# Patient Record
Sex: MALE | Race: WHITE | NOT HISPANIC OR LATINO
[De-identification: names, ages, dates, MRNs, and addresses within clinical notes are randomized per-mention and may not be internally consistent; named-entity substitution may affect disease eponyms.]

---

## 2017-02-22 ENCOUNTER — APPOINTMENT (OUTPATIENT)
Dept: OTOLARYNGOLOGY | Facility: CLINIC | Age: 67
End: 2017-02-22

## 2017-02-22 VITALS
BODY MASS INDEX: 25.69 KG/M2 | SYSTOLIC BLOOD PRESSURE: 117 MMHG | DIASTOLIC BLOOD PRESSURE: 76 MMHG | HEART RATE: 83 BPM | HEIGHT: 63 IN | WEIGHT: 145 LBS

## 2017-02-22 DIAGNOSIS — Z78.9 OTHER SPECIFIED HEALTH STATUS: ICD-10-CM

## 2017-02-22 DIAGNOSIS — N40.0 BENIGN PROSTATIC HYPERPLASIA WITHOUT LOWER URINARY TRACT SYMPMS: ICD-10-CM

## 2017-02-22 DIAGNOSIS — Z83.3 FAMILY HISTORY OF DIABETES MELLITUS: ICD-10-CM

## 2017-02-22 RX ORDER — ALBUTEROL SULFATE 90 UG/1
108 (90 BASE) AEROSOL, METERED RESPIRATORY (INHALATION)
Qty: 8 | Refills: 0 | Status: COMPLETED | COMMUNITY
Start: 2016-10-03

## 2017-02-22 RX ORDER — CEPHALEXIN 500 MG/1
500 CAPSULE ORAL
Qty: 20 | Refills: 0 | Status: COMPLETED | COMMUNITY
Start: 2016-10-24

## 2017-02-22 RX ORDER — NAPROXEN 500 MG/1
500 TABLET ORAL
Qty: 60 | Refills: 0 | Status: COMPLETED | COMMUNITY
Start: 2016-10-24

## 2017-02-22 RX ORDER — FOLIC ACID 1 MG/1
1 TABLET ORAL
Qty: 180 | Refills: 0 | Status: COMPLETED | COMMUNITY
Start: 2016-10-12

## 2017-02-22 RX ORDER — OXYCODONE AND ACETAMINOPHEN 5; 325 MG/1; MG/1
5-325 TABLET ORAL
Qty: 30 | Refills: 0 | Status: COMPLETED | COMMUNITY
Start: 2016-10-19

## 2017-03-08 ENCOUNTER — RESULT REVIEW (OUTPATIENT)
Age: 67
End: 2017-03-08

## 2017-03-09 ENCOUNTER — APPOINTMENT (OUTPATIENT)
Dept: OTOLARYNGOLOGY | Facility: CLINIC | Age: 67
End: 2017-03-09

## 2017-03-09 VITALS
SYSTOLIC BLOOD PRESSURE: 119 MMHG | DIASTOLIC BLOOD PRESSURE: 73 MMHG | BODY MASS INDEX: 26.05 KG/M2 | WEIGHT: 147 LBS | HEART RATE: 75 BPM | HEIGHT: 63 IN

## 2017-03-10 ENCOUNTER — OUTPATIENT (OUTPATIENT)
Dept: OUTPATIENT SERVICES | Facility: HOSPITAL | Age: 67
LOS: 1 days | End: 2017-03-10
Payer: COMMERCIAL

## 2017-03-10 DIAGNOSIS — J34.0 ABSCESS, FURUNCLE AND CARBUNCLE OF NOSE: ICD-10-CM

## 2017-03-10 PROCEDURE — 88305 TISSUE EXAM BY PATHOLOGIST: CPT

## 2017-03-10 PROCEDURE — 88312 SPECIAL STAINS GROUP 1: CPT

## 2017-03-10 PROCEDURE — 88313 SPECIAL STAINS GROUP 2: CPT

## 2017-03-10 RX ORDER — SULFAMETHOXAZOLE AND TRIMETHOPRIM 400; 80 MG/1; MG/1
400-80 TABLET ORAL TWICE DAILY
Qty: 20 | Refills: 0 | Status: DISCONTINUED | COMMUNITY
Start: 2017-03-09 | End: 2017-03-10

## 2017-03-15 ENCOUNTER — APPOINTMENT (OUTPATIENT)
Dept: OTOLARYNGOLOGY | Facility: CLINIC | Age: 67
End: 2017-03-15

## 2017-03-15 VITALS
DIASTOLIC BLOOD PRESSURE: 81 MMHG | WEIGHT: 147 LBS | BODY MASS INDEX: 26.05 KG/M2 | HEIGHT: 63 IN | HEART RATE: 79 BPM | SYSTOLIC BLOOD PRESSURE: 121 MMHG

## 2017-03-15 LAB — SURGICAL PATHOLOGY STUDY: SIGNIFICANT CHANGE UP

## 2017-03-24 ENCOUNTER — APPOINTMENT (OUTPATIENT)
Dept: OTOLARYNGOLOGY | Facility: CLINIC | Age: 67
End: 2017-03-24

## 2017-03-24 RX ORDER — SULFAMETHOXAZOLE AND TRIMETHOPRIM 800; 160 MG/1; MG/1
800-160 TABLET ORAL DAILY
Qty: 14 | Refills: 0 | Status: COMPLETED | COMMUNITY
Start: 2017-03-09 | End: 2017-03-24

## 2017-04-07 ENCOUNTER — APPOINTMENT (OUTPATIENT)
Dept: OTOLARYNGOLOGY | Facility: CLINIC | Age: 67
End: 2017-04-07

## 2017-04-27 ENCOUNTER — APPOINTMENT (OUTPATIENT)
Dept: OTOLARYNGOLOGY | Facility: CLINIC | Age: 67
End: 2017-04-27

## 2017-04-27 VITALS
BODY MASS INDEX: 26.05 KG/M2 | HEART RATE: 72 BPM | HEIGHT: 63 IN | SYSTOLIC BLOOD PRESSURE: 121 MMHG | WEIGHT: 147 LBS | DIASTOLIC BLOOD PRESSURE: 77 MMHG

## 2017-04-27 RX ORDER — CIPROFLOXACIN HYDROCHLORIDE 500 MG/1
500 TABLET, FILM COATED ORAL TWICE DAILY
Qty: 20 | Refills: 0 | Status: COMPLETED | COMMUNITY
Start: 2017-03-24 | End: 2017-04-27

## 2017-04-27 RX ORDER — MUPIROCIN 20 MG/G
2 OINTMENT TOPICAL 3 TIMES DAILY
Qty: 1 | Refills: 0 | Status: COMPLETED | COMMUNITY
Start: 2017-02-22 | End: 2017-04-27

## 2017-06-14 ENCOUNTER — APPOINTMENT (OUTPATIENT)
Dept: OTOLARYNGOLOGY | Facility: CLINIC | Age: 67
End: 2017-06-14

## 2017-06-14 VITALS
DIASTOLIC BLOOD PRESSURE: 76 MMHG | HEART RATE: 84 BPM | BODY MASS INDEX: 24.8 KG/M2 | HEIGHT: 63 IN | WEIGHT: 140 LBS | SYSTOLIC BLOOD PRESSURE: 118 MMHG

## 2017-07-12 ENCOUNTER — APPOINTMENT (OUTPATIENT)
Dept: OTOLARYNGOLOGY | Facility: CLINIC | Age: 67
End: 2017-07-12

## 2017-07-12 VITALS
DIASTOLIC BLOOD PRESSURE: 80 MMHG | HEART RATE: 70 BPM | BODY MASS INDEX: 24.84 KG/M2 | HEIGHT: 62 IN | SYSTOLIC BLOOD PRESSURE: 133 MMHG | WEIGHT: 135 LBS

## 2017-07-13 RX ORDER — CIPROFLOXACIN HYDROCHLORIDE 500 MG/1
500 TABLET, FILM COATED ORAL
Qty: 28 | Refills: 1 | Status: COMPLETED | COMMUNITY
Start: 2017-06-14 | End: 2017-07-11

## 2017-09-07 ENCOUNTER — APPOINTMENT (OUTPATIENT)
Dept: RHEUMATOLOGY | Facility: CLINIC | Age: 67
End: 2017-09-07
Payer: COMMERCIAL

## 2017-09-07 VITALS — DIASTOLIC BLOOD PRESSURE: 78 MMHG | SYSTOLIC BLOOD PRESSURE: 118 MMHG

## 2017-09-07 VITALS — OXYGEN SATURATION: 98 % | DIASTOLIC BLOOD PRESSURE: 78 MMHG | SYSTOLIC BLOOD PRESSURE: 130 MMHG | HEART RATE: 88 BPM

## 2017-09-07 PROCEDURE — 99204 OFFICE O/P NEW MOD 45 MIN: CPT

## 2017-09-08 LAB
ALBUMIN SERPL ELPH-MCNC: 4.4 G/DL
ALP BLD-CCNC: 101 U/L
ALT SERPL-CCNC: 25 U/L
ANION GAP SERPL CALC-SCNC: 14 MMOL/L
AST SERPL-CCNC: 19 U/L
BASOPHILS # BLD AUTO: 0.02 K/UL
BASOPHILS NFR BLD AUTO: 0.2 %
BILIRUB SERPL-MCNC: <0.2 MG/DL
BUN SERPL-MCNC: 25 MG/DL
CALCIUM SERPL-MCNC: 10.2 MG/DL
CHLORIDE SERPL-SCNC: 101 MMOL/L
CO2 SERPL-SCNC: 22 MMOL/L
CREAT SERPL-MCNC: 1.57 MG/DL
CRP SERPL-MCNC: <0.2 MG/DL
EOSINOPHIL # BLD AUTO: 0.16 K/UL
EOSINOPHIL NFR BLD AUTO: 1.5 %
ERYTHROCYTE [SEDIMENTATION RATE] IN BLOOD BY WESTERGREN METHOD: 18 MM/HR
GLUCOSE SERPL-MCNC: 121 MG/DL
HCT VFR BLD CALC: 47.1 %
HGB BLD-MCNC: 15.4 G/DL
IMM GRANULOCYTES NFR BLD AUTO: 0.5 %
LYMPHOCYTES # BLD AUTO: 3.07 K/UL
LYMPHOCYTES NFR BLD AUTO: 29.6 %
MAN DIFF?: NORMAL
MCHC RBC-ENTMCNC: 28.6 PG
MCHC RBC-ENTMCNC: 32.7 GM/DL
MCV RBC AUTO: 87.5 FL
MONOCYTES # BLD AUTO: 0.74 K/UL
MONOCYTES NFR BLD AUTO: 7.1 %
NEUTROPHILS # BLD AUTO: 6.33 K/UL
NEUTROPHILS NFR BLD AUTO: 61.1 %
PLATELET # BLD AUTO: 166 K/UL
POTASSIUM SERPL-SCNC: 3.7 MMOL/L
PROT SERPL-MCNC: 7.5 G/DL
RBC # BLD: 5.38 M/UL
RBC # FLD: 13.9 %
RHEUMATOID FACT SER QL: 121 IU/ML
SODIUM SERPL-SCNC: 137 MMOL/L
WBC # FLD AUTO: 10.37 K/UL

## 2017-09-18 LAB
ANA PAT FLD IF-IMP: ABNORMAL
ANA SER IF-ACNC: ABNORMAL
CCP AB SER IA-ACNC: >250 UNITS
RF+CCP IGG SER-IMP: ABNORMAL

## 2017-09-20 ENCOUNTER — APPOINTMENT (OUTPATIENT)
Dept: HEART AND VASCULAR | Facility: CLINIC | Age: 67
End: 2017-09-20
Payer: COMMERCIAL

## 2017-09-20 VITALS — OXYGEN SATURATION: 94 % | DIASTOLIC BLOOD PRESSURE: 81 MMHG | SYSTOLIC BLOOD PRESSURE: 123 MMHG | HEART RATE: 78 BPM

## 2017-09-20 PROCEDURE — 99202 OFFICE O/P NEW SF 15 MIN: CPT

## 2017-09-26 ENCOUNTER — APPOINTMENT (OUTPATIENT)
Dept: RHEUMATOLOGY | Facility: CLINIC | Age: 67
End: 2017-09-26
Payer: COMMERCIAL

## 2017-09-26 VITALS — SYSTOLIC BLOOD PRESSURE: 121 MMHG | DIASTOLIC BLOOD PRESSURE: 82 MMHG | HEART RATE: 75 BPM | OXYGEN SATURATION: 97 %

## 2017-09-26 PROCEDURE — 99215 OFFICE O/P EST HI 40 MIN: CPT | Mod: 25

## 2017-09-26 PROCEDURE — 20600 DRAIN/INJ JOINT/BURSA W/O US: CPT

## 2017-09-28 ENCOUNTER — APPOINTMENT (OUTPATIENT)
Dept: PULMONOLOGY | Facility: CLINIC | Age: 67
End: 2017-09-28

## 2017-10-05 ENCOUNTER — OUTPATIENT (OUTPATIENT)
Dept: OUTPATIENT SERVICES | Facility: HOSPITAL | Age: 67
LOS: 1 days | End: 2017-10-05
Payer: COMMERCIAL

## 2017-10-05 PROCEDURE — 71250 CT THORAX DX C-: CPT

## 2017-10-05 PROCEDURE — 71250 CT THORAX DX C-: CPT | Mod: 26

## 2017-10-09 ENCOUNTER — INPATIENT (INPATIENT)
Facility: HOSPITAL | Age: 67
LOS: 0 days | Discharge: HOME | DRG: 714 | End: 2017-10-10
Attending: UROLOGY | Admitting: UROLOGY
Payer: COMMERCIAL

## 2017-10-09 ENCOUNTER — RESULT REVIEW (OUTPATIENT)
Age: 67
End: 2017-10-09

## 2017-10-09 VITALS
TEMPERATURE: 98 F | DIASTOLIC BLOOD PRESSURE: 74 MMHG | RESPIRATION RATE: 16 BRPM | OXYGEN SATURATION: 95 % | HEIGHT: 62 IN | WEIGHT: 141.54 LBS | SYSTOLIC BLOOD PRESSURE: 121 MMHG | HEART RATE: 90 BPM

## 2017-10-09 DIAGNOSIS — Z41.9 ENCOUNTER FOR PROCEDURE FOR PURPOSES OTHER THAN REMEDYING HEALTH STATE, UNSPECIFIED: Chronic | ICD-10-CM

## 2017-10-09 DIAGNOSIS — N40.1 BENIGN PROSTATIC HYPERPLASIA WITH LOWER URINARY TRACT SYMPTOMS: ICD-10-CM

## 2017-10-09 DIAGNOSIS — Z98.890 OTHER SPECIFIED POSTPROCEDURAL STATES: Chronic | ICD-10-CM

## 2017-10-09 LAB
ANION GAP SERPL CALC-SCNC: 14 MMOL/L — SIGNIFICANT CHANGE UP (ref 5–17)
BASOPHILS NFR BLD AUTO: 0.1 % — SIGNIFICANT CHANGE UP (ref 0–2)
BUN SERPL-MCNC: 17 MG/DL — SIGNIFICANT CHANGE UP (ref 7–23)
CALCIUM SERPL-MCNC: 9.6 MG/DL — SIGNIFICANT CHANGE UP (ref 8.4–10.5)
CHLORIDE SERPL-SCNC: 102 MMOL/L — SIGNIFICANT CHANGE UP (ref 96–108)
CO2 SERPL-SCNC: 23 MMOL/L — SIGNIFICANT CHANGE UP (ref 22–31)
CREAT SERPL-MCNC: 0.98 MG/DL — SIGNIFICANT CHANGE UP (ref 0.5–1.3)
EOSINOPHIL NFR BLD AUTO: 1 % — SIGNIFICANT CHANGE UP (ref 0–6)
GLUCOSE SERPL-MCNC: 98 MG/DL — SIGNIFICANT CHANGE UP (ref 70–99)
HCT VFR BLD CALC: 43 % — SIGNIFICANT CHANGE UP (ref 39–50)
HGB BLD-MCNC: 14.3 G/DL — SIGNIFICANT CHANGE UP (ref 13–17)
LYMPHOCYTES # BLD AUTO: 22.8 % — SIGNIFICANT CHANGE UP (ref 13–44)
MCHC RBC-ENTMCNC: 28.7 PG — SIGNIFICANT CHANGE UP (ref 27–34)
MCHC RBC-ENTMCNC: 33.3 G/DL — SIGNIFICANT CHANGE UP (ref 32–36)
MCV RBC AUTO: 86.2 FL — SIGNIFICANT CHANGE UP (ref 80–100)
MONOCYTES NFR BLD AUTO: 5.4 % — SIGNIFICANT CHANGE UP (ref 2–14)
NEUTROPHILS NFR BLD AUTO: 70.7 % — SIGNIFICANT CHANGE UP (ref 43–77)
PLATELET # BLD AUTO: 141 K/UL — LOW (ref 150–400)
POTASSIUM SERPL-MCNC: 4.4 MMOL/L — SIGNIFICANT CHANGE UP (ref 3.5–5.3)
POTASSIUM SERPL-SCNC: 4.4 MMOL/L — SIGNIFICANT CHANGE UP (ref 3.5–5.3)
RBC # BLD: 4.99 M/UL — SIGNIFICANT CHANGE UP (ref 4.2–5.8)
RBC # FLD: 13.6 % — SIGNIFICANT CHANGE UP (ref 10.3–16.9)
SODIUM SERPL-SCNC: 139 MMOL/L — SIGNIFICANT CHANGE UP (ref 135–145)
WBC # BLD: 7.8 K/UL — SIGNIFICANT CHANGE UP (ref 3.8–10.5)
WBC # FLD AUTO: 7.8 K/UL — SIGNIFICANT CHANGE UP (ref 3.8–10.5)

## 2017-10-09 RX ORDER — ACETAMINOPHEN 500 MG
650 TABLET ORAL EVERY 6 HOURS
Qty: 0 | Refills: 0 | Status: DISCONTINUED | OUTPATIENT
Start: 2017-10-09 | End: 2017-10-10

## 2017-10-09 RX ORDER — LIDOCAINE 4 G/100G
1 CREAM TOPICAL
Qty: 0 | Refills: 0 | Status: DISCONTINUED | OUTPATIENT
Start: 2017-10-09 | End: 2017-10-10

## 2017-10-09 RX ORDER — LIDOCAINE 4 G/100G
1 CREAM TOPICAL
Qty: 0 | Refills: 0 | Status: DISCONTINUED | OUTPATIENT
Start: 2017-10-09 | End: 2017-10-09

## 2017-10-09 RX ORDER — SODIUM CHLORIDE 9 MG/ML
1000 INJECTION, SOLUTION INTRAVENOUS
Qty: 0 | Refills: 0 | Status: DISCONTINUED | OUTPATIENT
Start: 2017-10-09 | End: 2017-10-10

## 2017-10-09 RX ORDER — CEFAZOLIN SODIUM 1 G
2000 VIAL (EA) INJECTION EVERY 8 HOURS
Qty: 0 | Refills: 0 | Status: DISCONTINUED | OUTPATIENT
Start: 2017-10-09 | End: 2017-10-10

## 2017-10-09 RX ORDER — DOCUSATE SODIUM 100 MG
100 CAPSULE ORAL THREE TIMES A DAY
Qty: 0 | Refills: 0 | Status: DISCONTINUED | OUTPATIENT
Start: 2017-10-09 | End: 2017-10-10

## 2017-10-09 RX ORDER — OXYBUTYNIN CHLORIDE 5 MG
5 TABLET ORAL EVERY 8 HOURS
Qty: 0 | Refills: 0 | Status: DISCONTINUED | OUTPATIENT
Start: 2017-10-09 | End: 2017-10-10

## 2017-10-09 RX ORDER — OXYCODONE AND ACETAMINOPHEN 5; 325 MG/1; MG/1
1 TABLET ORAL EVERY 4 HOURS
Qty: 0 | Refills: 0 | Status: DISCONTINUED | OUTPATIENT
Start: 2017-10-09 | End: 2017-10-10

## 2017-10-09 RX ORDER — SENNA PLUS 8.6 MG/1
1 TABLET ORAL AT BEDTIME
Qty: 0 | Refills: 0 | Status: DISCONTINUED | OUTPATIENT
Start: 2017-10-09 | End: 2017-10-10

## 2017-10-09 RX ORDER — ONDANSETRON 8 MG/1
4 TABLET, FILM COATED ORAL EVERY 6 HOURS
Qty: 0 | Refills: 0 | Status: DISCONTINUED | OUTPATIENT
Start: 2017-10-09 | End: 2017-10-10

## 2017-10-09 RX ADMIN — SENNA PLUS 1 TABLET(S): 8.6 TABLET ORAL at 22:23

## 2017-10-09 RX ADMIN — Medication 100 MILLIGRAM(S): at 22:23

## 2017-10-09 RX ADMIN — Medication 100 MILLIGRAM(S): at 22:24

## 2017-10-09 NOTE — H&P ADULT - ASSESSMENT
66 y.o. gentleman with obstructive lower urinary tract symptoms secondary to enlarged prostate, now s/p TURP.

## 2017-10-09 NOTE — ASU PATIENT PROFILE, ADULT - VISION (WITH CORRECTIVE LENSES IF THE PATIENT USUALLY WEARS THEM):
needs glaSSES/Partially impaired: cannot see medication labels or newsprint, but can see obstacles in path, and the surrounding layout; can count fingers at arm's length

## 2017-10-09 NOTE — PROGRESS NOTE ADULT - PROBLEM SELECTOR PLAN 1
-stable  -OOB  -IS, SCD's  -Diet: Regular  -Antibx: ancef  -I's & O's  -pain control  -IVF's  -continue CBI/FC  -release traction

## 2017-10-09 NOTE — H&P ADULT - HISTORY OF PRESENT ILLNESS
66 y.o. gentleman who presented at the office complaining of obstructive lower urinary tract symptoms who on evaluation was noted to have an enlarged and obstructing prostate.

## 2017-10-09 NOTE — PROGRESS NOTE ADULT - SUBJECTIVE AND OBJECTIVE BOX
UROLOGY POST OP NOTE (PAGER # [phone])    PROCEDURE: TURP    T(C): 36.6 (10-09-17 @ 20:45), Max: 36.6 (10-09-17 @ 20:45)  HR: 70 (10-09-17 @ 20:45) (70 - 90)  BP: 132/82 (10-09-17 @ 20:45) (113/74 - 132/82)  RR: 13 (10-09-17 @ 20:45) (11 - 21)  SpO2: 98% (10-09-17 @ 20:45) (94% - 98%)  Wt(kg): --  UO: CBI    SUBJECTIVE: pain controlled, no N/V, no CP/SOB    ON PE: alert and awake    Abdomen: soft, NT, ND    : FC/CBI intact urine pinkish                          14.3   7.8   )-----------( 141      ( 09 Oct 2017 19:53 )             43.0     10-09    139  |  102  |  17  ----------------------------<  98  4.4   |  23  |  0.98    Ca    9.6      09 Oct 2017 19:54

## 2017-10-09 NOTE — BRIEF OPERATIVE NOTE - PROCEDURE
<<-----Click on this checkbox to enter Procedure TURP, using bipolar cautery  10/09/2017    Active  [id number]

## 2017-10-10 ENCOUNTER — TRANSCRIPTION ENCOUNTER (OUTPATIENT)
Age: 67
End: 2017-10-10

## 2017-10-10 VITALS
OXYGEN SATURATION: 96 % | TEMPERATURE: 98 F | RESPIRATION RATE: 18 BRPM | HEART RATE: 70 BPM | DIASTOLIC BLOOD PRESSURE: 69 MMHG | SYSTOLIC BLOOD PRESSURE: 111 MMHG

## 2017-10-10 LAB
ANION GAP SERPL CALC-SCNC: 13 MMOL/L — SIGNIFICANT CHANGE UP (ref 5–17)
BLD GP AB SCN SERPL QL: NEGATIVE — SIGNIFICANT CHANGE UP
BUN SERPL-MCNC: 18 MG/DL — SIGNIFICANT CHANGE UP (ref 7–23)
CALCIUM SERPL-MCNC: 9.5 MG/DL — SIGNIFICANT CHANGE UP (ref 8.4–10.5)
CHLORIDE SERPL-SCNC: 102 MMOL/L — SIGNIFICANT CHANGE UP (ref 96–108)
CO2 SERPL-SCNC: 22 MMOL/L — SIGNIFICANT CHANGE UP (ref 22–31)
CREAT SERPL-MCNC: 0.94 MG/DL — SIGNIFICANT CHANGE UP (ref 0.5–1.3)
CULTURE RESULTS: NO GROWTH — SIGNIFICANT CHANGE UP
GLUCOSE SERPL-MCNC: 154 MG/DL — HIGH (ref 70–99)
HCT VFR BLD CALC: 41.9 % — SIGNIFICANT CHANGE UP (ref 39–50)
HGB BLD-MCNC: 14.2 G/DL — SIGNIFICANT CHANGE UP (ref 13–17)
MAGNESIUM SERPL-MCNC: 1.6 MG/DL — SIGNIFICANT CHANGE UP (ref 1.6–2.6)
MCHC RBC-ENTMCNC: 29 PG — SIGNIFICANT CHANGE UP (ref 27–34)
MCHC RBC-ENTMCNC: 33.9 G/DL — SIGNIFICANT CHANGE UP (ref 32–36)
MCV RBC AUTO: 85.5 FL — SIGNIFICANT CHANGE UP (ref 80–100)
PHOSPHATE SERPL-MCNC: 3.3 MG/DL — SIGNIFICANT CHANGE UP (ref 2.5–4.5)
PLATELET # BLD AUTO: 164 K/UL — SIGNIFICANT CHANGE UP (ref 150–400)
POTASSIUM SERPL-MCNC: 4.4 MMOL/L — SIGNIFICANT CHANGE UP (ref 3.5–5.3)
POTASSIUM SERPL-SCNC: 4.4 MMOL/L — SIGNIFICANT CHANGE UP (ref 3.5–5.3)
RBC # BLD: 4.9 M/UL — SIGNIFICANT CHANGE UP (ref 4.2–5.8)
RBC # FLD: 13.6 % — SIGNIFICANT CHANGE UP (ref 10.3–16.9)
RH IG SCN BLD-IMP: POSITIVE — SIGNIFICANT CHANGE UP
SODIUM SERPL-SCNC: 137 MMOL/L — SIGNIFICANT CHANGE UP (ref 135–145)
SPECIMEN SOURCE: SIGNIFICANT CHANGE UP
WBC # BLD: 8.8 K/UL — SIGNIFICANT CHANGE UP (ref 3.8–10.5)
WBC # FLD AUTO: 8.8 K/UL — SIGNIFICANT CHANGE UP (ref 3.8–10.5)

## 2017-10-10 RX ORDER — MAGNESIUM OXIDE 400 MG ORAL TABLET 241.3 MG
400 TABLET ORAL ONCE
Qty: 0 | Refills: 0 | Status: COMPLETED | OUTPATIENT
Start: 2017-10-10 | End: 2017-10-10

## 2017-10-10 RX ORDER — CIPROFLOXACIN LACTATE 400MG/40ML
1 VIAL (ML) INTRAVENOUS
Qty: 10 | Refills: 0
Start: 2017-10-10 | End: 2017-10-15

## 2017-10-10 RX ADMIN — SODIUM CHLORIDE 100 MILLILITER(S): 9 INJECTION, SOLUTION INTRAVENOUS at 01:29

## 2017-10-10 RX ADMIN — MAGNESIUM OXIDE 400 MG ORAL TABLET 400 MILLIGRAM(S): 241.3 TABLET ORAL at 12:18

## 2017-10-10 RX ADMIN — Medication 100 MILLIGRAM(S): at 06:19

## 2017-10-10 NOTE — DISCHARGE NOTE ADULT - CARE PROVIDER_API CALL
Regino Esparza), Urology  [address] [place] [address], [place] [id number]  Phone: [phone]  Fax: [phone]

## 2017-10-10 NOTE — DISCHARGE NOTE ADULT - PLAN OF CARE
regular diet, activity as tolerated, alford to leg bag. If fever >100.4 or any change or worsening of symptoms please call doctor or report to ED. Make followup appointment with Dr Esparza call office improvement after surgery

## 2017-10-10 NOTE — PROGRESS NOTE ADULT - SUBJECTIVE AND OBJECTIVE BOX
INTERVAL HPI/OVERNIGHT EVENTS:  No acute events overnight.    VITALS:    T(F): 98.2 (10-10-17 @ 05:12), Max: 98.7 (10-10-17 @ 01:18)  HR: 81 (10-10-17 @ 05:12) (70 - 90)  BP: 114/76 (10-10-17 @ 05:12) (110/75 - 132/82)  RR: 18 (10-10-17 @ 05:12) (11 - 21)  SpO2: 94% (10-10-17 @ 05:12) (94% - 98%)  Wt(kg): --    I&O's Detail    09 Oct 2017 07:01  -  10 Oct 2017 05:15  --------------------------------------------------------  IN:    Continuous Bladder Irrigation: 61008 mL    lactated ringers.: 900 mL    Solution: 50 mL  Total IN: 05866 mL    OUT:    Continuous Bladder Irrigation: 72371 mL  Total OUT: 13450 mL    Total NET: 3775 mL          MEDICATIONS:    ANTIBIOTICS:  ceFAZolin   IVPB 2000 milliGRAM(s) IV Intermittent every 8 hours      PAIN CONTROL:  acetaminophen   Tablet. 650 milliGRAM(s) Oral every 6 hours PRN  ondansetron Injectable 4 milliGRAM(s) IV Push every 6 hours PRN  oxyCODONE    5 mG/acetaminophen 325 mG 1 Tablet(s) Oral every 4 hours PRN       MEDS:  oxybutynin 5 milliGRAM(s) Oral every 8 hours PRN      HEME/ONC        PHYSICAL EXAM:  General: No acute distress.  Alert and Oriented  Abdominal Exam: soft, NT, ND   Exam: FC/ CBI intact, urine clear      LABS:                        14.3   7.8   )-----------( 141      ( 09 Oct 2017 19:53 )             43.0     10-09    139  |  102  |  17  ----------------------------<  98  4.4   |  23  |  0.98    Ca    9.6      09 Oct 2017 19:54            RADIOLOGY & ADDITIONAL TESTS:

## 2017-10-10 NOTE — DISCHARGE NOTE ADULT - CARE PLAN
Principal Discharge DX:	BPH with obstruction/lower urinary tract symptoms  Goal:	improvement after surgery  Instructions for follow-up, activity and diet:	regular diet, activity as tolerated, alford to leg bag. If fever >100.4 or any change or worsening of symptoms please call doctor or report to ED. Make followup appointment with Dr Esparza call office

## 2017-10-10 NOTE — DISCHARGE NOTE ADULT - MEDICATION SUMMARY - MEDICATIONS TO TAKE
I will START or STAY ON the medications listed below when I get home from the hospital:    tamsulosin 0.4 mg oral capsule  -- 1 cap(s) by mouth once a day  -- Indication: For home med    Cipro 500 mg oral tablet  -- 1 tab(s) by mouth every 12 hours   -- Avoid prolonged or excessive exposure to direct and/or artificial sunlight while taking this medication.  Check with your doctor before becoming pregnant.  Do not take dairy products, antacids, or iron preparations within one hour of this medication.  Finish all this medication unless otherwise directed by prescriber.  Medication should be taken with plenty of water.    -- Indication: For infection prophylaxis

## 2017-10-11 LAB — SURGICAL PATHOLOGY STUDY: SIGNIFICANT CHANGE UP

## 2017-10-13 DIAGNOSIS — N40.1 BENIGN PROSTATIC HYPERPLASIA WITH LOWER URINARY TRACT SYMPTOMS: ICD-10-CM

## 2017-10-13 DIAGNOSIS — Z87.891 PERSONAL HISTORY OF NICOTINE DEPENDENCE: ICD-10-CM

## 2017-10-13 DIAGNOSIS — M06.9 RHEUMATOID ARTHRITIS, UNSPECIFIED: ICD-10-CM

## 2017-10-13 DIAGNOSIS — J42 UNSPECIFIED CHRONIC BRONCHITIS: ICD-10-CM

## 2017-10-30 ENCOUNTER — APPOINTMENT (OUTPATIENT)
Dept: RHEUMATOLOGY | Facility: CLINIC | Age: 67
End: 2017-10-30
Payer: COMMERCIAL

## 2017-10-30 PROCEDURE — 99214 OFFICE O/P EST MOD 30 MIN: CPT

## 2017-11-01 PROBLEM — M06.9 RHEUMATOID ARTHRITIS, UNSPECIFIED: Chronic | Status: ACTIVE | Noted: 2017-10-09

## 2017-11-10 ENCOUNTER — APPOINTMENT (OUTPATIENT)
Dept: ORTHOPEDIC SURGERY | Facility: CLINIC | Age: 67
End: 2017-11-10
Payer: COMMERCIAL

## 2017-11-10 VITALS — HEIGHT: 63 IN | BODY MASS INDEX: 24.8 KG/M2 | RESPIRATION RATE: 16 BRPM | WEIGHT: 140 LBS

## 2017-11-10 PROCEDURE — 99203 OFFICE O/P NEW LOW 30 MIN: CPT

## 2017-11-10 PROCEDURE — 73070 X-RAY EXAM OF ELBOW: CPT | Mod: 50

## 2017-11-10 PROCEDURE — 73110 X-RAY EXAM OF WRIST: CPT | Mod: 50

## 2017-11-10 RX ORDER — MUPIROCIN 2 G/100G
2 CREAM TOPICAL
Refills: 0 | Status: DISCONTINUED | COMMUNITY
End: 2017-11-10

## 2017-11-10 RX ORDER — DICLOFENAC SODIUM 16.05 MG/ML
1.5 SOLUTION TOPICAL
Qty: 1 | Refills: 1 | Status: DISCONTINUED | COMMUNITY
Start: 2017-09-07 | End: 2017-11-10

## 2017-11-10 RX ORDER — FOLIC ACID 1 MG/1
1 TABLET ORAL
Qty: 30 | Refills: 3 | Status: DISCONTINUED | COMMUNITY
Start: 2017-10-30 | End: 2017-11-10

## 2017-11-10 RX ORDER — CEFUROXIME AXETIL 500 MG/1
500 TABLET ORAL
Qty: 10 | Refills: 0 | Status: DISCONTINUED | COMMUNITY
Start: 2017-08-07

## 2017-11-10 RX ORDER — NITROFURANTOIN (MONOHYDRATE/MACROCRYSTALS) 25; 75 MG/1; MG/1
100 CAPSULE ORAL
Qty: 10 | Refills: 0 | Status: DISCONTINUED | COMMUNITY
Start: 2017-08-07

## 2017-11-10 RX ORDER — HYDROCORTISONE 1 %
12 CREAM (GRAM) TOPICAL
Qty: 400 | Refills: 0 | Status: DISCONTINUED | COMMUNITY
Start: 2017-04-25 | End: 2017-11-10

## 2017-11-10 RX ORDER — ECONAZOLE NITRATE 10 MG/G
1 CREAM TOPICAL
Qty: 30 | Refills: 0 | Status: DISCONTINUED | COMMUNITY
Start: 2017-03-30 | End: 2017-11-10

## 2017-11-10 RX ORDER — MUPIROCIN 20 MG/G
2 OINTMENT TOPICAL TWICE DAILY
Qty: 22 | Refills: 1 | Status: DISCONTINUED | COMMUNITY
Start: 2017-06-14 | End: 2017-11-10

## 2017-11-10 RX ORDER — FLUTICASONE PROPIONATE 0.5 MG/G
0.05 CREAM TOPICAL
Qty: 60 | Refills: 0 | Status: DISCONTINUED | COMMUNITY
Start: 2017-04-25 | End: 2017-11-10

## 2017-11-10 RX ORDER — METHOTREXATE 2.5 MG/1
2.5 TABLET ORAL
Qty: 24 | Refills: 6 | Status: DISCONTINUED | COMMUNITY
Start: 2017-10-30 | End: 2017-11-10

## 2017-11-10 RX ORDER — FLUTICASONE PROPIONATE AND SALMETEROL 50; 100 UG/1; UG/1
100-50 POWDER RESPIRATORY (INHALATION)
Qty: 60 | Refills: 0 | Status: DISCONTINUED | COMMUNITY
Start: 2017-10-10

## 2017-11-10 RX ORDER — LEVOFLOXACIN 500 MG/1
500 TABLET, FILM COATED ORAL
Qty: 3 | Refills: 0 | Status: DISCONTINUED | COMMUNITY
Start: 2017-10-23

## 2017-11-10 RX ORDER — CHROMIUM 200 MCG
1000 TABLET ORAL DAILY
Qty: 30 | Refills: 6 | Status: DISCONTINUED | COMMUNITY
Start: 2017-10-30 | End: 2017-11-10

## 2017-11-17 ENCOUNTER — APPOINTMENT (OUTPATIENT)
Dept: PULMONOLOGY | Facility: CLINIC | Age: 67
End: 2017-11-17
Payer: COMMERCIAL

## 2017-11-17 PROCEDURE — 99214 OFFICE O/P EST MOD 30 MIN: CPT | Mod: 25

## 2017-11-28 ENCOUNTER — APPOINTMENT (OUTPATIENT)
Dept: RHEUMATOLOGY | Facility: CLINIC | Age: 67
End: 2017-11-28

## 2017-12-06 ENCOUNTER — APPOINTMENT (OUTPATIENT)
Dept: PULMONOLOGY | Facility: CLINIC | Age: 67
End: 2017-12-06

## 2018-01-17 ENCOUNTER — RX RENEWAL (OUTPATIENT)
Age: 68
End: 2018-01-17

## 2018-01-18 ENCOUNTER — RX RENEWAL (OUTPATIENT)
Age: 68
End: 2018-01-18

## 2018-01-23 ENCOUNTER — APPOINTMENT (OUTPATIENT)
Dept: RHEUMATOLOGY | Facility: CLINIC | Age: 68
End: 2018-01-23
Payer: COMMERCIAL

## 2018-01-23 VITALS
HEART RATE: 94 BPM | BODY MASS INDEX: 26.58 KG/M2 | WEIGHT: 150 LBS | SYSTOLIC BLOOD PRESSURE: 110 MMHG | DIASTOLIC BLOOD PRESSURE: 78 MMHG | OXYGEN SATURATION: 97 % | HEIGHT: 63 IN

## 2018-01-23 PROCEDURE — 99214 OFFICE O/P EST MOD 30 MIN: CPT

## 2018-02-23 ENCOUNTER — APPOINTMENT (OUTPATIENT)
Dept: RHEUMATOLOGY | Facility: CLINIC | Age: 68
End: 2018-02-23
Payer: COMMERCIAL

## 2018-02-23 VITALS — DIASTOLIC BLOOD PRESSURE: 78 MMHG | HEART RATE: 90 BPM | SYSTOLIC BLOOD PRESSURE: 118 MMHG | OXYGEN SATURATION: 98 %

## 2018-02-23 PROCEDURE — 99214 OFFICE O/P EST MOD 30 MIN: CPT | Mod: 25

## 2018-02-23 PROCEDURE — 36415 COLL VENOUS BLD VENIPUNCTURE: CPT

## 2018-02-27 LAB
ALBUMIN SERPL ELPH-MCNC: 3.9 G/DL
ALP BLD-CCNC: 100 U/L
ALT SERPL-CCNC: 33 U/L
ANION GAP SERPL CALC-SCNC: 14 MMOL/L
AST SERPL-CCNC: 21 U/L
BASOPHILS # BLD AUTO: 0.01 K/UL
BASOPHILS NFR BLD AUTO: 0.1 %
BILIRUB SERPL-MCNC: 0.3 MG/DL
BUN SERPL-MCNC: 19 MG/DL
CALCIUM SERPL-MCNC: 9.4 MG/DL
CHLORIDE SERPL-SCNC: 104 MMOL/L
CO2 SERPL-SCNC: 24 MMOL/L
CREAT SERPL-MCNC: 1.04 MG/DL
CRP SERPL-MCNC: <0.2 MG/DL
EOSINOPHIL # BLD AUTO: 0.02 K/UL
EOSINOPHIL NFR BLD AUTO: 0.2 %
ERYTHROCYTE [SEDIMENTATION RATE] IN BLOOD BY WESTERGREN METHOD: 34 MM/HR
GLUCOSE SERPL-MCNC: 157 MG/DL
HCT VFR BLD CALC: 49.3 %
HGB BLD-MCNC: 15.4 G/DL
IMM GRANULOCYTES NFR BLD AUTO: 0.4 %
LYMPHOCYTES # BLD AUTO: 1.96 K/UL
LYMPHOCYTES NFR BLD AUTO: 17.7 %
MAN DIFF?: NORMAL
MCHC RBC-ENTMCNC: 27.7 PG
MCHC RBC-ENTMCNC: 31.2 GM/DL
MCV RBC AUTO: 88.7 FL
MONOCYTES # BLD AUTO: 0.7 K/UL
MONOCYTES NFR BLD AUTO: 6.3 %
NEUTROPHILS # BLD AUTO: 8.37 K/UL
NEUTROPHILS NFR BLD AUTO: 75.3 %
PLATELET # BLD AUTO: 180 K/UL
POTASSIUM SERPL-SCNC: 4.4 MMOL/L
PROT SERPL-MCNC: 7.3 G/DL
RBC # BLD: 5.56 M/UL
RBC # FLD: 15.4 %
SODIUM SERPL-SCNC: 142 MMOL/L
WBC # FLD AUTO: 11.1 K/UL

## 2018-03-13 ENCOUNTER — APPOINTMENT (OUTPATIENT)
Dept: RHEUMATOLOGY | Facility: CLINIC | Age: 68
End: 2018-03-13
Payer: COMMERCIAL

## 2018-03-13 VITALS — OXYGEN SATURATION: 99 % | DIASTOLIC BLOOD PRESSURE: 74 MMHG | SYSTOLIC BLOOD PRESSURE: 114 MMHG | HEART RATE: 76 BPM

## 2018-03-13 PROCEDURE — 36415 COLL VENOUS BLD VENIPUNCTURE: CPT

## 2018-03-13 PROCEDURE — 99214 OFFICE O/P EST MOD 30 MIN: CPT | Mod: 25

## 2018-03-14 LAB
ALBUMIN SERPL ELPH-MCNC: 4 G/DL
ALP BLD-CCNC: 94 U/L
ALT SERPL-CCNC: 27 U/L
ANION GAP SERPL CALC-SCNC: 14 MMOL/L
AST SERPL-CCNC: 19 U/L
BASOPHILS # BLD AUTO: 0.01 K/UL
BASOPHILS NFR BLD AUTO: 0.1 %
BILIRUB SERPL-MCNC: 0.2 MG/DL
BUN SERPL-MCNC: 17 MG/DL
CALCIUM SERPL-MCNC: 9.3 MG/DL
CHLORIDE SERPL-SCNC: 107 MMOL/L
CO2 SERPL-SCNC: 20 MMOL/L
CREAT SERPL-MCNC: 1.03 MG/DL
CRP SERPL-MCNC: 0.3 MG/DL
EOSINOPHIL # BLD AUTO: 0.07 K/UL
EOSINOPHIL NFR BLD AUTO: 1 %
ERYTHROCYTE [SEDIMENTATION RATE] IN BLOOD BY WESTERGREN METHOD: 21 MM/HR
GLUCOSE SERPL-MCNC: 121 MG/DL
HCT VFR BLD CALC: 50 %
HGB BLD-MCNC: 15.3 G/DL
IMM GRANULOCYTES NFR BLD AUTO: 0.4 %
LYMPHOCYTES # BLD AUTO: 2.07 K/UL
LYMPHOCYTES NFR BLD AUTO: 30.1 %
MAN DIFF?: NORMAL
MCHC RBC-ENTMCNC: 28.8 PG
MCHC RBC-ENTMCNC: 30.6 GM/DL
MCV RBC AUTO: 94.2 FL
MONOCYTES # BLD AUTO: 0.76 K/UL
MONOCYTES NFR BLD AUTO: 11 %
NEUTROPHILS # BLD AUTO: 3.94 K/UL
NEUTROPHILS NFR BLD AUTO: 57.4 %
PLATELET # BLD AUTO: 191 K/UL
POTASSIUM SERPL-SCNC: 4.2 MMOL/L
PROT SERPL-MCNC: 7 G/DL
RBC # BLD: 5.31 M/UL
RBC # FLD: 16 %
SODIUM SERPL-SCNC: 141 MMOL/L
WBC # FLD AUTO: 6.88 K/UL

## 2018-03-19 LAB — HBA1C MFR BLD HPLC: 6.1 %

## 2018-03-27 ENCOUNTER — APPOINTMENT (OUTPATIENT)
Dept: RHEUMATOLOGY | Facility: CLINIC | Age: 68
End: 2018-03-27

## 2018-04-02 ENCOUNTER — APPOINTMENT (OUTPATIENT)
Dept: HEART AND VASCULAR | Facility: CLINIC | Age: 68
End: 2018-04-02
Payer: COMMERCIAL

## 2018-04-02 VITALS
SYSTOLIC BLOOD PRESSURE: 118 MMHG | OXYGEN SATURATION: 98 % | WEIGHT: 150 LBS | TEMPERATURE: 97.8 F | HEART RATE: 72 BPM | BODY MASS INDEX: 29.45 KG/M2 | DIASTOLIC BLOOD PRESSURE: 80 MMHG | HEIGHT: 60 IN

## 2018-04-02 PROCEDURE — 99214 OFFICE O/P EST MOD 30 MIN: CPT | Mod: 25

## 2018-04-02 PROCEDURE — 93000 ELECTROCARDIOGRAM COMPLETE: CPT

## 2018-04-04 ENCOUNTER — OUTPATIENT (OUTPATIENT)
Dept: OUTPATIENT SERVICES | Facility: HOSPITAL | Age: 68
LOS: 1 days | End: 2018-04-04
Payer: COMMERCIAL

## 2018-04-04 ENCOUNTER — APPOINTMENT (OUTPATIENT)
Dept: RADIOLOGY | Facility: HOSPITAL | Age: 68
End: 2018-04-04

## 2018-04-04 DIAGNOSIS — Z41.9 ENCOUNTER FOR PROCEDURE FOR PURPOSES OTHER THAN REMEDYING HEALTH STATE, UNSPECIFIED: Chronic | ICD-10-CM

## 2018-04-04 DIAGNOSIS — Z98.890 OTHER SPECIFIED POSTPROCEDURAL STATES: Chronic | ICD-10-CM

## 2018-04-04 PROCEDURE — 77080 DXA BONE DENSITY AXIAL: CPT | Mod: 26

## 2018-04-04 PROCEDURE — 77080 DXA BONE DENSITY AXIAL: CPT

## 2018-04-09 ENCOUNTER — RESULT REVIEW (OUTPATIENT)
Age: 68
End: 2018-04-09

## 2018-04-09 ENCOUNTER — INPATIENT (INPATIENT)
Facility: HOSPITAL | Age: 68
LOS: 1 days | Discharge: HOME | DRG: 713 | End: 2018-04-11
Attending: UROLOGY | Admitting: UROLOGY
Payer: COMMERCIAL

## 2018-04-09 VITALS
SYSTOLIC BLOOD PRESSURE: 118 MMHG | OXYGEN SATURATION: 95 % | HEIGHT: 62 IN | DIASTOLIC BLOOD PRESSURE: 72 MMHG | RESPIRATION RATE: 18 BRPM | HEART RATE: 89 BPM | WEIGHT: 153 LBS | TEMPERATURE: 98 F

## 2018-04-09 DIAGNOSIS — Z41.9 ENCOUNTER FOR PROCEDURE FOR PURPOSES OTHER THAN REMEDYING HEALTH STATE, UNSPECIFIED: Chronic | ICD-10-CM

## 2018-04-09 DIAGNOSIS — Z98.890 OTHER SPECIFIED POSTPROCEDURAL STATES: Chronic | ICD-10-CM

## 2018-04-09 LAB
ANION GAP SERPL CALC-SCNC: 11 MMOL/L — SIGNIFICANT CHANGE UP (ref 5–17)
BUN SERPL-MCNC: 14 MG/DL — SIGNIFICANT CHANGE UP (ref 7–23)
CALCIUM SERPL-MCNC: 9.1 MG/DL — SIGNIFICANT CHANGE UP (ref 8.4–10.5)
CHLORIDE SERPL-SCNC: 104 MMOL/L — SIGNIFICANT CHANGE UP (ref 96–108)
CO2 SERPL-SCNC: 23 MMOL/L — SIGNIFICANT CHANGE UP (ref 22–31)
CREAT SERPL-MCNC: 0.94 MG/DL — SIGNIFICANT CHANGE UP (ref 0.5–1.3)
GLUCOSE SERPL-MCNC: 138 MG/DL — HIGH (ref 70–99)
GRAM STN FLD: SIGNIFICANT CHANGE UP
HCT VFR BLD CALC: 44.3 % — SIGNIFICANT CHANGE UP (ref 39–50)
HGB BLD-MCNC: 14.5 G/DL — SIGNIFICANT CHANGE UP (ref 13–17)
MCHC RBC-ENTMCNC: 28.4 PG — SIGNIFICANT CHANGE UP (ref 27–34)
MCHC RBC-ENTMCNC: 32.7 G/DL — SIGNIFICANT CHANGE UP (ref 32–36)
MCV RBC AUTO: 86.9 FL — SIGNIFICANT CHANGE UP (ref 80–100)
PLATELET # BLD AUTO: 133 K/UL — LOW (ref 150–400)
POTASSIUM SERPL-MCNC: 4.2 MMOL/L — SIGNIFICANT CHANGE UP (ref 3.5–5.3)
POTASSIUM SERPL-SCNC: 4.2 MMOL/L — SIGNIFICANT CHANGE UP (ref 3.5–5.3)
RBC # BLD: 5.1 M/UL — SIGNIFICANT CHANGE UP (ref 4.2–5.8)
RBC # FLD: 14.6 % — SIGNIFICANT CHANGE UP (ref 10.3–16.9)
SODIUM SERPL-SCNC: 138 MMOL/L — SIGNIFICANT CHANGE UP (ref 135–145)
SPECIMEN SOURCE: SIGNIFICANT CHANGE UP
WBC # BLD: 6.1 K/UL — SIGNIFICANT CHANGE UP (ref 3.8–10.5)
WBC # FLD AUTO: 6.1 K/UL — SIGNIFICANT CHANGE UP (ref 3.8–10.5)

## 2018-04-09 RX ORDER — ACETAMINOPHEN 500 MG
650 TABLET ORAL EVERY 6 HOURS
Qty: 0 | Refills: 0 | Status: DISCONTINUED | OUTPATIENT
Start: 2018-04-09 | End: 2018-04-11

## 2018-04-09 RX ORDER — SENNA PLUS 8.6 MG/1
1 TABLET ORAL AT BEDTIME
Qty: 0 | Refills: 0 | Status: DISCONTINUED | OUTPATIENT
Start: 2018-04-09 | End: 2018-04-11

## 2018-04-09 RX ORDER — PIPERACILLIN AND TAZOBACTAM 4; .5 G/20ML; G/20ML
3.38 INJECTION, POWDER, LYOPHILIZED, FOR SOLUTION INTRAVENOUS EVERY 6 HOURS
Qty: 0 | Refills: 0 | Status: DISCONTINUED | OUTPATIENT
Start: 2018-04-09 | End: 2018-04-11

## 2018-04-09 RX ORDER — LIDOCAINE 4 G/100G
1 CREAM TOPICAL
Qty: 0 | Refills: 0 | Status: DISCONTINUED | OUTPATIENT
Start: 2018-04-09 | End: 2018-04-11

## 2018-04-09 RX ORDER — DOCUSATE SODIUM 100 MG
100 CAPSULE ORAL THREE TIMES A DAY
Qty: 0 | Refills: 0 | Status: DISCONTINUED | OUTPATIENT
Start: 2018-04-09 | End: 2018-04-11

## 2018-04-09 RX ORDER — ONDANSETRON 8 MG/1
4 TABLET, FILM COATED ORAL EVERY 6 HOURS
Qty: 0 | Refills: 0 | Status: DISCONTINUED | OUTPATIENT
Start: 2018-04-09 | End: 2018-04-11

## 2018-04-09 RX ORDER — SODIUM CHLORIDE 9 MG/ML
1000 INJECTION INTRAMUSCULAR; INTRAVENOUS; SUBCUTANEOUS
Qty: 0 | Refills: 0 | Status: DISCONTINUED | OUTPATIENT
Start: 2018-04-09 | End: 2018-04-11

## 2018-04-09 RX ORDER — ATROPA BELLADONNA AND OPIUM 16.2; 6 MG/1; MG/1
1 SUPPOSITORY RECTAL EVERY 8 HOURS
Qty: 0 | Refills: 0 | Status: DISCONTINUED | OUTPATIENT
Start: 2018-04-09 | End: 2018-04-11

## 2018-04-09 RX ADMIN — PIPERACILLIN AND TAZOBACTAM 200 GRAM(S): 4; .5 INJECTION, POWDER, LYOPHILIZED, FOR SOLUTION INTRAVENOUS at 21:11

## 2018-04-09 RX ADMIN — LIDOCAINE 1 APPLICATION(S): 4 CREAM TOPICAL at 19:18

## 2018-04-09 RX ADMIN — Medication 100 MILLIGRAM(S): at 21:11

## 2018-04-09 RX ADMIN — SENNA PLUS 1 TABLET(S): 8.6 TABLET ORAL at 21:11

## 2018-04-09 NOTE — BRIEF OPERATIVE NOTE - PROCEDURE
<<-----Click on this checkbox to enter Procedure Transurethral electrosurgical resection of prostate  04/09/2018  regrowth of prostatic tissue  Active  [id number]

## 2018-04-09 NOTE — ASU PATIENT PROFILE, ADULT - PSH
Elective surgery  Rt. foot Hallux Valaus repair-!0/2016  History of prostate surgery  TURP  S/P rotator cuff repair  Rt. shoulder-2014

## 2018-04-09 NOTE — PROGRESS NOTE ADULT - SUBJECTIVE AND OBJECTIVE BOX
Postop:  Pt denies chest pain, SOB, NV or severe abdominal pain            Vital Signs Last 24 Hrs  T(C): 36.2 (09 Apr 2018 16:22), Max: 37.2 (09 Apr 2018 14:52)  T(F): 97.2 (09 Apr 2018 16:22), Max: 98.9 (09 Apr 2018 14:52)  HR: 88 (09 Apr 2018 16:22) (84 - 90)  BP: 139/93 (09 Apr 2018 16:22) (118/72 - 147/94)  BP(mean): 110 (09 Apr 2018 16:22) (106 - 115)  RR: 21 (09 Apr 2018 16:22) (13 - 21)  SpO2: 99% (09 Apr 2018 16:22) (95% - 99%)    I&O's Summary    09 Apr 2018 07:01  -  09 Apr 2018 16:59  --------------------------------------------------------  IN: 125 mL / OUT: 0 mL / NET: 125 mL        Gen: NAD    Abd: NTND    : alford draining clear on CBI                          14.5   6.1   )-----------( 133      ( 09 Apr 2018 15:24 )             44.3     04-09    138  |  104  |  14  ----------------------------<  138<H>  4.2   |  23  |  0.94    Ca    9.1      09 Apr 2018 15:23      cultures    A/P:67M hx BPH s/p TURP 4/9  1- Alford on CBI- monitor UOP  2- Regular diet  3- ABX- Zosyn   4- Pain meds PRN  5- OOB amb, IS

## 2018-04-10 DIAGNOSIS — N40.0 BENIGN PROSTATIC HYPERPLASIA WITHOUT LOWER URINARY TRACT SYMPTOMS: ICD-10-CM

## 2018-04-10 LAB
ANION GAP SERPL CALC-SCNC: 11 MMOL/L — SIGNIFICANT CHANGE UP (ref 5–17)
BASOPHILS NFR BLD AUTO: 0 % — SIGNIFICANT CHANGE UP (ref 0–2)
BUN SERPL-MCNC: 11 MG/DL — SIGNIFICANT CHANGE UP (ref 7–23)
CALCIUM SERPL-MCNC: 8.5 MG/DL — SIGNIFICANT CHANGE UP (ref 8.4–10.5)
CHLORIDE SERPL-SCNC: 103 MMOL/L — SIGNIFICANT CHANGE UP (ref 96–108)
CO2 SERPL-SCNC: 22 MMOL/L — SIGNIFICANT CHANGE UP (ref 22–31)
CREAT SERPL-MCNC: 0.92 MG/DL — SIGNIFICANT CHANGE UP (ref 0.5–1.3)
EOSINOPHIL NFR BLD AUTO: 0 % — SIGNIFICANT CHANGE UP (ref 0–6)
GLUCOSE SERPL-MCNC: 180 MG/DL — HIGH (ref 70–99)
HCT VFR BLD CALC: 39.8 % — SIGNIFICANT CHANGE UP (ref 39–50)
HGB BLD-MCNC: 12.9 G/DL — LOW (ref 13–17)
LYMPHOCYTES # BLD AUTO: 10.2 % — LOW (ref 13–44)
MAGNESIUM SERPL-MCNC: 1.6 MG/DL — SIGNIFICANT CHANGE UP (ref 1.6–2.6)
MCHC RBC-ENTMCNC: 28.6 PG — SIGNIFICANT CHANGE UP (ref 27–34)
MCHC RBC-ENTMCNC: 32.4 G/DL — SIGNIFICANT CHANGE UP (ref 32–36)
MCV RBC AUTO: 88.2 FL — SIGNIFICANT CHANGE UP (ref 80–100)
MONOCYTES NFR BLD AUTO: 6.3 % — SIGNIFICANT CHANGE UP (ref 2–14)
NEUTROPHILS NFR BLD AUTO: 83.5 % — HIGH (ref 43–77)
NON-GYNECOLOGICAL CYTOLOGY STUDY: SIGNIFICANT CHANGE UP
NON-GYNECOLOGICAL CYTOLOGY STUDY: SIGNIFICANT CHANGE UP
PHOSPHATE SERPL-MCNC: 1.7 MG/DL — LOW (ref 2.5–4.5)
PLATELET # BLD AUTO: 139 K/UL — LOW (ref 150–400)
POTASSIUM SERPL-MCNC: 4.1 MMOL/L — SIGNIFICANT CHANGE UP (ref 3.5–5.3)
POTASSIUM SERPL-SCNC: 4.1 MMOL/L — SIGNIFICANT CHANGE UP (ref 3.5–5.3)
RBC # BLD: 4.51 M/UL — SIGNIFICANT CHANGE UP (ref 4.2–5.8)
RBC # FLD: 14.7 % — SIGNIFICANT CHANGE UP (ref 10.3–16.9)
SODIUM SERPL-SCNC: 136 MMOL/L — SIGNIFICANT CHANGE UP (ref 135–145)
WBC # BLD: 8.5 K/UL — SIGNIFICANT CHANGE UP (ref 3.8–10.5)
WBC # FLD AUTO: 8.5 K/UL — SIGNIFICANT CHANGE UP (ref 3.8–10.5)

## 2018-04-10 RX ORDER — MAGNESIUM OXIDE 400 MG ORAL TABLET 241.3 MG
800 TABLET ORAL ONCE
Qty: 0 | Refills: 0 | Status: COMPLETED | OUTPATIENT
Start: 2018-04-10 | End: 2018-04-10

## 2018-04-10 RX ORDER — SODIUM,POTASSIUM PHOSPHATES 278-250MG
1 POWDER IN PACKET (EA) ORAL ONCE
Qty: 0 | Refills: 0 | Status: COMPLETED | OUTPATIENT
Start: 2018-04-10 | End: 2018-04-10

## 2018-04-10 RX ADMIN — PIPERACILLIN AND TAZOBACTAM 200 GRAM(S): 4; .5 INJECTION, POWDER, LYOPHILIZED, FOR SOLUTION INTRAVENOUS at 05:02

## 2018-04-10 RX ADMIN — MAGNESIUM OXIDE 400 MG ORAL TABLET 800 MILLIGRAM(S): 241.3 TABLET ORAL at 07:21

## 2018-04-10 RX ADMIN — PIPERACILLIN AND TAZOBACTAM 200 GRAM(S): 4; .5 INJECTION, POWDER, LYOPHILIZED, FOR SOLUTION INTRAVENOUS at 21:08

## 2018-04-10 RX ADMIN — SENNA PLUS 1 TABLET(S): 8.6 TABLET ORAL at 21:08

## 2018-04-10 RX ADMIN — Medication 100 MILLIGRAM(S): at 05:02

## 2018-04-10 RX ADMIN — Medication 100 MILLIGRAM(S): at 21:08

## 2018-04-10 RX ADMIN — PIPERACILLIN AND TAZOBACTAM 200 GRAM(S): 4; .5 INJECTION, POWDER, LYOPHILIZED, FOR SOLUTION INTRAVENOUS at 15:50

## 2018-04-10 RX ADMIN — Medication 1 TABLET(S): at 07:21

## 2018-04-10 RX ADMIN — PIPERACILLIN AND TAZOBACTAM 200 GRAM(S): 4; .5 INJECTION, POWDER, LYOPHILIZED, FOR SOLUTION INTRAVENOUS at 09:53

## 2018-04-10 RX ADMIN — Medication 100 MILLIGRAM(S): at 15:50

## 2018-04-10 NOTE — PROGRESS NOTE ADULT - SUBJECTIVE AND OBJECTIVE BOX
AM Note    No acute events overnight.      Vital Signs Last 24 Hrs  T(C): 36.9 (04-10-18 @ 00:54), Max: 37.2 (04-09-18 @ 14:52)  T(F): 98.4 (04-10-18 @ 00:54), Max: 98.9 (04-09-18 @ 14:52)  HR: 93 (04-10-18 @ 00:54) (84 - 99)  BP: 107/68 (04-10-18 @ 00:54) (107/68 - 147/94)  BP(mean): 110 (04-09-18 @ 16:22) (106 - 115)  RR: 18 (04-10-18 @ 00:54) (13 - 21)  SpO2: 97% (04-10-18 @ 00:54) (95% - 99%)     09 Apr 2018 15:23    138    |  104    |  14     ----------------------------<  138    4.2     |  23     |  0.94     Ca    9.1        09 Apr 2018 15:23                            14.5   6.1   )-----------( 133      ( 09 Apr 2018 15:24 )             44.3         I&O's Summary    09 Apr 2018 07:01  -  10 Apr 2018 04:24  --------------------------------------------------------  IN: 875 mL / OUT: 0 mL / NET: 875 mL          PHYSICAL EXAM:    GEN: nad  ABD: soft, ntnd  : rocío in place draining clear

## 2018-04-11 ENCOUNTER — TRANSCRIPTION ENCOUNTER (OUTPATIENT)
Age: 68
End: 2018-04-11

## 2018-04-11 VITALS
DIASTOLIC BLOOD PRESSURE: 78 MMHG | TEMPERATURE: 98 F | OXYGEN SATURATION: 97 % | RESPIRATION RATE: 16 BRPM | HEART RATE: 69 BPM | SYSTOLIC BLOOD PRESSURE: 115 MMHG

## 2018-04-11 LAB
ANION GAP SERPL CALC-SCNC: 13 MMOL/L — SIGNIFICANT CHANGE UP (ref 5–17)
BASOPHILS NFR BLD AUTO: 0.1 % — SIGNIFICANT CHANGE UP (ref 0–2)
BUN SERPL-MCNC: 13 MG/DL — SIGNIFICANT CHANGE UP (ref 7–23)
CALCIUM SERPL-MCNC: 9.5 MG/DL — SIGNIFICANT CHANGE UP (ref 8.4–10.5)
CHLORIDE SERPL-SCNC: 106 MMOL/L — SIGNIFICANT CHANGE UP (ref 96–108)
CO2 SERPL-SCNC: 24 MMOL/L — SIGNIFICANT CHANGE UP (ref 22–31)
CREAT SERPL-MCNC: 1.23 MG/DL — SIGNIFICANT CHANGE UP (ref 0.5–1.3)
CULTURE RESULTS: NO GROWTH — SIGNIFICANT CHANGE UP
CULTURE RESULTS: NO GROWTH — SIGNIFICANT CHANGE UP
EOSINOPHIL NFR BLD AUTO: 0.7 % — SIGNIFICANT CHANGE UP (ref 0–6)
GLUCOSE SERPL-MCNC: 124 MG/DL — HIGH (ref 70–99)
HCT VFR BLD CALC: 41.4 % — SIGNIFICANT CHANGE UP (ref 39–50)
HGB BLD-MCNC: 13.1 G/DL — SIGNIFICANT CHANGE UP (ref 13–17)
LYMPHOCYTES # BLD AUTO: 21.3 % — SIGNIFICANT CHANGE UP (ref 13–44)
MAGNESIUM SERPL-MCNC: 2.1 MG/DL — SIGNIFICANT CHANGE UP (ref 1.6–2.6)
MCHC RBC-ENTMCNC: 27.7 PG — SIGNIFICANT CHANGE UP (ref 27–34)
MCHC RBC-ENTMCNC: 31.6 G/DL — LOW (ref 32–36)
MCV RBC AUTO: 87.5 FL — SIGNIFICANT CHANGE UP (ref 80–100)
MONOCYTES NFR BLD AUTO: 10.1 % — SIGNIFICANT CHANGE UP (ref 2–14)
NEUTROPHILS NFR BLD AUTO: 67.8 % — SIGNIFICANT CHANGE UP (ref 43–77)
PHOSPHATE SERPL-MCNC: 2.7 MG/DL — SIGNIFICANT CHANGE UP (ref 2.5–4.5)
PLATELET # BLD AUTO: 153 K/UL — SIGNIFICANT CHANGE UP (ref 150–400)
POTASSIUM SERPL-MCNC: 3.8 MMOL/L — SIGNIFICANT CHANGE UP (ref 3.5–5.3)
POTASSIUM SERPL-SCNC: 3.8 MMOL/L — SIGNIFICANT CHANGE UP (ref 3.5–5.3)
RBC # BLD: 4.73 M/UL — SIGNIFICANT CHANGE UP (ref 4.2–5.8)
RBC # FLD: 15 % — SIGNIFICANT CHANGE UP (ref 10.3–16.9)
SODIUM SERPL-SCNC: 143 MMOL/L — SIGNIFICANT CHANGE UP (ref 135–145)
SPECIMEN SOURCE: SIGNIFICANT CHANGE UP
SPECIMEN SOURCE: SIGNIFICANT CHANGE UP
WBC # BLD: 10.2 K/UL — SIGNIFICANT CHANGE UP (ref 3.8–10.5)
WBC # FLD AUTO: 10.2 K/UL — SIGNIFICANT CHANGE UP (ref 3.8–10.5)

## 2018-04-11 PROCEDURE — 84100 ASSAY OF PHOSPHORUS: CPT

## 2018-04-11 PROCEDURE — 88112 CYTOPATH CELL ENHANCE TECH: CPT

## 2018-04-11 PROCEDURE — 87086 URINE CULTURE/COLONY COUNT: CPT

## 2018-04-11 PROCEDURE — 85027 COMPLETE CBC AUTOMATED: CPT

## 2018-04-11 PROCEDURE — 80048 BASIC METABOLIC PNL TOTAL CA: CPT

## 2018-04-11 PROCEDURE — 36415 COLL VENOUS BLD VENIPUNCTURE: CPT

## 2018-04-11 PROCEDURE — 87075 CULTR BACTERIA EXCEPT BLOOD: CPT

## 2018-04-11 PROCEDURE — 88305 TISSUE EXAM BY PATHOLOGIST: CPT

## 2018-04-11 PROCEDURE — 87070 CULTURE OTHR SPECIMN AEROBIC: CPT

## 2018-04-11 PROCEDURE — 85025 COMPLETE CBC W/AUTO DIFF WBC: CPT

## 2018-04-11 PROCEDURE — 83735 ASSAY OF MAGNESIUM: CPT

## 2018-04-11 RX ORDER — CEFPODOXIME PROXETIL 100 MG
1 TABLET ORAL
Qty: 6 | Refills: 0
Start: 2018-04-11 | End: 2018-04-13

## 2018-04-11 RX ADMIN — PIPERACILLIN AND TAZOBACTAM 200 GRAM(S): 4; .5 INJECTION, POWDER, LYOPHILIZED, FOR SOLUTION INTRAVENOUS at 03:58

## 2018-04-11 NOTE — DISCHARGE NOTE ADULT - NS AS ACTIVITY OBS
Showering allowed/Stairs allowed/Driving allowed/Walking-Indoors allowed/No Heavy lifting/straining/Walking-Outdoors allowed

## 2018-04-11 NOTE — DISCHARGE NOTE ADULT - MEDICATION SUMMARY - MEDICATIONS TO TAKE
I will START or STAY ON the medications listed below when I get home from the hospital:    cefpodoxime 100 mg oral tablet  -- 1 tab(s) by mouth every 12 hours   -- Finish all this medication unless otherwise directed by prescriber.  Take with food or milk.    -- Indication: For antibiotic twice a day for 3 days.

## 2018-04-11 NOTE — DISCHARGE NOTE ADULT - PATIENT PORTAL LINK FT
You can access the [facility] Patient Portal, offered by [facility], by registering with the following website: [email or web address]

## 2018-04-11 NOTE — DISCHARGE NOTE ADULT - CARE PLAN
Principal Discharge DX:	BPH (benign prostatic hyperplasia)  Goal:	ambulation and hydration  Assessment and plan of treatment:	Call MD for increase abdominal pain, nausea, vomiting, temperature >100.5F, or if alford catheter not draining well.   Home with alford catheter to leg bag.  Care for alford/leg bag as instructed by nurse.  Follow up tomorrow or this coming friday for alford removal at Dr. Esparza's office. Call office to schedule.  Secondary Diagnosis:	Rheumatoid arthritis

## 2018-04-11 NOTE — DISCHARGE NOTE ADULT - HOSPITAL COURSE
68 yo male s/p TURP 4/9/18. Tolerated procedure well. Uneventful post op course. Home with alford to leg bag. Alford to be removed as outpt.

## 2018-04-11 NOTE — DISCHARGE NOTE ADULT - MEDICATION SUMMARY - MEDICATIONS TO STOP TAKING
I will STOP taking the medications listed below when I get home from the hospital:    tamsulosin 0.4 mg oral capsule  -- 1 cap(s) by mouth once a day    Cipro 500 mg oral tablet  -- 1 tab(s) by mouth every 12 hours   -- Avoid prolonged or excessive exposure to direct and/or artificial sunlight while taking this medication.  Check with your doctor before becoming pregnant.  Do not take dairy products, antacids, or iron preparations within one hour of this medication.  Finish all this medication unless otherwise directed by prescriber.  Medication should be taken with plenty of water.

## 2018-04-11 NOTE — DISCHARGE NOTE ADULT - CARE PROVIDER_API CALL
Regino Esparza), Urology  [address] [place] [address], [place] [id number]  Phone: [phone]  Fax: [phone]

## 2018-04-11 NOTE — DISCHARGE NOTE ADULT - PLAN OF CARE
ambulation and hydration Call MD for increase abdominal pain, nausea, vomiting, temperature >100.5F, or if alford catheter not draining well.   Home with alford catheter to leg bag.  Care for alford/leg bag as instructed by nurse.  Follow up tomorrow or this coming friday for alford removal at Dr. Esparza's office. Call office to schedule.

## 2018-04-11 NOTE — PROGRESS NOTE ADULT - SUBJECTIVE AND OBJECTIVE BOX
INTERVAL HPI/OVERNIGHT EVENTS:  Pt is a  67 year old male with BPH s/p TURP (4/9/2018). No acute events overnight.    VITALS:    T(F): 98 (04-11-18 @ 04:59), Max: 98 (04-10-18 @ 09:00)  HR: 73 (04-11-18 @ 04:59) (73 - 95)  BP: 123/73 (04-11-18 @ 04:59) (115/73 - 128/80)  RR: 18 (04-11-18 @ 04:59) (18 - 18)  SpO2: 96% (04-11-18 @ 04:59) (95% - 96%)  Wt(kg): --    I&O's Detail    10 Apr 2018 07:01  -  11 Apr 2018 07:00  --------------------------------------------------------  IN:  Total IN: 0 mL    OUT:    Indwelling Catheter - Urethral: 5900 mL  Total OUT: 5900 mL    Total NET: -5900 mL          MEDICATIONS:    ANTIBIOTICS:  piperacillin/tazobactam IVPB. 3.375 Gram(s) IV Intermittent every 6 hours      PAIN CONTROL:  acetaminophen   Tablet 650 milliGRAM(s) Oral every 6 hours PRN  acetaminophen   Tablet. 650 milliGRAM(s) Oral every 6 hours PRN  belladonna 16.2 mG/opium 60 mg Suppository 1 Suppository(s) Rectal every 8 hours PRN  diazepam    Tablet 5 milliGRAM(s) Oral three times a day PRN  ondansetron Injectable 4 milliGRAM(s) IV Push every 6 hours PRN       MEDS:      HEME/ONC        PHYSICAL EXAM:  General: No acute distress.  Alert and Oriented  Abdominal Exam: soft nontender/nondistended   Exam: Carlos cath draining yellow/clear urine.      LABS:                        12.9   8.5   )-----------( 139      ( 10 Apr 2018 06:19 )             39.8     04-11    143  |  106  |  13  ----------------------------<  124<H>  3.8   |  24  |  1.23    Ca    9.5      11 Apr 2018 06:02  Phos  2.7     04-11  Mg     2.1     04-11            RADIOLOGY & ADDITIONAL TESTS:

## 2018-04-11 NOTE — PROGRESS NOTE ADULT - PROBLEM SELECTOR PLAN 1
-Diet: Reg  -Pain management  -Monitor alford output  -continue abx  -IVF  -OOB/IS  - continue plan of care

## 2018-04-13 ENCOUNTER — APPOINTMENT (OUTPATIENT)
Dept: RHEUMATOLOGY | Facility: CLINIC | Age: 68
End: 2018-04-13

## 2018-04-13 DIAGNOSIS — N13.8 OTHER OBSTRUCTIVE AND REFLUX UROPATHY: ICD-10-CM

## 2018-04-13 DIAGNOSIS — M06.9 RHEUMATOID ARTHRITIS, UNSPECIFIED: ICD-10-CM

## 2018-04-13 DIAGNOSIS — N40.1 BENIGN PROSTATIC HYPERPLASIA WITH LOWER URINARY TRACT SYMPTOMS: ICD-10-CM

## 2018-04-13 LAB — SURGICAL PATHOLOGY STUDY: SIGNIFICANT CHANGE UP

## 2018-04-24 ENCOUNTER — APPOINTMENT (OUTPATIENT)
Dept: RHEUMATOLOGY | Facility: CLINIC | Age: 68
End: 2018-04-24
Payer: COMMERCIAL

## 2018-04-24 VITALS
OXYGEN SATURATION: 97 % | BODY MASS INDEX: 29.45 KG/M2 | WEIGHT: 150 LBS | DIASTOLIC BLOOD PRESSURE: 74 MMHG | HEIGHT: 60 IN | SYSTOLIC BLOOD PRESSURE: 121 MMHG | HEART RATE: 105 BPM

## 2018-04-24 DIAGNOSIS — M47.815 SPONDYLOSIS W/OUT MYELOPATHY OR RADICULOPATHY, THORACOLUMBAR REGION: ICD-10-CM

## 2018-04-24 PROCEDURE — 99214 OFFICE O/P EST MOD 30 MIN: CPT

## 2018-05-23 PROBLEM — M47.815: Status: ACTIVE | Noted: 2018-05-23

## 2018-05-30 ENCOUNTER — RX RENEWAL (OUTPATIENT)
Age: 68
End: 2018-05-30

## 2018-06-15 ENCOUNTER — OUTPATIENT (OUTPATIENT)
Dept: OUTPATIENT SERVICES | Facility: HOSPITAL | Age: 68
LOS: 1 days | End: 2018-06-15
Payer: COMMERCIAL

## 2018-06-15 DIAGNOSIS — Z98.890 OTHER SPECIFIED POSTPROCEDURAL STATES: Chronic | ICD-10-CM

## 2018-06-15 DIAGNOSIS — Z41.9 ENCOUNTER FOR PROCEDURE FOR PURPOSES OTHER THAN REMEDYING HEALTH STATE, UNSPECIFIED: Chronic | ICD-10-CM

## 2018-06-15 PROCEDURE — 93970 EXTREMITY STUDY: CPT | Mod: 26

## 2018-06-15 PROCEDURE — 93970 EXTREMITY STUDY: CPT

## 2018-06-22 ENCOUNTER — APPOINTMENT (OUTPATIENT)
Dept: RHEUMATOLOGY | Facility: CLINIC | Age: 68
End: 2018-06-22
Payer: COMMERCIAL

## 2018-06-22 VITALS
TEMPERATURE: 98.1 F | WEIGHT: 150 LBS | HEIGHT: 60 IN | OXYGEN SATURATION: 93 % | HEART RATE: 99 BPM | SYSTOLIC BLOOD PRESSURE: 124 MMHG | BODY MASS INDEX: 29.45 KG/M2 | DIASTOLIC BLOOD PRESSURE: 79 MMHG

## 2018-06-22 PROCEDURE — 99214 OFFICE O/P EST MOD 30 MIN: CPT

## 2018-07-03 ENCOUNTER — RX RENEWAL (OUTPATIENT)
Age: 68
End: 2018-07-03

## 2018-10-18 ENCOUNTER — APPOINTMENT (OUTPATIENT)
Dept: RHEUMATOLOGY | Facility: CLINIC | Age: 68
End: 2018-10-18
Payer: COMMERCIAL

## 2018-10-18 VITALS — HEART RATE: 78 BPM | OXYGEN SATURATION: 96 % | DIASTOLIC BLOOD PRESSURE: 93 MMHG | SYSTOLIC BLOOD PRESSURE: 138 MMHG

## 2018-10-18 PROCEDURE — 99214 OFFICE O/P EST MOD 30 MIN: CPT

## 2018-11-14 ENCOUNTER — RX RENEWAL (OUTPATIENT)
Age: 68
End: 2018-11-14

## 2019-02-20 ENCOUNTER — APPOINTMENT (OUTPATIENT)
Dept: OTOLARYNGOLOGY | Facility: CLINIC | Age: 69
End: 2019-02-20
Payer: COMMERCIAL

## 2019-02-20 VITALS
HEART RATE: 82 BPM | HEIGHT: 60 IN | BODY MASS INDEX: 29.64 KG/M2 | SYSTOLIC BLOOD PRESSURE: 121 MMHG | DIASTOLIC BLOOD PRESSURE: 67 MMHG | WEIGHT: 151 LBS

## 2019-02-20 DIAGNOSIS — Z87.09 PERSONAL HISTORY OF OTHER DISEASES OF THE RESPIRATORY SYSTEM: ICD-10-CM

## 2019-02-20 DIAGNOSIS — M25.519 PAIN IN UNSPECIFIED SHOULDER: ICD-10-CM

## 2019-02-20 DIAGNOSIS — S69.91XD UNSPECIFIED INJURY OF RIGHT WRIST, HAND AND FINGER(S), SUBSEQUENT ENCOUNTER: ICD-10-CM

## 2019-02-20 DIAGNOSIS — Z87.39 PERSONAL HISTORY OF OTHER DISEASES OF THE MUSCULOSKELETAL SYSTEM AND CONNECTIVE TISSUE: ICD-10-CM

## 2019-02-20 DIAGNOSIS — M75.82 OTHER SHOULDER LESIONS, LEFT SHOULDER: ICD-10-CM

## 2019-02-20 DIAGNOSIS — M24.131 OTHER ARTICULAR CARTILAGE DISORDERS, RIGHT WRIST: ICD-10-CM

## 2019-02-20 DIAGNOSIS — S66.919A STRAIN OF UNSPECIFIED MUSCLE, FASCIA AND TENDON AT WRIST AND HAND LEVEL, UNSPECIFIED HAND, INITIAL ENCOUNTER: ICD-10-CM

## 2019-02-20 PROCEDURE — 99214 OFFICE O/P EST MOD 30 MIN: CPT

## 2019-02-20 RX ORDER — CEFPODOXIME PROXETIL 100 MG/1
100 TABLET, FILM COATED ORAL
Qty: 6 | Refills: 0 | Status: COMPLETED | COMMUNITY
Start: 2018-04-11 | End: 2019-02-20

## 2019-02-20 RX ORDER — PREDNISONE 5 MG/1
5 TABLET ORAL DAILY
Qty: 90 | Refills: 2 | Status: COMPLETED | COMMUNITY
Start: 2018-02-08 | End: 2019-02-20

## 2019-02-20 RX ORDER — CELECOXIB 200 MG/1
200 CAPSULE ORAL
Qty: 90 | Refills: 2 | Status: COMPLETED | COMMUNITY
Start: 2018-01-18 | End: 2019-02-20

## 2019-02-20 RX ORDER — PREDNISONE 2.5 MG/1
2.5 TABLET ORAL
Qty: 100 | Refills: 2 | Status: COMPLETED | COMMUNITY
Start: 2017-11-29 | End: 2019-02-20

## 2019-02-20 RX ORDER — PREDNISONE 5 MG/1
5 TABLET ORAL
Qty: 100 | Refills: 0 | Status: COMPLETED | COMMUNITY
Start: 2017-11-29 | End: 2019-02-20

## 2019-02-20 RX ORDER — PREDNISONE 5 MG/1
5 TABLET ORAL DAILY
Qty: 90 | Refills: 2 | Status: COMPLETED | COMMUNITY
Start: 2017-10-30 | End: 2019-02-20

## 2019-02-22 ENCOUNTER — RX RENEWAL (OUTPATIENT)
Age: 69
End: 2019-02-22

## 2019-02-26 PROBLEM — S69.91XD: Status: RESOLVED | Noted: 2017-11-10 | Resolved: 2019-02-26

## 2019-02-26 PROBLEM — Z87.39 HISTORY OF RHEUMATOID ARTHRITIS: Status: RESOLVED | Noted: 2017-11-10 | Resolved: 2019-02-26

## 2019-02-26 PROBLEM — S66.919A TENDON RUPTURE OF WRIST: Status: RESOLVED | Noted: 2017-10-30 | Resolved: 2019-02-26

## 2019-02-26 PROBLEM — M24.131 ARTICULAR CARTILAGE DISORDER OF RIGHT WRIST: Status: RESOLVED | Noted: 2017-11-10 | Resolved: 2019-02-26

## 2019-02-26 RX ORDER — ETANERCEPT 50 MG/ML
50 SOLUTION SUBCUTANEOUS
Qty: 1 | Refills: 4 | Status: COMPLETED | COMMUNITY
Start: 2018-01-30 | End: 2018-11-08

## 2019-02-26 RX ORDER — ETANERCEPT 50 MG/ML
50 SOLUTION SUBCUTANEOUS
Qty: 1 | Refills: 4 | Status: DISCONTINUED | COMMUNITY
Start: 2018-01-23 | End: 2019-02-20

## 2019-02-26 RX ORDER — LEFLUNOMIDE 20 MG/1
20 TABLET, FILM COATED ORAL
Qty: 30 | Refills: 0 | Status: DISCONTINUED | COMMUNITY
End: 2019-02-20

## 2019-02-26 NOTE — PHYSICAL EXAM
[] : septum deviated to the right [Normal] : no neck adenopathy [de-identified] : Mild click right TMJ, nontender.  Left TMJ normal. [de-identified] : Rightward deviation lower 1/3. [de-identified] : Stable left nasal ulcer; crusting removed.  Septum is still intact. [de-identified] : Full upper denture; lower dental implants. [de-identified] : 1+ bilateral

## 2019-02-26 NOTE — CONSULT LETTER
[Dear  ___] : Dear  [unfilled], [Courtesy Letter:] : I had the pleasure of seeing your patient, [unfilled], in my office today. [Consult Closing:] : Thank you very much for allowing me to participate in the care of this patient.  If you have any questions, please do not hesitate to contact me. [Sincerely,] : Sincerely, [DrCalixto  ___] : Dr. AREVALO [FreeTextEntry2] : Lorie Arnold M.D.\par [address] [facility]\[place] [address] [FreeTextEntry1] : \par \par Enclosed please find my office notes for February 20, 2019. \par \par \par  [FreeTextEntry3] : \par Olivia Gonzalez MD \par Otolaryngology, Head and Neck Surgery \par Residency site , [facility] \par

## 2019-02-26 NOTE — HISTORY OF PRESENT ILLNESS
[de-identified] : Mr. KATZ  complains of new right ear pain along with continued nose issue. \par On Sunday night, Feb 17, he began experiencing intermittent (every 30 seconds) "shooting" and throbbing right ear pain. \par He noticed the pain started after biting down on a hard piece of sausage. \par Pain was constant from Sunday through Monday, and he could not sleep. \par After he took some of his arthritis medications on Feb 18, pain improved greatly, but ear still does not feel right. \par Pain radiates from right ear down to right jaw. \par Last dental visit was 1 1/2 years ago. Has dentures (upper denture, lower implants).\par Denture is a little loose, but he thinks it fits okay. \par Denies grinding or clenching jaw. \par He notes the same kind of pain occurred as a child, and his mother applied warm camphor oil to his ear. \par No ear drainage, clogging or trauma. Uses Qtips in ears at times. \par No recent changes with hearing; has noted some hearing loss but is not interested in any hearing aids at this time. \par No complaints with left ear. \par \par History of chronic left nasal septal ulcer that started at end of 2016 and has never resolved. \par Has occasional spot bleeding from left nose; last occurrence was 5-6 days ago. \par Still feels pain when touching inside nose and on outer tip. \par Was using Mupirocin daily x 1 year. Now using Propolis ointment to keep nose moist. \par Nasal culture had grown MRSA; repeat culture with no grown after mupirocin treatment.\par Also treated with Bactrim and Cipro in 2017.\par Biopsy of left septal ulcer showed chronic/acute inflammation; no fungus or vasculitis.\par \par On 2 medications for rheumatoid arthritis.\par Since last visit had 2 prostate surgeries for BPH. \par \par \par LABS (03/13/17) -- ACE normal , C-ANCA negative\par \par PATHOLOGY Left nasal septal ulcer biopsy (03/09/2017):\par - Slight fragments of superficial sinonasal mucosa with acute and chronic inflammation\par - Fragments of necrotic debris with extensive acute inflammation consistent with portions of ulcer bed.\par - Special stain for fungus is negative\par - Stain for elastin shows small vessel with no evidence of vasculitis \par  \par \par

## 2019-02-26 NOTE — ASSESSMENT
[FreeTextEntry1] : Mr. AKTZ had the following issues addressed today:\par \par 1.) Right otalgia is due to TMJ inflammation after biting the hard sausage - better now\par 2.) Left nasal septal ulcer is stable - no evidence of granulomatous disease or neoplasm\par 3.) Nasal vestibulitis \par \par PLAN:\par -- TMJ precautions reviewed \par -- F/u with dentist \par -- Renew mupirocin ointment \par \par I will be happy to see him again as needed.

## 2019-03-07 ENCOUNTER — APPOINTMENT (OUTPATIENT)
Dept: OTOLARYNGOLOGY | Facility: CLINIC | Age: 69
End: 2019-03-07
Payer: COMMERCIAL

## 2019-03-07 VITALS
SYSTOLIC BLOOD PRESSURE: 135 MMHG | DIASTOLIC BLOOD PRESSURE: 93 MMHG | BODY MASS INDEX: 29.45 KG/M2 | HEIGHT: 60 IN | WEIGHT: 150 LBS | HEART RATE: 83 BPM

## 2019-03-07 PROCEDURE — 92550 TYMPANOMETRY & REFLEX THRESH: CPT

## 2019-03-07 PROCEDURE — 92557 COMPREHENSIVE HEARING TEST: CPT

## 2019-03-07 PROCEDURE — 99214 OFFICE O/P EST MOD 30 MIN: CPT

## 2019-03-07 RX ORDER — DUTASTERIDE 0.5 MG/1
0.5 CAPSULE, LIQUID FILLED ORAL
Qty: 30 | Refills: 0 | Status: ACTIVE | COMMUNITY
Start: 2019-01-17

## 2019-03-07 NOTE — ASSESSMENT
[FreeTextEntry1] : 68M here for evaluation. He has seen my partner, Dr. Olivia Gonzalez, on many occasions for various reasons.\par He c/o diminished right sided hearing. This is not constant, but comes and goes randomly without triggers. There is also right sided ear discomfort. This all started 3 weeks ago with sharp shooting and throbbing right otalgia which started after biting down on a hard piece of sausage. The pain has since improved, but the ear still feels as if something is off. There is no otorrhea, tinnitus or vertigo. Left ear is fine. Audiogram from today is grossly unremarkable (asymmetry at 1-2khz poorer in right and at 6khz poorer in left) with normal right sided hearing through 1khz sloping to a mild to severe sensorineural hearing loss and normal left sided hearing through 2khz sloping to a mild to a mild to severe sensorineural hearing loss.\par On exam, there is a mild click by the right TMJ. The rest of the complete and comprehensive head and neck exam us otherwise unremarkable. \par I agree the right otalgia is due to TMJ inflammation. His audiogram is unremarkable and I am not sure what to make of his subjective right sided diminished hearing other than the fact that she should be reassured. Continue soft diet, jaw massage and compresses. RTO as needed.\par I will be happy to see him again as needed.

## 2019-03-07 NOTE — REVIEW OF SYSTEMS
[Patient Intake Form Reviewed] : Patient intake form was reviewed [As Noted in HPI] : as noted in HPI [Cough] : cough [Joint Swelling] : joint swelling [Itching] : itching [Negative] : Heme/Lymph [FreeTextEntry6] : chronic bronchitis [FreeTextEntry8] : prostate problem [FreeTextEntry9] : arthritis [de-identified] : eczema

## 2019-03-07 NOTE — HISTORY OF PRESENT ILLNESS
[de-identified] : 68M here for evaluation.\par \par He has seen my partner, Dr. Olivia Gonzalez, on many occasions for various reasons.\par \par He c/o diminished right sided hearing. This is not constant, but comes and goes randomly without triggers. There is also right sided ear pain.\par This started 3 weeks ago with intermittent (every 30 seconds) "shooting" and throbbing right ear pain which started after biting down on a hard piece of sausage. The pain has since improved, but the ear still feels as if something is off.\par There is no otorrhea, tinnitus or vertigo. Left ear fine. \par Denture fits fine. Denies grinding or clenching jaw.\par \par Audiogram from today (asymmetry at 1-2khz poorer in right and at 6khz poorer in left):\par R ear: hearing wnl through 1khz sloping to a mild to severe SNHL. SRT 30, 100% discrim, type A\par L ear: hearing wnl through 2khz sloping to a mild to a mild to severe SNHL. SRT 30, 100% discrim, type A\par \par On 2 medications for rheumatoid arthritis.\par Since last visit had 2 prostate surgeries for BPH. \par \par ROS otherwise unremarkable.

## 2019-03-07 NOTE — PHYSICAL EXAM
[] : septum deviated to the right [Normal] : no neck adenopathy [de-identified] : Mild click right TMJ, nontender.  Left TMJ normal. [de-identified] : eac clear and dry, TM intact and mobile, ME clear [de-identified] : Rightward deviation lower 1/3. [de-identified] : Stable left nasal ulcer; septum intact. [de-identified] : Full upper denture; lower dental implants. [de-identified] : 1+ bilateral

## 2019-03-07 NOTE — CONSULT LETTER
[Dear  ___] : Dear  [unfilled], [Courtesy Letter:] : I had the pleasure of seeing your patient, [unfilled], in my office today. [Consult Closing:] : Thank you very much for allowing me to participate in the care of this patient.  If you have any questions, please do not hesitate to contact me. [Sincerely,] : Sincerely, [DrCalixto  ___] : Dr. AREVALO [FreeTextEntry2] : Lorie Arnold M.D.\par [address] [facility], [address]\[place] [address] [FreeTextEntry1] : \par \par \par \par \par  [FreeTextEntry3] : Richard Rodriguez MD\par Department of Otolaryngology - Head and Neck Surgery\par [facility]

## 2019-04-24 ENCOUNTER — RX RENEWAL (OUTPATIENT)
Age: 69
End: 2019-04-24

## 2019-04-29 ENCOUNTER — APPOINTMENT (OUTPATIENT)
Dept: RHEUMATOLOGY | Facility: CLINIC | Age: 69
End: 2019-04-29
Payer: COMMERCIAL

## 2019-04-29 VITALS
BODY MASS INDEX: 29.45 KG/M2 | TEMPERATURE: 98.7 F | SYSTOLIC BLOOD PRESSURE: 128 MMHG | DIASTOLIC BLOOD PRESSURE: 83 MMHG | OXYGEN SATURATION: 97 % | HEART RATE: 84 BPM | WEIGHT: 150 LBS | HEIGHT: 60 IN

## 2019-04-29 PROCEDURE — 99214 OFFICE O/P EST MOD 30 MIN: CPT | Mod: 25

## 2019-04-29 PROCEDURE — 36415 COLL VENOUS BLD VENIPUNCTURE: CPT

## 2019-04-29 NOTE — HISTORY OF PRESENT ILLNESS
[Skin Nodules] : skin nodules [___ Month(s) Ago] : [unfilled] month(s) ago [FreeTextEntry1] : Office Visit 10/18/18:\par Spent two weeks in a spa - lots of [place] and he felt this helped his joints.\par Overall feels well \par Still aches in the morning for about a half hour to an hour but this is diffuse muscle aches, denies joint pain/stiffness/swelling\par Plan: Doing well\par Continue current regimen with Enbrel and Arava \par Patient will have labs done at PCP next week and send them to my office. Have checked off the requested CBC, CMP, ESR and CMP on [facility] form today. \par \par Office Visit 6/22/18:\par No pain/swelling/stiffness\par Ongoing back pain, has been going to PT. Has only been 4 times thus far. \par Pain in his left paralumbar spine and intermittently radiates down his leg. \par Plan: Doing well, continue current management of Enbrel and Arava\par \par Office Visit 4/24/18:\par Had prostate surgery - TURP, held Enbrel prior to this and started two weeks after. Continued Arava throughout - overall feels well. \par Smoking 4 cigarettes a day still. \par Denies pain in his joints. Mild morning stiffness but no swelling. \par New lower back pain, constant and unaffected by activity. DEXA with osteopenia of AP spine no role for treatment per [facility]. \par Plan: Overall doing well. Improvement on Enbrel.\par Continue current management: Enbrel 50mg weekly. Arava 20mg daily.\par Some tenderness in thoracic spine - will XR today for fracture, patient osteopenic on DEXA on vitamin D at this time, vertebral fracture would be an indication for treatment. \par \par Office Visit 3/13/18:\par Doing well overall. Strength in hands has improved. \par Continues on Prednisone taper and is nervous as he decreases the dose - worried he will flare again. \par Skin nodules are improving. Denies swelling or morning stiffness. \par Denies fevers, chills, weight loss, night sweats, cough, SOB, dysuria. \par Continues to smoke, now 4 cigarettes a day\par Last set of labs with high BGs\par Plan: Doing very well on current regimen of Enbrel 50mg weekly, Arava 20mg daily and Prednisone taper. \par Continue to taper steroids per provided regimen. \par Continue Enbrel and Arava\par Continue Vitamin D, still needs bone density \par Continue to encourage smoking cessation. \par \par Office Visit 2/23/18: \par Patient feels well, has had three doses of Enbrel and today denies any pain however he is also taking Prednisone 15mg daily. He was flaring while waiting for the Enbrel and thus I had prescribed him a small dose in the meantime. \par Denies swelling of any joints. \par Denies weakness in hands today. \par Plan: Only had three doses of Enbrel, thus it is early to assess for a therapeutic effect.\par Plan to continue Enbrel for now and taper Prednisone - will reduce to 10mg today. Patient to notify me if he has difficulty with tapering Prednisone / flares in the setting of taper.  \par Continue Arava \par Check labs to assess disease activity and for medication toxicity. \par \par Office Visit 1/23/18:\par Patient states that since he stopped Prednisone he has more weakness in his hands mostly in his right hand, which is distressing to him as this is his dominant hand. He feels this is causing him to be slower at work and he is frustrated by this, understandably. Denies pain or swelling in any joints today.  \par He felt that his subcutaneous nodules regressed on Prednisone but have since recurred since he stopped the medication. Denies fevers, chills, nausea, vomiting, diarrhea, rashes, headaches, SOB, cough, chest pain. \par Plan: Patient has rheumatoid nodulosis and two full thickness tendon tears on MRI secondary to poorly controlled RA. He is currently on Arava 20mg daily and only suboptimally controlled - he states he felt better on higher doses of Prednisone. At this time, he has completed his prostate surgery and has no plans to undergo surgery for his tendon tears. Further, his pulmonologist re imaged his chest and the previously viewed pulmonary nodules are no longer visible, he only has reactive airway disease presumably in the setting of his smoking. There are currently no contraindications to TNFi therapy. Seeing as he can't be on MTX, will try Enbrel as this has the lowest risk of HACA formation without the additional use of MTX. Will start 50mg SQ weekly. Check quantiferon and hepatitis serologies today. Patient to follow up next week when he obtains the medication so I can show him how to administer. Will continue Arava while on Enbrel. \par \par Office visit 10/30/17: \par Patient underwent prostate surgery, recovering well. Had stopped all RA therapies (prednisone) per recommendations of his surgeon. \par Patient presented last visit with pain in his R wrist, also with notable weakness. Injected his wrist with suboptimal relief of pain but persistent weakness. Underwent an MRI with evidence of two a large full thickness tear of the triangular fibrocartilage and diffuse synovitis. \par Today he states the pain is better though he still notes it is uncomfortable. Denies swelling. \par Progressive subcutaneous nodules. \par Denies any swelling or morning stiffness of any joints today. \par No fevers or chills. No cough. WIll need to see Dr. Alves later this month for follow up of his pulmonary nodules. \par Plan: \par - Have referred him to an orthopedic hand specialist \par - Patient has been intermittently off therapy in light of surgical procedures, however will need to restart therapy today. Will treat with Predisone 15mg and MTX 15mg weekly with folic acid for now - wont start TNF yet as patient will likely be undergoing surgery for his hand in the near future and this would need to be held in light of surgery, so will start after. Would also like patient's pulmonary nodules assessed prior to biologic therapy. Will likely start with Humira. \par Office Visit 9/26/17:\par Patient with two months of wrist pain. Pain was sudden in onset. He points to his ulnar styloid.  \par Severe pain began in July but was intermittent, worse in the am with concomitant swelling but would usually resolve by the end of the day. Was given prednisone by Dr. Cruz but did not take this. When he returned from vacation in August his pain became constant. He started taking Prednisone on 9/1/17 and stopped this on 9/7/17. He was taking 10mg daily. He states this helped - though when he stopped taking this the wrist pain recurred. Patient has had this pain before but notes it has gotten much worse in the past two weeks to the point he is having difficulty sleeping. Denies numbness or weakness. Denies functional impairment though is finding it more difficult to cook at this time. Not taking any additional medications for pain. \par Has surgery for prostate ca in two weeks. TURP procedure. Dr. Regino Esparza (). Told by urologist he is not supposed to be on steroids for the procedure. \par Of note, I spoke with Dr. Rutledge, patient's pulmonologist regarding his  CXR which revealed interstitial prominence. He had a follow up CT in 10/16 which revealed pulmonary nodules, requiring 6 month follow up per Fleishner criteria but patient never followed up with Dr. Alves. Denies cough, SOB, weight loss, night sweats today. \par \par Office Visit 9/7/17:\par Patient currently only takes Celebrex PRN for his RA.\par Patient has ongoing pain in his knees, wrists and R shoulder. These are intermittent and self resolve. \par Has two month problem of R wrist pain without swelling or morning stiffness. He was given topical therapy by Nancy in June which he never used. Was also given prednisone to take if needed, he finally took it after one week and the pain subsided, though he does not know if it was that intervention that helped - he also thinks this could be because of the weather. \par Was initially prescribed MTX in 2014 and then underwent rotator cuff surgery. At this time he stopped the MTX and he didn'tâ€™t notice a difference in his symptoms so it was never re started. \par Denies joint swelling at this time. \par Denies morning stiffness.\par Overall today with no complaints. \par Functionally independent in iADLs. \par No pain medications required.  [Weight Loss] : no weight loss [Fever] : no fever [Chills] : no chills [Dry Mouth] : no dry mouth [Difficulty Walking] : no difficulty walking [Muscle Spasms] : no muscle spasms [Dry Eyes] : no dry eyes

## 2019-04-29 NOTE — PHYSICAL EXAM
[General Appearance - Alert] : alert [General Appearance - In No Acute Distress] : in no acute distress [Sclera] : the sclera and conjunctiva were normal [Outer Ear] : the ears and nose were normal in appearance [Examination Of The Oral Cavity] : the lips and gums were normal [Nasal Cavity] : the nasal mucosa and septum were normal [Neck Appearance] : the appearance of the neck was normal [Respiration, Rhythm And Depth] : normal respiratory rhythm and effort [Auscultation Breath Sounds / Voice Sounds] : lungs were clear to auscultation bilaterally [Heart Rate And Rhythm] : heart rate was normal and rhythm regular [Heart Sounds] : normal S1 and S2 [Edema] : there was no peripheral edema [Veins - Varicosity Changes] : there were no varicosital changes [Bowel Sounds] : normal bowel sounds [Abdomen Soft] : soft [Abdomen Tenderness] : non-tender [No Spinal Tenderness] : no spinal tenderness [Abnormal Walk] : normal gait [Musculoskeletal - Swelling] : no joint swelling seen [Motor Tone] : muscle strength and tone were normal [] : no rash [Oriented To Time, Place, And Person] : oriented to person, place, and time [Affect] : the affect was normal [Mood] : the mood was normal [FreeTextEntry1] :  Improvement of SC nodule distal to L elbow, now smaller than prior. Not warm or tender.  strength improved bilaterally.

## 2019-04-29 NOTE — ASSESSMENT
[FreeTextEntry1] : 68 year old male presents for evaluation of seropositive nodular rheumatoid arthritis. \par Doing well. \par Continue Enbrel and Arava.\par Check labs. \par \par \par

## 2019-04-29 NOTE — DATA REVIEWED
[FreeTextEntry1] : XR T/L Spine 5/15/18:\par Mild degenerative changes of the lower thoracic spine and lower lumbar spine \par Mild levoscoliosis of the lower lumbar spine\par Mild degenerative changes of the SI joints\par No acute lesions seen\par \par MRI wrist 10/6/17\par Large full thickness tear of triangular fibrocartilage at its radial attachment \par Small full thickness defect of triquelunate ligament  \par Moderate synovitis throughout all compartments of the wrist. \par \par DEXA 4/4/18:\par LH TS -1.0\par FN TS -1.8 \par AP Spine TS -1.1

## 2019-04-29 NOTE — REVIEW OF SYSTEMS
[Fever] : no fever [Chills] : no chills [Eye Pain] : no eye pain [Red Eyes] : eyes not red [Dry Eyes] : no dryness of the eyes [Earache] : no earache [Chest Pain] : no chest pain [Palpitations] : no palpitations [Leg Claudication] : no intermittent leg claudication [Lower Ext Edema] : no extremity edema [Shortness Of Breath] : no shortness of breath [Wheezing] : no wheezing [Cough] : no cough [SOB on Exertion] : no shortness of breath during exertion [Dysuria] : no dysuria [Incontinence] : no incontinence [Arthralgias] : no arthralgias [Joint Pain] : no joint pain [Joint Swelling] : no joint swelling [Joint Stiffness] : no joint stiffness [Skin Lesions] : no skin lesions [Dizziness] : no dizziness [Fainting] : no fainting [Anxiety] : no anxiety [Depression] : no depression

## 2019-05-01 LAB
ALBUMIN SERPL ELPH-MCNC: 4.1 G/DL
ALP BLD-CCNC: 121 U/L
ALT SERPL-CCNC: 25 U/L
ANION GAP SERPL CALC-SCNC: 14 MMOL/L
AST SERPL-CCNC: 28 U/L
BASOPHILS # BLD AUTO: 0.01 K/UL
BASOPHILS NFR BLD AUTO: 0.2 %
BILIRUB SERPL-MCNC: 0.2 MG/DL
BUN SERPL-MCNC: 19 MG/DL
CALCIUM SERPL-MCNC: 9.8 MG/DL
CHLORIDE SERPL-SCNC: 105 MMOL/L
CO2 SERPL-SCNC: 20 MMOL/L
CREAT SERPL-MCNC: 0.97 MG/DL
CRP SERPL-MCNC: 0.56 MG/DL
EOSINOPHIL # BLD AUTO: 0.09 K/UL
EOSINOPHIL NFR BLD AUTO: 1.4 %
ERYTHROCYTE [SEDIMENTATION RATE] IN BLOOD BY WESTERGREN METHOD: 56 MM/HR
GLUCOSE SERPL-MCNC: 106 MG/DL
HCT VFR BLD CALC: 46.3 %
HGB BLD-MCNC: 14 G/DL
IMM GRANULOCYTES NFR BLD AUTO: 0.3 %
LYMPHOCYTES # BLD AUTO: 1.93 K/UL
LYMPHOCYTES NFR BLD AUTO: 29.3 %
MAN DIFF?: NORMAL
MCHC RBC-ENTMCNC: 26.8 PG
MCHC RBC-ENTMCNC: 30.2 GM/DL
MCV RBC AUTO: 88.5 FL
MONOCYTES # BLD AUTO: 0.92 K/UL
MONOCYTES NFR BLD AUTO: 14 %
NEUTROPHILS # BLD AUTO: 3.61 K/UL
NEUTROPHILS NFR BLD AUTO: 54.8 %
PLATELET # BLD AUTO: 154 K/UL
POTASSIUM SERPL-SCNC: 4.4 MMOL/L
PROT SERPL-MCNC: 7.5 G/DL
RBC # BLD: 5.23 M/UL
RBC # FLD: 14.6 %
SODIUM SERPL-SCNC: 139 MMOL/L
WBC # FLD AUTO: 6.58 K/UL

## 2019-05-30 ENCOUNTER — APPOINTMENT (OUTPATIENT)
Dept: RHEUMATOLOGY | Facility: CLINIC | Age: 69
End: 2019-05-30
Payer: COMMERCIAL

## 2019-05-30 PROCEDURE — 36415 COLL VENOUS BLD VENIPUNCTURE: CPT

## 2019-06-05 ENCOUNTER — TRANSCRIPTION ENCOUNTER (OUTPATIENT)
Age: 69
End: 2019-06-05

## 2019-06-07 LAB
ALBUMIN SERPL ELPH-MCNC: 4.3 G/DL
ALP BLD-CCNC: 101 U/L
ALT SERPL-CCNC: 17 U/L
ANION GAP SERPL CALC-SCNC: 12 MMOL/L
AST SERPL-CCNC: 25 U/L
BASOPHILS # BLD AUTO: 0.02 K/UL
BASOPHILS NFR BLD AUTO: 0.3 %
BILIRUB SERPL-MCNC: 0.3 MG/DL
BUN SERPL-MCNC: 22 MG/DL
CALCIUM SERPL-MCNC: 10.1 MG/DL
CHLORIDE SERPL-SCNC: 108 MMOL/L
CO2 SERPL-SCNC: 23 MMOL/L
CREAT SERPL-MCNC: 1.13 MG/DL
CRP SERPL-MCNC: 0.48 MG/DL
EOSINOPHIL # BLD AUTO: 0.13 K/UL
EOSINOPHIL NFR BLD AUTO: 2 %
ERYTHROCYTE [SEDIMENTATION RATE] IN BLOOD BY WESTERGREN METHOD: 49 MM/HR
GLUCOSE SERPL-MCNC: 99 MG/DL
HCT VFR BLD CALC: 44.1 %
HGB BLD-MCNC: 13.6 G/DL
IMM GRANULOCYTES NFR BLD AUTO: 0.2 %
LYMPHOCYTES # BLD AUTO: 2.32 K/UL
LYMPHOCYTES NFR BLD AUTO: 35.7 %
MAN DIFF?: NORMAL
MCHC RBC-ENTMCNC: 26.9 PG
MCHC RBC-ENTMCNC: 30.8 GM/DL
MCV RBC AUTO: 87.2 FL
MONOCYTES # BLD AUTO: 0.74 K/UL
MONOCYTES NFR BLD AUTO: 11.4 %
NEUTROPHILS # BLD AUTO: 3.28 K/UL
NEUTROPHILS NFR BLD AUTO: 50.4 %
PLATELET # BLD AUTO: 169 K/UL
POTASSIUM SERPL-SCNC: 5.1 MMOL/L
PROT SERPL-MCNC: 7.4 G/DL
RBC # BLD: 5.06 M/UL
RBC # FLD: 15.7 %
SODIUM SERPL-SCNC: 143 MMOL/L
WBC # FLD AUTO: 6.5 K/UL

## 2019-06-19 ENCOUNTER — APPOINTMENT (OUTPATIENT)
Dept: PULMONOLOGY | Facility: CLINIC | Age: 69
End: 2019-06-19
Payer: COMMERCIAL

## 2019-06-19 VITALS
DIASTOLIC BLOOD PRESSURE: 82 MMHG | HEART RATE: 80 BPM | HEIGHT: 60 IN | SYSTOLIC BLOOD PRESSURE: 120 MMHG | OXYGEN SATURATION: 95 % | WEIGHT: 145 LBS | BODY MASS INDEX: 28.47 KG/M2 | TEMPERATURE: 98.2 F

## 2019-06-19 PROCEDURE — 99214 OFFICE O/P EST MOD 30 MIN: CPT

## 2019-06-19 NOTE — PHYSICAL EXAM
[Normal Appearance] : normal appearance [General Appearance - Well Developed] : well developed [General Appearance - Well Nourished] : well nourished [Well Groomed] : well groomed [No Deformities] : no deformities [General Appearance - In No Acute Distress] : no acute distress [Eyelids - No Xanthelasma] : the eyelids demonstrated no xanthelasmas [Normal Conjunctiva] : the conjunctiva exhibited no abnormalities [Normal Oropharynx] : normal oropharynx [Neck Cervical Mass (___cm)] : no neck mass was observed [Neck Appearance] : the appearance of the neck was normal [Jugular Venous Distention Increased] : there was no jugular-venous distention [Thyroid Diffuse Enlargement] : the thyroid was not enlarged [Thyroid Nodule] : there were no palpable thyroid nodules [Heart Rate And Rhythm] : heart rate and rhythm were normal [Heart Sounds] : normal S1 and S2 [Murmurs] : no murmurs present [Respiration, Rhythm And Depth] : normal respiratory rhythm and effort [Auscultation Breath Sounds / Voice Sounds] : lungs were clear to auscultation bilaterally [Exaggerated Use Of Accessory Muscles For Inspiration] : no accessory muscle use [Abdomen Soft] : soft [Abdomen Tenderness] : non-tender [Abdomen Mass (___ Cm)] : no abdominal mass palpated [Abnormal Walk] : normal gait [Gait - Sufficient For Exercise Testing] : the gait was sufficient for exercise testing [Nail Clubbing] : no clubbing of the fingernails [Cyanosis, Localized] : no localized cyanosis [Skin Color & Pigmentation] : normal skin color and pigmentation [Petechial Hemorrhages (___cm)] : no petechial hemorrhages [] : no rash [No Venous Stasis] : no venous stasis [No Skin Ulcers] : no skin ulcer [Skin Lesions] : no skin lesions [No Xanthoma] : no  xanthoma was observed [Deep Tendon Reflexes (DTR)] : deep tendon reflexes were 2+ and symmetric [Sensation] : the sensory exam was normal to light touch and pinprick [No Focal Deficits] : no focal deficits [Oriented To Time, Place, And Person] : oriented to person, place, and time [Impaired Insight] : insight and judgment were intact [Affect] : the affect was normal

## 2019-06-20 NOTE — PROCEDURE
[FreeTextEntry1] : EXAM: CT CHEST \par \par PROCEDURE DATE: 10/05/2017 \par \par \par \par \par \par \par INTERPRETATION: EXAM: CT chest without IV contrast 10/5/2017 6:12 PM \par \par CLINICAL HISTORY: 66-year-old male with arthritis. Marked bronchitis. \par \par TECHNIQUE: Axial CT images through the chest were performed, with 2-D \par reformations in the coronal and sagittal planes. No intravenous contrast was \par administered. \par \par COMPARISON: No prior CT chest exams are available for comparison at this \par time. Correlation with chest radiograph dated 12/27/2013 \par \par FINDINGS: \par There are mildly dilated distal airways and bronchial wall thickening in the \par right middle lobe and lingula segment, with small areas of subsegmental \par atelectasis. There is also mild bronchial wall thickening in both lower \par lobes, with peripheral centrilobular groundglass opacities at the extreme \par lung bases. No focal airspace consolidation is seen. No pleural effusion or \par pneumothorax. The intrathoracic trachea and the mainstem bronchi are patent \par bilaterally. There is no parenchymal mass in either lung. \par \par Heart is normal in size. No pericardial effusion. Thoracic aorta is normal \par in course and caliber, without significant atheromatous disease. No \par mediastinal or axillary lymphadenopathy. Evaluation for hilar \par lymphadenopathy is limited without IV contrast, however, no gross hilar \par lymphadenopathy is appreciated. Visualized thyroid gland has unremarkable \par nonenhanced appearance. \par \par Partial visualization of a 6 x 5.4 cm and adjacent 5.8 x 5.6 cm partially \par exophytic fluid density cystic lesions off the upper pole the right kidney. \par A punctate nonobstructing stone in the left upper pole the left kidney is \par noted. Remaining imaged upper abdominal solid and hollow visceral organs \par have an unremarkable nonenhanced appearance. \par \par A Schmorl's noted in superior endplate of T11 is noted. No aggressive lytic \par or blastic osseous lesion. \par \par \par \par IMPRESSION: \par 1. Mildly dilated airways in the right middle lobe and lingular segment, \par with small associated areas of subsegmental atelectasis. \par \par 2. Mild bronchial wall thickening in both lower lobes, with peripheral \par centrilobular groundglass opacities at the extreme lung bases. Findings \par appear airway related and are likely of inflammatory or infectious etiology. \par \par 3. No focal airspace consolidation, pleural effusion or pneumothorax. No \par discrete endobronchial lesion identified. \par \par 4. Partial visualization of a 6 x 5.4 cm and adjacent 5.8 x 5.6 cm partially \par exophytic cystic lesions off the right kidney. A punctate nonobstructing \par stone in the left kidney. Initial evaluation with ultrasound is recommended. \par \par \par \par \par \par "Thank you for the opportunity to participate in the care of this patient." \par \par \par \par ADRI FLORES M.D., ATTENDING RADIOLOGIST \par This document has been electronically signed. Oct 6 2017 10:14AM \par \par

## 2019-06-20 NOTE — DISCUSSION/SUMMARY
[FreeTextEntry1] : 68 yr old male currently smoking with chronic bronchitis, current smoker & RA.\par \par Plan\par - Patient referred to [facility] program. Patient to get LDCT scan and we will compare with CT chest from 2017 which revealed GGO particularly in the peripheral lung bases and bronchial wall thickening bilaterally. \par - PFTs to follow, pt unable to do today and will set an appt.  Discussed advair is  maintenance therapy  and not to be used as needed.  JORDIN to use as needed.  Will follow with PFT and advise on inhalers.\par - Pt is currently being treated for RA follow with PFT and CT scan. Pt to continue to follow with Dr. Lezama.\par - Smoking cessation discussed but patient not ready to quit at this time \par - RTC in 1 month

## 2019-06-20 NOTE — REVIEW OF SYSTEMS
[Negative] : Sleep Disorder [Cough] : cough [Sputum] : sputum  [Fever] : no fever [Chills] : no chills

## 2019-06-20 NOTE — HISTORY OF PRESENT ILLNESS
[FreeTextEntry1] : 68 year old male with PMHx of bronchitis, RA, BPH s/p TURP,  presents today for follow up visit for management of chronic bronchitis. \par \par Patient still smoking; 5-7 cigs/day. Starting smoking 55 yrs ago; approx 1 ppd. 55 pack years. Coughing up white mucous. Took Levaquin in October for flare up of bronchitis with PCP. Coughing has improved since October but still coughing. Slow breather. SOB with stair-climbing but not walking on flat surfaces. Active during the day- approx. 20,000 steps per day and tolerates well. Does wheeze intermittently; not particularly associated with exertion. Denies chest pain or palpitations. Does have LE swelling. Does see Dr. David.\par \par Currently uses Advair intermittently but not daily. \par \par Of note, patient recently saw Dr. Lezama. On Enbrel and Arava for nodular RA. Tolerating medications well.

## 2019-06-21 ENCOUNTER — APPOINTMENT (OUTPATIENT)
Dept: PULMONOLOGY | Facility: CLINIC | Age: 69
End: 2019-06-21
Payer: COMMERCIAL

## 2019-06-21 PROCEDURE — G0296 VISIT TO DETERM LDCT ELIG: CPT

## 2019-06-21 PROCEDURE — 99406 BEHAV CHNG SMOKING 3-10 MIN: CPT | Mod: 25

## 2019-06-21 PROCEDURE — 94060 EVALUATION OF WHEEZING: CPT

## 2019-06-21 PROCEDURE — 94729 DIFFUSING CAPACITY: CPT

## 2019-06-21 PROCEDURE — 94727 GAS DIL/WSHOT DETER LNG VOL: CPT

## 2019-06-26 ENCOUNTER — FORM ENCOUNTER (OUTPATIENT)
Age: 69
End: 2019-06-26

## 2019-06-27 ENCOUNTER — OUTPATIENT (OUTPATIENT)
Dept: OUTPATIENT SERVICES | Facility: HOSPITAL | Age: 69
LOS: 1 days | End: 2019-06-27
Payer: COMMERCIAL

## 2019-06-27 ENCOUNTER — APPOINTMENT (OUTPATIENT)
Dept: CT IMAGING | Facility: HOSPITAL | Age: 69
End: 2019-06-27
Payer: COMMERCIAL

## 2019-06-27 DIAGNOSIS — Z98.890 OTHER SPECIFIED POSTPROCEDURAL STATES: Chronic | ICD-10-CM

## 2019-06-27 DIAGNOSIS — Z41.9 ENCOUNTER FOR PROCEDURE FOR PURPOSES OTHER THAN REMEDYING HEALTH STATE, UNSPECIFIED: Chronic | ICD-10-CM

## 2019-06-27 PROCEDURE — G0297: CPT | Mod: 26

## 2019-06-27 PROCEDURE — G0297: CPT

## 2019-06-28 ENCOUNTER — RESULT REVIEW (OUTPATIENT)
Age: 69
End: 2019-06-28

## 2019-09-18 ENCOUNTER — RX RENEWAL (OUTPATIENT)
Age: 69
End: 2019-09-18

## 2019-11-18 ENCOUNTER — APPOINTMENT (OUTPATIENT)
Dept: RHEUMATOLOGY | Facility: CLINIC | Age: 69
End: 2019-11-18
Payer: COMMERCIAL

## 2019-11-18 VITALS
WEIGHT: 146 LBS | BODY MASS INDEX: 25.87 KG/M2 | HEART RATE: 79 BPM | TEMPERATURE: 97.8 F | SYSTOLIC BLOOD PRESSURE: 129 MMHG | HEIGHT: 63 IN | DIASTOLIC BLOOD PRESSURE: 87 MMHG | OXYGEN SATURATION: 96 %

## 2019-11-18 PROCEDURE — 99214 OFFICE O/P EST MOD 30 MIN: CPT

## 2019-11-19 ENCOUNTER — MED ADMIN CHARGE (OUTPATIENT)
Age: 69
End: 2019-11-19

## 2019-11-19 ENCOUNTER — APPOINTMENT (OUTPATIENT)
Dept: PULMONOLOGY | Facility: CLINIC | Age: 69
End: 2019-11-19
Payer: COMMERCIAL

## 2019-11-19 VITALS
WEIGHT: 152.2 LBS | BODY MASS INDEX: 26.97 KG/M2 | SYSTOLIC BLOOD PRESSURE: 100 MMHG | HEART RATE: 95 BPM | OXYGEN SATURATION: 95 % | DIASTOLIC BLOOD PRESSURE: 80 MMHG | HEIGHT: 63 IN

## 2019-11-19 LAB
ALBUMIN SERPL ELPH-MCNC: 4.2 G/DL
ALP BLD-CCNC: 97 U/L
ALT SERPL-CCNC: 23 U/L
ANION GAP SERPL CALC-SCNC: 14 MMOL/L
AST SERPL-CCNC: 26 U/L
BASOPHILS # BLD AUTO: 0.02 K/UL
BASOPHILS NFR BLD AUTO: 0.3 %
BILIRUB SERPL-MCNC: 0.3 MG/DL
BUN SERPL-MCNC: 17 MG/DL
CALCIUM SERPL-MCNC: 9.6 MG/DL
CHLORIDE SERPL-SCNC: 105 MMOL/L
CO2 SERPL-SCNC: 21 MMOL/L
CREAT SERPL-MCNC: 1 MG/DL
CRP SERPL-MCNC: 0.39 MG/DL
EOSINOPHIL # BLD AUTO: 0.15 K/UL
EOSINOPHIL NFR BLD AUTO: 2.4 %
ERYTHROCYTE [SEDIMENTATION RATE] IN BLOOD BY WESTERGREN METHOD: 47 MM/HR
GLUCOSE SERPL-MCNC: 112 MG/DL
HCT VFR BLD CALC: 49.4 %
HGB BLD-MCNC: 14.8 G/DL
IMM GRANULOCYTES NFR BLD AUTO: 0.2 %
LYMPHOCYTES # BLD AUTO: 2 K/UL
LYMPHOCYTES NFR BLD AUTO: 31.4 %
MAN DIFF?: NORMAL
MCHC RBC-ENTMCNC: 27.3 PG
MCHC RBC-ENTMCNC: 30 GM/DL
MCV RBC AUTO: 91.1 FL
MONOCYTES # BLD AUTO: 0.83 K/UL
MONOCYTES NFR BLD AUTO: 13 %
NEUTROPHILS # BLD AUTO: 3.36 K/UL
NEUTROPHILS NFR BLD AUTO: 52.7 %
PLATELET # BLD AUTO: 156 K/UL
POTASSIUM SERPL-SCNC: 4 MMOL/L
PROT SERPL-MCNC: 7.5 G/DL
RBC # BLD: 5.42 M/UL
RBC # FLD: 15.3 %
SODIUM SERPL-SCNC: 140 MMOL/L
WBC # FLD AUTO: 6.37 K/UL

## 2019-11-19 PROCEDURE — 99214 OFFICE O/P EST MOD 30 MIN: CPT

## 2019-11-19 RX ORDER — LIDOCAINE HYDROCHLORIDE 10 MG/ML
1 INJECTION, SOLUTION INFILTRATION; PERINEURAL
Qty: 0 | Refills: 0 | Status: COMPLETED | OUTPATIENT
Start: 2019-11-19

## 2019-11-19 RX ORDER — METHYLPRED ACET/NACL,ISO-OS/PF 80 MG/ML
80 VIAL (ML) INJECTION
Qty: 1 | Refills: 0 | Status: COMPLETED | OUTPATIENT
Start: 2019-11-19

## 2019-11-19 RX ADMIN — METHYLPREDNISOLONE ACETATE MG/ML: 40 INJECTION, SUSPENSION INTRA-ARTICULAR; INTRALESIONAL; INTRAMUSCULAR; SOFT TISSUE at 00:00

## 2019-11-19 RX ADMIN — LIDOCAINE HYDROCHLORIDE %: 10 INJECTION, SOLUTION INFILTRATION; PERINEURAL at 00:00

## 2019-11-19 NOTE — PROCEDURE
[Today's Date:] : Date: [unfilled] [Soft Tissue Injection] : soft tissue injection was performed [Patient] : the patient [Benefits] : benefits [Risks] : risks [Alternatives] : alternatives [#1 Site: ______] : #1 site identified in the [unfilled] [Therapeutic] : therapeutic [___ ml Inj] : [unfilled] ~Uml [1%] : 1%  [Without Epi] : without epinephrine [Alcohol] : alcohol [Chlorhexidine] : chlorhexidine [22 gauge 1 inch] : A 22 gauge 1 inch needle was used [Cell Count] : cell count [Gram Stain & Culture] : gram stain and culture [Depomedrol ___ mg] : Depomedrol [unfilled] mg

## 2019-11-19 NOTE — HISTORY OF PRESENT ILLNESS
[FreeTextEntry1] : Referred by Olivia Constantino NP for assistance in stopping smoking after patient expressed and interest\par \par "I want to stop the cough and stop being dependent on cigarettes. It is time to stop"\par Gets bronchitis when he gets sick and the cough lasts up to 2 months\par \par Has not tried quitting in the past 6 months but feels he is ready to quit smoking. \par Has been smoking since around age 19 and he is 69 now. Currently smoking 1/3 PPD (6-7 per day)\par Has 1st cigarette w/in 30 minutes waking up\par \par Longest he quit was 2 weeks with no support\par \par Confidence level 5/10\par Triggers: life crisis, general stress/anxiety, after meals, alcohol\par \par

## 2019-11-19 NOTE — ASSESSMENT
[FreeTextEntry1] : 69 year old male presents for evaluation of seropositive nodular rheumatoid arthritis. \par Overall doing well\par Continue Arava and Enbrel at current doses\par Check labs \par XR ankle, injection today - likely OA > RA given lack of synovitis. \par \par

## 2019-11-19 NOTE — HISTORY OF PRESENT ILLNESS
[___ Month(s) Ago] : [unfilled] month(s) ago [Skin Nodules] : skin nodules [FreeTextEntry1] : \par Office Visit 4/29/19:\par Taking Enbrel and Arava \par Feeling much better \par Denies joint pain, swelling, stiffness\par Most nodules getting smaller/resolving. Still with swelling in olecranon bursa. Not painful. \par Plan: Doing well. \par Continue Enbrel and Arava.\par Check labs. \par \par Office Visit 10/18/18:\par Spent two weeks in a spa - lots of [place] and he felt this helped his joints.\par Overall feels well \par Still aches in the morning for about a half hour to an hour but this is diffuse muscle aches, denies joint pain/stiffness/swelling\par Plan: Doing well\par Continue current regimen with Enbrel and Arava \par Patient will have labs done at PCP next week and send them to my office. Have checked off the requested CBC, CMP, ESR and CMP on [facility] form today. \par \par Office Visit 6/22/18:\par No pain/swelling/stiffness\par Ongoing back pain, has been going to PT. Has only been 4 times thus far. \par Pain in his left paralumbar spine and intermittently radiates down his leg. \par Plan: Doing well, continue current management of Enbrel and Arava\par \par Office Visit 4/24/18:\par Had prostate surgery - TURP, held Enbrel prior to this and started two weeks after. Continued Arava throughout - overall feels well. \par Smoking 4 cigarettes a day still. \par Denies pain in his joints. Mild morning stiffness but no swelling. \par New lower back pain, constant and unaffected by activity. DEXA with osteopenia of AP spine no role for treatment per [facility]X. \par Plan: Overall doing well. Improvement on Enbrel.\par Continue current management: Enbrel 50mg weekly. Arava 20mg daily.\par Some tenderness in thoracic spine - will XR today for fracture, patient osteopenic on DEXA on vitamin D at this time, vertebral fracture would be an indication for treatment. \par \par Office Visit 3/13/18:\par Doing well overall. Strength in hands has improved. \par Continues on Prednisone taper and is nervous as he decreases the dose - worried he will flare again. \par Skin nodules are improving. Denies swelling or morning stiffness. \par Denies fevers, chills, weight loss, night sweats, cough, SOB, dysuria. \par Continues to smoke, now 4 cigarettes a day\par Last set of labs with high BGs\par Plan: Doing very well on current regimen of Enbrel 50mg weekly, Arava 20mg daily and Prednisone taper. \par Continue to taper steroids per provided regimen. \par Continue Enbrel and Arava\par Continue Vitamin D, still needs bone density \par Continue to encourage smoking cessation. \par \par Office Visit 2/23/18: \par Patient feels well, has had three doses of Enbrel and today denies any pain however he is also taking Prednisone 15mg daily. He was flaring while waiting for the Enbrel and thus I had prescribed him a small dose in the meantime. \par Denies swelling of any joints. \par Denies weakness in hands today. \par Plan: Only had three doses of Enbrel, thus it is early to assess for a therapeutic effect.\par Plan to continue Enbrel for now and taper Prednisone - will reduce to 10mg today. Patient to notify me if he has difficulty with tapering Prednisone / flares in the setting of taper.  \par Continue Arava \par Check labs to assess disease activity and for medication toxicity. \par \par Office Visit 1/23/18:\par Patient states that since he stopped Prednisone he has more weakness in his hands mostly in his right hand, which is distressing to him as this is his dominant hand. He feels this is causing him to be slower at work and he is frustrated by this, understandably. Denies pain or swelling in any joints today.  \par He felt that his subcutaneous nodules regressed on Prednisone but have since recurred since he stopped the medication. Denies fevers, chills, nausea, vomiting, diarrhea, rashes, headaches, SOB, cough, chest pain. \par Plan: Patient has rheumatoid nodulosis and two full thickness tendon tears on MRI secondary to poorly controlled RA. He is currently on Arava 20mg daily and only suboptimally controlled - he states he felt better on higher doses of Prednisone. At this time, he has completed his prostate surgery and has no plans to undergo surgery for his tendon tears. Further, his pulmonologist re imaged his chest and the previously viewed pulmonary nodules are no longer visible, he only has reactive airway disease presumably in the setting of his smoking. There are currently no contraindications to TNFi therapy. Seeing as he can't be on MTX, will try Enbrel as this has the lowest risk of HACA formation without the additional use of MTX. Will start 50mg SQ weekly. Check quantiferon and hepatitis serologies today. Patient to follow up next week when he obtains the medication so I can show him how to administer. Will continue Arava while on Enbrel. \par \par Office visit 10/30/17: \par Patient underwent prostate surgery, recovering well. Had stopped all RA therapies (prednisone) per recommendations of his surgeon. \par Patient presented last visit with pain in his R wrist, also with notable weakness. Injected his wrist with suboptimal relief of pain but persistent weakness. Underwent an MRI with evidence of two a large full thickness tear of the triangular fibrocartilage and diffuse synovitis. \par Today he states the pain is better though he still notes it is uncomfortable. Denies swelling. \par Progressive subcutaneous nodules. \par Denies any swelling or morning stiffness of any joints today. \par No fevers or chills. No cough. WIll need to see Dr. Alves later this month for follow up of his pulmonary nodules. \par Plan: \par - Have referred him to an orthopedic hand specialist \par - Patient has been intermittently off therapy in light of surgical procedures, however will need to restart therapy today. Will treat with Predisone 15mg and MTX 15mg weekly with folic acid for now - wont start TNF yet as patient will likely be undergoing surgery for his hand in the near future and this would need to be held in light of surgery, so will start after. Would also like patient's pulmonary nodules assessed prior to biologic therapy. Will likely start with Humira. \par Office Visit 9/26/17:\par Patient with two months of wrist pain. Pain was sudden in onset. He points to his ulnar styloid.  \par Severe pain began in July but was intermittent, worse in the am with concomitant swelling but would usually resolve by the end of the day. Was given prednisone by Dr. Cruz but did not take this. When he returned from vacation in August his pain became constant. He started taking Prednisone on 9/1/17 and stopped this on 9/7/17. He was taking 10mg daily. He states this helped - though when he stopped taking this the wrist pain recurred. Patient has had this pain before but notes it has gotten much worse in the past two weeks to the point he is having difficulty sleeping. Denies numbness or weakness. Denies functional impairment though is finding it more difficult to cook at this time. Not taking any additional medications for pain. \par Has surgery for prostate ca in two weeks. TURP procedure. Dr. Regino Esparza (). Told by urologist he is not supposed to be on steroids for the procedure. \par Of note, I spoke with Dr. Rutledge, patient's pulmonologist regarding his  CXR which revealed interstitial prominence. He had a follow up CT in 10/16 which revealed pulmonary nodules, requiring 6 month follow up per Fleishner criteria but patient never followed up with Dr. Alves. Denies cough, SOB, weight loss, night sweats today. \par \par Office Visit 9/7/17:\par Patient currently only takes Celebrex PRN for his RA.\par Patient has ongoing pain in his knees, wrists and R shoulder. These are intermittent and self resolve. \par Has two month problem of R wrist pain without swelling or morning stiffness. He was given topical therapy by Nancy in June which he never used. Was also given prednisone to take if needed, he finally took it after one week and the pain subsided, though he does not know if it was that intervention that helped - he also thinks this could be because of the weather. \mike Was initially prescribed MTX in 2014 and then underwent rotator cuff surgery. At this time he stopped the MTX and he didn'tâ€™t notice a difference in his symptoms so it was never re started. \par Denies joint swelling at this time. \par Denies morning stiffness.\par Overall today with no complaints. \par Functionally independent in iADLs. \par No pain medications required.  [Fever] : no fever [Weight Loss] : no weight loss [Chills] : no chills [Dry Mouth] : no dry mouth [Muscle Spasms] : no muscle spasms [Difficulty Walking] : no difficulty walking [Dry Eyes] : no dry eyes

## 2019-11-19 NOTE — PHYSICAL EXAM
· Continue synthroid  [General Appearance - In No Acute Distress] : in no acute distress [General Appearance - Alert] : alert [Sclera] : the sclera and conjunctiva were normal [Outer Ear] : the ears and nose were normal in appearance [Nasal Cavity] : the nasal mucosa and septum were normal [Examination Of The Oral Cavity] : the lips and gums were normal [Neck Appearance] : the appearance of the neck was normal [Respiration, Rhythm And Depth] : normal respiratory rhythm and effort [Auscultation Breath Sounds / Voice Sounds] : lungs were clear to auscultation bilaterally [Heart Rate And Rhythm] : heart rate was normal and rhythm regular [Heart Sounds] : normal S1 and S2 [Veins - Varicosity Changes] : there were no varicosital changes [Edema] : there was no peripheral edema [Bowel Sounds] : normal bowel sounds [Abdomen Soft] : soft [Abdomen Tenderness] : non-tender [No Spinal Tenderness] : no spinal tenderness [Abnormal Walk] : normal gait [Musculoskeletal - Swelling] : no joint swelling seen [Motor Tone] : muscle strength and tone were normal [] : no rash [Oriented To Time, Place, And Person] : oriented to person, place, and time [Affect] : the affect was normal [Mood] : the mood was normal [FreeTextEntry1] : near complete resolution of subcutaneous nodules. mild tenderness in R ankle, limited ROM.

## 2019-11-19 NOTE — DISCUSSION/SUMMARY
[FreeTextEntry1] : He does not like gum so does not want to use NRT gum\par Would be open to the lozenge\par Open to using the patch \par Smoking less than 1/2 PPD\par When he flies to [place] he does not feel uncomfortable\par Suggested using short acting NRT when he gets up in the am and after meals which is when he  usually has cigarettes. \par Suggested  setting a quit date and preparing ahead of that date\par Alcohol is a trigger and the holidays are coming up. He does not think it is feasible to quit before January 1st.\par Discouraged weaning, should just quit completely on quit date\par Suggested space out cigarettes until  his quit date arrives as a way to wean down beforehand\par Be mindlful of cigarettes and think about why he is reaching for a cigarettes in that moment\par Plan some distraction techniques in advance of quit date\par Cinnamon stick as an option to keep hands busy\par Healthy snacks to avoid weight gain\par \par Acknowledged how difficult it is to quit smoking. Advised that quitting smoking is the most important thing a person can do for their health. Discussed strategies to deal with cravings. Suggested exercise as a distraction. Discussed the importance of having a strategy planned in advance of a quit date. Discussed use of daily nicotine patch and use of lozenge prn for cravings and how to access through CTC. Instructed in proper use of lozenge and patch \par \par

## 2019-11-19 NOTE — ASSESSMENT
[FreeTextEntry1] : 69 Year old man with chronic bronchitis related to his dependence on cigarettes. He is ready to quit smoking and feels a realistic quit date is January 1st.\par \par Plan:\par 21 mg patch (at pt's request)\par 4 mg lozenge\par Explained to step down to 14 mg patch after 4 weeks then to the  7 mg patch after 2-4 weeks\par Advised to use lozenge liberally in the beginning\par Return Feb 2020 to check on progress

## 2019-11-19 NOTE — REVIEW OF SYSTEMS
[Fever] : no fever [Chills] : no chills [Eye Pain] : no eye pain [Dry Eyes] : no dryness of the eyes [Red Eyes] : eyes not red [Earache] : no earache [Chest Pain] : no chest pain [Palpitations] : no palpitations [Leg Claudication] : no intermittent leg claudication [Lower Ext Edema] : no extremity edema [Shortness Of Breath] : no shortness of breath [Wheezing] : no wheezing [Cough] : no cough [SOB on Exertion] : no shortness of breath during exertion [Dysuria] : no dysuria [Incontinence] : no incontinence [Arthralgias] : no arthralgias [Joint Pain] : no joint pain [Joint Swelling] : no joint swelling [Joint Stiffness] : no joint stiffness [Skin Lesions] : no skin lesions [Dizziness] : no dizziness [Fainting] : no fainting [Anxiety] : no anxiety [Depression] : no depression

## 2019-12-19 ENCOUNTER — FORM ENCOUNTER (OUTPATIENT)
Age: 69
End: 2019-12-19

## 2019-12-20 ENCOUNTER — OUTPATIENT (OUTPATIENT)
Dept: OUTPATIENT SERVICES | Facility: HOSPITAL | Age: 69
LOS: 1 days | End: 2019-12-20
Payer: COMMERCIAL

## 2019-12-20 DIAGNOSIS — Z98.890 OTHER SPECIFIED POSTPROCEDURAL STATES: Chronic | ICD-10-CM

## 2019-12-20 DIAGNOSIS — Z41.9 ENCOUNTER FOR PROCEDURE FOR PURPOSES OTHER THAN REMEDYING HEALTH STATE, UNSPECIFIED: Chronic | ICD-10-CM

## 2019-12-20 PROCEDURE — 73630 X-RAY EXAM OF FOOT: CPT | Mod: 26,RT

## 2019-12-20 PROCEDURE — 73630 X-RAY EXAM OF FOOT: CPT

## 2020-01-09 ENCOUNTER — APPOINTMENT (OUTPATIENT)
Dept: OTOLARYNGOLOGY | Facility: CLINIC | Age: 70
End: 2020-01-09
Payer: COMMERCIAL

## 2020-01-09 VITALS
SYSTOLIC BLOOD PRESSURE: 130 MMHG | WEIGHT: 148 LBS | BODY MASS INDEX: 26.22 KG/M2 | DIASTOLIC BLOOD PRESSURE: 89 MMHG | HEIGHT: 63 IN | HEART RATE: 99 BPM

## 2020-01-09 DIAGNOSIS — H91.8X3 OTHER SPECIFIED HEARING LOSS, BILATERAL: ICD-10-CM

## 2020-01-09 DIAGNOSIS — H92.01 OTALGIA, RIGHT EAR: ICD-10-CM

## 2020-01-09 PROCEDURE — 92550 TYMPANOMETRY & REFLEX THRESH: CPT

## 2020-01-09 PROCEDURE — 92557 COMPREHENSIVE HEARING TEST: CPT

## 2020-01-09 PROCEDURE — 99214 OFFICE O/P EST MOD 30 MIN: CPT

## 2020-01-09 NOTE — DATA REVIEWED
[de-identified] : \par AUDIOGRAM (01/09/20)\par BILATERAL:   hearing wnl through 2K Hz, sloping to a mild to severe SNHL. \par  Asymmetry of 20 dB at 6K Hz, worse on left\par WORD RECOGNITION:  90% bilateral \par TYMPANOGRAM:  As on right; A on left \par \par AUDIOGRAM (03/07/19)\par RIGHT:   hearing wnl through 1K Hz, sloping to a mild to severe SNHL. \par LEFT:  hearing wnl through 2K Hz, sloping to a mild to a mild to severe SNHL\par  (Asymmetry at 1-2K Hz poorer in right by 5 dB, and at 6K Hz poorer in left by 15 dB)\par WORD RECOGNITION:  100% bilateral \par TYMPANOGRAM:  A bilateral \par

## 2020-01-09 NOTE — ASSESSMENT
[FreeTextEntry1] : Mr. KATZ had the following issues addressed today:\par \par 1.) Left ear discomfort may be due to TMJ or just ear canal sensitivity.\par 2.) sensorineural hearing loss Bilateral, slightly worse at 6K Hz on left.\par Last year, asymmetry on right side at 1-2K Hz\par Will follow at this time.\par 3.) Left nasal septal ulcer, which started 3 years ago, is stable and does not seem active  - no evidence of granulomatous disease or neoplasm on prior testing/biopsy\par 4.) Nasal vestibulitis \par \par PLAN:\par -- TMJ precautions reviewed \par -- Renew mupirocin ointment for nose\par -- Repeat audiogram in 1 year\par \par Return in 6 months\par

## 2020-01-09 NOTE — PHYSICAL EXAM
[] : septum deviated to the right [Normal] : no neck adenopathy [de-identified] : Mild click right TMJ, nontender.  Left TMJ normal.  No pterygoid tenderness. [de-identified] : Rightward deviation lower 1/3.  Nasal dorsum normal. [de-identified] : Stable left nasal ulcer; very shallow and smooth; minimal crusting, removed.  Septum is intact. [de-identified] : Full upper denture; lower dental implants. [de-identified] : 1+ bilateral

## 2020-01-09 NOTE — CONSULT LETTER
[Dear  ___] : Dear  [unfilled], [Courtesy Letter:] : I had the pleasure of seeing your patient, [unfilled], in my office today. [Consult Closing:] : Thank you very much for allowing me to participate in the care of this patient.  If you have any questions, please do not hesitate to contact me. [Sincerely,] : Sincerely, [DrCalixto  ___] : Dr. AREVALO [FreeTextEntry2] : Lorie Arnold M.D.\par [address]\[place] [address] [FreeTextEntry1] : \par \par Enclosed please find my office notes for January 9, 2020.\par  \par \par \par  [FreeTextEntry3] : \par Olivia Gonzalez MD \par Otolaryngology, Head and Neck Surgery \par Residency site , [facility] \par

## 2020-01-09 NOTE — HISTORY OF PRESENT ILLNESS
[de-identified] : Mr. KATZ was seen to evaluate left ear discomfort and to reevaluate the erosive rhinitis that affected left septum.\par \par Left ear/jaw area uncomfortable when exposed to cold air; lasts for a few minutes; better if area warmed.\par No pain with chewing.  Denies grinding or clenching jaw.  Hx of pain from right TMJ several times in past.\par No ear drainage, clogging or trauma. \par Found to have sensorineural hearing loss bilateral last year.\par No recent changes with hearing; feels left side a little worse; not interested in any hearing aids at this time. \par \par History of chronic left nasal septal ulcer that started at end of 2016 and has never fully healed, although progression stopped. \par Has rare bleeding spots from left nose\par Feels pain/discomfort when touching nasal dorsum.\par Alternating Mupirocin and Propolis ointment to keep nose moist. \par Nasal culture had grown MRSA in past; repeat culture with no grown after mupirocin treatment.\par Also treated with Bactrim and Cipro in 2017.\par Biopsy of left septal ulcer 2017 showed chronic/acute inflammation; no fungus or vasculitis.\par \par On 2 medications for rheumatoid arthritis.\par \par LABS \par (11/19/2019) -- ESR 47, CRP 0.39\par (03/13/2017) -- ACE normal , C-ANCA negative\par \par PATHOLOGY Left nasal septal ulcer biopsy (03/09/2017):\par - Slight fragments of superficial sinonasal mucosa with acute and chronic inflammation\par - Fragments of necrotic debris with extensive acute inflammation consistent with portions of ulcer bed.\par - Special stain for fungus is negative\par - Stain for elastin shows small vessel with no evidence of vasculitis \par  \par \par

## 2020-07-09 ENCOUNTER — APPOINTMENT (OUTPATIENT)
Dept: RHEUMATOLOGY | Facility: CLINIC | Age: 70
End: 2020-07-09
Payer: COMMERCIAL

## 2020-07-09 ENCOUNTER — APPOINTMENT (OUTPATIENT)
Dept: OTOLARYNGOLOGY | Facility: CLINIC | Age: 70
End: 2020-07-09
Payer: COMMERCIAL

## 2020-07-09 ENCOUNTER — LABORATORY RESULT (OUTPATIENT)
Age: 70
End: 2020-07-09

## 2020-07-09 VITALS
HEIGHT: 63 IN | SYSTOLIC BLOOD PRESSURE: 109 MMHG | HEART RATE: 97 BPM | WEIGHT: 150 LBS | DIASTOLIC BLOOD PRESSURE: 72 MMHG | BODY MASS INDEX: 26.58 KG/M2 | TEMPERATURE: 95.6 F

## 2020-07-09 VITALS
SYSTOLIC BLOOD PRESSURE: 116 MMHG | BODY MASS INDEX: 26.67 KG/M2 | HEART RATE: 91 BPM | WEIGHT: 150.5 LBS | TEMPERATURE: 98.7 F | OXYGEN SATURATION: 97 % | DIASTOLIC BLOOD PRESSURE: 78 MMHG | HEIGHT: 63 IN

## 2020-07-09 DIAGNOSIS — H92.02 OTALGIA, LEFT EAR: ICD-10-CM

## 2020-07-09 DIAGNOSIS — M26.621 ARTHRALGIA OF RIGHT TEMPOROMANDIBULAR JOINT: ICD-10-CM

## 2020-07-09 PROCEDURE — 99214 OFFICE O/P EST MOD 30 MIN: CPT | Mod: 25

## 2020-07-09 PROCEDURE — 99214 OFFICE O/P EST MOD 30 MIN: CPT

## 2020-07-09 PROCEDURE — 36415 COLL VENOUS BLD VENIPUNCTURE: CPT

## 2020-07-09 RX ORDER — NICOTINE TRANSDERMAL SYSTEM 21 MG/24H
21 PATCH, EXTENDED RELEASE TRANSDERMAL DAILY
Qty: 2 | Refills: 2 | Status: COMPLETED | COMMUNITY
Start: 2019-11-19 | End: 2020-07-09

## 2020-07-09 RX ORDER — TAMSULOSIN HYDROCHLORIDE 0.4 MG/1
CAPSULE ORAL
Refills: 0 | Status: ACTIVE | COMMUNITY

## 2020-07-09 NOTE — PHYSICAL EXAM
[] : septum deviated to the right [Normal] : cranial nerves 2-12 intact [de-identified] : Mild click right TMJ, nontender.  Left TMJ normal.   [de-identified] : Rightward deviation lower 1/3.  Nasal dorsum normal. [de-identified] : Full upper denture; lower dental implants. [de-identified] : Left nasal septum with shallow ulcer, slighlty larger size and has crusting/granulation tissue at superior edge, friable when crusting removed.  Septum still intact.  Right side septum looks normal. [de-identified] : 1+ bilateral

## 2020-07-09 NOTE — HISTORY OF PRESENT ILLNESS
[de-identified] : Mr. KATZ reports that left septal area has more crusting and is tender over past month.\par Occasional bleeding from area with cleaning but no bleeding x last 10 days.\par History of chronic left nasal septal ulcer that started at end of 2016 and has never fully healed, although progression had stopped. \par Currently still alternating Mupirocin and Propolis ointment to keep nose moist. \par Nasal culture had grown MRSA in past; repeat culture with no grown after mupirocin treatment.\par Also treated with Bactrim and Cipro in 2017.\par Biopsy of left septal ulcer 2017 showed chronic/acute inflammation; no fungus or vasculitis.\par Still on 2 medications for rheumatoid arthritis.\par \par Left ear/jaw pain resolved.\par Occasional left tinnitus, nonpulsatile.  Found to have sensorineural hearing loss bilateral last year.\par No recent changes with hearing; still feels left side a little worse.  Not interested in hearing aids. \par \par Retired from [employer] in early 2020, a few days before COVID crisis started.\par \par \par LABS \par (11/19/2019) -- ESR 47, CRP 0.39\par (03/13/2017) -- ACE normal , C-ANCA negative\par \par PATHOLOGY Left nasal septal ulcer biopsy (03/09/2017):\par - Slight fragments of superficial sinonasal mucosa with acute and chronic inflammation\par - Fragments of necrotic debris with extensive acute inflammation consistent with portions of ulcer bed.\par - Special stain for fungus is negative\par - Stain for elastin shows small vessel with no evidence of vasculitis \par  \par \par

## 2020-07-09 NOTE — CONSULT LETTER
[Dear  ___] : Dear  [unfilled], [Courtesy Letter:] : I had the pleasure of seeing your patient, [unfilled], in my office today. [Consult Closing:] : Thank you very much for allowing me to participate in the care of this patient.  If you have any questions, please do not hesitate to contact me. [Sincerely,] : Sincerely, [DrCalixto  ___] : Dr. AREVALO [FreeTextEntry1] : \par \par Enclosed please find my office notes for July 9, 2020.\par  \par \par \par  [FreeTextEntry2] : Lorie Arnold M.D.\par [address] [facility]\[place] [address] [FreeTextEntry3] : \par Olivia Gonzalez MD \par Otolaryngology, Head and Neck Surgery \par Residency site , [facility] \par

## 2020-07-09 NOTE — DATA REVIEWED
[de-identified] : \par AUDIOGRAM (01/09/20)\par BILATERAL:   hearing wnl through 2K Hz, sloping to a mild to severe SNHL. \par  Asymmetry of 20 dB at 6K Hz, worse on left\par WORD RECOGNITION:  90% bilateral \par TYMPANOGRAM:  As on right; A on left \par \par AUDIOGRAM (03/07/19)\par RIGHT:   hearing wnl through 1K Hz, sloping to a mild to severe SNHL. \par LEFT:  hearing wnl through 2K Hz, sloping to a mild to a mild to severe SNHL\par  (Asymmetry at 1-2K Hz poorer in right by 5 dB, and at 6K Hz poorer in left by 15 dB)\par WORD RECOGNITION:  100% bilateral \par TYMPANOGRAM:  A bilateral \par 
7 y/o male Will observe, Apply Emula and staple.

## 2020-07-09 NOTE — ASSESSMENT
[FreeTextEntry1] : Mr. KATZ had the following issues addressed today:\par \par 1.) Left nasal septal ulcer, which started 3 years ago, is worse over past few weeks.\par There is more crusting and inflammation, with bleeding mucosa after crust removed today.  The septum is intact.\par The workup in 2017 included septal mucosal biopsy (inflammation, no vasculitis) and neg/normal C-ANCA and ACE levels. He is on meds for arthritis.\par I took a culture from the left septum today.\par 2.) Left tinnitus occasionally.  Left ear discomfort resolved\par 3.) Bilateral sensorineural hearing loss, slightly worse at 6K Hz on left, which may account for the tinnitus.\par \par PLAN:\par -- check septal culture\par -- Renew mupirocin ointment for nose\par -- Repeat audiogram in 6 months\par \par Discuss with Dr. Lezama - ? any other serologic testing that may be informative.  He did not want another septal biopsy today.\par

## 2020-07-13 NOTE — ASSESSMENT
[FreeTextEntry1] : 69 year old male presents for evaluation of seropositive nodular rheumatoid arthritis. \par Rotator cuff tendonitis, offered depomedrol injection, patient deferred today. Will call if interested. \par Discussed inflammatory nasal sinuses and c/f vasculitis with Dr. Gonzalez - this could be a side effects of TNFi. Dr. Gonzalez to perform biopsy, will check ANCAs and FRANCISCO also. If any c/f vasculitis will consider stopping Enbrel. \par Continue Arava\par Check labs\par

## 2020-07-13 NOTE — PHYSICAL EXAM
[General Appearance - Alert] : alert [Sclera] : the sclera and conjunctiva were normal [General Appearance - In No Acute Distress] : in no acute distress [Outer Ear] : the ears and nose were normal in appearance [Nasal Cavity] : the nasal mucosa and septum were normal [Examination Of The Oral Cavity] : the lips and gums were normal [Neck Appearance] : the appearance of the neck was normal [Respiration, Rhythm And Depth] : normal respiratory rhythm and effort [Auscultation Breath Sounds / Voice Sounds] : lungs were clear to auscultation bilaterally [Edema] : there was no peripheral edema [Heart Rate And Rhythm] : heart rate was normal and rhythm regular [Heart Sounds] : normal S1 and S2 [Veins - Varicosity Changes] : there were no varicosital changes [Bowel Sounds] : normal bowel sounds [Abdomen Tenderness] : non-tender [Abdomen Soft] : soft [No Spinal Tenderness] : no spinal tenderness [Motor Tone] : muscle strength and tone were normal [Abnormal Walk] : normal gait [Musculoskeletal - Swelling] : no joint swelling seen [] : no rash [Mood] : the mood was normal [Oriented To Time, Place, And Person] : oriented to person, place, and time [Affect] : the affect was normal [FreeTextEntry1] : near complete resolution of subcutaneous nodules.  L shoulder with painful arc at 110, + empty can test, mild tenderness over subacromial bursa

## 2020-07-13 NOTE — HISTORY OF PRESENT ILLNESS
[___ Month(s) Ago] : [unfilled] month(s) ago [Skin Nodules] : skin nodules [FreeTextEntry1] : Office Visit 11/18/19:\par R ankle pain persists but all other joint pain improved on current regimen \par History of MTP / bunion surgery \par Denies any other joint pain\par Plan: Overall doing well\par Continue Arava and Enbrel at current doses\par Check labs \par XR ankle, injection today - likely OA > RA given lack of synovitis. \par \par Office Visit 4/29/19:\par Taking Enbrel and Arava \par Feeling much better \par Denies joint pain, swelling, stiffness\par Most nodules getting smaller/resolving. Still with swelling in olecranon bursa. Not painful. \par Plan: Doing well. \par Continue Enbrel and Arava.\par Check labs. \par \par Office Visit 10/18/18:\par Spent two weeks in a spa - lots of hot springs and he felt this helped his joints.\par Overall feels well \par Still aches in the morning for about a half hour to an hour but this is diffuse muscle aches, denies joint pain/stiffness/swelling\par Plan: Doing well\par Continue current regimen with Enbrel and Arava \par Patient will have labs done at PCP next week and send them to my office. Have checked off the requested CBC, CMP, ESR and CMP on [facility] form today. \par \par Office Visit 6/22/18:\par No pain/swelling/stiffness\par Ongoing back pain, has been going to PT. Has only been 4 times thus far. \par Pain in his left paralumbar spine and intermittently radiates down his leg. \par Plan: Doing well, continue current management of Enbrel and Arava\par \par Office Visit 4/24/18:\par Had prostate surgery - TURP, held Enbrel prior to this and started two weeks after. Continued Arava throughout - overall feels well. \par Smoking 4 cigarettes a day still. \par Denies pain in his joints. Mild morning stiffness but no swelling. \par New lower back pain, constant and unaffected by activity. DEXA with osteopenia of AP spine no role for treatment per [facility]. \par Plan: Overall doing well. Improvement on Enbrel.\par Continue current management: Enbrel 50mg weekly. Arava 20mg daily.\par Some tenderness in thoracic spine - will XR today for fracture, patient osteopenic on DEXA on vitamin D at this time, vertebral fracture would be an indication for treatment. \par \par Office Visit 3/13/18:\par Doing well overall. Strength in hands has improved. \par Continues on Prednisone taper and is nervous as he decreases the dose - worried he will flare again. \par Skin nodules are improving. Denies swelling or morning stiffness. \par Denies fevers, chills, weight loss, night sweats, cough, SOB, dysuria. \par Continues to smoke, now 4 cigarettes a day\par Last set of labs with high BGs\par Plan: Doing very well on current regimen of Enbrel 50mg weekly, Arava 20mg daily and Prednisone taper. \par Continue to taper steroids per provided regimen. \par Continue Enbrel and Arava\par Continue Vitamin D, still needs bone density \par Continue to encourage smoking cessation. \par \par Office Visit 2/23/18: \par Patient feels well, has had three doses of Enbrel and today denies any pain however he is also taking Prednisone 15mg daily. He was flaring while waiting for the Enbrel and thus I had prescribed him a small dose in the meantime. \par Denies swelling of any joints. \par Denies weakness in hands today. \par Plan: Only had three doses of Enbrel, thus it is early to assess for a therapeutic effect.\par Plan to continue Enbrel for now and taper Prednisone - will reduce to 10mg today. Patient to notify me if he has difficulty with tapering Prednisone / flares in the setting of taper.  \par Continue Arava \par Check labs to assess disease activity and for medication toxicity. \par \par Office Visit 1/23/18:\par Patient states that since he stopped Prednisone he has more weakness in his hands mostly in his right hand, which is distressing to him as this is his dominant hand. He feels this is causing him to be slower at work and he is frustrated by this, understandably. Denies pain or swelling in any joints today.  \par He felt that his subcutaneous nodules regressed on Prednisone but have since recurred since he stopped the medication. Denies fevers, chills, nausea, vomiting, diarrhea, rashes, headaches, SOB, cough, chest pain. \par Plan: Patient has rheumatoid nodulosis and two full thickness tendon tears on MRI secondary to poorly controlled RA. He is currently on Arava 20mg daily and only suboptimally controlled - he states he felt better on higher doses of Prednisone. At this time, he has completed his prostate surgery and has no plans to undergo surgery for his tendon tears. Further, his pulmonologist re imaged his chest and the previously viewed pulmonary nodules are no longer visible, he only has reactive airway disease presumably in the setting of his smoking. There are currently no contraindications to TNFi therapy. Seeing as he can't be on MTX, will try Enbrel as this has the lowest risk of HACA formation without the additional use of MTX. Will start 50mg SQ weekly. Check quantiferon and hepatitis serologies today. Patient to follow up next week when he obtains the medication so I can show him how to administer. Will continue Arava while on Enbrel. \par \par Office visit 10/30/17: \par Patient underwent prostate surgery, recovering well. Had stopped all RA therapies (prednisone) per recommendations of his surgeon. \par Patient presented last visit with pain in his R wrist, also with notable weakness. Injected his wrist with suboptimal relief of pain but persistent weakness. Underwent an MRI with evidence of two a large full thickness tear of the triangular fibrocartilage and diffuse synovitis. \par Today he states the pain is better though he still notes it is uncomfortable. Denies swelling. \par Progressive subcutaneous nodules. \par Denies any swelling or morning stiffness of any joints today. \par No fevers or chills. No cough. WIll need to see Dr. Alves later this month for follow up of his pulmonary nodules. \par Plan: \par - Have referred him to an orthopedic hand specialist \par - Patient has been intermittently off therapy in light of surgical procedures, however will need to restart therapy today. Will treat with Predisone 15mg and MTX 15mg weekly with folic acid for now - wont start TNF yet as patient will likely be undergoing surgery for his hand in the near future and this would need to be held in light of surgery, so will start after. Would also like patient's pulmonary nodules assessed prior to biologic therapy. Will likely start with Humira. \par Office Visit 9/26/17:\par Patient with two months of wrist pain. Pain was sudden in onset. He points to his ulnar styloid.  \par Severe pain began in July but was intermittent, worse in the am with concomitant swelling but would usually resolve by the end of the day. Was given prednisone by Dr. Cruz but did not take this. When he returned from vacation in August his pain became constant. He started taking Prednisone on 9/1/17 and stopped this on 9/7/17. He was taking 10mg daily. He states this helped - though when he stopped taking this the wrist pain recurred. Patient has had this pain before but notes it has gotten much worse in the past two weeks to the point he is having difficulty sleeping. Denies numbness or weakness. Denies functional impairment though is finding it more difficult to cook at this time. Not taking any additional medications for pain. \par Has surgery for prostate ca in two weeks. TURP procedure. Dr. Regino Esparza (). Told by urologist he is not supposed to be on steroids for the procedure. \par Of note, I spoke with Dr. Rutledge, patient's pulmonologist regarding his  CXR which revealed interstitial prominence. He had a follow up CT in 10/16 which revealed pulmonary nodules, requiring 6 month follow up per Fleishner criteria but patient never followed up with Dr. Alves. Denies cough, SOB, weight loss, night sweats today. \par \par Office Visit 9/7/17:\par Patient currently only takes Celebrex PRN for his RA.\par Patient has ongoing pain in his knees, wrists and R shoulder. These are intermittent and self resolve. \par Has two month problem of R wrist pain without swelling or morning stiffness. He was given topical therapy by Nancy in June which he never used. Was also given prednisone to take if needed, he finally took it after one week and the pain subsided, though he does not know if it was that intervention that helped - he also thinks this could be because of the weather. \par Was initially prescribed MTX in 2014 and then underwent rotator cuff surgery. At this time he stopped the MTX and he didn'tâ€™t notice a difference in his symptoms so it was never re started. \par Denies joint swelling at this time. \par Denies morning stiffness.\par Overall today with no complaints. \par Functionally independent in iADLs. \par No pain medications required.  [Weight Loss] : no weight loss [Fever] : no fever [Chills] : no chills [Dry Mouth] : no dry mouth [Difficulty Walking] : no difficulty walking [Muscle Spasms] : no muscle spasms [Dry Eyes] : no dry eyes

## 2020-07-15 LAB
ALBUMIN SERPL ELPH-MCNC: 4.2 G/DL
ALP BLD-CCNC: 108 U/L
ALT SERPL-CCNC: 20 U/L
ANION GAP SERPL CALC-SCNC: 16 MMOL/L
AST SERPL-CCNC: 22 U/L
BASOPHILS # BLD AUTO: 0.02 K/UL
BASOPHILS NFR BLD AUTO: 0.3 %
BILIRUB SERPL-MCNC: 0.3 MG/DL
BUN SERPL-MCNC: 20 MG/DL
CALCIUM SERPL-MCNC: 9.6 MG/DL
CHLORIDE SERPL-SCNC: 105 MMOL/L
CO2 SERPL-SCNC: 19 MMOL/L
CREAT SERPL-MCNC: 1.15 MG/DL
CRP SERPL-MCNC: 0.33 MG/DL
EOSINOPHIL # BLD AUTO: 0.11 K/UL
EOSINOPHIL NFR BLD AUTO: 1.7 %
ERYTHROCYTE [SEDIMENTATION RATE] IN BLOOD BY WESTERGREN METHOD: 60 MM/HR
GLUCOSE SERPL-MCNC: 104 MG/DL
HCT VFR BLD CALC: 47.2 %
HGB BLD-MCNC: 14.2 G/DL
IMM GRANULOCYTES NFR BLD AUTO: 0.3 %
LYMPHOCYTES # BLD AUTO: 1.74 K/UL
LYMPHOCYTES NFR BLD AUTO: 26.1 %
MAN DIFF?: NORMAL
MCHC RBC-ENTMCNC: 27.1 PG
MCHC RBC-ENTMCNC: 30.1 GM/DL
MCV RBC AUTO: 90.1 FL
MONOCYTES # BLD AUTO: 0.84 K/UL
MONOCYTES NFR BLD AUTO: 12.6 %
NEUTROPHILS # BLD AUTO: 3.93 K/UL
NEUTROPHILS NFR BLD AUTO: 59 %
PLATELET # BLD AUTO: 159 K/UL
POTASSIUM SERPL-SCNC: 4.4 MMOL/L
PROT SERPL-MCNC: 7.2 G/DL
RBC # BLD: 5.24 M/UL
RBC # FLD: 14.6 %
SODIUM SERPL-SCNC: 140 MMOL/L
WBC # FLD AUTO: 6.66 K/UL

## 2020-07-16 ENCOUNTER — RESULT REVIEW (OUTPATIENT)
Age: 70
End: 2020-07-16

## 2020-07-16 ENCOUNTER — APPOINTMENT (OUTPATIENT)
Dept: OTOLARYNGOLOGY | Facility: CLINIC | Age: 70
End: 2020-07-16
Payer: COMMERCIAL

## 2020-07-16 ENCOUNTER — APPOINTMENT (OUTPATIENT)
Dept: RHEUMATOLOGY | Facility: CLINIC | Age: 70
End: 2020-07-16
Payer: COMMERCIAL

## 2020-07-16 VITALS
BODY MASS INDEX: 26.58 KG/M2 | DIASTOLIC BLOOD PRESSURE: 66 MMHG | HEART RATE: 86 BPM | WEIGHT: 150 LBS | SYSTOLIC BLOOD PRESSURE: 108 MMHG | HEIGHT: 63 IN | TEMPERATURE: 96.5 F

## 2020-07-16 PROCEDURE — 36415 COLL VENOUS BLD VENIPUNCTURE: CPT

## 2020-07-16 PROCEDURE — 30100 INTRANASAL BIOPSY: CPT | Mod: LT

## 2020-07-17 ENCOUNTER — LABORATORY RESULT (OUTPATIENT)
Age: 70
End: 2020-07-17

## 2020-07-17 ENCOUNTER — OUTPATIENT (OUTPATIENT)
Dept: OUTPATIENT SERVICES | Facility: HOSPITAL | Age: 70
LOS: 1 days | End: 2020-07-17
Payer: COMMERCIAL

## 2020-07-17 DIAGNOSIS — Z41.9 ENCOUNTER FOR PROCEDURE FOR PURPOSES OTHER THAN REMEDYING HEALTH STATE, UNSPECIFIED: Chronic | ICD-10-CM

## 2020-07-17 DIAGNOSIS — Z98.890 OTHER SPECIFIED POSTPROCEDURAL STATES: Chronic | ICD-10-CM

## 2020-07-17 DIAGNOSIS — J34.0 ABSCESS, FURUNCLE AND CARBUNCLE OF NOSE: ICD-10-CM

## 2020-07-17 PROCEDURE — 88305 TISSUE EXAM BY PATHOLOGIST: CPT

## 2020-07-17 PROCEDURE — 88341 IMHCHEM/IMCYTCHM EA ADD ANTB: CPT

## 2020-07-17 PROCEDURE — 88360 TUMOR IMMUNOHISTOCHEM/MANUAL: CPT

## 2020-07-20 PROCEDURE — 88342 IMHCHEM/IMCYTCHM 1ST ANTB: CPT | Mod: 26,59

## 2020-07-20 PROCEDURE — 88360 TUMOR IMMUNOHISTOCHEM/MANUAL: CPT | Mod: 26

## 2020-07-20 PROCEDURE — 88341 IMHCHEM/IMCYTCHM EA ADD ANTB: CPT | Mod: 26,59

## 2020-07-20 PROCEDURE — 88305 TISSUE EXAM BY PATHOLOGIST: CPT | Mod: 26

## 2020-07-23 LAB
ANA PAT FLD IF-IMP: ABNORMAL
ANA SER IF-ACNC: ABNORMAL
DSDNA AB SER-ACNC: <12 IU/ML
HISTONE AB SER QL: 1.4 UNITS
MPO AB + PR3 PNL SER: NORMAL

## 2020-07-24 LAB — SURGICAL PATHOLOGY STUDY: SIGNIFICANT CHANGE UP

## 2020-08-05 ENCOUNTER — OUTPATIENT (OUTPATIENT)
Dept: OUTPATIENT SERVICES | Facility: HOSPITAL | Age: 70
LOS: 1 days | End: 2020-08-05
Payer: COMMERCIAL

## 2020-08-05 DIAGNOSIS — Z41.9 ENCOUNTER FOR PROCEDURE FOR PURPOSES OTHER THAN REMEDYING HEALTH STATE, UNSPECIFIED: Chronic | ICD-10-CM

## 2020-08-05 DIAGNOSIS — Z98.890 OTHER SPECIFIED POSTPROCEDURAL STATES: Chronic | ICD-10-CM

## 2020-08-05 LAB — GLUCOSE BLDC GLUCOMTR-MCNC: 91 MG/DL — SIGNIFICANT CHANGE UP (ref 70–99)

## 2020-08-05 PROCEDURE — 78815 PET IMAGE W/CT SKULL-THIGH: CPT | Mod: 26

## 2020-08-05 PROCEDURE — 82962 GLUCOSE BLOOD TEST: CPT

## 2020-08-05 PROCEDURE — 70487 CT MAXILLOFACIAL W/DYE: CPT

## 2020-08-05 PROCEDURE — A9552: CPT

## 2020-08-05 PROCEDURE — 70487 CT MAXILLOFACIAL W/DYE: CPT | Mod: 26

## 2020-08-05 PROCEDURE — A9585: CPT

## 2020-08-05 PROCEDURE — 78815 PET IMAGE W/CT SKULL-THIGH: CPT

## 2020-08-05 PROCEDURE — 70543 MRI ORBT/FAC/NCK W/O &W/DYE: CPT | Mod: 26

## 2020-08-05 PROCEDURE — 70543 MRI ORBT/FAC/NCK W/O &W/DYE: CPT

## 2020-08-10 ENCOUNTER — APPOINTMENT (OUTPATIENT)
Dept: OTOLARYNGOLOGY | Facility: CLINIC | Age: 70
End: 2020-08-10
Payer: COMMERCIAL

## 2020-08-12 ENCOUNTER — APPOINTMENT (OUTPATIENT)
Dept: OTOLARYNGOLOGY | Facility: CLINIC | Age: 70
End: 2020-08-12
Payer: COMMERCIAL

## 2020-08-12 VITALS
BODY MASS INDEX: 25.34 KG/M2 | SYSTOLIC BLOOD PRESSURE: 109 MMHG | HEIGHT: 63 IN | WEIGHT: 143 LBS | DIASTOLIC BLOOD PRESSURE: 72 MMHG | HEART RATE: 97 BPM | TEMPERATURE: 95.8 F

## 2020-08-12 PROCEDURE — 99215 OFFICE O/P EST HI 40 MIN: CPT

## 2020-08-12 RX ORDER — ETANERCEPT 50 MG/ML
50 SOLUTION SUBCUTANEOUS
Qty: 4 | Refills: 13 | Status: COMPLETED | COMMUNITY
Start: 2018-01-29 | End: 2020-08-12

## 2020-08-13 NOTE — HISTORY OF PRESENT ILLNESS
[de-identified] : Mr. 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 Lezama took him off of Enbrel after the cancer diagnosis.\par \par \par PATHOLOGY Left nasal septum biopsy (7/16/2020):\par - Invasive nonkeratinizing squamous cell carcinoma.\par - Ki-67 shows an increased proliferation rate of approximately 50%.\par \par CT MAXILLOFACIAL  with contrast (08/05/2020) at [facility]:\par - The current study is interpreted in conjunction with images from concurrent PET/CT dated 8/5/2020.\par - Please refer to report of concurrent MRI for more detailed description of lesion extent. Subtle asymmetric enhancement is noted along the left lateral surface of the anterior cartilaginous septum with more confluent enhancing soft tissue noted along the junction of the lateral margin of the maxillary crest and the floor of the left nasal cavity, a finding best appreciated on coronal images 20 through 23. Here, there is underlying osseous erosion with an approximately 7 mm ill-defined lytic focus lying immediately anterior to the nasopalatine duct; see sagittal bone images 39 through 41, axial images 34 through 36 and coronal images 23 through 25. There is also a small nodular focus of enhancing soft tissue along the superior margin of the anterior left nasal septum, best seen on soft tissue coronal image 12, with irregularity of the adjacent left nasal bone evident on the corresponding bone images. As this lies at a considerable distance from the nasal cavity floor lesion, correlation with direct visual inspection is recommended for confirmation. There does appear to be FDG uptake at this site on the PET/CT study.\par - No additional nasal or nasopharyngeal soft tissue mass. There is prominent right and mild left middle turbinate pneumatization. There is a very large left-sided bony septal spur that contacts the hypoplastic left inferior turbinate and the left lateral nasal wall. Circumferential mucosal thickening lines walls of the right maxillary antrum with the remaining paranasal sinuses predominantly ventilated bilaterally. Anterior and posterior drainage pathways are patent. Orbital contents are normal. There is mild ventricular enlargement out of proportion to the degree of sulcal prominence, central atrophy versus mild communicating hydrocephalus. Mastoid air cells are clear bilaterally.\par IMPRESSION:\par Although it is difficult to define lesional extent on this CT examination, subtle asymmetric enhancement is present along the left lateral margin of the anterior nasal septum, with more confluent soft tissue along the nasal cavity floor that is associated with underlying bone erosion. A smaller nodular focus is present superiorly with suspected erosion of the left nasal bone. Please refer to concurrent MRI and PET/CT for additional assessment of lesional extent.\par (Images were reviewed.) \par \par \par MR ORBIT FACE AND/OR NECK with/without contrast (08/05/2020) at [facility]:\par - The current study is interpreted in conjunction with images from concurrent CT maxillofacial and PET/CT studies.\par - As on the CT study, subtle asymmetric enhancement is present along the mucosal surface of the left anterior nasal septum, with more confluent soft tissue along the nasal cavity floor associated with osseous erosion and extension of tumor into the left ventral maxilla. Additional subcentimeter nodular enhancement along the anterior left nasal roof associated with apparent erosion of the left nasal bone is not as well seen on the MRI study. The best MRI imaging correlate is on reconstructed coronal images of the volumetric T1 series, annotated and saved as key images. No additional sites of suspicious nodular soft tissue or contrast enhancement are identified. There is no pathologic contrast enhancement within skull base foramina and the cavernous sinuses are symmetric in appearance bilaterally. There is again mild ventricular prominence, central atrophy versus mild communicating hydrocephalus. There is no intracranial mass or pathologic contrast enhancement.\par IMPRESSION:\par As on the concurrent CT examination, subtle asymmetric enhancement is noted along the left anterior nasal septum, with more confluent soft tissue density along the nasal cavity floor associated with underlying osseous erosion. A second suspected focus of osseous erosion along the left nasal bone is better seen on the CT examination.\par (Images were reviewed.) \par \par \par PET-CT SKUL-THIGH ONC FDG INIT (08/05/2020) at [facility]:  \par - Comparison: CT of the head 8/5/2020, CT chest 6/27/2019\par - Reference mean SUV, Liver: 2.4\par - HEAD and NECK:  Focal intense FDG avidity along the left aspect of the anterior nasal septum SUV max 9.9, corresponding to CT findings. The FDG avidity extends to the erosive osseous lesion in the floor of the nasal cavity, as well as the nasal bone (4:33, 45). Subcentimeter Right cervical level 1 lymph nodes are seen (3:64) SUV max up to 2.6.\par Focally FDG avid thyroid nodule in the left lobe with punctate calcification, SUV max 4.2 (4:88).\par - LUNGS and PLEURA:   Mildly FDG avid patchy groundglass opacities/centrilobular groundglass nodules pronounced in the mid lung and lower lungs, likely inflammatory.\par - MEDIASTINUM and CATHY:  Mildly FDG avid small mediastinal lymph nodes and right hilar node, SUV max up to 4.5 in the periaortic region, may be reactive. Physiologic FDG avidity in the myocardium is seen.\par - HEPATOBILIARY:  No focus of abnormal FDG avidity. Within normal limits.\par - PANCREAS:  No focus of abnormal FDG avidity. Within normal limits.\par - SPLEEN:  No focus of abnormal FDG avidity. Within normal limits.\par - ADRENALS:   No focus of abnormal FDG avidity. Within normal limits.\par - GENITOURINARY:   Mildly and diffusely increased FDG uptake in bilateral testes with SUV max 4.9 without CT correlation. Mild diffuse heterogeneous FDG uptake in the enlarged prostate, nonspecific and likely inflammatory. Physiologic FDG avidity along the urinary tract is seen. Photopenic right renal cysts.\par - GASTROINTESTINAL:   Focal FDG avidity in the medial aspect of the duodenal antrum with SUV max 9.1, without CT correlation (604:72). Prominent FDG uptake along the proximal gastric wall without CT correlation, with SUV max 6.3 may be inflammatory. FDG avidity in the proximal sigmoid colon with SUV max 8.3 (604:36), and in the anorectal region with SUV max 12.2 without CT correlation may be physiologic.\par - PERITONEUM/RETROPERITONEUM:   No focus of abnormal FDG avidity. Within normal limits.\par - LYMPH NODES:   No focus of abnormal FDG avidity. Within normal limits.\par - VESSELS:  No focus of abnormal FDG avidity. Within normal limits.\par - BONES and SOFT TISSUE:  Increased FDG uptake surrounding the bilateral humeral heads, right greater than the left, likely inflammatory/degenerative. Area of increased FDG avidity along the paraspinal muscle in the mid thoracic spine level, may be physiologic.\par IMPRESSION:\par 1.)  FDG-avid lesion along the left aspect of the anterior nasal septum, with osseous involvement in the floor of the nasal cavity as well as nasal bones.\par 2.)  Mildly FDG-avid right level 1 cervical lymph nodes, may be reactive. No evidence of distant metastasis.\par 3.)  Mildly FDG avid ground glass opacities and centrilobular ground glass opacities in both lungs, likely inflammatory.\par 4.)  FDG-avid left thyroid nodule with calcification. Correlation with neck ultrasound is recommended.\par 5.)  FDG-avid focus in the duodenal antrum without CT correlation, nonspecific. Clinical/visual correlation is recommended.\par (Images were reviewed.) \par \par

## 2020-08-13 NOTE — CONSULT LETTER
[Dear  ___] : Dear  [unfilled], [Please see my note below.] : Please see my note below. [Courtesy Letter:] : I had the pleasure of seeing your patient, [unfilled], in my office today. [Consult Closing:] : Thank you very much for allowing me to participate in the care of this patient.  If you have any questions, please do not hesitate to contact me. [FreeTextEntry2] : Lorie Arnold M.D.\par [address] [facility]\[place] [address] [Sincerely,] : Sincerely, [FreeTextEntry1] : \par \par Enclosed please find my office notes for August 12, 2020.\par  \par \par \par  [FreeTextEntry3] : \par Olivia Gonzalez MD \par Otolaryngology, Head and Neck Surgery \par Residency site , [facility] \par  [DrCalixto  ___] : Dr. AREVALO [___] : [unfilled]

## 2020-08-13 NOTE — ASSESSMENT
[FreeTextEntry1] : Mr Emily has an invasive squamous cell carcinoma of the nasal septum, with radiologic evidence of erosion of portion nasal bone and left nasal floor.  There is no evidence of regional or distant metastasis.\par His case was presented at the [facility] Tumor Board, and consensus recommendation for surgical resection (septum, portion of nasal bone, portion of left maxilla)  with reconstruction.  Based on his exam and imaging, the nasal skin should be spared.  Postop RT/chemo would be recommended also.\par We discussed alternative of primary RT/chemo, which has the disadvantage of higher failure if bony involvement, plus change in the nasal cartilage post treatment.\par The surgery, risks and benefits were discussed with him.  I told him honestly that the resection may be larger than expected if required by pathology margins.  He was concerned about the posttreatment appearance of his nose/face.  He will need an obturator since I would rather leave the maxillary defect open for monitoring postop, with reconstruction second stage.\par His questions were answered.  He was given reports for pathology and imaging studies; he has CDs already.\par I encouraged him to get a second opinion.  He did not want to see the radiation oncologist at this time.\par He will discuss with his wife at home.\par We will f/up in a few days by telephone.\par \par Left thyroid nodule seen on PET-CT and is FDG avid.\par US thyroid is ordered for evaluation.  May need USG FNA as well.\par

## 2020-08-13 NOTE — PHYSICAL EXAM
[de-identified] : No palpable thyroid nodule [] : septum deviated to the right [de-identified] : Rightward deviation lower 1/3.  Nasal dorsum normal. [de-identified] : Left nasal septum with shallow ulcer, about 3/4 of the quadrangular cartilage and extends onto anterior nasal floor where he has more tenderness.  Still has crusting/granulation tissue at superior edge..  Septum still intact.  Right side septum looks normal. [de-identified] : hard palate intact, nontender [Normal] : no neck adenopathy

## 2020-08-25 ENCOUNTER — APPOINTMENT (OUTPATIENT)
Dept: MRI IMAGING | Facility: HOSPITAL | Age: 70
End: 2020-08-25

## 2020-08-25 ENCOUNTER — APPOINTMENT (OUTPATIENT)
Dept: CT IMAGING | Facility: HOSPITAL | Age: 70
End: 2020-08-25

## 2020-08-26 ENCOUNTER — APPOINTMENT (OUTPATIENT)
Dept: OTOLARYNGOLOGY | Facility: CLINIC | Age: 70
End: 2020-08-26
Payer: COMMERCIAL

## 2020-08-26 VITALS
HEIGHT: 63 IN | TEMPERATURE: 95.8 F | SYSTOLIC BLOOD PRESSURE: 116 MMHG | BODY MASS INDEX: 24.63 KG/M2 | DIASTOLIC BLOOD PRESSURE: 72 MMHG | WEIGHT: 139 LBS | HEART RATE: 87 BPM

## 2020-08-26 PROCEDURE — 99215 OFFICE O/P EST HI 40 MIN: CPT

## 2020-08-26 RX ORDER — FLUTICASONE PROPIONATE AND SALMETEROL 50; 250 UG/1; UG/1
250-50 POWDER RESPIRATORY (INHALATION)
Qty: 1 | Refills: 11 | Status: COMPLETED | COMMUNITY
Start: 2019-07-12 | End: 2020-08-26

## 2020-08-27 ENCOUNTER — NON-APPOINTMENT (OUTPATIENT)
Age: 70
End: 2020-08-27

## 2020-09-08 ENCOUNTER — APPOINTMENT (OUTPATIENT)
Dept: PLASTIC SURGERY | Facility: CLINIC | Age: 70
End: 2020-09-08
Payer: COMMERCIAL

## 2020-09-08 VITALS — OXYGEN SATURATION: 95 % | HEIGHT: 63 IN | WEIGHT: 135 LBS | BODY MASS INDEX: 23.92 KG/M2 | HEART RATE: 84 BPM

## 2020-09-08 PROCEDURE — 99203 OFFICE O/P NEW LOW 30 MIN: CPT

## 2020-09-08 NOTE — HISTORY OF PRESENT ILLNESS
[FreeTextEntry1] : 68 y/o M with new dx of nasalpharnygeal carcinoma originally started having complaints in 2017. Had biopsy in June demonstrtaed CA. C.O. "mild numbness" on the left midface, occasional bloody noses but no change in outward appearance. No pain, No difficulty speaking or chewing, no tinnitus, no visual disturbances. Wears full dentures. \par \par Referred by Dr. Olivia Gonzalez, present for initial consultation for nasal/maxillary reconstruction following surgical resection (septum, portion of nasal bone, portion of left maxilla) with reconstruction. Based on his exam and imaging, the nasal skin should be spared. Postop RT/chemo would be recommended also. He does not have a surgical date set yet. Notes that he has nasal tenderness.\par \par \par Hx of smoker, no longer smoking. He smoked 5 cigarettes oer day and stopped 1.5 months ago. He smoked for 50 years.\par

## 2020-09-08 NOTE — PHYSICAL EXAM
[NI] : Normal [de-identified] : NC, AT EOMI, Catarina CN II-XII grossly intact, complete upper denture in place.  [de-identified] : bilateral lower arms skin intact, no lesions, masses or scars; normal Cesar's test left hand

## 2020-09-08 NOTE — ASSESSMENT
[FreeTextEntry1] : I reviewed with Calixto STERLING the risks, benefits, and alternatives of free microvascular skin flap reconstruction to provide soft tissue reconstruction to the maxilla , nasal cavity and possible bone graft for nasal support.  The reconstruction will also allow for re-establishing a water tight seal inside the mouth and prevent fistula formation. \par The alternative is an obturator, denture. I will coordinate with Dr. Gonzalez. The risks of the surgery include flap failure, delayed wound healing, fistula formation, and need for revision surgery. \par

## 2020-09-09 ENCOUNTER — APPOINTMENT (OUTPATIENT)
Dept: RADIATION ONCOLOGY | Facility: CLINIC | Age: 70
End: 2020-09-09
Payer: COMMERCIAL

## 2020-09-09 VITALS
SYSTOLIC BLOOD PRESSURE: 110 MMHG | WEIGHT: 136 LBS | BODY MASS INDEX: 24.09 KG/M2 | DIASTOLIC BLOOD PRESSURE: 77 MMHG | RESPIRATION RATE: 18 BRPM | OXYGEN SATURATION: 97 % | HEART RATE: 72 BPM

## 2020-09-09 PROCEDURE — 99204 OFFICE O/P NEW MOD 45 MIN: CPT

## 2020-09-09 RX ORDER — SULFAMETHOXAZOLE AND TRIMETHOPRIM 800; 160 MG/1; MG/1
800-160 TABLET ORAL DAILY
Qty: 7 | Refills: 0 | Status: COMPLETED | COMMUNITY
Start: 2020-07-13 | End: 2020-07-20

## 2020-09-09 NOTE — PHYSICAL EXAM
[de-identified] : No palpable thyroid nodule [de-identified] : Rightward deviation lower 1/3.  Nasal dorsum normal. [] : septum deviated to the right [de-identified] : Left nasal septum with shallow ulcer, about 3/4 of the quadrangular cartilage and extends onto anterior nasal floor where he has more tenderness.  Mild crusting/granulation tissue at superior edge..  Septum still intact.  Right side septum looks normal.  I do not see extension to lower lateral cartilages or cartilaginous columella. [de-identified] : Full denture upper. [Normal] : no neck adenopathy [de-identified] : hard palate intact, nontender.  Mild fullness in upper gingiva left of midline, below the expected junction with piriform aperture.

## 2020-09-09 NOTE — ASSESSMENT
[FreeTextEntry1] : Mr. Diaz has T3M0N0 SCCa of nasal cavity (septum with erosion of left nasal floor and nasal bone).\par As I had discussed with him previously, recommended treatment is resection nasal septum, portion nasal bone, portion of maxilla (include nasal floor on left) with reconstruction.  His nasal skin and most cartilage envelope should be free of cancer.  He understands that he will need postop RT to reduce risk of recurrence.\par His questions were answered.\par A second opinion re treatment was again recommended.  He wanted to know if radiation only can be given as treatment.\par Will have him see Dr. Marte.\par He will also need to see reconstructive surgeon who will work with me - Dr Lerman and Dr. Yusuf were recommended.  \par Appts will be arranged for above.\par I gave him the reports from his CT and PET scans.  He has CDs if he decides to get a second surgical opinion.\par \par The area of swelling in left gingiva may be inflammation from his denture.  \par Chlorhexidine rinse.\par

## 2020-09-09 NOTE — CONSULT LETTER
[Dear  ___] : Dear  [unfilled], [Consult Closing:] : Thank you very much for allowing me to participate in the care of this patient.  If you have any questions, please do not hesitate to contact me. [Courtesy Letter:] : I had the pleasure of seeing your patient, [unfilled], in my office today. [Sincerely,] : Sincerely, [FreeTextEntry2] : Lorie Arnold M.D.\par [address] [facility]\[place] [address] [DrCalixto  ___] : Dr. AREVALO [FreeTextEntry1] : \par \par Enclosed please find my office notes for August 26, 2020.\par  \par \par \par  [FreeTextEntry3] : \par Olivia Gonzalez MD \par Otolaryngology, Head and Neck Surgery \par Residency site , [facility] \par

## 2020-09-09 NOTE — HISTORY OF PRESENT ILLNESS
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  He wanted to take time to consider his options and he also considered alternative therapies, since a friend with colon cancer was reportedly cured with alternative treatment.\par \par He has no bleeding from the nose.  He still has pain if he presses on his nose.\par He has some discomfort above his upper denture.\par \par \par PATHOLOGY Left nasal septum biopsy (7/16/2020):\par - Invasive nonkeratinizing squamous cell carcinoma.\par - Ki-67 shows an increased proliferation rate of approximately 50%.\par \par CT MAXILLOFACIAL  with contrast (08/05/2020) at [facility]:\par - The current study is interpreted in conjunction with images from concurrent PET/CT dated 8/5/2020.\par - Please refer to report of concurrent MRI for more detailed description of lesion extent. Subtle asymmetric enhancement is noted along the left lateral surface of the anterior cartilaginous septum with more confluent enhancing soft tissue noted along the junction of the lateral margin of the maxillary crest and the floor of the left nasal cavity, a finding best appreciated on coronal images 20 through 23. Here, there is underlying osseous erosion with an approximately 7 mm ill-defined lytic focus lying immediately anterior to the nasopalatine duct; see sagittal bone images 39 through 41, axial images 34 through 36 and coronal images 23 through 25. There is also a small nodular focus of enhancing soft tissue along the superior margin of the anterior left nasal septum, best seen on soft tissue coronal image 12, with irregularity of the adjacent left nasal bone evident on the corresponding bone images. As this lies at a considerable distance from the nasal cavity floor lesion, correlation with direct visual inspection is recommended for confirmation. There does appear to be FDG uptake at this site on the PET/CT study.\par - No additional nasal or nasopharyngeal soft tissue mass. There is prominent right and mild left middle turbinate pneumatization. There is a very large left-sided bony septal spur that contacts the hypoplastic left inferior turbinate and the left lateral nasal wall. Circumferential mucosal thickening lines walls of the right maxillary antrum with the remaining paranasal sinuses predominantly ventilated bilaterally. Anterior and posterior drainage pathways are patent. Orbital contents are normal. There is mild ventricular enlargement out of proportion to the degree of sulcal prominence, central atrophy versus mild communicating hydrocephalus. Mastoid air cells are clear bilaterally.\par IMPRESSION:\par Although it is difficult to define lesional extent on this CT examination, subtle asymmetric enhancement is present along the left lateral margin of the anterior nasal septum, with more confluent soft tissue along the nasal cavity floor that is associated with underlying bone erosion. A smaller nodular focus is present superiorly with suspected erosion of the left nasal bone. Please refer to concurrent MRI and PET/CT for additional assessment of lesional extent.\par (Images were reviewed.) \par \par \par MR ORBIT FACE AND/OR NECK with/without contrast (08/05/2020) at [facility]:\par - The current study is interpreted in conjunction with images from concurrent CT maxillofacial and PET/CT studies.\par - As on the CT study, subtle asymmetric enhancement is present along the mucosal surface of the left anterior nasal septum, with more confluent soft tissue along the nasal cavity floor associated with osseous erosion and extension of tumor into the left ventral maxilla. Additional subcentimeter nodular enhancement along the anterior left nasal roof associated with apparent erosion of the left nasal bone is not as well seen on the MRI study. The best MRI imaging correlate is on reconstructed coronal images of the volumetric T1 series, annotated and saved as key images. No additional sites of suspicious nodular soft tissue or contrast enhancement are identified. There is no pathologic contrast enhancement within skull base foramina and the cavernous sinuses are symmetric in appearance bilaterally. There is again mild ventricular prominence, central atrophy versus mild communicating hydrocephalus. There is no intracranial mass or pathologic contrast enhancement.\par IMPRESSION:\par As on the concurrent CT examination, subtle asymmetric enhancement is noted along the left anterior nasal septum, with more confluent soft tissue density along the nasal cavity floor associated with underlying osseous erosion. A second suspected focus of osseous erosion along the left nasal bone is better seen on the CT examination.\par \par \par \par PET-CT SKUL-THIGH ONC FDG INIT (08/05/2020) at [facility]:  \par - Comparison: CT of the head 8/5/2020, CT chest 6/27/2019\par - Reference mean SUV, Liver: 2.4\par - HEAD and NECK:  Focal intense FDG avidity along the left aspect of the anterior nasal septum SUV max 9.9, corresponding to CT findings. The FDG avidity extends to the erosive osseous lesion in the floor of the nasal cavity, as well as the nasal bone (4:33, 45). Subcentimeter Right cervical level 1 lymph nodes are seen (3:64) SUV max up to 2.6.\par Focally FDG avid thyroid nodule in the left lobe with punctate calcification, SUV max 4.2 (4:88).\par - LUNGS and PLEURA:   Mildly FDG avid patchy groundglass opacities/centrilobular groundglass nodules pronounced in the mid lung and lower lungs, likely inflammatory.\par - MEDIASTINUM and CATHY:  Mildly FDG avid small mediastinal lymph nodes and right hilar node, SUV max up to 4.5 in the periaortic region, may be reactive. Physiologic FDG avidity in the myocardium is seen.\par - HEPATOBILIARY:  No focus of abnormal FDG avidity. Within normal limits.\par - PANCREAS:  No focus of abnormal FDG avidity. Within normal limits.\par - SPLEEN:  No focus of abnormal FDG avidity. Within normal limits.\par - ADRENALS:   No focus of abnormal FDG avidity. Within normal limits.\par - GENITOURINARY:   Mildly and diffusely increased FDG uptake in bilateral testes with SUV max 4.9 without CT correlation. Mild diffuse heterogeneous FDG uptake in the enlarged prostate, nonspecific and likely inflammatory. Physiologic FDG avidity along the urinary tract is seen. Photopenic right renal cysts.\par - GASTROINTESTINAL:   Focal FDG avidity in the medial aspect of the duodenal antrum with SUV max 9.1, without CT correlation (604:72). Prominent FDG uptake along the proximal gastric wall without CT correlation, with SUV max 6.3 may be inflammatory. FDG avidity in the proximal sigmoid colon with SUV max 8.3 (604:36), and in the anorectal region with SUV max 12.2 without CT correlation may be physiologic.\par - PERITONEUM/RETROPERITONEUM:   No focus of abnormal FDG avidity. Within normal limits.\par - LYMPH NODES:   No focus of abnormal FDG avidity. Within normal limits.\par - VESSELS:  No focus of abnormal FDG avidity. Within normal limits.\par - BONES and SOFT TISSUE:  Increased FDG uptake surrounding the bilateral humeral heads, right greater than the left, likely inflammatory/degenerative. Area of increased FDG avidity along the paraspinal muscle in the mid thoracic spine level, may be physiologic.\par IMPRESSION:\par 1.)  FDG-avid lesion along the left aspect of the anterior nasal septum, with osseous involvement in the floor of the nasal cavity as well as nasal bones.\par 2.)  Mildly FDG-avid right level 1 cervical lymph nodes, may be reactive. No evidence of distant metastasis.\par 3.)  Mildly FDG avid ground glass opacities and centrilobular ground glass opacities in both lungs, likely inflammatory.\par 4.)  FDG-avid left thyroid nodule with calcification. Correlation with neck ultrasound is recommended.\par 5.)  FDG-avid focus in the duodenal antrum without CT correlation, nonspecific. Clinical/visual correlation is recommended.\par \par \par

## 2020-09-10 NOTE — PHYSICAL EXAM
[Normal] : normal heart rate and rhythm, normal S1 and S2, and no murmurs present [de-identified] : Externally the left nasal vestibule looks unremarkable, but with retraction of the ala ulceration of the mucosa can be appreciated.  It is difficult to trace superior.  He has a complete upper denture.  No tumor was noted on the palate.  [de-identified] : No skin changes on the face noted.  [de-identified] : No nodes could be palpated.

## 2020-09-10 NOTE — HISTORY OF PRESENT ILLNESS
[FreeTextEntry1] : Mr. Glenroy Diaz is a 69 year old male referred by Dr. Gonzalez for consideration of radiation therapy for an invasive nonkeratinizing squamous cell carcinoma of the left nasal septum. \par \par Patient had been following up with Dr. Gonzalez since 2017 due to left nasal septal ulcer, which had been biopsied in the past with no evidence of malignancy. \par \par Biopsy of left nasal septum was done done on 7/16/2020. Pathology revealed the following:\par -Invasive nonkeratinizing squamous cell carcinoma. \par -Immunohistochemical staining show the neoplastic cells to be positive for AE1/AE3, CK5/6, p40, p63 and p16, while negative for synaptophysin and chromogranin. Ki-67 shows an increased proliferation rate of approximately 50%.\par \par He then had imaging done on 8/5/2020 (MRI, CT, PET/ CT).\par MRI revealed the following: \par -As on the concurrent CT examination, subtle asymmetric enhancement is noted along the left anterior nasal septum, with more confluent soft tissue density along the nasal cavity floor associated with underlying osseous erosion. A second suspected focus of osseous erosion along the left nasal bone is better seen on the CT examination.\par \par CT showed the following: \par -Although it is difficult to define lesional extent on this CT examination, subtle asymmetric enhancement is present along the left lateral margin of the anterior nasal septum, with more confluent soft tissue along the nasal cavity floor that is associated with underlying bone erosion. A smaller nodular focus is present superiorly with suspected erosion of the left nasal bone. Please refer to concurrent MRI and PET/CT for additional assessment of lesional extent.\par \par PET/ CT showed the following:\par -FDG-avid lesion along the left aspect of the anterior nasal septum, with osseous involvement in the floor of the nasal cavity as well as nasal bones.\par -Mildly FDG-avid right level 1 cervical lymph nodes, may be reactive. No evidence of distant metastasis.\par -Mildly FDG avid ground glass opacities and centrilobular ground glass opacities in both lungs, likely inflammatory.\par -FDG-avid left thyroid nodule with calcification. Correlation with neck ultrasound is recommended.\par -FDG-avid focus in the duodenal antrum without CT correlation, nonspecific. Clinical/visual correlation is recommended.\par \par His case was presented at the [facility] Tumor Board, and consensus recommendation was for surgical resection (septum, portion of nasal bone, portion of left maxilla) with reconstruction. Based on his exam and imaging, the nasal skin should be spared. Postop RT/chemo would be recommended also. He followed up with Dr. Gonzalez on 8/12/2020, at which time they discussed management options, including tumor board recommendations as well as primary RT/ chemo. He was also encouraged to obtain a second opinion. \par \par On 9/8/2020, he consulted with plastic surgeon Dr. Lerman. He is scheduled to follow up with Dr. Gonzalez this afternoon. \par

## 2020-09-10 NOTE — REVIEW OF SYSTEMS
[Patient Intake Form Reviewed] : Patient intake form was reviewed [Negative] : Heme/Lymph [FreeTextEntry4] : see HPI. nasal soreness.

## 2020-09-15 ENCOUNTER — APPOINTMENT (OUTPATIENT)
Dept: OTOLARYNGOLOGY | Facility: CLINIC | Age: 70
End: 2020-09-15
Payer: COMMERCIAL

## 2020-09-15 VITALS — WEIGHT: 135 LBS | HEIGHT: 63 IN | BODY MASS INDEX: 23.92 KG/M2

## 2020-09-15 PROCEDURE — 99214 OFFICE O/P EST MOD 30 MIN: CPT | Mod: 25

## 2020-09-15 PROCEDURE — 31231 NASAL ENDOSCOPY DX: CPT

## 2020-09-15 NOTE — CONSULT LETTER
[Dear  ___] : Dear ~LANDY, [Consult Letter:] : I had the pleasure of evaluating your patient, [unfilled]. [Please see my note below.] : Please see my note below. [Consult Closing:] : Thank you very much for allowing me to participate in the care of this patient.  If you have any questions, please do not hesitate to contact me. [Sincerely,] : Sincerely, [FreeTextEntry2] : Olivia Gonzalez MD [FreeTextEntry3] : Cari Delgadillo MD\par Otolaryngology - Head & Neck Surgery\par

## 2020-09-15 NOTE — PHYSICAL EXAM
[Nasal Endoscopy Performed] : nasal endoscopy was performed, see procedure section for findings [Midline] : trachea located in midline position [Normal] : no rashes [de-identified] : Left UE: adequate ulnar flow to hand

## 2020-09-15 NOTE — HISTORY OF PRESENT ILLNESS
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 Patient states felt a lesion in his left nostril about three years ago. Recently diagnosed in July 2020 by Dr. 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 Patient had biopsy performed in 7/2020. Patient admits minimal bleeding at times. Pain when touching his nose. He admits some weight loss when he found out; changed his diet. Lost about 14 lbs. Patient denies any fevers. No night sweats. Presented at [facility] at [facility], surgery recommended. \par \par He is right handed. He wears a full upper denture plate.

## 2020-09-15 NOTE — REASON FOR VISIT
[Initial Evaluation] : an initial evaluation for [FreeTextEntry2] : nasal septum squamous cell carcinoma

## 2020-09-15 NOTE — ASSESSMENT
[FreeTextEntry1] : - extensive discussion had with Mr Emily and his wife regarding reconstructive options. Discussed obturator with second stage reconstruction using microvascular surgery and septal reconstruction: bone (OCRFF) vs cartilage VS single stage resection and reconstruction. Microvascular reconstruction would be with left RFFF, STSG, possible bone graft/OCRFF, possible cartilage graft. Discussed extensively the details of free flap with microvascular reconstruction. Discussed extensively the risks, benefits, and alternatives to treatment. Risks include but not limited to free flap loss, poor wound healing, scarring, need for wound care, possible return to OR, possible non-healing wounds. Patient voices understanding and questions were answered to the patient's apparent satisfaction.\par - he is meeting with Dr Gonzalez later this week to discuss next steps. Will coordinate with Dr Gonzalez after patient decides which reconstructive option he would like to pursue. If he decides for obturator, will refer him to Dr Shaw, . \par

## 2020-09-15 NOTE — DATA REVIEWED
[de-identified] : relevant images and reports personally reviewed by me:\par \par \par \par EXAM: CT MAXILLOFACIAL IC \par \par PROCEDURE DATE: 08/05/2020 \par \par \par \par INTERPRETATION: Technique: A helical axial acquisition was obtained through the paranasal sinuses utilizing navigational protocol. Axial, sagittal and coronal reformatted images were reconstructed utilizing bone and soft tissue algorithms. \par \par Contrast: 90 cc of Optiray 350. \par \par Clinical information: Newly diagnosed squamous cell carcinoma of left anterior/mid nasal septum. Evaluate bone and extent of cancer. \par \par Findings: \par \par The current study is interpreted in conjunction with images from concurrent PET/CT dated 8/5/2020. \par \par Please refer to report of concurrent MRI for more detailed description of lesion extent. Subtle asymmetric enhancement is noted along the left lateral surface of the anterior cartilaginous septum with more confluent enhancing soft tissue noted along the junction of the lateral margin of the maxillary crest and the floor of the left nasal cavity, a finding best appreciated on coronal images 20 through 23. Here, there is underlying osseous erosion with an approximately 7 mm ill-defined lytic focus lying immediately anterior to the nasopalatine duct; see sagittal bone images 39 through 41, axial images 34 through 36 and coronal images 23 through 25. There is also a small nodular focus of enhancing soft tissue along the superior margin of the anterior left nasal septum, best seen on soft tissue coronal image 12, with irregularity of the adjacent left nasal bone evident on the corresponding bone images. As this lies at a considerable distance from the nasal cavity floor lesion, correlation with direct visual inspection is recommended for confirmation. There does appear to be FDG uptake at this site on the PET/CT study. \par \par There is no additional nasal or nasopharyngeal soft tissue mass. There is prominent right and mild left middle turbinate pneumatization. There is a very large left-sided bony septal spur that contacts the hypoplastic left inferior turbinate and the left lateral nasal wall. Circumferential mucosal thickening lines walls of the right maxillary antrum with the remaining paranasal sinuses predominantly ventilated bilaterally. Anterior and posterior drainage pathways are patent. Orbital contents are normal. There is mild ventricular enlargement out of proportion to the degree of sulcal prominence, central atrophy versus mild communicating hydrocephalus. Mastoid air cells are clear bilaterally. \par \par IMPRESSION: \par \par Although it is difficult to define lesional extent on this CT examination, subtle asymmetric enhancement is present along the left lateral margin of the anterior nasal septum, with more confluent soft tissue along the nasal cavity floor that is associated with underlying bone erosion. A smaller nodular focus is present superiorly with suspected erosion of the left nasal bone. Please refer to concurrent MRI and PET/CT for additional assessment of lesional extent. \par \par \par \par \par Thank you for the opportunity to participate in the care of this patient. \par \par \par \par JUSTICE CARRANZA M.D., ATTENDING RADIOLOGIST \par This document has been electronically signed. Aug 5 2020 3:08PM \par \par EXAM:  PETCT SKUL-THI ONC FDG INIT\par \par PROCEDURE DATE:  08/05/2020\par \par \par \par INTERPRETATION:  PET/CT\par \par Indication: Newly diagnosed squamous cell cancer of left nasal septum. Baseline study to help determine initial treatment strategy.\par \par Procedure: Blood glucose: 91 mg/dL; Intravenous access was established and the patient was injected with 9.6 mCi of [id number]. After approximately 1 hour in a quiet room, the patient was positioned on the imaging table with the arms as high above the head as possible.  PET/CT imaging was then performed with the standard protocol from the base of the skull to the mid thighs, followed by head and neck. The CT scan is a low dose protocol with images used for attenuation correction and localization only.\par \par PET images were reconstructed in axial, sagittal and coronal planes using CT based attenuation correction.  Images were displayed as PET, CT and fused data sets as well as maximum intensity pixel projections.\par \par Comparison: CT of the head 8/5/2020, CT chest 6/27/2019\par \par Findings:\par Reference mean SUV, Liver: 2.4\par \par head and neck: Focal intense FDG avidity along the left aspect of the anterior nasal septum SUV max 9.9, corresponding to CT findings. The FDG avidity extends to the erosive osseous lesion in the floor of the nasal cavity, as well as the nasal bone (4:33, 45). Subcentimeter Right cervical level 1 lymph nodes are seen (3:64) SUV max up to 2.6.\par Focally FDG avid thyroid nodule in the left lobe with punctate calcification, SUV max 4.2 (4:88).\par \par Lungs and pleura: Mildly FDG avid patchy groundglass opacities/centrilobular groundglass nodules pronounced in the mid lung and lower lungs, likely inflammatory.\par Mediastinum and az: Mildly FDG avid small mediastinal lymph nodes and right hilar node, SUV max up to 4.5 in the periaortic region, may be reactive. Physiologic FDG avidity in the myocardium is seen.\par \par Hepatobiliary: No focus of abnormal FDG avidity. Within normal limits.\par Pancreas: No focus of abnormal FDG avidity. Within normal limits.\par Spleen: No focus of abnormal FDG avidity. Within normal limits.\par Adrenals: No focus of abnormal FDG avidity. Within normal limits.\par Genitourinary: Mildly and diffusely increased FDG uptake in bilateral testes with SUV max 4.9 without CT correlation. Mild diffuse heterogeneous FDG uptake in the enlarged prostate, nonspecific and likely inflammatory. Physiologic FDG avidity along the urinary tract is seen. Photopenic right renal cysts.\par Gastrointestinal: Focal FDG avidity in the medial aspect of the duodenal antrum with SUV max 9.1, without CT correlation (604:72). Prominent FDG uptake along the proximal gastric wall without CT correlation, with SUV max 6.3 may be inflammatory. FDG avidity in the proximal sigmoid colon with SUV max 8.3 (604:36), and in the anorectal region with SUV max 12.2 without CT correlation may be physiologic.\par Peritoneum/retroperitoneum: No focus of abnormal FDG avidity. Within normal limits.\par Lymph nodes: No focus of abnormal FDG avidity. Within normal limits.\par Vessels: No focus of abnormal FDG avidity. Within normal limits.\par \par Bones and soft tissue: Increased FDG uptake surrounding the bilateral humeral heads, right greater than the left, likely inflammatory/degenerative. Area of increased FDG avidity along the paraspinal muscle in the mid thoracic spine level, may be physiologic.\par \par \par Impression:\par 1.  FDG-avid lesion along the left aspect of the anterior nasal septum, with osseous involvement in the floor of the nasal cavity as well as nasal bones.\par 2.  Mildly FDG-avid right level 1 cervical lymph nodes, may be reactive. No evidence of distant metastasis.\par 3.  Mildly FDG avid ground glass opacities and centrilobular ground glass opacities in both lungs, likely inflammatory.\par 4.  FDG-avid left thyroid nodule with calcification. Correlation with neck ultrasound is recommended.\par 5.  FDG-avid focus in the duodenal antrum without CT correlation, nonspecific. Clinical/visual correlation is recommended.\par \par \par \par \par Thank you for the opportunity to participate in the care of this patient.\par \par \par \par ANNIE BAKER M.D., ATTENDING RADIOLOGIST\par This document has been electronically signed. Aug  5 2020  3:40PM\par \par \par

## 2020-09-17 ENCOUNTER — APPOINTMENT (OUTPATIENT)
Dept: OTOLARYNGOLOGY | Facility: CLINIC | Age: 70
End: 2020-09-17
Payer: COMMERCIAL

## 2020-09-17 VITALS
HEART RATE: 92 BPM | TEMPERATURE: 96.2 F | DIASTOLIC BLOOD PRESSURE: 72 MMHG | WEIGHT: 137.6 LBS | HEIGHT: 63 IN | SYSTOLIC BLOOD PRESSURE: 111 MMHG | BODY MASS INDEX: 24.38 KG/M2

## 2020-09-17 PROCEDURE — 99215 OFFICE O/P EST HI 40 MIN: CPT

## 2020-09-17 RX ORDER — CHLORHEXIDINE GLUCONATE, 0.12% ORAL RINSE 1.2 MG/ML
0.12 SOLUTION DENTAL
Qty: 1 | Refills: 1 | Status: DISCONTINUED | COMMUNITY
Start: 2020-08-27 | End: 2020-09-17

## 2020-09-17 NOTE — PHYSICAL EXAM
[Normal] : mucosa is normal [de-identified] : S-shaped septum with ulcer in left anterior surface, no bleeding.  Mild submucosa bulge on right anterior septum.  Cartilaginous columella seems free of the tumor, which extends onto left floor of nose. [de-identified] : Full denture upper. [de-identified] : hard palate intact, nontender.  Mild tenderness in upper gingiva left of midline, below the expected junction with piriform aperture.

## 2020-09-17 NOTE — HISTORY OF PRESENT ILLNESS
[de-identified] : Mr. 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 Delgadillo regarding reconstruction after resection.\par \par He has occasional bleeding from the left nose.  He still has pain if he presses on his nose.\par He still has some discomfort left of midline above his upper denture.\par He is startign to get joint pain from his RA.  he stopped Enbrel about 1 month ago, after the cancer diagnosis was made.\par Left thyroid nodule with calcification was noted on PET-CT scan.  He needs US thyroid.\par  \par \par PATHOLOGY Left nasal septum biopsy (7/16/2020):\par - Invasive nonkeratinizing squamous cell carcinoma.\par - Ki-67 shows an increased proliferation rate of approximately 50%.\par \par CT MAXILLOFACIAL  with contrast (08/05/2020) at [facility]:\par - The current study is interpreted in conjunction with images from concurrent PET/CT dated 8/5/2020.\par - Please refer to report of concurrent MRI for more detailed description of lesion extent. Subtle asymmetric enhancement is noted along the left lateral surface of the anterior cartilaginous septum with more confluent enhancing soft tissue noted along the junction of the lateral margin of the maxillary crest and the floor of the left nasal cavity, a finding best appreciated on coronal images 20 through 23. Here, there is underlying osseous erosion with an approximately 7 mm ill-defined lytic focus lying immediately anterior to the nasopalatine duct; see sagittal bone images 39 through 41, axial images 34 through 36 and coronal images 23 through 25. There is also a small nodular focus of enhancing soft tissue along the superior margin of the anterior left nasal septum, best seen on soft tissue coronal image 12, with irregularity of the adjacent left nasal bone evident on the corresponding bone images. As this lies at a considerable distance from the nasal cavity floor lesion, correlation with direct visual inspection is recommended for confirmation. There does appear to be FDG uptake at this site on the PET/CT study.\par - No additional nasal or nasopharyngeal soft tissue mass. There is prominent right and mild left middle turbinate pneumatization. There is a very large left-sided bony septal spur that contacts the hypoplastic left inferior turbinate and the left lateral nasal wall. Circumferential mucosal thickening lines walls of the right maxillary antrum with the remaining paranasal sinuses predominantly ventilated bilaterally. Anterior and posterior drainage pathways are patent. Orbital contents are normal. There is mild ventricular enlargement out of proportion to the degree of sulcal prominence, central atrophy versus mild communicating hydrocephalus. Mastoid air cells are clear bilaterally.\par IMPRESSION:\par Although it is difficult to define lesional extent on this CT examination, subtle asymmetric enhancement is present along the left lateral margin of the anterior nasal septum, with more confluent soft tissue along the nasal cavity floor that is associated with underlying bone erosion. A smaller nodular focus is present superiorly with suspected erosion of the left nasal bone. Please refer to concurrent MRI and PET/CT for additional assessment of lesional extent.\par \par MR ORBIT FACE AND/OR NECK with/without contrast (08/05/2020) at [facility]:\par - The current study is interpreted in conjunction with images from concurrent CT maxillofacial and PET/CT studies.\par - As on the CT study, subtle asymmetric enhancement is present along the mucosal surface of the left anterior nasal septum, with more confluent soft tissue along the nasal cavity floor associated with osseous erosion and extension of tumor into the left ventral maxilla. Additional subcentimeter nodular enhancement along the anterior left nasal roof associated with apparent erosion of the left nasal bone is not as well seen on the MRI study. The best MRI imaging correlate is on reconstructed coronal images of the volumetric T1 series, annotated and saved as key images. No additional sites of suspicious nodular soft tissue or contrast enhancement are identified. There is no pathologic contrast enhancement within skull base foramina and the cavernous sinuses are symmetric in appearance bilaterally. There is again mild ventricular prominence, central atrophy versus mild communicating hydrocephalus. There is no intracranial mass or pathologic contrast enhancement.\par IMPRESSION:\par As on the concurrent CT examination, subtle asymmetric enhancement is noted along the left anterior nasal septum, with more confluent soft tissue density along the nasal cavity floor associated with underlying osseous erosion. A second suspected focus of osseous erosion along the left nasal bone is better seen on the CT examination.\par \par \par PET-CT SKUL-THIGH ONC FDG INIT (08/05/2020) at [facility]:  \par - Comparison: CT of the head 8/5/2020, CT chest 6/27/2019\par - Reference mean SUV, Liver: 2.4\par - HEAD and NECK:  Focal intense FDG avidity along the left aspect of the anterior nasal septum SUV max 9.9, corresponding to CT findings. The FDG avidity extends to the erosive osseous lesion in the floor of the nasal cavity, as well as the nasal bone (4:33, 45). Subcentimeter Right cervical level 1 lymph nodes are seen (3:64) SUV max up to 2.6.\par Focally FDG avid thyroid nodule in the left lobe with punctate calcification, SUV max 4.2 (4:88).\par - LUNGS and PLEURA:   Mildly FDG avid patchy groundglass opacities/centrilobular groundglass nodules pronounced in the mid lung and lower lungs, likely inflammatory.\par - MEDIASTINUM and CATHY:  Mildly FDG avid small mediastinal lymph nodes and right hilar node, SUV max up to 4.5 in the periaortic region, may be reactive. Physiologic FDG avidity in the myocardium is seen.\par - HEPATOBILIARY:  No focus of abnormal FDG avidity. Within normal limits.\par - PANCREAS:  No focus of abnormal FDG avidity. Within normal limits.\par - SPLEEN:  No focus of abnormal FDG avidity. Within normal limits.\par - ADRENALS:   No focus of abnormal FDG avidity. Within normal limits.\par - GENITOURINARY:   Mildly and diffusely increased FDG uptake in bilateral testes with SUV max 4.9 without CT correlation. Mild diffuse heterogeneous FDG uptake in the enlarged prostate, nonspecific and likely inflammatory. Physiologic FDG avidity along the urinary tract is seen. Photopenic right renal cysts.\par - GASTROINTESTINAL:   Focal FDG avidity in the medial aspect of the duodenal antrum with SUV max 9.1, without CT correlation (604:72). Prominent FDG uptake along the proximal gastric wall without CT correlation, with SUV max 6.3 may be inflammatory. FDG avidity in the proximal sigmoid colon with SUV max 8.3 (604:36), and in the anorectal region with SUV max 12.2 without CT correlation may be physiologic.\par - PERITONEUM/RETROPERITONEUM:   No focus of abnormal FDG avidity. Within normal limits.\par - LYMPH NODES:   No focus of abnormal FDG avidity. Within normal limits.\par - VESSELS:  No focus of abnormal FDG avidity. Within normal limits.\par - BONES and SOFT TISSUE:  Increased FDG uptake surrounding the bilateral humeral heads, right greater than the left, likely inflammatory/degenerative. Area of increased FDG avidity along the paraspinal muscle in the mid thoracic spine level, may be physiologic.\par IMPRESSION:\par 1.)  FDG-avid lesion along the left aspect of the anterior nasal septum, with osseous involvement in the floor of the nasal cavity as well as nasal bones.\par 2.)  Mildly FDG-avid right level 1 cervical lymph nodes, may be reactive. No evidence of distant metastasis.\par 3.)  Mildly FDG avid ground glass opacities and centrilobular ground glass opacities in both lungs, likely inflammatory.\par 4.)  FDG-avid left thyroid nodule with calcification. Correlation with neck ultrasound is recommended.\par 5.)  FDG-avid focus in the duodenal antrum without CT correlation, nonspecific. Clinical/visual correlation is recommended.\par \par \par

## 2020-09-17 NOTE — CONSULT LETTER
[Dear  ___] : Dear  [unfilled], [Courtesy Letter:] : I had the pleasure of seeing your patient, [unfilled], in my office today. [Please see my note below.] : Please see my note below. [Consult Closing:] : Thank you very much for allowing me to participate in the care of this patient.  If you have any questions, please do not hesitate to contact me. [Sincerely,] : Sincerely, [DrCalixto  ___] : Dr. AREVALO [DrCalixto ___] : Dr. AREVALO [FreeTextEntry2] : Lorie Arnold M.D.\par [address] [facility]\[place] [address] [FreeTextEntry1] : \par \par \par  [FreeTextEntry3] : \par Olivia Gonzalez MD \par Otolaryngology, Head and Neck Surgery \par Residency site , [facility] \par

## 2020-09-17 NOTE — ASSESSMENT
[FreeTextEntry1] : Mr. Diaz has T1M0N0 SCCa of nasal cavity (septum with erosion of left nasal floor and nasal bone).\par Dr. Goodrich was correct in stating that pt's current bony involvement does not upstage him.\par \par Recommended treatment is resection of nasal septum, portion nasal bone, portion of maxilla (include nasal floor on left/midline) with reconstruction.  His nasal skin and most cartilage envelope should be free of cancer.  I will also try to save the alveolar arch. He will have immediate reconstruction for the nasal dorsum/caudal and lining of the nose, possibly of the maxilla also.  However, an obturator will be requested from Dr. Shaw since pt wears a full denture anyway.  He understands that he will need postop RT to reduce risk of recurrence.\par He will see radiation oncologist in [place] on 9/24.\par His questions were answered.\par He will see his PCP for medical evaluation and clearance.\par .  \par Appts will be arranged for Dr. Shaw.\par I will contact pt re OR date after final conference with Dr. Delgadillo.\par \par US thyroid was ordered previously, still to be done.\par For his joint pain, recommended that he use lidocaine transdermal gel, which he has at home.  He was also advised to speak with Dr. Lezama for other non-NSAID suggestion.  I do not want him taking NSAID regularly prior to his surgery.\par

## 2020-10-07 ENCOUNTER — APPOINTMENT (OUTPATIENT)
Dept: PULMONOLOGY | Facility: CLINIC | Age: 70
End: 2020-10-07
Payer: COMMERCIAL

## 2020-10-07 VITALS
HEIGHT: 63 IN | HEART RATE: 103 BPM | WEIGHT: 134 LBS | OXYGEN SATURATION: 96 % | TEMPERATURE: 98 F | BODY MASS INDEX: 23.74 KG/M2

## 2020-10-07 PROCEDURE — 99214 OFFICE O/P EST MOD 30 MIN: CPT | Mod: 25

## 2020-10-07 PROCEDURE — G0008: CPT

## 2020-10-07 PROCEDURE — 90662 IIV NO PRSV INCREASED AG IM: CPT

## 2020-10-09 NOTE — ASSESSMENT
[FreeTextEntry1] : 69 yr old male with PMH of chronic bronchitis, former smoker (quit in july 2020 cold turkey), GGO on recent PET scan & RA.\par \par Mr. Diaz has T1M0N0 SCCa of nasal cavity (septum with erosion of left nasal floor and nasal bone). He is going for resection of nasal septum, portion nasal bone, portion of maxilla (include nasal floor on left/midline) with reconstruction.\par \par PET scan 8/5/2020\par Impression: \par 1. FDG-avid lesion along the left aspect of the anterior nasal septum, with osseous involvement in the floor of the nasal cavity as well as nasal bones. \par 2. Mildly FDG-avid right level 1 cervical lymph nodes, may be reactive. No evidence of distant metastasis. \par 3. Mildly FDG avid ground glass opacities and centrilobular ground glass opacities in both lungs, likely inflammatory. \par 4. FDG-avid left thyroid nodule with calcification. Correlation with neck ultrasound is recommended. \par 5. FDG-avid focus in the duodenal antrum without CT correlation, nonspecific. Clinical/visual correlation is recommended. \par \par Preop - DEVORA IGUDIN  is optimized for surgery. he  is to be extubated once fully awake and able to protect airway.  The patient is to be monitored in the recovery room. They might benefit for high flow oxygen or noninvasive ventilation to prevent or reverse atelectasis.  Patient is to be admitted to a monitored bed postoperatively.  Avoid oversedation.  DEVORA is high risk for DVT and will require bimodal agents for DVT prophylaxis early mobilization is recommended. he is to use the incentive spirometry postoperative. \par \par Plan \par - continue with Advair BID and JORDIN as needed\par - Follow with repeat imaging of the chest post surgery\par - Follow with repeat PFT

## 2020-10-09 NOTE — HISTORY OF PRESENT ILLNESS
[Former] : former [Never] : never [>= 30 pack years] : >= 30 pack years [Difficulty Breathing During Exertion] : dyspnea on exertion [Feelings Of Weakness On Exertion] : exercise intolerance [Wheezing] : wheezing [Nasal Passage Blockage (Stuffiness)] : edema [Nonspecific Pain, Swelling, And Stiffness] : chest pain [Fever] : fever [Cough] : coughing [1  - Very slight] : 1, very slight [Class I - No Symptoms and No Limitations] : I [Wt Gain ___ kg] : No recent weight gain [Wt Loss ___ kg] : No recent weight loss [TextBox_4] : Pt is scheduled for surgery 10/19/2020 Dr. Gonzalez and Dr. Ellis.  He is here for pulmonary clearance for the surgery.  Send note to Dr. Recinos PCP for clearance PH: [phone]\par \par 69 yr old male with PMH of chronic bronchitis, former smoker (quit in july 2020 cold turkey), GGO on recent PET scan & RA.\par \par Mr. Diaz has T1M0N0 SCCa of nasal cavity (septum with erosion of left nasal floor and nasal bone). He is going for resection of nasal septum, portion nasal bone, portion of maxilla (include nasal floor on left/midline) with reconstruction. He will see radiation oncologist in [place] on 9/24.\par \par He walks about 10 - 12,000 steps without SOB.  3 flights not flights.  He has clear his throat with white mucus, mostly in the morning with nasal congestion.  Denies heartburn.   He retired this year.   Denies fevers or wheezing.  He is not currently using any inhalers. \par \par He is currently using Advair BID and rarely using his JORDIN.  \par \par 8/10/2020 PET scan\par \par Impression: \par 1. FDG-avid lesion along the left aspect of the anterior nasal septum, with osseous involvement in the floor of the nasal cavity as well as nasal bones. \par 2. Mildly FDG-avid right level 1 cervical lymph nodes, may be reactive. No evidence of distant metastasis. \par 3. Mildly FDG avid ground glass opacities and centrilobular ground glass opacities in both lungs, likely inflammatory. \par 4. FDG-avid left thyroid nodule with calcification. Correlation with neck ultrasound is recommended. \par 5. FDG-avid focus in the duodenal antrum without CT correlation, nonspecific. Clinical/visual correlation is recommended. \par \par  [TextBox_11] : 1 [TextBox_13] : 56 [YearQuit] : 2020

## 2020-10-09 NOTE — REASON FOR VISIT
[Follow-Up] : a follow-up visit [Pre-op Risk Stratification] : pre-op risk stratification [TextBox_44] : Surgery

## 2020-10-12 ENCOUNTER — APPOINTMENT (OUTPATIENT)
Dept: ULTRASOUND IMAGING | Facility: HOSPITAL | Age: 70
End: 2020-10-12

## 2020-10-12 LAB — SARS-COV-2 N GENE NPH QL NAA+PROBE: NOT DETECTED

## 2020-10-13 ENCOUNTER — OUTPATIENT (OUTPATIENT)
Dept: OUTPATIENT SERVICES | Facility: HOSPITAL | Age: 70
LOS: 1 days | End: 2020-10-13
Payer: COMMERCIAL

## 2020-10-13 DIAGNOSIS — Z41.9 ENCOUNTER FOR PROCEDURE FOR PURPOSES OTHER THAN REMEDYING HEALTH STATE, UNSPECIFIED: Chronic | ICD-10-CM

## 2020-10-13 DIAGNOSIS — J42 UNSPECIFIED CHRONIC BRONCHITIS: ICD-10-CM

## 2020-10-13 DIAGNOSIS — Z98.890 OTHER SPECIFIED POSTPROCEDURAL STATES: Chronic | ICD-10-CM

## 2020-10-13 PROCEDURE — 94010 BREATHING CAPACITY TEST: CPT | Mod: 26

## 2020-10-13 PROCEDURE — 94726 PLETHYSMOGRAPHY LUNG VOLUMES: CPT

## 2020-10-13 PROCEDURE — 94729 DIFFUSING CAPACITY: CPT | Mod: 26

## 2020-10-13 PROCEDURE — 94729 DIFFUSING CAPACITY: CPT

## 2020-10-13 PROCEDURE — 94060 EVALUATION OF WHEEZING: CPT

## 2020-10-13 PROCEDURE — 94727 GAS DIL/WSHOT DETER LNG VOL: CPT | Mod: 26

## 2020-10-13 PROCEDURE — 94760 N-INVAS EAR/PLS OXIMETRY 1: CPT

## 2020-10-16 ENCOUNTER — LABORATORY RESULT (OUTPATIENT)
Age: 70
End: 2020-10-16

## 2020-10-16 VITALS
HEIGHT: 62 IN | OXYGEN SATURATION: 95 % | RESPIRATION RATE: 16 BRPM | HEART RATE: 83 BPM | SYSTOLIC BLOOD PRESSURE: 120 MMHG | WEIGHT: 132.72 LBS | TEMPERATURE: 98 F | DIASTOLIC BLOOD PRESSURE: 66 MMHG

## 2020-10-16 NOTE — PRE-OP CHECKLIST - NOTHING BY MOUTH SINCE
18-Oct-2020 18:00
I have personally seen and examined this patient.  I have fully participated in the care of this patient. I have reviewed all pertinent clinical information, including history, physical exam, plan and the Resident’s note and agree except as noted.

## 2020-10-18 ENCOUNTER — TRANSCRIPTION ENCOUNTER (OUTPATIENT)
Age: 70
End: 2020-10-18

## 2020-10-19 ENCOUNTER — INPATIENT (INPATIENT)
Facility: HOSPITAL | Age: 70
LOS: 6 days | Discharge: HOME | DRG: 141 | End: 2020-10-26
Attending: OTOLARYNGOLOGY | Admitting: OTOLARYNGOLOGY
Payer: COMMERCIAL

## 2020-10-19 ENCOUNTER — RESULT REVIEW (OUTPATIENT)
Age: 70
End: 2020-10-19

## 2020-10-19 ENCOUNTER — APPOINTMENT (OUTPATIENT)
Dept: ORTHOPEDIC SURGERY | Facility: HOSPITAL | Age: 70
End: 2020-10-19

## 2020-10-19 ENCOUNTER — APPOINTMENT (OUTPATIENT)
Dept: OTOLARYNGOLOGY | Facility: HOSPITAL | Age: 70
End: 2020-10-19

## 2020-10-19 DIAGNOSIS — Z98.890 OTHER SPECIFIED POSTPROCEDURAL STATES: Chronic | ICD-10-CM

## 2020-10-19 DIAGNOSIS — Z41.9 ENCOUNTER FOR PROCEDURE FOR PURPOSES OTHER THAN REMEDYING HEALTH STATE, UNSPECIFIED: Chronic | ICD-10-CM

## 2020-10-19 DIAGNOSIS — M06.9 RHEUMATOID ARTHRITIS, UNSPECIFIED: ICD-10-CM

## 2020-10-19 DIAGNOSIS — I42.2 OTHER HYPERTROPHIC CARDIOMYOPATHY: ICD-10-CM

## 2020-10-19 DIAGNOSIS — J44.9 CHRONIC OBSTRUCTIVE PULMONARY DISEASE, UNSPECIFIED: ICD-10-CM

## 2020-10-19 DIAGNOSIS — C30.0 MALIGNANT NEOPLASM OF NASAL CAVITY: ICD-10-CM

## 2020-10-19 DIAGNOSIS — Z87.891 PERSONAL HISTORY OF NICOTINE DEPENDENCE: ICD-10-CM

## 2020-10-19 DIAGNOSIS — N40.0 BENIGN PROSTATIC HYPERPLASIA WITHOUT LOWER URINARY TRACT SYMPTOMS: ICD-10-CM

## 2020-10-19 LAB
ANION GAP SERPL CALC-SCNC: 11 MMOL/L — SIGNIFICANT CHANGE UP (ref 5–17)
BASE EXCESS BLDA CALC-SCNC: -1 MMOL/L — SIGNIFICANT CHANGE UP (ref -2–3)
BASE EXCESS BLDA CALC-SCNC: -1.6 MMOL/L — SIGNIFICANT CHANGE UP (ref -2–3)
BASOPHILS # BLD AUTO: 0.02 K/UL — SIGNIFICANT CHANGE UP (ref 0–0.2)
BASOPHILS NFR BLD AUTO: 0.2 % — SIGNIFICANT CHANGE UP (ref 0–2)
BUN SERPL-MCNC: 13 MG/DL — SIGNIFICANT CHANGE UP (ref 7–23)
CA-I BLDA-SCNC: 0.98 MMOL/L — LOW (ref 1.12–1.3)
CA-I BLDA-SCNC: 1.03 MMOL/L — LOW (ref 1.12–1.3)
CALCIUM SERPL-MCNC: 8.8 MG/DL — SIGNIFICANT CHANGE UP (ref 8.4–10.5)
CHLORIDE SERPL-SCNC: 104 MMOL/L — SIGNIFICANT CHANGE UP (ref 96–108)
CO2 SERPL-SCNC: 24 MMOL/L — SIGNIFICANT CHANGE UP (ref 22–31)
COHGB MFR BLDA: 0.5 % — SIGNIFICANT CHANGE UP
COHGB MFR BLDA: 0.7 % — SIGNIFICANT CHANGE UP
CREAT SERPL-MCNC: 0.89 MG/DL — SIGNIFICANT CHANGE UP (ref 0.5–1.3)
EOSINOPHIL # BLD AUTO: 0 K/UL — SIGNIFICANT CHANGE UP (ref 0–0.5)
EOSINOPHIL NFR BLD AUTO: 0 % — SIGNIFICANT CHANGE UP (ref 0–6)
GLUCOSE SERPL-MCNC: 168 MG/DL — HIGH (ref 70–99)
HCO3 BLDA-SCNC: 22 MMOL/L — SIGNIFICANT CHANGE UP (ref 21–28)
HCO3 BLDA-SCNC: 23 MMOL/L — SIGNIFICANT CHANGE UP (ref 21–28)
HCT VFR BLD CALC: 30.2 % — LOW (ref 39–50)
HGB BLD-MCNC: 9.7 G/DL — LOW (ref 13–17)
HGB BLDA-MCNC: 11.4 G/DL — LOW (ref 13–17)
HGB BLDA-MCNC: 12 G/DL — LOW (ref 13–17)
IMM GRANULOCYTES NFR BLD AUTO: 0.3 % — SIGNIFICANT CHANGE UP (ref 0–1.5)
LYMPHOCYTES # BLD AUTO: 0.83 K/UL — LOW (ref 1–3.3)
LYMPHOCYTES # BLD AUTO: 8.6 % — LOW (ref 13–44)
MAGNESIUM SERPL-MCNC: 1.5 MG/DL — LOW (ref 1.6–2.6)
MCHC RBC-ENTMCNC: 27.7 PG — SIGNIFICANT CHANGE UP (ref 27–34)
MCHC RBC-ENTMCNC: 32.1 GM/DL — SIGNIFICANT CHANGE UP (ref 32–36)
MCV RBC AUTO: 86.3 FL — SIGNIFICANT CHANGE UP (ref 80–100)
METHGB MFR BLDA: 0.3 % — SIGNIFICANT CHANGE UP
METHGB MFR BLDA: 0.3 % — SIGNIFICANT CHANGE UP
MONOCYTES # BLD AUTO: 0.77 K/UL — SIGNIFICANT CHANGE UP (ref 0–0.9)
MONOCYTES NFR BLD AUTO: 8 % — SIGNIFICANT CHANGE UP (ref 2–14)
NEUTROPHILS # BLD AUTO: 7.97 K/UL — HIGH (ref 1.8–7.4)
NEUTROPHILS NFR BLD AUTO: 82.9 % — HIGH (ref 43–77)
NRBC # BLD: 0 /100 WBCS — SIGNIFICANT CHANGE UP (ref 0–0)
O2 CT VFR BLDA CALC: 16.8 ML/DL — SIGNIFICANT CHANGE UP (ref 15–23)
O2 CT VFR BLDA CALC: 17.6 ML/DL — SIGNIFICANT CHANGE UP (ref 15–23)
OXYHGB MFR BLDA: 98 % — SIGNIFICANT CHANGE UP (ref 94–100)
OXYHGB MFR BLDA: 98 % — SIGNIFICANT CHANGE UP (ref 94–100)
PCO2 BLDA: 34 MMHG — LOW (ref 35–48)
PCO2 BLDA: 35 MMHG — SIGNIFICANT CHANGE UP (ref 35–48)
PH BLDA: 7.43 — SIGNIFICANT CHANGE UP (ref 7.35–7.45)
PH BLDA: 7.43 — SIGNIFICANT CHANGE UP (ref 7.35–7.45)
PHOSPHATE SERPL-MCNC: 3.6 MG/DL — SIGNIFICANT CHANGE UP (ref 2.5–4.5)
PLATELET # BLD AUTO: 150 K/UL — SIGNIFICANT CHANGE UP (ref 150–400)
PO2 BLDA: 381 MMHG — HIGH (ref 83–108)
PO2 BLDA: 391 MMHG — HIGH (ref 83–108)
POTASSIUM BLDA-SCNC: 4.4 MMOL/L — SIGNIFICANT CHANGE UP (ref 3.5–4.9)
POTASSIUM BLDA-SCNC: 4.5 MMOL/L — SIGNIFICANT CHANGE UP (ref 3.5–4.9)
POTASSIUM SERPL-MCNC: 4.4 MMOL/L — SIGNIFICANT CHANGE UP (ref 3.5–5.3)
POTASSIUM SERPL-SCNC: 4.4 MMOL/L — SIGNIFICANT CHANGE UP (ref 3.5–5.3)
RBC # BLD: 3.5 M/UL — LOW (ref 4.2–5.8)
RBC # FLD: 14.1 % — SIGNIFICANT CHANGE UP (ref 10.3–14.5)
SAO2 % BLDA: 99 % — SIGNIFICANT CHANGE UP (ref 95–100)
SAO2 % BLDA: 99 % — SIGNIFICANT CHANGE UP (ref 95–100)
SODIUM BLDA-SCNC: 134 MMOL/L — LOW (ref 138–146)
SODIUM BLDA-SCNC: 135 MMOL/L — LOW (ref 138–146)
SODIUM SERPL-SCNC: 139 MMOL/L — SIGNIFICANT CHANGE UP (ref 135–145)
WBC # BLD: 9.62 K/UL — SIGNIFICANT CHANGE UP (ref 3.8–10.5)
WBC # FLD AUTO: 9.62 K/UL — SIGNIFICANT CHANGE UP (ref 3.8–10.5)

## 2020-10-19 PROCEDURE — 88342 IMHCHEM/IMCYTCHM 1ST ANTB: CPT | Mod: 26

## 2020-10-19 PROCEDURE — 20900 REMOVAL OF BONE FOR GRAFT: CPT

## 2020-10-19 PROCEDURE — 88331 PATH CONSLTJ SURG 1 BLK 1SPC: CPT | Mod: 26

## 2020-10-19 PROCEDURE — 20969 BONE/SKIN GRAFT MICROVASC: CPT

## 2020-10-19 PROCEDURE — 35701 EXPL N/FLWD SURG NECK ART: CPT | Mod: 59

## 2020-10-19 PROCEDURE — 88304 TISSUE EXAM BY PATHOLOGIST: CPT | Mod: 26

## 2020-10-19 PROCEDURE — 99291 CRITICAL CARE FIRST HOUR: CPT

## 2020-10-19 PROCEDURE — 88305 TISSUE EXAM BY PATHOLOGIST: CPT | Mod: 26

## 2020-10-19 PROCEDURE — 38700 REMOVAL OF LYMPH NODES NECK: CPT | Mod: GC,LT

## 2020-10-19 PROCEDURE — 15100 SPLT AGRFT T/A/L 1ST 100SQCM: CPT

## 2020-10-19 PROCEDURE — 25515 OPTX RADIAL SHAFT FRACTURE: CPT | Mod: LT

## 2020-10-19 PROCEDURE — 30150 RHINECTOMY PARTIAL: CPT | Mod: GC

## 2020-10-19 PROCEDURE — 88300 SURGICAL PATH GROSS: CPT | Mod: 26,59

## 2020-10-19 PROCEDURE — 73110 X-RAY EXAM OF WRIST: CPT | Mod: 26,LT

## 2020-10-19 PROCEDURE — 15733 MUSC MYOQ/FSCQ FLP H&N PEDCL: CPT

## 2020-10-19 PROCEDURE — 88309 TISSUE EXAM BY PATHOLOGIST: CPT | Mod: 26

## 2020-10-19 PROCEDURE — 21034 EXCISE MAX/ZYGOMA MAL TUMOR: CPT | Mod: GC

## 2020-10-19 RX ORDER — METRONIDAZOLE 500 MG
500 TABLET ORAL EVERY 8 HOURS
Refills: 0 | Status: COMPLETED | OUTPATIENT
Start: 2020-10-19 | End: 2020-10-21

## 2020-10-19 RX ORDER — DEXTROSE 50 % IN WATER 50 %
25 SYRINGE (ML) INTRAVENOUS ONCE
Refills: 0 | Status: DISCONTINUED | OUTPATIENT
Start: 2020-10-19 | End: 2020-10-26

## 2020-10-19 RX ORDER — SODIUM CHLORIDE 9 MG/ML
1000 INJECTION, SOLUTION INTRAVENOUS
Refills: 0 | Status: DISCONTINUED | OUTPATIENT
Start: 2020-10-19 | End: 2020-10-26

## 2020-10-19 RX ORDER — GLUCAGON INJECTION, SOLUTION 0.5 MG/.1ML
1 INJECTION, SOLUTION SUBCUTANEOUS ONCE
Refills: 0 | Status: DISCONTINUED | OUTPATIENT
Start: 2020-10-19 | End: 2020-10-26

## 2020-10-19 RX ORDER — CEFAZOLIN SODIUM 1 G
2000 VIAL (EA) INJECTION EVERY 8 HOURS
Refills: 0 | Status: COMPLETED | OUTPATIENT
Start: 2020-10-19 | End: 2020-10-21

## 2020-10-19 RX ORDER — ASPIRIN/CALCIUM CARB/MAGNESIUM 324 MG
300 TABLET ORAL ONCE
Refills: 0 | Status: COMPLETED | OUTPATIENT
Start: 2020-10-19 | End: 2020-10-19

## 2020-10-19 RX ORDER — SODIUM CHLORIDE 9 MG/ML
1000 INJECTION, SOLUTION INTRAVENOUS
Refills: 0 | Status: DISCONTINUED | OUTPATIENT
Start: 2020-10-19 | End: 2020-10-21

## 2020-10-19 RX ORDER — ACETAMINOPHEN 500 MG
1000 TABLET ORAL ONCE
Refills: 0 | Status: COMPLETED | OUTPATIENT
Start: 2020-10-19 | End: 2020-10-20

## 2020-10-19 RX ORDER — HYDROMORPHONE HYDROCHLORIDE 2 MG/ML
0.5 INJECTION INTRAMUSCULAR; INTRAVENOUS; SUBCUTANEOUS EVERY 4 HOURS
Refills: 0 | Status: DISCONTINUED | OUTPATIENT
Start: 2020-10-19 | End: 2020-10-19

## 2020-10-19 RX ORDER — DEXTROSE 50 % IN WATER 50 %
15 SYRINGE (ML) INTRAVENOUS ONCE
Refills: 0 | Status: DISCONTINUED | OUTPATIENT
Start: 2020-10-19 | End: 2020-10-26

## 2020-10-19 RX ORDER — HYDROMORPHONE HYDROCHLORIDE 2 MG/ML
0.25 INJECTION INTRAMUSCULAR; INTRAVENOUS; SUBCUTANEOUS
Refills: 0 | Status: DISCONTINUED | OUTPATIENT
Start: 2020-10-19 | End: 2020-10-23

## 2020-10-19 RX ORDER — DEXTROSE 50 % IN WATER 50 %
12.5 SYRINGE (ML) INTRAVENOUS ONCE
Refills: 0 | Status: DISCONTINUED | OUTPATIENT
Start: 2020-10-19 | End: 2020-10-26

## 2020-10-19 RX ORDER — INSULIN LISPRO 100/ML
VIAL (ML) SUBCUTANEOUS
Refills: 0 | Status: DISCONTINUED | OUTPATIENT
Start: 2020-10-19 | End: 2020-10-26

## 2020-10-19 RX ORDER — MAGNESIUM SULFATE 500 MG/ML
2 VIAL (ML) INJECTION ONCE
Refills: 0 | Status: COMPLETED | OUTPATIENT
Start: 2020-10-19 | End: 2020-10-19

## 2020-10-19 RX ADMIN — Medication 100 MILLIGRAM(S): at 22:38

## 2020-10-19 RX ADMIN — Medication 50 GRAM(S): at 22:48

## 2020-10-19 RX ADMIN — Medication 300 MILLIGRAM(S): at 22:48

## 2020-10-19 RX ADMIN — Medication 100 MILLIGRAM(S): at 22:39

## 2020-10-19 NOTE — H&P ADULT - ASSESSMENT
69M w/ hypertrophic cardiomyopathy, COPD, BPH now s/p partial rhinectomy/septectomy, left level I neck dissection, left radial forearm free flap, STSG, insertion of cadaveric bone and cartilage, and ORIF L radius.    #    PPX: SCDs     69M w/ hypertrophic cardiomyopathy, COPD, BPH now s/p partial rhinectomy/septectomy, left level I neck dissection, left radial forearm free flap, STSG, insertion of cadaveric bone and cartilage, and ORIF L radius.    #Neuro  -pain control as per ICU  -HOB 30 deg  -Head neutral    #CV  -TX  now, start ASA tomorrow  -Q1H nursing flap checks w/ doppler, Q6H resident flap check    -MAP >65  -avoid pressors if possible  -vonnie in place    #Resp  -extubated to face mask    #GI  -NPO x 24 hours, then CLD for 72 hours    #  -alford in place    #Endocrine  -FSG as per ICU    #ID  -ancef/flagyl for 48 hours    #Heme  -Keep Hgb >7, transfuse as needed    #Extr  -s/p ORIF l radius, f/u arm XR    PPx  -SCDs, start SQH tomorrow

## 2020-10-19 NOTE — CONSULT NOTE ADULT - SUBJECTIVE AND OBJECTIVE BOX
HPI:  69M w/ hypertrophic cardiomyopathy, COPD, BPH now s/p partial rhinectomy/septectomy, left level I neck dissection, left radial forearm free flap, STSG, insertion of cadaveric bone and cartilage, and ORIF L radius.    SICU Addendum:   Pt transferred post-operatively to SICU extubated in stable condition.       PHYSICAL EXAM:    ENT EXAM-   Constitutional: Well-developed, well-nourished.    Head:  normocephalic, atraumatic.   Face: s/p partial rhinectomy, sutures c/d/i  Nose: s/p septectomy, septum and dorsum recreated with cadaveric bone and cartilage, monten[place]ro splints in place intranasally, [place] splint in place externally  OC/OP: MMM, tongue midline, palatal defect recreated with radial forearm free flap, good triphasic signal observed over flap, sutures c/d/i  Neck:  Trachea midline. L neck dissection incision c/d/i, JPx1 in place holding suction with sanguinous output  Extr: L forearm wrapped in webril/cast/ACE, wound vac in place over STSG holding suction, FRANTZ x1 in place holding suction with sanguinous output, STSG donor site covered in tegaderm  Resp: NLB on facemask      LABS:  pending    RADIOLOGY & ADDITIONAL STUDIES:  pending      PAST MEDICAL & SURGICAL HISTORY:  Arthralgia of bilateral temporomandibular joint    Tendinitis  left rotator cuff    BPH (benign prostatic hyperplasia)    Malignant neoplasm  nasal cavity    Bronchitis  chronic    Rheumatoid arthritis    Surgery, elective  left knee    History of prostate surgery  TURP    Elective surgery  Rt. foot Hallux Valaus repair-!0/2016    S/P rotator cuff repair  Rt. shoulder-2014        MEDICATIONS  (STANDING):  aspirin Suppository 300 milliGRAM(s) Rectal once  ceFAZolin   IVPB 2000 milliGRAM(s) IV Intermittent every 8 hours  metroNIDAZOLE  IVPB 500 milliGRAM(s) IV Intermittent every 8 hours    MEDICATIONS  (PRN):      Allergies    No Known Allergies    Intolerances    Milk (Other)      SOCIAL HISTORY:    FAMILY HISTORY:      REVIEW OF SYSTEMS  Negative other than stated in HPI    Vital Signs Last 24 Hrs  T(C): 36.8 (19 Oct 2020 21:30), Max: 36.8 (19 Oct 2020 21:30)  T(F): 98.3 (19 Oct 2020 21:30), Max: 98.3 (19 Oct 2020 21:30)  HR: 89 (19 Oct 2020 21:45) (89 - 90)  BP: 104/60 (19 Oct 2020 21:30) (104/60 - 104/60)  BP(mean): 75 (19 Oct 2020 21:30) (75 - 75)  RR: 18 (19 Oct 2020 21:30) (18 - 18)  SpO2: 93% (19 Oct 2020 21:45) (93% - 98%)    I&O's Summary      Physical Exam:  General: NAD, resting comfortably  HEENT: NC/AT, EOMI, normal hearing, no oral lesions, no LAD, neck supple  Pulmonary: normal resp effort, CTA-B  Cardiovascular: NSR, no murmurs  Abdominal: soft, ND/NT, no organomegaly  Extremities: WWP, normal strength, no clubbing/cyanosis/edema  Neuro: A/O x 3, CNs II-XII grossly intact, normal sensation, no focal deficits  Pulses: palpable distal pulses    Lines/drains/tubes:    LABS:                        9.7    9.62  )-----------( 150      ( 19 Oct 2020 21:43 )             30.2               CAPILLARY BLOOD GLUCOSE      POCT Blood Glucose.: 149 mg/dL (19 Oct 2020 15:53)  POCT Blood Glucose.: 63 mg/dL (19 Oct 2020 15:10)  POCT Blood Glucose.: 90 mg/dL (19 Oct 2020 09:07)        Cultures:      RADIOLOGY & ADDITIONAL STUDIES:

## 2020-10-19 NOTE — BRIEF OPERATIVE NOTE - OPERATION/FINDINGS
3.5cm defect radial shaft with >50% cortical disruption concerning for impending left radius fracture requiring prophylactic plating.
primary malignancy of nasal septum removed with negative margins, defect recreated with radial forearm free flap, cadaveric bone and rib. Left level I ND. Doppler with good signal at end of case

## 2020-10-19 NOTE — CONSULT NOTE ADULT - ATTENDING COMMENTS
Manuel [id number]  This is a 68 y/o male with squamous cell cancer of the nose s/p resection and reconstruction.  -squamous cell cancer of the nose s/p resection and reconstruction  -stable COPD  >pain control  >moisturized air  >IVF, LR  >maintenance of BP, no pressors  >NPO  >RISS  >empiric antibiotics  >SQ heparin when ok with surgery.

## 2020-10-19 NOTE — CONSULT NOTE ADULT - ASSESSMENT
69M w/ hypertrophic cardiomyopathy, COPD, BPH now s/p partial rhinectomy/septectomy, left level I neck dissection, left radial forearm free flap, STSG, insertion of cadaveric bone and cartilage, and ORIF L radius.    NEURO: Dilaudid PRN, Tylenol PRN  CV: stable, ASA supp,  starting 10/20  PULM: ARMOND  GI/FEN: NPO.   : alford.   ENDO: ISS  ID: Ancef (10/19-) Flagyl (1019-) for 48 hrs  PPX: SCDs, HSQ POD 1  LINES: PIVs,   WOUNDS/DRAINS: a-line, PIVs, JPx2, STSG right thigh, wound vac left arm

## 2020-10-19 NOTE — H&P ADULT - HISTORY OF PRESENT ILLNESS
69M w/ hypertrophic cardiomyopathy, COPD, BPH now s/p partial rhinectomy/septectomy, left level I neck dissection, left radial forearm free flap, STSG, insertion of cadaveric bone and cartilage, and ORIF L radius.    No acute events post op. Extubated and brought to SICU in stable condition       PHYSICAL EXAM:    ENT EXAM-   Constitutional: Well-developed, well-nourished.    Head:  normocephalic, atraumatic.   Face: s/p partial rhinectomy, sutures c/d/i  Nose: s/p septectomy, septum and dorsum recreated with cadaveric bone and cartilage, montenegro splints in place intranasally, [place] splint in place externally  OC/OP: MMM, tongue midline, palatal defect recreated with radial forearm free flap, good triphasic signal observed over flap, sutures c/d/i  Neck:  Trachea midline. L neck dissection incision c/d/i, JPx1 in place holding suction with sanguinous output  Extr: L forearm wrapped in webril/cast/ACE, wound vac in place over STSG holding suction, FRANTZ x1 in place holding suction with sanguinous output, STSG donor site covered in tegaderm  Resp: NLB on facemask      LABS:  pending    RADIOLOGY & ADDITIONAL STUDIES:  pending

## 2020-10-20 PROBLEM — C80.1 MALIGNANT (PRIMARY) NEOPLASM, UNSPECIFIED: Chronic | Status: ACTIVE | Noted: 2020-10-16

## 2020-10-20 PROBLEM — J40 BRONCHITIS, NOT SPECIFIED AS ACUTE OR CHRONIC: Chronic | Status: ACTIVE | Noted: 2017-10-09

## 2020-10-20 PROBLEM — N40.0 BENIGN PROSTATIC HYPERPLASIA WITHOUT LOWER URINARY TRACT SYMPTOMS: Chronic | Status: ACTIVE | Noted: 2020-10-16

## 2020-10-20 LAB
A1C WITH ESTIMATED AVERAGE GLUCOSE RESULT: 5.7 % — HIGH (ref 4–5.6)
ANION GAP SERPL CALC-SCNC: 9 MMOL/L — SIGNIFICANT CHANGE UP (ref 5–17)
BUN SERPL-MCNC: 14 MG/DL — SIGNIFICANT CHANGE UP (ref 7–23)
CALCIUM SERPL-MCNC: 8.3 MG/DL — LOW (ref 8.4–10.5)
CHLORIDE SERPL-SCNC: 106 MMOL/L — SIGNIFICANT CHANGE UP (ref 96–108)
CO2 SERPL-SCNC: 24 MMOL/L — SIGNIFICANT CHANGE UP (ref 22–31)
CREAT SERPL-MCNC: 0.74 MG/DL — SIGNIFICANT CHANGE UP (ref 0.5–1.3)
ESTIMATED AVERAGE GLUCOSE: 117 MG/DL — HIGH (ref 68–114)
GLUCOSE SERPL-MCNC: 119 MG/DL — HIGH (ref 70–99)
HCT VFR BLD CALC: 30 % — LOW (ref 39–50)
HCV AB S/CO SERPL IA: 0.08 S/CO — SIGNIFICANT CHANGE UP
HCV AB SERPL-IMP: SIGNIFICANT CHANGE UP
HGB BLD-MCNC: 9.5 G/DL — LOW (ref 13–17)
MAGNESIUM SERPL-MCNC: 2.1 MG/DL — SIGNIFICANT CHANGE UP (ref 1.6–2.6)
MCHC RBC-ENTMCNC: 27.7 PG — SIGNIFICANT CHANGE UP (ref 27–34)
MCHC RBC-ENTMCNC: 31.7 GM/DL — LOW (ref 32–36)
MCV RBC AUTO: 87.5 FL — SIGNIFICANT CHANGE UP (ref 80–100)
NRBC # BLD: 0 /100 WBCS — SIGNIFICANT CHANGE UP (ref 0–0)
PHOSPHATE SERPL-MCNC: 2.4 MG/DL — LOW (ref 2.5–4.5)
PLATELET # BLD AUTO: 147 K/UL — LOW (ref 150–400)
POTASSIUM SERPL-MCNC: 3.7 MMOL/L — SIGNIFICANT CHANGE UP (ref 3.5–5.3)
POTASSIUM SERPL-SCNC: 3.7 MMOL/L — SIGNIFICANT CHANGE UP (ref 3.5–5.3)
RBC # BLD: 3.43 M/UL — LOW (ref 4.2–5.8)
RBC # FLD: 14 % — SIGNIFICANT CHANGE UP (ref 10.3–14.5)
SODIUM SERPL-SCNC: 139 MMOL/L — SIGNIFICANT CHANGE UP (ref 135–145)
WBC # BLD: 7.65 K/UL — SIGNIFICANT CHANGE UP (ref 3.8–10.5)
WBC # FLD AUTO: 7.65 K/UL — SIGNIFICANT CHANGE UP (ref 3.8–10.5)

## 2020-10-20 PROCEDURE — 99233 SBSQ HOSP IP/OBS HIGH 50: CPT | Mod: GC

## 2020-10-20 RX ORDER — FINASTERIDE 5 MG/1
5 TABLET, FILM COATED ORAL DAILY
Refills: 0 | Status: DISCONTINUED | OUTPATIENT
Start: 2020-10-20 | End: 2020-10-26

## 2020-10-20 RX ORDER — ACETAMINOPHEN 500 MG
975 TABLET ORAL EVERY 6 HOURS
Refills: 0 | Status: DISCONTINUED | OUTPATIENT
Start: 2020-10-20 | End: 2020-10-26

## 2020-10-20 RX ORDER — ASPIRIN/CALCIUM CARB/MAGNESIUM 324 MG
325 TABLET ORAL DAILY
Refills: 0 | Status: DISCONTINUED | OUTPATIENT
Start: 2020-10-20 | End: 2020-10-26

## 2020-10-20 RX ORDER — POTASSIUM PHOSPHATE, MONOBASIC POTASSIUM PHOSPHATE, DIBASIC 236; 224 MG/ML; MG/ML
30 INJECTION, SOLUTION INTRAVENOUS ONCE
Refills: 0 | Status: COMPLETED | OUTPATIENT
Start: 2020-10-20 | End: 2020-10-20

## 2020-10-20 RX ORDER — TAMSULOSIN HYDROCHLORIDE 0.4 MG/1
0.4 CAPSULE ORAL AT BEDTIME
Refills: 0 | Status: DISCONTINUED | OUTPATIENT
Start: 2020-10-20 | End: 2020-10-26

## 2020-10-20 RX ORDER — CHLORHEXIDINE GLUCONATE 213 G/1000ML
15 SOLUTION TOPICAL
Refills: 0 | Status: DISCONTINUED | OUTPATIENT
Start: 2020-10-20 | End: 2020-10-26

## 2020-10-20 RX ORDER — HEPARIN SODIUM 5000 [USP'U]/ML
5000 INJECTION INTRAVENOUS; SUBCUTANEOUS EVERY 8 HOURS
Refills: 0 | Status: DISCONTINUED | OUTPATIENT
Start: 2020-10-20 | End: 2020-10-26

## 2020-10-20 RX ADMIN — FINASTERIDE 5 MILLIGRAM(S): 5 TABLET, FILM COATED ORAL at 22:03

## 2020-10-20 RX ADMIN — Medication 325 MILLIGRAM(S): at 17:24

## 2020-10-20 RX ADMIN — Medication 100 MILLIGRAM(S): at 14:54

## 2020-10-20 RX ADMIN — Medication 100 MILLIGRAM(S): at 22:03

## 2020-10-20 RX ADMIN — HEPARIN SODIUM 5000 UNIT(S): 5000 INJECTION INTRAVENOUS; SUBCUTANEOUS at 14:54

## 2020-10-20 RX ADMIN — CHLORHEXIDINE GLUCONATE 15 MILLILITER(S): 213 SOLUTION TOPICAL at 17:24

## 2020-10-20 RX ADMIN — HEPARIN SODIUM 5000 UNIT(S): 5000 INJECTION INTRAVENOUS; SUBCUTANEOUS at 22:04

## 2020-10-20 RX ADMIN — Medication 100 MILLIGRAM(S): at 05:39

## 2020-10-20 RX ADMIN — Medication 400 MILLIGRAM(S): at 05:40

## 2020-10-20 RX ADMIN — TAMSULOSIN HYDROCHLORIDE 0.4 MILLIGRAM(S): 0.4 CAPSULE ORAL at 22:02

## 2020-10-20 RX ADMIN — Medication 100 MILLIGRAM(S): at 22:02

## 2020-10-20 RX ADMIN — POTASSIUM PHOSPHATE, MONOBASIC POTASSIUM PHOSPHATE, DIBASIC 83.33 MILLIMOLE(S): 236; 224 INJECTION, SOLUTION INTRAVENOUS at 14:46

## 2020-10-20 NOTE — PROGRESS NOTE ADULT - SUBJECTIVE AND OBJECTIVE BOX
SUBJECTIVE: Patient seen and examined. Pain controlled.  No issues overnight. No HA, f/c/n/v/cp/sob.     OBJECTIVE:  NAD  Vital Signs Last 24 Hrs  T(C): 36.7 (20 Oct 2020 01:12), Max: 36.8 (19 Oct 2020 21:30)  T(F): 98.1 (20 Oct 2020 01:12), Max: 98.3 (19 Oct 2020 21:30)  HR: 92 (20 Oct 2020 04:00) (78 - 92)  BP: 118/66 (20 Oct 2020 04:00) (98/55 - 118/66)  BP(mean): 87 (20 Oct 2020 04:00) (72 - 87)  RR: 20 (20 Oct 2020 04:00) (15 - 20)  SpO2: 94% (20 Oct 2020 04:00) (92% - 98%)    Physical Exam:   General: Resting comfortably, NAD  LUE: Arm in splint. FRANTZ proximal to dressing, wound vac inside dressing. Dressing: clean/dry/intact. Fingers SILT. AIN/PIN/U motor intact. Fingers warm well perfused; capillary refill <3 seconds              Labs:             9.7    9.62  )-----------( 150      ( 19 Oct 2020 21:43 )             30.2     10-19    139  |  104  |  13  ----------------------------<  168<H>  4.4   |  24  |  0.89    Ca    8.8      19 Oct 2020 21:43  Phos  3.6     10-19  Mg     1.5     10-19        A/P :  Pt is a 70yo Male s/p bone graft harvest from L radius w/ plating for stability, stable, NVID   -    Pain control    -    Physical Therapy  -    Care per primary team  -    WBS: NWB LUE until 1 week postop

## 2020-10-20 NOTE — PHYSICAL THERAPY INITIAL EVALUATION ADULT - MD ORDER
ORIF, fracture, radial shaft 19-Oct-2020 18:32:57  Jack New. ORIF, fracture, radial shaft 19-Oct-2020 18:32:57  Jack New.  Septectomy, nasal 19-Oct-2020 20:57:40  Culeln Quiñones.

## 2020-10-20 NOTE — PHYSICAL THERAPY INITIAL EVALUATION ADULT - IMPAIRMENTS FOUND, PT EVAL
aerobic capacity/endurance/integumentary integrity/muscle strength/posture/gross motor/joint integrity and mobility/gait, locomotion, and balance

## 2020-10-20 NOTE — PHYSICAL THERAPY INITIAL EVALUATION ADULT - ADDITIONAL COMMENTS
pt lives w/ his wife in a private home where he can stay on the first floor but has 2 steps to enter. Denies use of DME for ambulation. States that he was independent in all ADLs. 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

## 2020-10-20 NOTE — PROGRESS NOTE ADULT - ASSESSMENT
Assessment:   70 y/o M with past medical history significant for hypertrophic cardiomyopathy, COPD, BPH and L nasal SCC, now POD#1 partial rhinectomy/septectomy, L level I neck dissection, L radial forearm free flap, STSG L thigh, insertion of cadaveric bone and cartilage and ORIF L radius. He was transferred to the SICU post-op for close hemodynamic monitoring and q1h flap checks. He had no acute events overnight, continue to progress according to ENT pathway.       Plan:   Neuro: IV Dilaudid PRN  HEENT: Flap checks q1h;  ASA?   CV: HD stable NS@ Avoid pressors   Pulm: Trached CMV 40/280/12/5  GI: NPO Doboff  : Breanna  ID: Ancef( /-) Flagyl( / - )   Endo: ISS  ppx: SCD No SQH 2* bleeding risk  Lines: PIV Azul( /-)   Wounds: FRANTZ x  PT/OT: Not Ordered   Assessment:   68 y/o M with past medical history significant for hypertrophic cardiomyopathy, COPD, BPH and L nasal SCC, now POD#1 partial rhinectomy/septectomy, L level I neck dissection, L radial forearm free flap, STSG L thigh, insertion of cadaveric bone and cartilage and ORIF L radius. He was transferred to the SICU post-op for close hemodynamic monitoring and q1h flap checks. He had no acute events overnight, continue to progress according to ENT pathway.       Plan:   Neuro: IV Dilaudid PRN  HEENT: Flap checks q1h; Given ASA 300mg rectally overnight; Start ASA 325mg daily  CV: Remains hemodynamically stable; Decreased LR to 50mL/hr; Avoid pressors to protect flap  Pulm: Maintaining oxygen saturation >94% on room air; Continue to monitor airway   GI: NPO except medications; May start clear liquid diet this evening   : Carlos in place- plan to discontinue this AM and check PVRs; Strict I&O's   ID: Continue Ancef(10/19-) and Flagyl(10/19- )   Endo: POCT glucose, SSI   PPx: SCDs, SQ Heparin to start today   Lines: PIVs, L radial Azul(10/19-10/20)- will discontinue today    Wounds: L nasal incision, L neck FRANTZ, L forearm FRANTZ, L forearm ace wrap, R thigh STSG  PT/OT: Ordered PT this AM; May ambulate OOB to chair   Dispo: SICU    Assessment:   70 y/o M with past medical history significant for hypertrophic cardiomyopathy, COPD, BPH and L nasal SCC, now POD#1 partial rhinectomy/septectomy, L level I neck dissection, L radial forearm free flap, STSG L thigh, insertion of cadaveric bone and cartilage and ORIF L radius. He was transferred to the SICU post-op for close hemodynamic monitoring and q1h flap checks. He had no acute events overnight, continue to progress according to ENT pathway.       Plan:   Neuro: IV Dilaudid, Tylenol PRN   HEENT: Flap checks q1h; Given ASA 300mg rectally overnight; Start ASA 325mg daily  CV: Remains hemodynamically stable; Decreased LR to 50mL/hr; Avoid pressors to protect flap  Pulm: Maintaining oxygen saturation >94% on room air; Continue to monitor airway   GI: NPO except medications; May start clear liquid diet this evening   : Carlos in place- plan to discontinue this AM and check PVRs; Strict I&O's; Will restart home Flomax 0.4mg nightly, Proscar 5mg daily (conversion from home dose Dutasteride 0.5mg)  ID: Continue Ancef (10/19-) and Flagyl (10/19- )   Endo: POCT glucose, SSI   Rheum: Continue to hold home dose Leflunomide 20mg daily  PPx: SCDs, SQ Heparin to start today   Lines: PIVs, L radial Azul(10/19-10/20)- will discontinue today    Wounds: L nasal incision, L neck FRANTZ, L forearm FRANTZ, L forearm ace wrap, R thigh STSG  PT/OT: Ordered PT this AM; May ambulate OOB to chair   Dispo: SICU

## 2020-10-20 NOTE — PHYSICAL THERAPY INITIAL EVALUATION ADULT - PERTINENT HX OF CURRENT PROBLEM, REHAB EVAL
68 y/o M with past medical history significant for hypertrophic cardiomyopathy, COPD, BPH and L nasal SCC, now POD#1 partial rhinectomy/septectomy, L level I neck dissection, L radial forearm free flap, STSG L thigh, insertion of cadaveric bone and cartilage and ORIF L radius. He was transferred to the SICU post-op for close hemodynamic monitoring and q1h flap checks. He had no acute events overnight, continue to progress according to ENT pathway

## 2020-10-20 NOTE — PHYSICAL THERAPY INITIAL EVALUATION ADULT - CRITERIA FOR SKILLED THERAPEUTIC INTERVENTIONS
rehab potential/risk reduction/prevention/therapy frequency/impairments found/functional limitations in following categories/anticipated discharge recommendation

## 2020-10-20 NOTE — PHYSICAL THERAPY INITIAL EVALUATION ADULT - GAIT DEVIATIONS NOTED, PT EVAL
increased time in double stance/decreased weight-shifting ability/decreased cass/decreased step length

## 2020-10-20 NOTE — PROGRESS NOTE ADULT - ASSESSMENT
69M w/ hypertrophic cardiomyopathy, COPD, BPH now s/p partial rhinectomy/septectomy, left level I neck dissection, left radial forearm free flap, STSG, insertion of cadaveric bone and cartilage, and ORIF L radius.    #Neuro  -pain control as per ICU  -HOB 30 deg  -Head neutral    #CV  -MT  now, start ASA tomorrow  -Q1H nursing flap checks w/ doppler, Q6H resident flap check    -MAP >65  -avoid pressors if possible  -DC a-line today    #Resp  -extubated to face mask    #GI  -NPO x 24 hours, then CLD for 72 hours    #  -DC alford today    #Endocrine  -FSG as per ICU    #ID  -ancef/flagyl for 48 hours    #Heme  -Keep Hgb >7, transfuse as needed    #Extr  -s/p ORIF l radius, f/u arm XR    PPx  -SCDs, start SQH today  -OOBTC, ABAT   69M w/ hypertrophic cardiomyopathy, COPD, BPH now s/p partial rhinectomy/septectomy, left level I neck dissection, left radial forearm free flap, STSG, insertion of cadaveric bone and cartilage, and ORIF L radius.    #Neuro  -pain control as per ICU  -HOB 30 deg  -Head neutral    #CV  -SC  now, start ASA tomorrow  -Q1H nursing flap checks w/ doppler, Q6H resident flap check    -MAP >65  -avoid pressors if possible  -DC a-line today    #Resp  -extubated to face mask    #GI  -NPO x 24 hours, then CLD for 72 hours    #  -DC alford today    #Endocrine  -FSG as per ICU    #ID  -ancef/flagyl for 48 hours    #Heme  -Keep Hgb >7, transfuse as needed    #Extr  -s/p ORIF l radius, f/u arm XR  -weight bearing per ortho    PPx  -SCDs, start SQH today  -OOBTC, ABAT

## 2020-10-20 NOTE — PROGRESS NOTE ADULT - SUBJECTIVE AND OBJECTIVE BOX
ENT Progress Note    HPI:   69M w/ hypertrophic cardiomyopathy, COPD, BPH now s/p partial rhinectomy/septectomy, left level I neck dissection, left radial forearm free flap, STSG, insertion of cadaveric bone and cartilage, and ORIF L radius.    Interval:  10/20 (POD1): NAEON, AFVSS with MAPs in appropriate range. Flap warm, well-perfused, pink. Strong intraoral doppler signal and external neck signal.       PAST MEDICAL & SURGICAL HISTORY:  Arthralgia of bilateral temporomandibular joint    Tendinitis  left rotator cuff    BPH (benign prostatic hyperplasia)    Malignant neoplasm  nasal cavity    Bronchitis  chronic    Rheumatoid arthritis    Surgery, elective  left knee    History of prostate surgery  TURP    Elective surgery  Rt. foot Hallux Valaus repair-!0/2016    S/P rotator cuff repair  Rt. shoulder-2014      Allergies    No Known Allergies    Intolerances    Milk (Other)    MEDICATIONS  (STANDING):  ceFAZolin   IVPB 2000 milliGRAM(s) IV Intermittent every 8 hours  dextrose 5%. 1000 milliLiter(s) (50 mL/Hr) IV Continuous <Continuous>  dextrose 50% Injectable 25 Gram(s) IV Push once  dextrose 50% Injectable 25 Gram(s) IV Push once  dextrose 50% Injectable 12.5 Gram(s) IV Push once  insulin lispro (HumaLOG) corrective regimen sliding scale   SubCutaneous Before meals and at bedtime  lactated ringers. 1000 milliLiter(s) (100 mL/Hr) IV Continuous <Continuous>  metroNIDAZOLE  IVPB 500 milliGRAM(s) IV Intermittent every 8 hours  potassium phosphate IVPB 30 milliMole(s) IV Intermittent once    MEDICATIONS  (PRN):  dextrose 40% Gel 15 Gram(s) Oral once PRN Blood Glucose LESS THAN 70 milliGRAM(s)/deciliter  glucagon  Injectable 1 milliGRAM(s) IntraMuscular once PRN Glucose LESS THAN 70 milligrams/deciliter  HYDROmorphone  Injectable 0.5 milliGRAM(s) IV Push every 4 hours PRN Severe Pain (7 - 10)  HYDROmorphone  Injectable 0.25 milliGRAM(s) IV Push every 1 hour PRN Breakthrough        Vital Signs Last 24 Hrs  T(C): 36.8 (20 Oct 2020 05:08), Max: 36.8 (19 Oct 2020 21:30)  T(F): 98.3 (20 Oct 2020 05:08), Max: 98.3 (19 Oct 2020 21:30)  HR: 89 (20 Oct 2020 07:00) (78 - 92)  BP: 115/67 (20 Oct 2020 07:00) (98/55 - 118/66)  BP(mean): 84 (20 Oct 2020 07:00) (72 - 87)  RR: 18 (20 Oct 2020 07:00) (15 - 20)  SpO2: 95% (20 Oct 2020 07:00) (92% - 98%)    Physical Exam:  Constitutional: Well-developed, well-nourished.    Head:  normocephalic, atraumatic.   Face: s/p partial rhinectomy, sutures c/d/i  Nose: s/p septectomy, septum and dorsum recreated with cadaveric bone and cartilage, [place] splints in place intranasally, [place] splint in place externally  OC/OP: MMM, tongue midline, palatal defect recreated with radial forearm free flap, good triphasic signal observed over flap, sutures c/d/i  Neck:  Trachea midline. L neck dissection incision c/d/i, JPx1 in place holding suction with sanguinous output  Extr: L forearm wrapped in webril/cast/ACE, wound vac in place over STSG holding suction, FRANTZ x1 in place holding suction with sanguinous output, STSG donor site covered in tegaderm  Resp: MARISA on       10-19-20 @ 07:01  -  10-20-20 @ 07:00  --------------------------------------------------------  IN: 1275 mL / OUT: 570 mL / NET: 705 mL                              9.5    7.65  )-----------( 147      ( 20 Oct 2020 06:10 )             30.0    10-20    139  |  106  |  14  ----------------------------<  119<H>  3.7   |  24  |  0.74    Ca    8.3<L>      20 Oct 2020 06:10  Phos  2.4     10-20  Mg     2.1     10-20

## 2020-10-20 NOTE — PROGRESS NOTE ADULT - SUBJECTIVE AND OBJECTIVE BOX
HPI: ***    Subjective: ***    ROS: Negative unless otherwise stated above.   Unable to be obtained as patient is intubation/sedation.       PAST MEDICAL & SURGICAL HISTORY:  Arthralgia of bilateral temporomandibular joint    Tendinitis  left rotator cuff    BPH (benign prostatic hyperplasia)    Malignant neoplasm  nasal cavity    Bronchitis  chronic    Rheumatoid arthritis    Surgery, elective  left knee    History of prostate surgery  TURP    Elective surgery  Rt. foot Hallux Valaus repair-!0/2016    S/P rotator cuff repair  Rt. shoulder-2014      Allergies    No Known Allergies    Intolerances    Milk (Other)    MEDICATIONS  (STANDING):  aspirin 325 milliGRAM(s) Oral daily  ceFAZolin   IVPB 2000 milliGRAM(s) IV Intermittent every 8 hours  dextrose 5%. 1000 milliLiter(s) (50 mL/Hr) IV Continuous <Continuous>  dextrose 50% Injectable 25 Gram(s) IV Push once  dextrose 50% Injectable 25 Gram(s) IV Push once  dextrose 50% Injectable 12.5 Gram(s) IV Push once  heparin   Injectable 5000 Unit(s) SubCutaneous every 8 hours  insulin lispro (HumaLOG) corrective regimen sliding scale   SubCutaneous Before meals and at bedtime  lactated ringers. 1000 milliLiter(s) (100 mL/Hr) IV Continuous <Continuous>  metroNIDAZOLE  IVPB 500 milliGRAM(s) IV Intermittent every 8 hours  potassium phosphate IVPB 30 milliMole(s) IV Intermittent once    MEDICATIONS  (PRN):  dextrose 40% Gel 15 Gram(s) Oral once PRN Blood Glucose LESS THAN 70 milliGRAM(s)/deciliter  glucagon  Injectable 1 milliGRAM(s) IntraMuscular once PRN Glucose LESS THAN 70 milligrams/deciliter  HYDROmorphone  Injectable 0.5 milliGRAM(s) IV Push every 4 hours PRN Severe Pain (7 - 10)  HYDROmorphone  Injectable 0.25 milliGRAM(s) IV Push every 1 hour PRN Breakthrough      Physical Exam:   General: Well appearing male, sitting upright comfortably in bed in no acute distress  Neuro: Grossly intact bilaterally   HEENT: Handling secretions, no stridor, non-pitting edema in L cheek to neck, L nasal flap pink/well perfused, incision clean/dry/intact, L neck FRANTZ with serosanguinous drainage    Heart: Regular S1/S2, no murmurs rubs or gallops    Lungs: Unlabored breathing on room air; Clear to auscultation bilaterally in anterior lung fields, no adventitious sounds   Abdomen: Soft, non-distended, normoactive bowel sounds throughout, no tenderness to palpation in all 4 quadrants   Upper Extremities: L forearm with ace wrap dressing in place, no edema, hand warm/capillary refill <2 seconds; WVac with minimal serosang drainage, FRANTZ L forearm with serosang drainage   Lower Extremities: L thigh graft site with scant serosang drainage under tegaderm; No edema, 2+ DP pulse bilaterally, SCDs in place   Skin: Warm, non-diaphoretic        Labs:             9.5    7.65  )-----------( 147      ( 20 Oct 2020 06:10 )             30.0     10-20    139  |  106  |  14  ----------------------------<  119<H>  3.7   |  24  |  0.74    Ca    8.3<L>      20 Oct 2020 06:10  Phos  2.4     10-20  Mg     2.1     10-20    CAPILLARY BLOOD GLUCOSE  POCT Blood Glucose.: 122 mg/dL (20 Oct 2020 05:13)  POCT Blood Glucose.: 149 mg/dL (19 Oct 2020 15:53)  POCT Blood Glucose.: 63 mg/dL (19 Oct 2020 15:10)  POCT Blood Glucose.: 90 mg/dL (19 Oct 2020 09:07)      Vital Signs Last 24 Hrs  T(C): 36.8 (20 Oct 2020 05:08), Max: 36.8 (19 Oct 2020 21:30)  T(F): 98.3 (20 Oct 2020 05:08), Max: 98.3 (19 Oct 2020 21:30)  HR: 89 (20 Oct 2020 07:00) (78 - 92)  BP: 115/67 (20 Oct 2020 07:00) (98/55 - 118/66)  BP(mean): 84 (20 Oct 2020 07:00) (72 - 87)  RR: 18 (20 Oct 2020 07:00) (15 - 20)  SpO2: 95% (20 Oct 2020 07:00) (92% - 98%)      Input/Output:   19 Oct 2020 07:01  -  20 Oct 2020 07:00  --------------------------------------------------------  IN:    IV PiggyBack: 500 mL    Lactated Ringers: 775 mL  Total IN: 1275 mL    OUT:    Drain (mL): 20 mL    Drain (mL): 10 mL    Indwelling Catheter - Urethral (mL): 515 mL    VAC (Vacuum Assisted Closure) System (mL): 25 mL  Total OUT: 570 mL    Total NET: 705 mL   24 Hour Events: Transferred to SICU s/p partial rhinectomy/septectomy, L level I neck dissection, L radial forearm free flap, L thigh STSG, insertion of cadaveric bone/cartilage, ORIF L radius, for excision of nasal SCC. No acute events overnight, pain well controlled with current regimen, maintain MAPs >65, handling secretions.     PAST MEDICAL & SURGICAL HISTORY:  Arthralgia of bilateral temporomandibular joint  Tendinitis- left rotator cuff  BPH (benign prostatic hyperplasia)  Malignant neoplasm- nasal cavity  Bronchitis- chronic  Rheumatoid arthritis  Surgery, elective- left knee  History of prostate surgery- TURP  Elective surgery- Rt. foot Hallux Valaus repair-!0/2016  S/P rotator cuff repair- Rt. shoulder-2014    Allergies  No Known Allergies    Intolerances  Milk (Other)    MEDICATIONS  (STANDING):  aspirin 325 milliGRAM(s) Oral daily  ceFAZolin   IVPB 2000 milliGRAM(s) IV Intermittent every 8 hours  dextrose 5%. 1000 milliLiter(s) (50 mL/Hr) IV Continuous <Continuous>  dextrose 50% Injectable 25 Gram(s) IV Push once  dextrose 50% Injectable 25 Gram(s) IV Push once  dextrose 50% Injectable 12.5 Gram(s) IV Push once  heparin   Injectable 5000 Unit(s) SubCutaneous every 8 hours  insulin lispro (HumaLOG) corrective regimen sliding scale   SubCutaneous Before meals and at bedtime  lactated ringers. 1000 milliLiter(s) (100 mL/Hr) IV Continuous <Continuous>  metroNIDAZOLE  IVPB 500 milliGRAM(s) IV Intermittent every 8 hours  potassium phosphate IVPB 30 milliMole(s) IV Intermittent once    MEDICATIONS  (PRN):  dextrose 40% Gel 15 Gram(s) Oral once PRN Blood Glucose LESS THAN 70 milliGRAM(s)/deciliter  glucagon  Injectable 1 milliGRAM(s) IntraMuscular once PRN Glucose LESS THAN 70 milligrams/deciliter  HYDROmorphone  Injectable 0.5 milliGRAM(s) IV Push every 4 hours PRN Severe Pain (7 - 10)  HYDROmorphone  Injectable 0.25 milliGRAM(s) IV Push every 1 hour PRN Breakthrough      Physical Exam:   General: Well appearing male, sitting upright comfortably in bed in no acute distress  Neuro: Grossly intact bilaterally   HEENT: Handling secretions, no stridor, non-pitting edema in L cheek to neck, L nasal flap pink/well perfused, incision clean/dry/intact, L neck FRANTZ with serosanguinous drainage    Heart: Regular S1/S2, no murmurs rubs or gallops    Lungs: Unlabored breathing on room air; Clear to auscultation bilaterally in anterior lung fields, no adventitious sounds   Abdomen: Soft, non-distended, normoactive bowel sounds throughout, no tenderness to palpation in all 4 quadrants   Upper Extremities: L forearm with ace wrap dressing in place, no edema, hand warm/capillary refill <2 seconds; WVac with minimal serosang drainage, FRANTZ L forearm with serosang drainage   Lower Extremities: R thigh graft site with scant serosang drainage under tegaderm; No edema, 2+ DP pulse bilaterally, SCDs in place   Skin: Warm, non-diaphoretic        Labs:             9.5    7.65  )-----------( 147      ( 20 Oct 2020 06:10 )             30.0     10-20    139  |  106  |  14  ----------------------------<  119<H>  3.7   |  24  |  0.74    Ca    8.3<L>      20 Oct 2020 06:10  Phos  2.4     10-20  Mg     2.1     10-20    CAPILLARY BLOOD GLUCOSE  POCT Blood Glucose.: 122 mg/dL (20 Oct 2020 05:13)  POCT Blood Glucose.: 149 mg/dL (19 Oct 2020 15:53)  POCT Blood Glucose.: 63 mg/dL (19 Oct 2020 15:10)  POCT Blood Glucose.: 90 mg/dL (19 Oct 2020 09:07)      Vital Signs Last 24 Hrs  T(C): 36.8 (20 Oct 2020 05:08), Max: 36.8 (19 Oct 2020 21:30)  T(F): 98.3 (20 Oct 2020 05:08), Max: 98.3 (19 Oct 2020 21:30)  HR: 89 (20 Oct 2020 07:00) (78 - 92)  BP: 115/67 (20 Oct 2020 07:00) (98/55 - 118/66)  BP(mean): 84 (20 Oct 2020 07:00) (72 - 87)  RR: 18 (20 Oct 2020 07:00) (15 - 20)  SpO2: 95% (20 Oct 2020 07:00) (92% - 98%)      Input/Output:   19 Oct 2020 07:01  -  20 Oct 2020 07:00  --------------------------------------------------------  IN:    IV PiggyBack: 500 mL    Lactated Ringers: 775 mL  Total IN: 1275 mL    OUT:    Drain (mL): 20 mL    Drain (mL): 10 mL    Indwelling Catheter - Urethral (mL): 515 mL    VAC (Vacuum Assisted Closure) System (mL): 25 mL  Total OUT: 570 mL    Total NET: 705 mL

## 2020-10-21 LAB
ANION GAP SERPL CALC-SCNC: 16 MMOL/L — SIGNIFICANT CHANGE UP (ref 5–17)
BUN SERPL-MCNC: 12 MG/DL — SIGNIFICANT CHANGE UP (ref 7–23)
CALCIUM SERPL-MCNC: 8.9 MG/DL — SIGNIFICANT CHANGE UP (ref 8.4–10.5)
CHLORIDE SERPL-SCNC: 103 MMOL/L — SIGNIFICANT CHANGE UP (ref 96–108)
CO2 SERPL-SCNC: 19 MMOL/L — LOW (ref 22–31)
CREAT SERPL-MCNC: 0.67 MG/DL — SIGNIFICANT CHANGE UP (ref 0.5–1.3)
GLUCOSE SERPL-MCNC: 78 MG/DL — SIGNIFICANT CHANGE UP (ref 70–99)
HCT VFR BLD CALC: 30.9 % — LOW (ref 39–50)
HGB BLD-MCNC: 9.7 G/DL — LOW (ref 13–17)
MAGNESIUM SERPL-MCNC: 1.8 MG/DL — SIGNIFICANT CHANGE UP (ref 1.6–2.6)
MCHC RBC-ENTMCNC: 27.8 PG — SIGNIFICANT CHANGE UP (ref 27–34)
MCHC RBC-ENTMCNC: 31.4 GM/DL — LOW (ref 32–36)
MCV RBC AUTO: 88.5 FL — SIGNIFICANT CHANGE UP (ref 80–100)
NRBC # BLD: 0 /100 WBCS — SIGNIFICANT CHANGE UP (ref 0–0)
PHOSPHATE SERPL-MCNC: 2.2 MG/DL — LOW (ref 2.5–4.5)
PLATELET # BLD AUTO: 141 K/UL — LOW (ref 150–400)
POTASSIUM SERPL-MCNC: 4.1 MMOL/L — SIGNIFICANT CHANGE UP (ref 3.5–5.3)
POTASSIUM SERPL-SCNC: 4.1 MMOL/L — SIGNIFICANT CHANGE UP (ref 3.5–5.3)
RBC # BLD: 3.49 M/UL — LOW (ref 4.2–5.8)
RBC # FLD: 14.3 % — SIGNIFICANT CHANGE UP (ref 10.3–14.5)
SODIUM SERPL-SCNC: 138 MMOL/L — SIGNIFICANT CHANGE UP (ref 135–145)
WBC # BLD: 7.41 K/UL — SIGNIFICANT CHANGE UP (ref 3.8–10.5)
WBC # FLD AUTO: 7.41 K/UL — SIGNIFICANT CHANGE UP (ref 3.8–10.5)

## 2020-10-21 PROCEDURE — 99232 SBSQ HOSP IP/OBS MODERATE 35: CPT | Mod: GC

## 2020-10-21 PROCEDURE — 71045 X-RAY EXAM CHEST 1 VIEW: CPT | Mod: 26

## 2020-10-21 RX ORDER — CEFAZOLIN SODIUM 1 G
1000 VIAL (EA) INJECTION EVERY 8 HOURS
Refills: 0 | Status: DISCONTINUED | OUTPATIENT
Start: 2020-10-21 | End: 2020-10-21

## 2020-10-21 RX ORDER — SODIUM CHLORIDE 0.65 %
1 AEROSOL, SPRAY (ML) NASAL
Refills: 0 | Status: DISCONTINUED | OUTPATIENT
Start: 2020-10-21 | End: 2020-10-26

## 2020-10-21 RX ADMIN — FINASTERIDE 5 MILLIGRAM(S): 5 TABLET, FILM COATED ORAL at 11:42

## 2020-10-21 RX ADMIN — Medication 100 MILLIGRAM(S): at 06:15

## 2020-10-21 RX ADMIN — TAMSULOSIN HYDROCHLORIDE 0.4 MILLIGRAM(S): 0.4 CAPSULE ORAL at 21:41

## 2020-10-21 RX ADMIN — HEPARIN SODIUM 5000 UNIT(S): 5000 INJECTION INTRAVENOUS; SUBCUTANEOUS at 22:00

## 2020-10-21 RX ADMIN — Medication 100 MILLIGRAM(S): at 15:30

## 2020-10-21 RX ADMIN — HEPARIN SODIUM 5000 UNIT(S): 5000 INJECTION INTRAVENOUS; SUBCUTANEOUS at 06:15

## 2020-10-21 RX ADMIN — Medication 1 SPRAY(S): at 11:42

## 2020-10-21 RX ADMIN — Medication 100 MILLIGRAM(S): at 06:14

## 2020-10-21 RX ADMIN — Medication 1 SPRAY(S): at 20:37

## 2020-10-21 RX ADMIN — Medication 85 MILLIMOLE(S): at 11:42

## 2020-10-21 RX ADMIN — CHLORHEXIDINE GLUCONATE 15 MILLILITER(S): 213 SOLUTION TOPICAL at 20:36

## 2020-10-21 RX ADMIN — HEPARIN SODIUM 5000 UNIT(S): 5000 INJECTION INTRAVENOUS; SUBCUTANEOUS at 15:38

## 2020-10-21 RX ADMIN — Medication 1 SPRAY(S): at 23:59

## 2020-10-21 RX ADMIN — Medication 325 MILLIGRAM(S): at 11:42

## 2020-10-21 RX ADMIN — CHLORHEXIDINE GLUCONATE 15 MILLILITER(S): 213 SOLUTION TOPICAL at 06:15

## 2020-10-21 RX ADMIN — Medication 100 MILLIGRAM(S): at 15:38

## 2020-10-21 NOTE — DIETITIAN INITIAL EVALUATION ADULT. - OTHER INFO
70 y/o M with past medical history significant for hypertrophic cardiomyopathy, COPD, BPH and L nasal SCC, now s/p partial rhinectomy/septectomy, L level I neck dissection, L radial forearm free flap, STSG L thigh, insertion of cadaveric bone and cartilage and ORIF L radius on 10/19. Transferred to the SICU post-op for close hemodynamic monitoring and q1h flap checks.    Pt seen resting in chair this morning, no noted pain, N/V. Last BM 3 days ago per pt (pt endorses regular BM PTA), abdomen soft/nontender/nondistended. Conrad score 18. Pt on CLD, yet drink liquid he said, states he will try a little later this AM. Pt endorses good appetite PTA, intolerance to milk, denies food allergies. States he recently adjusted to a more vegetarian diet to intentionally lose weight. Of note, when placed on regular diet pt does not want to be limited to only vegetarian foods. Pt endorses UBW in July of 150lb, now reports weighing 132lb indicating ~18lb intentional weight loss x~3 months. Please see below for full nutritional recommendations- d/w team. RD to monitor and f/u per protocol.

## 2020-10-21 NOTE — PROGRESS NOTE ADULT - ASSESSMENT
69M w/ hypertrophic cardiomyopathy, COPD, BPH now s/p partial rhinectomy/septectomy, left level I neck dissection, left radial forearm free flap, STSG, insertion of cadaveric bone and cartilage, and ORIF L radius.    #Neuro  -pain control as per ICU  -HOB 30 deg  -Head neutral    #CV  -  -Q1H nursing flap checks w/ doppler, Q6H resident flap check    -MAP >65  -avoid pressors if possible    #Resp  -extubated to face mask    #GI  -CLD for 72 hours    #Endocrine  -FSG as per ICU    #ID  -ancef/flagyl for 48 hours    #Heme  -Keep Hgb >7, transfuse as needed    #Extr  -s/p ORIF l radius, f/u arm XR  -weight bearing per ortho    PPx  -SCDs,SQH  -OOBTC, ABAT

## 2020-10-21 NOTE — PROGRESS NOTE ADULT - ASSESSMENT
Assessment:   70 y/o M with past medical history significant for hypertrophic cardiomyopathy, COPD, BPH and L nasal SCC, now POD#1 partial rhinectomy/septectomy, L level I neck dissection, L radial forearm free flap, STSG L thigh, insertion of cadaveric bone and cartilage and ORIF L radius. He was transferred to the SICU post-op for close hemodynamic monitoring and q1h flap checks. He had no acute events overnight, continue to progress according to ENT pathway.       Plan:   Neuro: IV Dilaudid, Tylenol PRN   HEENT: Flap checks q1h; Continue ASA 325mg daily  CV: Remains hemodynamically stable; LR discontinued this AM; Avoid pressors to protect flap  Pulm: Maintaining oxygen saturation >94% on room air; Continue to monitor airway   GI: Tolerated clear liquid diet, continue for 48 hours   : Carlos discontinued yesterday- voiding independently; Strict I&O's; Continue home Flomax 0.4mg nightly, Proscar 5mg daily (conversion from home dose Dutasteride 0.5mg)  ID: Plan to discontinue Ancef (10/19-) and Flagyl (10/19- ) today for total 48 hours post-op prophylaxis   Endo: POCT glucose, SSI   Rheum: Continue to hold home dose Leflunomide 20mg daily  PPx: SCDs, SQ Heparin  Lines: PIVs  Wounds: L nasal incision, L neck FRANTZ, L forearm FRANTZ, L forearm ace wrap, R thigh STSG  PT/OT: PT ordered yesterday; Ambulated OOB to chair  Dispo: SICU    Assessment:   70 y/o M with past medical history significant for hypertrophic cardiomyopathy, COPD, BPH and L nasal SCC, now POD#2 partial rhinectomy/septectomy, L level I neck dissection, L radial forearm free flap, STSG L thigh, insertion of cadaveric bone and cartilage and ORIF L radius. He was transferred to the SICU post-op for close hemodynamic monitoring and q1h flap checks. He had no acute events overnight, continue to progress according to ENT pathway.       Plan:   Neuro: IV Dilaudid, Tylenol PRN   HEENT: Flap checks q1h; Continue ASA 325mg daily  CV: Remains hemodynamically stable; LR discontinued this AM; Avoid pressors to protect flap  Pulm: Maintaining oxygen saturation >94% on room air; Continue to monitor airway   GI: Tolerated clear liquid diet, continue for 48 hours   : Carlos discontinued yesterday- voiding independently; Strict I&O's; Continue home Flomax 0.4mg nightly, Proscar 5mg daily (conversion from home dose Dutasteride 0.5mg)  ID: Plan to discontinue Ancef (10/19-) and Flagyl (10/19- ) today for total 48 hours post-op prophylaxis   Endo: POCT glucose, SSI   Rheum: Continue to hold home dose Leflunomide 20mg daily  PPx: SCDs, SQ Heparin  Lines: PIVs  Wounds: L nasal incision, L neck FRANTZ, L forearm FRANTZ, L forearm ace wrap, R thigh STSG  PT/OT: PT ordered yesterday; Ambulated OOB to chair  Dispo: SICU    Assessment:   70 y/o M with past medical history significant for hypertrophic cardiomyopathy, COPD, BPH and L nasal SCC, now POD#2 partial rhinectomy/septectomy, L level I neck dissection, L radial forearm free flap, STSG L thigh, insertion of cadaveric bone and cartilage and ORIF L radius. He was transferred to the SICU post-op for close hemodynamic monitoring and q1h flap checks. He had no acute events overnight, continue to progress according to ENT pathway.       Plan:   Neuro: IV Dilaudid, Tylenol PRN; Consider starting Gabapentin for neuropathic pain, Melatonin nightly for insomnia   HEENT: Flap checks q1h; Continue ASA 325mg daily  CV: Remains hemodynamically stable; LR discontinued this AM; Avoid pressors to protect flap  Pulm: Maintaining oxygen saturation >94% on room air; Continue to monitor airway   GI: Tolerated clear liquid diet, continue for 48 hours   : Carlos discontinued yesterday- voiding independently; Strict I&O's; Continue home Flomax 0.4mg nightly, Proscar 5mg daily (conversion from home dose Dutasteride 0.5mg)  ID: Plan to discontinue Ancef (10/19-) and Flagyl (10/19- ) today for total 48 hours post-op prophylaxis   Endo: POCT glucose, SSI   Rheum: Continue to hold home dose Leflunomide 20mg daily  PPx: SCDs, SQ Heparin  Lines: PIVs  Wounds: L nasal incision, L neck FRANTZ, L forearm FRANTZ, L forearm ace wrap, R thigh STSG  PT/OT: PT ordered yesterday; Ambulated OOB to chair  Dispo: SICU

## 2020-10-21 NOTE — DIETITIAN INITIAL EVALUATION ADULT. - ADD RECOMMEND
1. As medically appropriate, recommend advance diet towards regular vs soft based on ability to chew 2. Monitor need to add CSTCHO dietary restriction pending BG levels as A1c elevated in pre-DM range

## 2020-10-21 NOTE — PROGRESS NOTE ADULT - ATTENDING COMMENTS
Pt seen and examined, discussed with nurse and patient at bedside  Patient conversant and cooperative  Splint intact LUE  SILT M/U/R exposed  + EPL, EIP, FPL  Comps soft and compressible  BCR WWP    Pt s/p radial osteocutaneous free flap from left forearm for maxillary reconstruction  Patient LUE stable from orthopedic perspective  Splint discontinue 1 week.   Begin WBAT LUE once soft tissue eval by ENT team from skin graft site  F/u in 6 weeks outpatient
Patient seen and examined with house-staff during bedside rounds  Resident note read, including vitals, physical findings, laboratory data, and radiological reports.   Revisions included below.  Case discussed with House staff  Direct personal management at bedside  and extensive interpretation of data. Decision making of high complexity.
Patient seen and examined with house-staff during bedside rounds  Resident note read, including vitals, physical findings, laboratory data, and radiological reports.   Revisions included below.  Case discussed with House staff  Direct personal management at bedside  and extensive interpretation of data. Decision making of high complexity.

## 2020-10-21 NOTE — DIETITIAN INITIAL EVALUATION ADULT. - OTHER CALCULATIONS
ABW (60.2kg) used to calculate energy needs due to pt's current body weight within % IBW (111%). Needs adjusted for age, post-op. Aim for higher end of kcal range.

## 2020-10-21 NOTE — DIETITIAN INITIAL EVALUATION ADULT. - PERSON TAUGHT/METHOD
verbal instruction/patient instructed/encouraged adequate PO intake with emphasis on lean protein for healing post-op as diet advances- pt appeared receptive

## 2020-10-21 NOTE — PROGRESS NOTE ADULT - SUBJECTIVE AND OBJECTIVE BOX
ENT Progress Note    HPI:   69M w/ hypertrophic cardiomyopathy, COPD, BPH now s/p partial rhinectomy/septectomy, left level I neck dissection, left radial forearm free flap, STSG, insertion of cadaveric bone and cartilage, and ORIF L radius.    Interval:  10/20 (POD1): NAEON, AFVSS with MAPs in appropriate range. Flap warm, well-perfused, pink. Strong intraoral doppler signal and external neck signal.   10/21 (POD2): NAEON, some tachycardia yesterday/ovn. A-line/alford DC'ed. OOBTC. Flap warm, well-perfused, pink. Strong intraoral doppler signal. Arm motor/sensation intact.      PAST MEDICAL & SURGICAL HISTORY:  Arthralgia of bilateral temporomandibular joint    Tendinitis  left rotator cuff    BPH (benign prostatic hyperplasia)    Malignant neoplasm  nasal cavity    Bronchitis  chronic    Rheumatoid arthritis    Surgery, elective  left knee    History of prostate surgery  TURP    Elective surgery  Rt. foot Hallux Valaus repair-!0/2016    S/P rotator cuff repair  Rt. shoulder-2014      Allergies    No Known Allergies    Intolerances    Milk (Other)    MEDICATIONS  (STANDING):  ceFAZolin   IVPB 2000 milliGRAM(s) IV Intermittent every 8 hours  dextrose 5%. 1000 milliLiter(s) (50 mL/Hr) IV Continuous <Continuous>  dextrose 50% Injectable 25 Gram(s) IV Push once  dextrose 50% Injectable 25 Gram(s) IV Push once  dextrose 50% Injectable 12.5 Gram(s) IV Push once  insulin lispro (HumaLOG) corrective regimen sliding scale   SubCutaneous Before meals and at bedtime  lactated ringers. 1000 milliLiter(s) (100 mL/Hr) IV Continuous <Continuous>  metroNIDAZOLE  IVPB 500 milliGRAM(s) IV Intermittent every 8 hours  potassium phosphate IVPB 30 milliMole(s) IV Intermittent once    MEDICATIONS  (PRN):  dextrose 40% Gel 15 Gram(s) Oral once PRN Blood Glucose LESS THAN 70 milliGRAM(s)/deciliter  glucagon  Injectable 1 milliGRAM(s) IntraMuscular once PRN Glucose LESS THAN 70 milligrams/deciliter  HYDROmorphone  Injectable 0.5 milliGRAM(s) IV Push every 4 hours PRN Severe Pain (7 - 10)  HYDROmorphone  Injectable 0.25 milliGRAM(s) IV Push every 1 hour PRN Breakthrough        Vital Signs Last 24 Hrs  T(C): 36.2 (21 Oct 2020 05:38), Max: 37 (20 Oct 2020 21:35)  T(F): 97.2 (21 Oct 2020 05:38), Max: 98.6 (20 Oct 2020 21:35)  HR: 99 (21 Oct 2020 08:00) (82 - 114)  BP: 116/66 (21 Oct 2020 08:00) (93/53 - 128/71)  BP(mean): 86 (21 Oct 2020 08:00) (66 - 96)  RR: 18 (21 Oct 2020 08:00) (16 - 20)  SpO2: 97% (21 Oct 2020 08:00) (94% - 98%)    Physical Exam:  Constitutional: Well-developed, well-nourished.    Head:  normocephalic, atraumatic.   Face: s/p partial rhinectomy, sutures c/d/i  Nose: s/p septectomy, septum and dorsum recreated with cadaveric bone and cartilage, montenegro splints in place intranasally, [place] splint in place externally  OC/OP: MMM, tongue midline, palatal defect recreated with radial forearm free flap, good triphasic signal observed over flap, sutures c/d/i  Neck:  Trachea midline. L neck dissection incision c/d/i, JPx1 in place holding suction with sanguinous output  Extr: L forearm wrapped in webril/cast/ACE, wound vac in place over STSG holding suction, FRANTZ x1 in place holding suction with sanguinous output, STSG donor site covered in tegaderm  Resp: NLB on RA                          9.7    7.41  )-----------( 141      ( 21 Oct 2020 06:44 )             30.9                     10-21    138  |  103  |  12  ----------------------------<  78  4.1   |  19<L>  |  0.67    Ca    8.9      21 Oct 2020 06:44  Phos  2.2     10-21  Mg     1.8     10-21

## 2020-10-21 NOTE — PROGRESS NOTE ADULT - NUTRITIONAL ASSESSMENT
Diet, Clear Liquid (10-20-20 @ 17:27) [Active]
Diet, NPO:   Except Medications (10-20-20 @ 08:10) [Active]

## 2020-10-21 NOTE — PROGRESS NOTE ADULT - SUBJECTIVE AND OBJECTIVE BOX
24 Hour Events: ***      PAST MEDICAL & SURGICAL HISTORY:  Arthralgia of bilateral temporomandibular joint  Tendinitis- left rotator cuff  BPH (benign prostatic hyperplasia)  Malignant neoplasm- nasal cavity  Bronchitis- chronic  Rheumatoid arthritis  Surgery, elective- left knee  History of prostate surgery- TURP  Elective surgery- Rt. foot Hallux Valaus repair-!0/2016  S/P rotator cuff repair- Rt. shoulder-2014      Allergies  No Known Allergies    Intolerances  Milk (Other)    MEDICATIONS  (STANDING):  aspirin 325 milliGRAM(s) Oral daily  ceFAZolin   IVPB 2000 milliGRAM(s) IV Intermittent every 8 hours  chlorhexidine 0.12% Liquid 15 milliLiter(s) Oral Mucosa two times a day  dextrose 5%. 1000 milliLiter(s) (50 mL/Hr) IV Continuous <Continuous>  dextrose 50% Injectable 12.5 Gram(s) IV Push once  dextrose 50% Injectable 25 Gram(s) IV Push once  dextrose 50% Injectable 25 Gram(s) IV Push once  finasteride 5 milliGRAM(s) Oral daily  heparin   Injectable 5000 Unit(s) SubCutaneous every 8 hours  insulin lispro (HumaLOG) corrective regimen sliding scale   SubCutaneous Before meals and at bedtime  lactated ringers. 1000 milliLiter(s) (50 mL/Hr) IV Continuous <Continuous>  metroNIDAZOLE  IVPB 500 milliGRAM(s) IV Intermittent every 8 hours  tamsulosin 0.4 milliGRAM(s) Oral at bedtime    MEDICATIONS  (PRN):  acetaminophen    Suspension .. 975 milliGRAM(s) Oral every 6 hours PRN Temp greater or equal to 38.5C (101.3F), Mild Pain (1 - 3)  dextrose 40% Gel 15 Gram(s) Oral once PRN Blood Glucose LESS THAN 70 milliGRAM(s)/deciliter  glucagon  Injectable 1 milliGRAM(s) IntraMuscular once PRN Glucose LESS THAN 70 milligrams/deciliter  HYDROmorphone  Injectable 0.5 milliGRAM(s) IV Push every 4 hours PRN Severe Pain (7 - 10)  HYDROmorphone  Injectable 0.25 milliGRAM(s) IV Push every 1 hour PRN Breakthrough      Physical Exam:   General: Well appearing male, sitting upright comfortably in bed in no acute distress  Neuro: Grossly intact bilaterally   HEENT: Handling secretions, no stridor, non-pitting edema in L cheek to neck, L nasal flap pink/well perfused, incision clean/dry/intact, L neck FRANTZ with serosanguinous drainage    Heart: Regular S1/S2, no murmurs rubs or gallops    Lungs: Unlabored breathing on room air; Clear to auscultation bilaterally in anterior lung fields, no adventitious sounds   Abdomen: Soft, non-distended, normoactive bowel sounds throughout, no tenderness to palpation in all 4 quadrants   Upper Extremities: L forearm with ace wrap dressing in place, no edema, hand warm/capillary refill <2 seconds; WVac with minimal serosang drainage, FRANTZ L forearm with serosang drainage   Lower Extremities: R thigh graft site with scant serosang drainage under tegaderm; No edema, 2+ DP pulse bilaterally, SCDs in place   Skin: Warm, non-diaphoretic          Labs:                9.5    7.65  )-----------( 147      ( 20 Oct 2020 06:10 )             30.0     10-20    139  |  106  |  14  ----------------------------<  119<H>  3.7   |  24  |  0.74    Ca    8.3<L>      20 Oct 2020 06:10  Phos  2.4     10-20  Mg     2.1     10-20      CAPILLARY BLOOD GLUCOSE  POCT Blood Glucose.: 88 mg/dL (20 Oct 2020 22:03)  POCT Blood Glucose.: 100 mg/dL (20 Oct 2020 17:13)  POCT Blood Glucose.: 108 mg/dL (20 Oct 2020 12:03)      Vital Signs Last 24 Hrs  T(C): 36.2 (21 Oct 2020 05:38), Max: 37 (20 Oct 2020 21:35)  T(F): 97.2 (21 Oct 2020 05:38), Max: 98.6 (20 Oct 2020 21:35)  HR: 114 (21 Oct 2020 06:00) (82 - 114)  BP: 128/71 (21 Oct 2020 06:00) (93/53 - 128/71)  BP(mean): 92 (21 Oct 2020 06:00) (66 - 96)  RR: 18 (21 Oct 2020 06:00) (16 - 20)  SpO2: 98% (21 Oct 2020 06:00) (94% - 98%)      Input/Output:   19 Oct 2020 07:01  -  20 Oct 2020 07:00  --------------------------------------------------------  IN:    IV PiggyBack: 500 mL    Lactated Ringers: 775 mL  Total IN: 1275 mL    OUT:    Drain (mL): 20 mL    Drain (mL): 10 mL    Indwelling Catheter - Urethral (mL): 515 mL    VAC (Vacuum Assisted Closure) System (mL): 25 mL  Total OUT: 570 mL    Total NET: 705 mL      20 Oct 2020 07:01  -  21 Oct 2020 06:28  --------------------------------------------------------  IN:    IV PiggyBack: 799.8 mL    Lactated Ringers: 1050 mL  Total IN: 1849.8 mL    OUT:    Drain (mL): 25 mL    Drain (mL): 35 mL    Indwelling Catheter - Urethral (mL): 410 mL    VAC (Vacuum Assisted Closure) System (mL): 25 mL    Voided (mL): 1530 mL  Total OUT: 2025 mL    Total NET: -175.2 mL   24 Hour Events: No acute events overnight. Started clear liquid diet yesterday, tolerated well without nausea/vomiting. Azul and alford discontinued. Voided independently, restarted home BPH medications. Ambulated out of bed to chair, slept in chair all night. Reporting numbness since surgery in L cheek, unchanged and does not radiate. Handling secretions and protecting airway.       PAST MEDICAL & SURGICAL HISTORY:  Arthralgia of bilateral temporomandibular joint  Tendinitis- left rotator cuff  BPH (benign prostatic hyperplasia)  Malignant neoplasm- nasal cavity  Bronchitis- chronic  Rheumatoid arthritis  Surgery, elective- left knee  History of prostate surgery- TURP  Elective surgery- Rt. foot Hallux Valaus repair-!0/2016  S/P rotator cuff repair- Rt. shoulder-2014      Allergies  No Known Allergies    Intolerances  Milk (Other)    MEDICATIONS  (STANDING):  aspirin 325 milliGRAM(s) Oral daily  ceFAZolin   IVPB 2000 milliGRAM(s) IV Intermittent every 8 hours  chlorhexidine 0.12% Liquid 15 milliLiter(s) Oral Mucosa two times a day  dextrose 5%. 1000 milliLiter(s) (50 mL/Hr) IV Continuous <Continuous>  dextrose 50% Injectable 12.5 Gram(s) IV Push once  dextrose 50% Injectable 25 Gram(s) IV Push once  dextrose 50% Injectable 25 Gram(s) IV Push once  finasteride 5 milliGRAM(s) Oral daily  heparin   Injectable 5000 Unit(s) SubCutaneous every 8 hours  insulin lispro (HumaLOG) corrective regimen sliding scale   SubCutaneous Before meals and at bedtime  lactated ringers. 1000 milliLiter(s) (50 mL/Hr) IV Continuous <Continuous>  metroNIDAZOLE  IVPB 500 milliGRAM(s) IV Intermittent every 8 hours  tamsulosin 0.4 milliGRAM(s) Oral at bedtime    MEDICATIONS  (PRN):  acetaminophen    Suspension .. 975 milliGRAM(s) Oral every 6 hours PRN Temp greater or equal to 38.5C (101.3F), Mild Pain (1 - 3)  dextrose 40% Gel 15 Gram(s) Oral once PRN Blood Glucose LESS THAN 70 milliGRAM(s)/deciliter  glucagon  Injectable 1 milliGRAM(s) IntraMuscular once PRN Glucose LESS THAN 70 milligrams/deciliter  HYDROmorphone  Injectable 0.5 milliGRAM(s) IV Push every 4 hours PRN Severe Pain (7 - 10)  HYDROmorphone  Injectable 0.25 milliGRAM(s) IV Push every 1 hour PRN Breakthrough      Physical Exam:   General: Well appearing male, sitting upright comfortably in bed in no acute distress  Neuro: Grossly intact bilaterally   HEENT: Handling secretions, no stridor, non-pitting edema in L cheek to neck, L nasal flap pink/well perfused, incision clean/dry/intact, L neck FRANTZ with serosanguinous drainage    Heart: Regular S1/S2, no murmurs rubs or gallops    Lungs: Unlabored breathing on room air; Clear to auscultation bilaterally in anterior lung fields, no adventitious sounds   Abdomen: Soft, non-distended, normoactive bowel sounds throughout, no tenderness to palpation in all 4 quadrants   Upper Extremities: L forearm with ace wrap dressing in place, no edema, hand warm/capillary refill <2 seconds; WVac with minimal serosang drainage, FRANTZ L forearm with serosang drainage   Lower Extremities: R thigh graft site with scant serosang drainage under tegaderm; No edema, 2+ DP pulse bilaterally, SCDs in place   Skin: Warm, non-diaphoretic          Labs:                9.5    7.65  )-----------( 147      ( 20 Oct 2020 06:10 )             30.0     10-20    139  |  106  |  14  ----------------------------<  119<H>  3.7   |  24  |  0.74    Ca    8.3<L>      20 Oct 2020 06:10  Phos  2.4     10-20  Mg     2.1     10-20      CAPILLARY BLOOD GLUCOSE  POCT Blood Glucose.: 88 mg/dL (20 Oct 2020 22:03)  POCT Blood Glucose.: 100 mg/dL (20 Oct 2020 17:13)  POCT Blood Glucose.: 108 mg/dL (20 Oct 2020 12:03)      Vital Signs Last 24 Hrs  T(C): 36.2 (21 Oct 2020 05:38), Max: 37 (20 Oct 2020 21:35)  T(F): 97.2 (21 Oct 2020 05:38), Max: 98.6 (20 Oct 2020 21:35)  HR: 114 (21 Oct 2020 06:00) (82 - 114)  BP: 128/71 (21 Oct 2020 06:00) (93/53 - 128/71)  BP(mean): 92 (21 Oct 2020 06:00) (66 - 96)  RR: 18 (21 Oct 2020 06:00) (16 - 20)  SpO2: 98% (21 Oct 2020 06:00) (94% - 98%)      Input/Output:   19 Oct 2020 07:01  -  20 Oct 2020 07:00  --------------------------------------------------------  IN:    IV PiggyBack: 500 mL    Lactated Ringers: 775 mL  Total IN: 1275 mL    OUT:    Drain (mL): 20 mL    Drain (mL): 10 mL    Indwelling Catheter - Urethral (mL): 515 mL    VAC (Vacuum Assisted Closure) System (mL): 25 mL  Total OUT: 570 mL    Total NET: 705 mL      20 Oct 2020 07:01  -  21 Oct 2020 06:28  --------------------------------------------------------  IN:    IV PiggyBack: 799.8 mL    Lactated Ringers: 1050 mL  Total IN: 1849.8 mL    OUT:    Drain (mL): 25 mL    Drain (mL): 35 mL    Indwelling Catheter - Urethral (mL): 410 mL    VAC (Vacuum Assisted Closure) System (mL): 25 mL    Voided (mL): 1530 mL  Total OUT: 2025 mL    Total NET: -175.2 mL

## 2020-10-22 LAB
ANION GAP SERPL CALC-SCNC: 12 MMOL/L — SIGNIFICANT CHANGE UP (ref 5–17)
BUN SERPL-MCNC: 11 MG/DL — SIGNIFICANT CHANGE UP (ref 7–23)
CALCIUM SERPL-MCNC: 9.3 MG/DL — SIGNIFICANT CHANGE UP (ref 8.4–10.5)
CHLORIDE SERPL-SCNC: 104 MMOL/L — SIGNIFICANT CHANGE UP (ref 96–108)
CO2 SERPL-SCNC: 23 MMOL/L — SIGNIFICANT CHANGE UP (ref 22–31)
CREAT SERPL-MCNC: 0.63 MG/DL — SIGNIFICANT CHANGE UP (ref 0.5–1.3)
GLUCOSE SERPL-MCNC: 106 MG/DL — HIGH (ref 70–99)
HCT VFR BLD CALC: 31.2 % — LOW (ref 39–50)
HGB BLD-MCNC: 9.8 G/DL — LOW (ref 13–17)
MAGNESIUM SERPL-MCNC: 1.8 MG/DL — SIGNIFICANT CHANGE UP (ref 1.6–2.6)
MCHC RBC-ENTMCNC: 27.8 PG — SIGNIFICANT CHANGE UP (ref 27–34)
MCHC RBC-ENTMCNC: 31.4 GM/DL — LOW (ref 32–36)
MCV RBC AUTO: 88.4 FL — SIGNIFICANT CHANGE UP (ref 80–100)
NRBC # BLD: 0 /100 WBCS — SIGNIFICANT CHANGE UP (ref 0–0)
PHOSPHATE SERPL-MCNC: 2.4 MG/DL — LOW (ref 2.5–4.5)
PLATELET # BLD AUTO: 153 K/UL — SIGNIFICANT CHANGE UP (ref 150–400)
POTASSIUM SERPL-MCNC: 3.8 MMOL/L — SIGNIFICANT CHANGE UP (ref 3.5–5.3)
POTASSIUM SERPL-SCNC: 3.8 MMOL/L — SIGNIFICANT CHANGE UP (ref 3.5–5.3)
RBC # BLD: 3.53 M/UL — LOW (ref 4.2–5.8)
RBC # FLD: 14.1 % — SIGNIFICANT CHANGE UP (ref 10.3–14.5)
SODIUM SERPL-SCNC: 139 MMOL/L — SIGNIFICANT CHANGE UP (ref 135–145)
WBC # BLD: 7.51 K/UL — SIGNIFICANT CHANGE UP (ref 3.8–10.5)
WBC # FLD AUTO: 7.51 K/UL — SIGNIFICANT CHANGE UP (ref 3.8–10.5)

## 2020-10-22 PROCEDURE — 84100 ASSAY OF PHOSPHORUS: CPT

## 2020-10-22 PROCEDURE — 85025 COMPLETE CBC W/AUTO DIFF WBC: CPT

## 2020-10-22 PROCEDURE — 80048 BASIC METABOLIC PNL TOTAL CA: CPT

## 2020-10-22 PROCEDURE — 86850 RBC ANTIBODY SCREEN: CPT

## 2020-10-22 PROCEDURE — 85027 COMPLETE CBC AUTOMATED: CPT

## 2020-10-22 PROCEDURE — 88305 TISSUE EXAM BY PATHOLOGIST: CPT

## 2020-10-22 PROCEDURE — 99232 SBSQ HOSP IP/OBS MODERATE 35: CPT | Mod: GC

## 2020-10-22 PROCEDURE — 87086 URINE CULTURE/COLONY COUNT: CPT

## 2020-10-22 PROCEDURE — 86900 BLOOD TYPING SEROLOGIC ABO: CPT

## 2020-10-22 PROCEDURE — 36415 COLL VENOUS BLD VENIPUNCTURE: CPT

## 2020-10-22 PROCEDURE — 83735 ASSAY OF MAGNESIUM: CPT

## 2020-10-22 PROCEDURE — 86901 BLOOD TYPING SEROLOGIC RH(D): CPT

## 2020-10-22 RX ORDER — SODIUM,POTASSIUM PHOSPHATES 278-250MG
3 POWDER IN PACKET (EA) ORAL ONCE
Refills: 0 | Status: COMPLETED | OUTPATIENT
Start: 2020-10-22 | End: 2020-10-22

## 2020-10-22 RX ORDER — OXYMETAZOLINE HYDROCHLORIDE 0.5 MG/ML
1 SPRAY NASAL
Refills: 0 | Status: DISCONTINUED | OUTPATIENT
Start: 2020-10-22 | End: 2020-10-26

## 2020-10-22 RX ORDER — MAGNESIUM SULFATE 500 MG/ML
2 VIAL (ML) INJECTION ONCE
Refills: 0 | Status: COMPLETED | OUTPATIENT
Start: 2020-10-22 | End: 2020-10-22

## 2020-10-22 RX ORDER — POTASSIUM CHLORIDE 20 MEQ
20 PACKET (EA) ORAL ONCE
Refills: 0 | Status: COMPLETED | OUTPATIENT
Start: 2020-10-22 | End: 2020-10-22

## 2020-10-22 RX ADMIN — HEPARIN SODIUM 5000 UNIT(S): 5000 INJECTION INTRAVENOUS; SUBCUTANEOUS at 07:00

## 2020-10-22 RX ADMIN — Medication 325 MILLIGRAM(S): at 11:56

## 2020-10-22 RX ADMIN — CHLORHEXIDINE GLUCONATE 15 MILLILITER(S): 213 SOLUTION TOPICAL at 05:02

## 2020-10-22 RX ADMIN — Medication 1 SPRAY(S): at 11:57

## 2020-10-22 RX ADMIN — HEPARIN SODIUM 5000 UNIT(S): 5000 INJECTION INTRAVENOUS; SUBCUTANEOUS at 21:25

## 2020-10-22 RX ADMIN — HEPARIN SODIUM 5000 UNIT(S): 5000 INJECTION INTRAVENOUS; SUBCUTANEOUS at 14:23

## 2020-10-22 RX ADMIN — TAMSULOSIN HYDROCHLORIDE 0.4 MILLIGRAM(S): 0.4 CAPSULE ORAL at 21:25

## 2020-10-22 RX ADMIN — Medication 3 PACKET(S): at 10:23

## 2020-10-22 RX ADMIN — CHLORHEXIDINE GLUCONATE 15 MILLILITER(S): 213 SOLUTION TOPICAL at 17:23

## 2020-10-22 RX ADMIN — FINASTERIDE 5 MILLIGRAM(S): 5 TABLET, FILM COATED ORAL at 11:56

## 2020-10-22 RX ADMIN — Medication 20 MILLIEQUIVALENT(S): at 10:56

## 2020-10-22 RX ADMIN — Medication 1 SPRAY(S): at 17:23

## 2020-10-22 RX ADMIN — Medication 50 GRAM(S): at 07:15

## 2020-10-22 RX ADMIN — OXYMETAZOLINE HYDROCHLORIDE 1 SPRAY(S): 0.5 SPRAY NASAL at 08:00

## 2020-10-22 NOTE — PROGRESS NOTE ADULT - SUBJECTIVE AND OBJECTIVE BOX
ENT Progress Note    HPI:   69M w/ hypertrophic cardiomyopathy, COPD, BPH now s/p partial rhinectomy/septectomy, left level I neck dissection, left radial forearm free flap, STSG, insertion of cadaveric bone and cartilage, and ORIF L radius.    Interval:  10/20 (POD1): NAEON, AFVSS with MAPs in appropriate range. Flap warm, well-perfused, pink. Strong intraoral doppler signal and external neck signal.   10/21 (POD2): NAEON, some tachycardia yesterday/ovn. A-line/alford DC'ed. OOBTC. Flap warm, well-perfused, pink. Strong intraoral doppler signal. Arm motor/sensation intact.  10/22 (POD3): Called for L sided nose bleeding overnight. Seen and examined this AM with slow trickle from L nose, likely from septum. Afrin ordered and dressing placed. Flap appears healthy, warm, well-perfused. Strong arterial signal    PAST MEDICAL & SURGICAL HISTORY:  Arthralgia of bilateral temporomandibular joint    Tendinitis  left rotator cuff    BPH (benign prostatic hyperplasia)    Malignant neoplasm  nasal cavity    Bronchitis  chronic    Rheumatoid arthritis    Surgery, elective  left knee    History of prostate surgery  TURP    Elective surgery  Rt. foot Hallux Valaus repair-!0/2016    S/P rotator cuff repair  Rt. shoulder-2014      Allergies    No Known Allergies    Intolerances    Milk (Other)    MEDICATIONS  (STANDING):  ceFAZolin   IVPB 2000 milliGRAM(s) IV Intermittent every 8 hours  dextrose 5%. 1000 milliLiter(s) (50 mL/Hr) IV Continuous <Continuous>  dextrose 50% Injectable 25 Gram(s) IV Push once  dextrose 50% Injectable 25 Gram(s) IV Push once  dextrose 50% Injectable 12.5 Gram(s) IV Push once  insulin lispro (HumaLOG) corrective regimen sliding scale   SubCutaneous Before meals and at bedtime  lactated ringers. 1000 milliLiter(s) (100 mL/Hr) IV Continuous <Continuous>  metroNIDAZOLE  IVPB 500 milliGRAM(s) IV Intermittent every 8 hours  potassium phosphate IVPB 30 milliMole(s) IV Intermittent once    MEDICATIONS  (PRN):  dextrose 40% Gel 15 Gram(s) Oral once PRN Blood Glucose LESS THAN 70 milliGRAM(s)/deciliter  glucagon  Injectable 1 milliGRAM(s) IntraMuscular once PRN Glucose LESS THAN 70 milligrams/deciliter  HYDROmorphone  Injectable 0.5 milliGRAM(s) IV Push every 4 hours PRN Severe Pain (7 - 10)  HYDROmorphone  Injectable 0.25 milliGRAM(s) IV Push every 1 hour PRN Breakthrough        Vital Signs Last 24 Hrs  T(C): 36.6 (22 Oct 2020 05:03), Max: 36.9 (21 Oct 2020 21:35)  T(F): 97.8 (22 Oct 2020 05:03), Max: 98.4 (21 Oct 2020 21:35)  HR: 91 (22 Oct 2020 07:00) (80 - 97)  BP: 122/70 (22 Oct 2020 07:00) (110/70 - 139/58)  BP(mean): 88 (22 Oct 2020 07:00) (84 - 104)  RR: 22 (22 Oct 2020 07:00) (16 - 22)  SpO2: 96% (22 Oct 2020 07:00) (95% - 97%)    Physical Exam:  Constitutional: Well-developed, well-nourished.    Head:  normocephalic, atraumatic.   Face: s/p partial rhinectomy, sutures c/d/i  Nose: s/p septectomy, septum and dorsum recreated with cadaveric bone and cartilage, [place]en[place]ro splints in place intranasally, [place] splint in place externally  OC/OP: MMM, tongue midline, palatal defect recreated with radial forearm free flap, good triphasic signal observed over flap, sutures c/d/i  Neck:  Trachea midline. L neck dissection incision c/d/i, JPx1 in place holding suction with sanguinous output  Extr: L forearm wrapped in webril/cast/ACE, wound vac in place over STSG holding suction, FRANTZ x1 in place holding suction with sanguinous output, STSG donor site covered in tegaderm  Resp: NLB on RA                          9.8    7.51  )-----------( 153      ( 22 Oct 2020 04:50 )             31.2     10-22    139  |  104  |  11  ----------------------------<  106<H>  3.8   |  23  |  0.63    Ca    9.3      22 Oct 2020 04:50  Phos  2.4     10-22  Mg     1.8     10-22

## 2020-10-22 NOTE — PROGRESS NOTE ADULT - SUBJECTIVE AND OBJECTIVE BOX
S: Pt reports having blood dripping from his left nare all night. His pain is well controlled, no HA, CP, SOB.     Vitals: ICU Vital Signs Last 24 Hrs  T(C): 36.6 (22 Oct 2020 05:03), Max: 36.9 (21 Oct 2020 21:35)  T(F): 97.8 (22 Oct 2020 05:03), Max: 98.4 (21 Oct 2020 21:35)  HR: 92 (22 Oct 2020 06:00) (80 - 99)  BP: 133/86 (22 Oct 2020 06:00) (110/70 - 139/58)  BP(mean): 103 (22 Oct 2020 06:00) (84 - 104)  ABP: --  ABP(mean): --  RR: 19 (22 Oct 2020 06:00) (16 - 20)  SpO2: 96% (22 Oct 2020 06:00) (95% - 97%)            I&O:  I&O's Detail    20 Oct 2020 07:01  -  21 Oct 2020 07:00  --------------------------------------------------------  IN:    IV PiggyBack: 949.8 mL    Lactated Ringers: 1100 mL  Total IN: 2049.8 mL    OUT:    Drain (mL): 25 mL    Drain (mL): 35 mL    Indwelling Catheter - Urethral (mL): 410 mL    VAC (Vacuum Assisted Closure) System (mL): 25 mL    Voided (mL): 1530 mL  Total OUT: 2025 mL    Total NET: 24.8 mL      21 Oct 2020 07:01  -  22 Oct 2020 06:48  --------------------------------------------------------  IN:    IV PiggyBack: 650 mL  Total IN: 650 mL    OUT:    Drain (mL): 35 mL    Drain (mL): 30 mL    VAC (Vacuum Assisted Closure) System (mL): 30 mL    Voided (mL): 1950 mL  Total OUT: 2045 mL    Total NET: -1395 mL            CAPILLARY BLOOD GLUCOSE      POCT Blood Glucose.: 107 mg/dL (22 Oct 2020 05:27)  POCT Blood Glucose.: 119 mg/dL (21 Oct 2020 21:10)  POCT Blood Glucose.: 144 mg/dL (21 Oct 2020 16:59)  POCT Blood Glucose.: 88 mg/dL (21 Oct 2020 11:22)      Labs:                         9.8    7.51  )-----------( 153      ( 22 Oct 2020 04:50 )             31.2   10-22    139  |  104  |  11  ----------------------------<  106<H>  3.8   |  23  |  0.63    Ca    9.3      22 Oct 2020 04:50  Phos  2.4     10-22  Mg     1.8     10-22          Electrolytes repleated to goals as follows: Potassium 4, Phosphorus 3 and Magnesium 2 where appropriate and necessary    Exam:  Neuro: A&Ox3, NAD, grossly neuro intact  HEENT: PERRL < EOMI, MMM, upper lip incision with sutures c/d/i. Bright red blood dripping from the left nare.   Neck: Supple, L sided drain with ss drainage  CV: RRR, no MRGs  Pulm: CTAB, no wheezes, rales, or rhonchi  Abd: BS (+), Soft, NT, ND  : Carlos in place  Ext: No edema peripherally or centrally, LUE with splint and ace wrap intact  Vasc: Extremities warm to palpation, 2+ pulses - radial and PT, L fingers warm with good capillary refill  MSK: no joint swelling  Skin: no rash  Psych: affect appropriate

## 2020-10-22 NOTE — PROGRESS NOTE ADULT - ASSESSMENT
69M w/ hypertrophic cardiomyopathy, COPD, BPH now s/p partial rhinectomy/septectomy, left level I neck dissection, left radial forearm free flap, STSG, insertion of cadaveric bone and cartilage, and ORIF L radius.    #Neuro  -pain control as per ICU  -HOB 30 deg  -Head neutral    #CV  -  -Q1H nursing flap checks w/ doppler, Q6H resident flap check    -MAP >65  -avoid pressors if possible    #Resp  -extubated to face mask    #GI  -CLD for 72 hours    #Endocrine  -FSG as per ICU    #ID  -completed ancef/flagyl    #Heme  -Keep Hgb >7, transfuse as needed    #Extr  -s/p ORIF l radius, f/u arm XR  -weight bearing per ortho    PPx  -SCDs,SQH  -OOBTC, ABAT

## 2020-10-22 NOTE — PROGRESS NOTE ADULT - ASSESSMENT
69M w/ hypertrophic cardiomyopathy, COPD, BPH now s/p partial rhinectomy/septectomy, left level I neck dissection, left radial forearm free flap, STSG, insertion of cadaveric bone and cartilage, and ORIF L radius.    NEURO: Dilaudid PRN, Tylenol PRN  CV: stable,    PULM: RA  GI/FEN: CLD  : Voids; Dutasteride, Flomax  ENDO: ISS  ID: Post-op ppx // Ancef (10/19-10/21) Flagyl (10/19-10/21)   PPX: SCDs, SQH  LINES: PIVs   WOUNDS/DRAINS: PIVs, JPx2 (L neck, L forearm), STSG right thigh, wound vac left arm  PT/OT: PT (10/20); WBAT LUE, dc splint x1 week (end 10/26)

## 2020-10-23 LAB
ANION GAP SERPL CALC-SCNC: 11 MMOL/L — SIGNIFICANT CHANGE UP (ref 5–17)
BUN SERPL-MCNC: 14 MG/DL — SIGNIFICANT CHANGE UP (ref 7–23)
CALCIUM SERPL-MCNC: 9.1 MG/DL — SIGNIFICANT CHANGE UP (ref 8.4–10.5)
CHLORIDE SERPL-SCNC: 103 MMOL/L — SIGNIFICANT CHANGE UP (ref 96–108)
CO2 SERPL-SCNC: 25 MMOL/L — SIGNIFICANT CHANGE UP (ref 22–31)
CREAT SERPL-MCNC: 0.63 MG/DL — SIGNIFICANT CHANGE UP (ref 0.5–1.3)
GLUCOSE SERPL-MCNC: 112 MG/DL — HIGH (ref 70–99)
HCT VFR BLD CALC: 27.4 % — LOW (ref 39–50)
HGB BLD-MCNC: 8.8 G/DL — LOW (ref 13–17)
MAGNESIUM SERPL-MCNC: 2 MG/DL — SIGNIFICANT CHANGE UP (ref 1.6–2.6)
MCHC RBC-ENTMCNC: 28.1 PG — SIGNIFICANT CHANGE UP (ref 27–34)
MCHC RBC-ENTMCNC: 32.1 GM/DL — SIGNIFICANT CHANGE UP (ref 32–36)
MCV RBC AUTO: 87.5 FL — SIGNIFICANT CHANGE UP (ref 80–100)
NRBC # BLD: 0 /100 WBCS — SIGNIFICANT CHANGE UP (ref 0–0)
PHOSPHATE SERPL-MCNC: 2.5 MG/DL — SIGNIFICANT CHANGE UP (ref 2.5–4.5)
PLATELET # BLD AUTO: 146 K/UL — LOW (ref 150–400)
POTASSIUM SERPL-MCNC: 3.9 MMOL/L — SIGNIFICANT CHANGE UP (ref 3.5–5.3)
POTASSIUM SERPL-SCNC: 3.9 MMOL/L — SIGNIFICANT CHANGE UP (ref 3.5–5.3)
RBC # BLD: 3.13 M/UL — LOW (ref 4.2–5.8)
RBC # FLD: 14 % — SIGNIFICANT CHANGE UP (ref 10.3–14.5)
SODIUM SERPL-SCNC: 139 MMOL/L — SIGNIFICANT CHANGE UP (ref 135–145)
WBC # BLD: 6.21 K/UL — SIGNIFICANT CHANGE UP (ref 3.8–10.5)
WBC # FLD AUTO: 6.21 K/UL — SIGNIFICANT CHANGE UP (ref 3.8–10.5)

## 2020-10-23 RX ORDER — POLYETHYLENE GLYCOL 3350 17 G/17G
17 POWDER, FOR SOLUTION ORAL DAILY
Refills: 0 | Status: DISCONTINUED | OUTPATIENT
Start: 2020-10-23 | End: 2020-10-26

## 2020-10-23 RX ORDER — SENNA PLUS 8.6 MG/1
2 TABLET ORAL AT BEDTIME
Refills: 0 | Status: DISCONTINUED | OUTPATIENT
Start: 2020-10-23 | End: 2020-10-26

## 2020-10-23 RX ADMIN — HEPARIN SODIUM 5000 UNIT(S): 5000 INJECTION INTRAVENOUS; SUBCUTANEOUS at 05:21

## 2020-10-23 RX ADMIN — HEPARIN SODIUM 5000 UNIT(S): 5000 INJECTION INTRAVENOUS; SUBCUTANEOUS at 21:47

## 2020-10-23 RX ADMIN — Medication 1 SPRAY(S): at 17:22

## 2020-10-23 RX ADMIN — Medication 1 SPRAY(S): at 11:49

## 2020-10-23 RX ADMIN — CHLORHEXIDINE GLUCONATE 15 MILLILITER(S): 213 SOLUTION TOPICAL at 05:21

## 2020-10-23 RX ADMIN — HEPARIN SODIUM 5000 UNIT(S): 5000 INJECTION INTRAVENOUS; SUBCUTANEOUS at 15:06

## 2020-10-23 RX ADMIN — FINASTERIDE 5 MILLIGRAM(S): 5 TABLET, FILM COATED ORAL at 11:49

## 2020-10-23 RX ADMIN — Medication 1 SPRAY(S): at 05:21

## 2020-10-23 RX ADMIN — CHLORHEXIDINE GLUCONATE 15 MILLILITER(S): 213 SOLUTION TOPICAL at 17:21

## 2020-10-23 RX ADMIN — TAMSULOSIN HYDROCHLORIDE 0.4 MILLIGRAM(S): 0.4 CAPSULE ORAL at 21:47

## 2020-10-23 RX ADMIN — Medication 1 SPRAY(S): at 01:13

## 2020-10-23 RX ADMIN — Medication 62.5 MILLIMOLE(S): at 10:53

## 2020-10-23 RX ADMIN — Medication 325 MILLIGRAM(S): at 11:49

## 2020-10-23 NOTE — PROGRESS NOTE ADULT - SUBJECTIVE AND OBJECTIVE BOX
ENT Progress Note    HPI:   69M w/ hypertrophic cardiomyopathy, COPD, BPH now s/p partial rhinectomy/septectomy, left level I neck dissection, left radial forearm free flap, STSG, insertion of cadaveric bone and cartilage, and ORIF L radius.    Interval:  10/20 (POD1): NAEON, AFVSS with MAPs in appropriate range. Flap warm, well-perfused, pink. Strong intraoral doppler signal and external neck signal.   10/21 (POD2): NAEON, some tachycardia yesterday/ovn. A-line/alford DC'ed. OOBTC. Flap warm, well-perfused, pink. Strong intraoral doppler signal. Arm motor/sensation intact.  10/22 (POD3): Called for L sided nose bleeding overnight. Seen and examined this AM with slow trickle from L nose, likely from septum. Afrin ordered and dressing placed. Flap appears healthy, warm, well-perfused. Strong arterial signal  10/23 (POD4): NAEON. Epistaxis resolved with afrin yesterday. Flap arterial signal remains strong. Pain controlled, tolerating CLD. No complaints.    PAST MEDICAL & SURGICAL HISTORY:  Arthralgia of bilateral temporomandibular joint    Tendinitis  left rotator cuff    BPH (benign prostatic hyperplasia)    Malignant neoplasm  nasal cavity    Bronchitis  chronic    Rheumatoid arthritis    Surgery, elective  left knee    History of prostate surgery  TURP    Elective surgery  Rt. foot Hallux Valaus repair-!0/2016    S/P rotator cuff repair  Rt. shoulder-2014      Allergies    No Known Allergies    Intolerances    Milk (Other)    MEDICATIONS  (STANDING):  ceFAZolin   IVPB 2000 milliGRAM(s) IV Intermittent every 8 hours  dextrose 5%. 1000 milliLiter(s) (50 mL/Hr) IV Continuous <Continuous>  dextrose 50% Injectable 25 Gram(s) IV Push once  dextrose 50% Injectable 25 Gram(s) IV Push once  dextrose 50% Injectable 12.5 Gram(s) IV Push once  insulin lispro (HumaLOG) corrective regimen sliding scale   SubCutaneous Before meals and at bedtime  lactated ringers. 1000 milliLiter(s) (100 mL/Hr) IV Continuous <Continuous>  metroNIDAZOLE  IVPB 500 milliGRAM(s) IV Intermittent every 8 hours  potassium phosphate IVPB 30 milliMole(s) IV Intermittent once    MEDICATIONS  (PRN):  dextrose 40% Gel 15 Gram(s) Oral once PRN Blood Glucose LESS THAN 70 milliGRAM(s)/deciliter  glucagon  Injectable 1 milliGRAM(s) IntraMuscular once PRN Glucose LESS THAN 70 milligrams/deciliter  HYDROmorphone  Injectable 0.5 milliGRAM(s) IV Push every 4 hours PRN Severe Pain (7 - 10)  HYDROmorphone  Injectable 0.25 milliGRAM(s) IV Push every 1 hour PRN Breakthrough      Vital Signs Last 24 Hrs  T(C): 36.2 (23 Oct 2020 05:14), Max: 36.4 (22 Oct 2020 09:21)  T(F): 97.2 (23 Oct 2020 05:14), Max: 97.5 (22 Oct 2020 09:21)  HR: 90 (23 Oct 2020 03:51) (77 - 94)  BP: 119/65 (23 Oct 2020 03:51) (104/63 - 138/68)  BP(mean): 86 (23 Oct 2020 03:51) (78 - 98)  RR: 18 (23 Oct 2020 03:51) (16 - 20)  SpO2: 96% (23 Oct 2020 03:51) (95% - 98%)    Physical Exam:  Constitutional: Well-developed, well-nourished.    Head:  normocephalic, atraumatic.   Face: s/p partial rhinectomy, sutures c/d/i  Nose: s/p septectomy, septum and dorsum recreated with cadaveric bone and cartilage, monten[place]ro splints in place intranasally, [place] splint in place externally  OC/OP: MMM, tongue midline, palatal defect recreated with radial forearm free flap, good triphasic signal observed over flap, sutures c/d/i  Neck:  Trachea midline. L neck dissection incision c/d/i, JPx1 in place holding suction with sanguinous output  Extr: L forearm wrapped in webril/cast/ACE, wound vac in place over STSG holding suction, FRANTZ x1 in place holding suction with sanguinous output, STSG donor site covered in tegaderm  Resp: NLB on RA                                       8.8    6.21  )-----------( 146      ( 23 Oct 2020 06:25 )             27.4     10-23    139  |  103  |  14  ----------------------------<  112<H>  3.9   |  25  |  0.63    Ca    9.1      23 Oct 2020 06:25  Phos  2.5     10-23  Mg     2.0     10-23

## 2020-10-23 NOTE — PROGRESS NOTE ADULT - ASSESSMENT
69M w/ hypertrophic cardiomyopathy, COPD, BPH now s/p partial rhinectomy/septectomy, left level I neck dissection, left radial forearm free flap, STSG, insertion of cadaveric bone and cartilage, and ORIF L radius.    #Neuro  -pain control as per ICU  -HOB 30 deg  -Head neutral    #CV  -  -Q1H nursing flap checks w/ doppler, Q8H resident flap check    -MAP >65  -avoid pressors if possible    #Resp  -breathing comfortable on RA    #GI  -CLD, advance to FLD today    #Endocrine  -FSG as per ICU    #ID  -completed ancef/flagyl    #Heme  -Keep Hgb >7, transfuse as needed    #Extr  -s/p ORIF l radius, f/u arm XR  -weight bearing per ortho    PPx  -SCDs,SQH  -OOBTC, ABAT

## 2020-10-23 NOTE — CHART NOTE - NSCHARTNOTEFT_GEN_A_CORE
Admitting Diagnosis:   Patient is a 69y old  Male who presents with a chief complaint of Squamous cell carcinoma (21 Oct 2020 12:52)      Consult: Yes [   ]  No [   ]    Reason for Initial Nutrition Assessment:      PAST MEDICAL & SURGICAL HISTORY:  Arthralgia of bilateral temporomandibular joint    Tendinitis  left rotator cuff    BPH (benign prostatic hyperplasia)    Malignant neoplasm  nasal cavity    Bronchitis  chronic    Rheumatoid arthritis    Surgery, elective  left knee    History of prostate surgery  TURP    Elective surgery  Rt. foot Hallux Valaus repair-!0/2016    S/P rotator cuff repair  Rt. shoulder-2014        Current Nutrition Order:  Full Liquids (adv. 10/23)    PO Intake: Excellent (%) [   ]  Good (50-75%) [   ]  Fair (25-50%) [   ]  Poor (<25%) [ x  ]  Had consumed clears for breakfast. Had juice and jello w/ good tolerance    GI Issues:   Denies N/V  Has yet to have a BM this admission-not on bowel regimen  Denies feeling bloated/distended    Pain:  No pain reported  Being medically managed  Ordered for tylenol and dilaudid    Skin Integrity:  Wound vac  FRANTZ drains  Conrad score 19    Labs:   10-23    139  |  103  |  14  ----------------------------<  112<H>  3.9   |  25  |  0.63    Ca    9.1      23 Oct 2020 06:25  Phos  2.5     10-23  Mg     2.0     10-23      CAPILLARY BLOOD GLUCOSE      POCT Blood Glucose.: 120 mg/dL (23 Oct 2020 06:29)  POCT Blood Glucose.: 115 mg/dL (22 Oct 2020 22:35)  POCT Blood Glucose.: 110 mg/dL (22 Oct 2020 16:31)  POCT Blood Glucose.: 112 mg/dL (22 Oct 2020 10:52)    Nutritionally Pertinent Lab Values:    Medications:  MEDICATIONS  (STANDING):  aspirin 325 milliGRAM(s) Oral daily  chlorhexidine 0.12% Liquid 15 milliLiter(s) Oral Mucosa two times a day  dextrose 5%. 1000 milliLiter(s) (50 mL/Hr) IV Continuous <Continuous>  dextrose 50% Injectable 12.5 Gram(s) IV Push once  dextrose 50% Injectable 25 Gram(s) IV Push once  dextrose 50% Injectable 25 Gram(s) IV Push once  finasteride 5 milliGRAM(s) Oral daily  heparin   Injectable 5000 Unit(s) SubCutaneous every 8 hours  insulin lispro (HumaLOG) corrective regimen sliding scale   SubCutaneous Before meals and at bedtime  sodium chloride 0.65% Nasal 1 Spray(s) Both Nostrils four times a day  sodium phosphate IVPB 15 milliMole(s) IV Intermittent once  tamsulosin 0.4 milliGRAM(s) Oral at bedtime    MEDICATIONS  (PRN):  acetaminophen    Suspension .. 975 milliGRAM(s) Oral every 6 hours PRN Temp greater or equal to 38.5C (101.3F), Mild Pain (1 - 3)  dextrose 40% Gel 15 Gram(s) Oral once PRN Blood Glucose LESS THAN 70 milliGRAM(s)/deciliter  glucagon  Injectable 1 milliGRAM(s) IntraMuscular once PRN Glucose LESS THAN 70 milligrams/deciliter  HYDROmorphone  Injectable 0.5 milliGRAM(s) IV Push every 4 hours PRN Severe Pain (7 - 10)  oxymetazoline 0.05% Nasal Spray 1 Spray(s) Left Nostril <User Schedule> PRN nose bleeding      Admitted Anthropometrics:  Height: 5'2" IBW 118lbs+/-10%, %%, BMI 24.3    Weight: 132lbs (10/19)  Daily     Weight Change:   Pt endorses UBW in July of 150lb, now reports weighing 132lb indicating ~18lb intentional weight loss x~3 months.   Nutrition Focused Physical Exam: Completed [   ]  Unable to complete [   ]    Estimated energy needs:   ABW used for calculations as pt between % of IBW.   Nutrient needs based on [facility] standards of care for maintenance in older adults.   Needs adjusted for age and post-op healing  1505-1806kcal/day (25-30kcal/kg)  72-84g pro/day (1.2-1.4g pro/kg)  1806-2107ml fluid/day (30-35ml/kg)    Subjective:   69M w/ hypertrophic cardiomyopathy, COPD, BPH now s/p partial rhinectomy/septectomy, left level I neck dissection, left radial forearm free flap, STSG, insertion of cadaveric bone and cartilage, and ORIF L radius.     Pt seen in room, resting in chair. Diet advanced this morning from clears to full liquids. Pt had consumed juice and jello for breakfast with good tolerance. Denies N/V, has yet to have a BM this admission. Denies abdominal discomfort or feeling bloated or distended. Discussed likely diet advancement process pending approval by MDs/SLPS for safest diet consistency. Intentional wt changes noted (see initial RD assessment). Confirmed milk intolerance. Wound vac and JPdrains in place. POCT , 115, 110, 112, A1C 5.7. RD to follow.    Nutrition Diagnosis:  Inadequate energy intake RT difficulty meeting needs on CLD/FLD AEB meeting 25% EER at present    Goal:  Pt to consistently meet % of estimated needs PO     Recommendations:  1. Diet advanecment per MD/SLP  2. If to remain on FLD, recommend adding on EnsureEnlive BID (700kcal, 40g pro) for additional nutrition support  3. Consider adding on bowel regimen 2/2 no BM x5 days  d/w team    Education:   diet adv. process. option of ONS on liquid diet.     Risk Level: High [ x  ] Moderate [   ] Low [   ]

## 2020-10-24 RX ORDER — LEFLUNOMIDE 10 MG/1
20 TABLET ORAL DAILY
Refills: 0 | Status: DISCONTINUED | OUTPATIENT
Start: 2020-10-24 | End: 2020-10-26

## 2020-10-24 RX ORDER — LANOLIN ALCOHOL/MO/W.PET/CERES
5 CREAM (GRAM) TOPICAL AT BEDTIME
Refills: 0 | Status: DISCONTINUED | OUTPATIENT
Start: 2020-10-24 | End: 2020-10-26

## 2020-10-24 RX ADMIN — HEPARIN SODIUM 5000 UNIT(S): 5000 INJECTION INTRAVENOUS; SUBCUTANEOUS at 06:10

## 2020-10-24 RX ADMIN — Medication 1 SPRAY(S): at 17:47

## 2020-10-24 RX ADMIN — HEPARIN SODIUM 5000 UNIT(S): 5000 INJECTION INTRAVENOUS; SUBCUTANEOUS at 14:58

## 2020-10-24 RX ADMIN — FINASTERIDE 5 MILLIGRAM(S): 5 TABLET, FILM COATED ORAL at 12:34

## 2020-10-24 RX ADMIN — HEPARIN SODIUM 5000 UNIT(S): 5000 INJECTION INTRAVENOUS; SUBCUTANEOUS at 22:06

## 2020-10-24 RX ADMIN — LEFLUNOMIDE 20 MILLIGRAM(S): 10 TABLET ORAL at 17:35

## 2020-10-24 RX ADMIN — Medication 325 MILLIGRAM(S): at 12:34

## 2020-10-24 RX ADMIN — Medication 1 SPRAY(S): at 12:34

## 2020-10-24 RX ADMIN — Medication 1 SPRAY(S): at 06:11

## 2020-10-24 RX ADMIN — Medication 1 SPRAY(S): at 23:04

## 2020-10-24 RX ADMIN — Medication 1 SPRAY(S): at 00:11

## 2020-10-24 RX ADMIN — TAMSULOSIN HYDROCHLORIDE 0.4 MILLIGRAM(S): 0.4 CAPSULE ORAL at 22:06

## 2020-10-24 NOTE — PROGRESS NOTE ADULT - ASSESSMENT
69M w/ hypertrophic cardiomyopathy, COPD, BPH now s/p partial rhinectomy/septectomy, left level I neck dissection, left radial forearm free flap, STSG, insertion of cadaveric bone and cartilage, and ORIF L radius.    #Neuro  -pain control as per ICU  -HOB 30 deg  -Head neutral    #CV  -  -Q1H nursing flap checks w/ doppler, Q8H resident flap check    -MAP >65  -avoid pressors if possible    #Resp  -breathing comfortable on RA    #GI  -FLD    #Endocrine  -FSG as per ICU    #ID  -completed ancef/flagyl    #Heme  -Keep Hgb >7, transfuse as needed    #Extr  -s/p ORIF l radius, f/u arm XR  -weight bearing per ortho    PPx  -SCDs,SQH  -OOBTC

## 2020-10-24 NOTE — PROGRESS NOTE ADULT - SUBJECTIVE AND OBJECTIVE BOX
ENT Progress Note    HPI:   69M w/ hypertrophic cardiomyopathy, COPD, BPH now s/p partial rhinectomy/septectomy, left level I neck dissection, left radial forearm free flap, STSG, insertion of cadaveric bone and cartilage, and ORIF L radius.    Interval:  10/20 (POD1): NAEON, AFVSS with MAPs in appropriate range. Flap warm, well-perfused, pink. Strong intraoral doppler signal and external neck signal.   10/21 (POD2): NAEON, some tachycardia yesterday/ovn. A-line/alford DC'ed. OOBTC. Flap warm, well-perfused, pink. Strong intraoral doppler signal. Arm motor/sensation intact.  10/22 (POD3): Called for L sided nose bleeding overnight. Seen and examined this AM with slow trickle from L nose, likely from septum. Afrin ordered and dressing placed. Flap appears healthy, warm, well-perfused. Strong arterial signal  10/23 (POD4): NAEON. Epistaxis resolved with afrin yesterday. Flap arterial signal remains strong. Pain controlled, tolerating CLD. No complaints.  10/24: NAEON. Tolerating FLD.    PAST MEDICAL & SURGICAL HISTORY:  Arthralgia of bilateral temporomandibular joint    Tendinitis  left rotator cuff    BPH (benign prostatic hyperplasia)    Malignant neoplasm  nasal cavity    Bronchitis  chronic    Rheumatoid arthritis    Surgery, elective  left knee    History of prostate surgery  TURP    Elective surgery  Rt. foot Hallux Valaus repair-!0/2016    S/P rotator cuff repair  Rt. shoulder-2014      Allergies    No Known Allergies    Intolerances    Milk (Other)    MEDICATIONS  (STANDING):  ceFAZolin   IVPB 2000 milliGRAM(s) IV Intermittent every 8 hours  dextrose 5%. 1000 milliLiter(s) (50 mL/Hr) IV Continuous <Continuous>  dextrose 50% Injectable 25 Gram(s) IV Push once  dextrose 50% Injectable 25 Gram(s) IV Push once  dextrose 50% Injectable 12.5 Gram(s) IV Push once  insulin lispro (HumaLOG) corrective regimen sliding scale   SubCutaneous Before meals and at bedtime  lactated ringers. 1000 milliLiter(s) (100 mL/Hr) IV Continuous <Continuous>  metroNIDAZOLE  IVPB 500 milliGRAM(s) IV Intermittent every 8 hours  potassium phosphate IVPB 30 milliMole(s) IV Intermittent once    MEDICATIONS  (PRN):  dextrose 40% Gel 15 Gram(s) Oral once PRN Blood Glucose LESS THAN 70 milliGRAM(s)/deciliter  glucagon  Injectable 1 milliGRAM(s) IntraMuscular once PRN Glucose LESS THAN 70 milligrams/deciliter  HYDROmorphone  Injectable 0.5 milliGRAM(s) IV Push every 4 hours PRN Severe Pain (7 - 10)  HYDROmorphone  Injectable 0.25 milliGRAM(s) IV Push every 1 hour PRN Breakthrough      Vital Signs Last 24 Hrs  T(C): 36.2 (23 Oct 2020 05:14), Max: 36.4 (22 Oct 2020 09:21)  T(F): 97.2 (23 Oct 2020 05:14), Max: 97.5 (22 Oct 2020 09:21)  HR: 90 (23 Oct 2020 03:51) (77 - 94)  BP: 119/65 (23 Oct 2020 03:51) (104/63 - 138/68)  BP(mean): 86 (23 Oct 2020 03:51) (78 - 98)  RR: 18 (23 Oct 2020 03:51) (16 - 20)  SpO2: 96% (23 Oct 2020 03:51) (95% - 98%)    Physical Exam:  Constitutional: Well-developed, well-nourished.    Head:  normocephalic, atraumatic.   Face: s/p partial rhinectomy, sutures c/d/i  Nose: s/p septectomy, septum and dorsum recreated with cadaveric bone and cartilage, [place] splints in place intranasally, [place] splint in place externally  OC/OP: MMM, tongue midline, palatal defect recreated with radial forearm free flap, good triphasic signal observed over flap, sutures c/d/i  Neck:  Trachea midline. L neck dissection incision c/d/i  Extr: L forearm wrapped in webril/cast/ACE, wound vac in place over STSG holding suction, FRANTZ x1 in place holding suction with sanguinous output, STSG donor site covered in tegaderm  Resp: NLB on RA                                       8.8    6.21  )-----------( 146      ( 23 Oct 2020 06:25 )             27.4     10-23    139  |  103  |  14  ----------------------------<  112<H>  3.9   |  25  |  0.63    Ca    9.1      23 Oct 2020 06:25  Phos  2.5     10-23  Mg     2.0     10-23

## 2020-10-25 LAB
ANION GAP SERPL CALC-SCNC: 9 MMOL/L — SIGNIFICANT CHANGE UP (ref 5–17)
BASOPHILS # BLD AUTO: 0.02 K/UL — SIGNIFICANT CHANGE UP (ref 0–0.2)
BASOPHILS NFR BLD AUTO: 0.3 % — SIGNIFICANT CHANGE UP (ref 0–2)
BUN SERPL-MCNC: 10 MG/DL — SIGNIFICANT CHANGE UP (ref 7–23)
CALCIUM SERPL-MCNC: 9.2 MG/DL — SIGNIFICANT CHANGE UP (ref 8.4–10.5)
CHLORIDE SERPL-SCNC: 105 MMOL/L — SIGNIFICANT CHANGE UP (ref 96–108)
CO2 SERPL-SCNC: 26 MMOL/L — SIGNIFICANT CHANGE UP (ref 22–31)
CREAT SERPL-MCNC: 0.69 MG/DL — SIGNIFICANT CHANGE UP (ref 0.5–1.3)
EOSINOPHIL # BLD AUTO: 0.27 K/UL — SIGNIFICANT CHANGE UP (ref 0–0.5)
EOSINOPHIL NFR BLD AUTO: 4.7 % — SIGNIFICANT CHANGE UP (ref 0–6)
GLUCOSE SERPL-MCNC: 92 MG/DL — SIGNIFICANT CHANGE UP (ref 70–99)
HCT VFR BLD CALC: 26.5 % — LOW (ref 39–50)
HGB BLD-MCNC: 8.3 G/DL — LOW (ref 13–17)
IMM GRANULOCYTES NFR BLD AUTO: 0.5 % — SIGNIFICANT CHANGE UP (ref 0–1.5)
LYMPHOCYTES # BLD AUTO: 1.17 K/UL — SIGNIFICANT CHANGE UP (ref 1–3.3)
LYMPHOCYTES # BLD AUTO: 20.2 % — SIGNIFICANT CHANGE UP (ref 13–44)
MCHC RBC-ENTMCNC: 27.3 PG — SIGNIFICANT CHANGE UP (ref 27–34)
MCHC RBC-ENTMCNC: 31.3 GM/DL — LOW (ref 32–36)
MCV RBC AUTO: 87.2 FL — SIGNIFICANT CHANGE UP (ref 80–100)
MONOCYTES # BLD AUTO: 0.66 K/UL — SIGNIFICANT CHANGE UP (ref 0–0.9)
MONOCYTES NFR BLD AUTO: 11.4 % — SIGNIFICANT CHANGE UP (ref 2–14)
NEUTROPHILS # BLD AUTO: 3.65 K/UL — SIGNIFICANT CHANGE UP (ref 1.8–7.4)
NEUTROPHILS NFR BLD AUTO: 62.9 % — SIGNIFICANT CHANGE UP (ref 43–77)
NRBC # BLD: 0 /100 WBCS — SIGNIFICANT CHANGE UP (ref 0–0)
PLATELET # BLD AUTO: 163 K/UL — SIGNIFICANT CHANGE UP (ref 150–400)
POTASSIUM SERPL-MCNC: 4 MMOL/L — SIGNIFICANT CHANGE UP (ref 3.5–5.3)
POTASSIUM SERPL-SCNC: 4 MMOL/L — SIGNIFICANT CHANGE UP (ref 3.5–5.3)
RBC # BLD: 3.04 M/UL — LOW (ref 4.2–5.8)
RBC # FLD: 14 % — SIGNIFICANT CHANGE UP (ref 10.3–14.5)
SODIUM SERPL-SCNC: 140 MMOL/L — SIGNIFICANT CHANGE UP (ref 135–145)
WBC # BLD: 5.8 K/UL — SIGNIFICANT CHANGE UP (ref 3.8–10.5)
WBC # FLD AUTO: 5.8 K/UL — SIGNIFICANT CHANGE UP (ref 3.8–10.5)

## 2020-10-25 RX ADMIN — HEPARIN SODIUM 5000 UNIT(S): 5000 INJECTION INTRAVENOUS; SUBCUTANEOUS at 21:39

## 2020-10-25 RX ADMIN — HEPARIN SODIUM 5000 UNIT(S): 5000 INJECTION INTRAVENOUS; SUBCUTANEOUS at 13:02

## 2020-10-25 RX ADMIN — Medication 325 MILLIGRAM(S): at 12:44

## 2020-10-25 RX ADMIN — Medication 1 SPRAY(S): at 23:01

## 2020-10-25 RX ADMIN — LEFLUNOMIDE 20 MILLIGRAM(S): 10 TABLET ORAL at 17:04

## 2020-10-25 RX ADMIN — Medication 1 SPRAY(S): at 12:44

## 2020-10-25 RX ADMIN — Medication 1 SPRAY(S): at 05:52

## 2020-10-25 RX ADMIN — FINASTERIDE 5 MILLIGRAM(S): 5 TABLET, FILM COATED ORAL at 12:44

## 2020-10-25 RX ADMIN — CHLORHEXIDINE GLUCONATE 15 MILLILITER(S): 213 SOLUTION TOPICAL at 05:52

## 2020-10-25 RX ADMIN — TAMSULOSIN HYDROCHLORIDE 0.4 MILLIGRAM(S): 0.4 CAPSULE ORAL at 21:39

## 2020-10-25 RX ADMIN — Medication 1 SPRAY(S): at 17:04

## 2020-10-25 RX ADMIN — CHLORHEXIDINE GLUCONATE 15 MILLILITER(S): 213 SOLUTION TOPICAL at 17:02

## 2020-10-25 RX ADMIN — HEPARIN SODIUM 5000 UNIT(S): 5000 INJECTION INTRAVENOUS; SUBCUTANEOUS at 05:52

## 2020-10-25 NOTE — PROGRESS NOTE ADULT - SUBJECTIVE AND OBJECTIVE BOX
ENT Progress Note    HPI:   69M w/ hypertrophic cardiomyopathy, COPD, BPH now s/p partial rhinectomy/septectomy, left level I neck dissection, left radial forearm free flap, STSG, insertion of cadaveric bone and cartilage, and ORIF L radius.    Interval:  10/20 (POD1): NAEON, AFVSS with MAPs in appropriate range. Flap warm, well-perfused, pink. Strong intraoral doppler signal and external neck signal.   10/21 (POD2): NAEON, some tachycardia yesterday/ovn. A-line/alford DC'ed. OOBTC. Flap warm, well-perfused, pink. Strong intraoral doppler signal. Arm motor/sensation intact.  10/22 (POD3): Called for L sided nose bleeding overnight. Seen and examined this AM with slow trickle from L nose, likely from septum. Afrin ordered and dressing placed. Flap appears healthy, warm, well-perfused. Strong arterial signal  10/23 (POD4): NAEON. Epistaxis resolved with afrin yesterday. Flap arterial signal remains strong. Pain controlled, tolerating CLD. No complaints.  10/24: NAEON. Tolerating FLD.  10/25: NAEON. Tolerating FLD but complains of difficulty with hot/cold foods. Ambulating. No additional epistaxis.      PAST MEDICAL & SURGICAL HISTORY:  Arthralgia of bilateral temporomandibular joint    Tendinitis  left rotator cuff    BPH (benign prostatic hyperplasia)    Malignant neoplasm  nasal cavity    Bronchitis  chronic    Rheumatoid arthritis    Surgery, elective  left knee    History of prostate surgery  TURP    Elective surgery  Rt. foot Hallux Valaus repair-!0/2016    S/P rotator cuff repair  Rt. shoulder-2014      Allergies    No Known Allergies    Intolerances    Milk (Other)    MEDICATIONS  (STANDING):  ceFAZolin   IVPB 2000 milliGRAM(s) IV Intermittent every 8 hours  dextrose 5%. 1000 milliLiter(s) (50 mL/Hr) IV Continuous <Continuous>  dextrose 50% Injectable 25 Gram(s) IV Push once  dextrose 50% Injectable 25 Gram(s) IV Push once  dextrose 50% Injectable 12.5 Gram(s) IV Push once  insulin lispro (HumaLOG) corrective regimen sliding scale   SubCutaneous Before meals and at bedtime  lactated ringers. 1000 milliLiter(s) (100 mL/Hr) IV Continuous <Continuous>  metroNIDAZOLE  IVPB 500 milliGRAM(s) IV Intermittent every 8 hours  potassium phosphate IVPB 30 milliMole(s) IV Intermittent once    MEDICATIONS  (PRN):  dextrose 40% Gel 15 Gram(s) Oral once PRN Blood Glucose LESS THAN 70 milliGRAM(s)/deciliter  glucagon  Injectable 1 milliGRAM(s) IntraMuscular once PRN Glucose LESS THAN 70 milligrams/deciliter  HYDROmorphone  Injectable 0.5 milliGRAM(s) IV Push every 4 hours PRN Severe Pain (7 - 10)  HYDROmorphone  Injectable 0.25 milliGRAM(s) IV Push every 1 hour PRN Breakthrough      Vital Signs Last 24 Hrs  T(C): 36.7 (25 Oct 2020 04:57), Max: 37.1 (24 Oct 2020 18:44)  T(F): 98 (25 Oct 2020 04:57), Max: 98.8 (24 Oct 2020 18:44)  HR: 80 (25 Oct 2020 08:19) (74 - 92)  BP: 124/77 (25 Oct 2020 08:19) (108/59 - 129/77)  BP(mean): 93 (25 Oct 2020 08:19) (78 - 97)  RR: 22 (25 Oct 2020 08:19) (18 - 22)  SpO2: 94% (25 Oct 2020 08:19) (94% - 97%)    Physical Exam:  Constitutional: Well-developed, well-nourished.    Head:  normocephalic, atraumatic.   Face: s/p partial rhinectomy, sutures c/d/i  Nose: s/p septectomy, septum and dorsum recreated with cadaveric bone and cartilage, [place] splints in place intranasally, [place] splint in place externally  OC/OP: MMM, tongue midline, palatal defect recreated with radial forearm free flap, good triphasic signal observed over flap, sutures c/d/i  Neck:  Trachea midline. L neck dissection incision c/d/i  Extr: L forearm wrapped in webril/cast/ACE, wound vac in place over STSG holding suction, FRANTZ x1 in place holding suction with sanguinous output, STSG donor site covered in tegaderm  Resp: NLB on RA                                              8.3    5.80  )-----------( 163      ( 25 Oct 2020 07:06 )             26.5   10-25    140  |  105  |  10  ----------------------------<  92  4.0   |  26  |  0.69    Ca    9.2      25 Oct 2020 07:06

## 2020-10-25 NOTE — PROGRESS NOTE ADULT - ASSESSMENT
69M w/ hypertrophic cardiomyopathy, COPD, BPH now s/p partial rhinectomy/septectomy, left level I neck dissection, left radial forearm free flap, STSG, insertion of cadaveric bone and cartilage, and ORIF L radius.    #Neuro  -pain control as per ICU  -HOB 30 deg  -Head neutral    #CV  -  -Q4H nursing flap checks w/ doppler, q shift resident flap check    -MAP >65  -avoid pressors if possible    #Resp  -breathing comfortable on RA    #GI  -FLD  -F/u calorie count    #Endocrine  -FSG     #ID  -completed ancef/flagyl    #Heme  -Keep Hgb >7, transfuse as needed    #Extr  -s/p ORIF l radius, f/u arm XR  -weight bearing per ortho    PPx  -SCDs,SQH  -ambulate as tolerated

## 2020-10-26 ENCOUNTER — TRANSCRIPTION ENCOUNTER (OUTPATIENT)
Age: 70
End: 2020-10-26

## 2020-10-26 VITALS — TEMPERATURE: 97 F

## 2020-10-26 PROCEDURE — 88305 TISSUE EXAM BY PATHOLOGIST: CPT

## 2020-10-26 PROCEDURE — 83735 ASSAY OF MAGNESIUM: CPT

## 2020-10-26 PROCEDURE — 97116 GAIT TRAINING THERAPY: CPT

## 2020-10-26 PROCEDURE — 86803 HEPATITIS C AB TEST: CPT

## 2020-10-26 PROCEDURE — 88304 TISSUE EXAM BY PATHOLOGIST: CPT

## 2020-10-26 PROCEDURE — C1713: CPT

## 2020-10-26 PROCEDURE — 88300 SURGICAL PATH GROSS: CPT

## 2020-10-26 PROCEDURE — 84132 ASSAY OF SERUM POTASSIUM: CPT

## 2020-10-26 PROCEDURE — 85025 COMPLETE CBC W/AUTO DIFF WBC: CPT

## 2020-10-26 PROCEDURE — 97162 PT EVAL MOD COMPLEX 30 MIN: CPT

## 2020-10-26 PROCEDURE — 73110 X-RAY EXAM OF WRIST: CPT

## 2020-10-26 PROCEDURE — 36415 COLL VENOUS BLD VENIPUNCTURE: CPT

## 2020-10-26 PROCEDURE — 82330 ASSAY OF CALCIUM: CPT

## 2020-10-26 PROCEDURE — 83036 HEMOGLOBIN GLYCOSYLATED A1C: CPT

## 2020-10-26 PROCEDURE — 76000 FLUOROSCOPY <1 HR PHYS/QHP: CPT

## 2020-10-26 PROCEDURE — 82962 GLUCOSE BLOOD TEST: CPT

## 2020-10-26 PROCEDURE — 71045 X-RAY EXAM CHEST 1 VIEW: CPT

## 2020-10-26 PROCEDURE — 85027 COMPLETE CBC AUTOMATED: CPT

## 2020-10-26 PROCEDURE — 84100 ASSAY OF PHOSPHORUS: CPT

## 2020-10-26 PROCEDURE — C1889: CPT

## 2020-10-26 PROCEDURE — 80048 BASIC METABOLIC PNL TOTAL CA: CPT

## 2020-10-26 PROCEDURE — 88341 IMHCHEM/IMCYTCHM EA ADD ANTB: CPT

## 2020-10-26 PROCEDURE — 88309 TISSUE EXAM BY PATHOLOGIST: CPT

## 2020-10-26 PROCEDURE — 84295 ASSAY OF SERUM SODIUM: CPT

## 2020-10-26 PROCEDURE — 85018 HEMOGLOBIN: CPT

## 2020-10-26 PROCEDURE — 88331 PATH CONSLTJ SURG 1 BLK 1SPC: CPT

## 2020-10-26 PROCEDURE — P9045: CPT

## 2020-10-26 RX ORDER — SODIUM CHLORIDE 0.65 %
1 AEROSOL, SPRAY (ML) NASAL
Qty: 2 | Refills: 0
Start: 2020-10-26 | End: 2020-11-08

## 2020-10-26 RX ORDER — OXYCODONE AND ACETAMINOPHEN 5; 325 MG/1; MG/1
1 TABLET ORAL
Qty: 20 | Refills: 0
Start: 2020-10-26 | End: 2020-11-01

## 2020-10-26 RX ORDER — ASPIRIN/CALCIUM CARB/MAGNESIUM 324 MG
1 TABLET ORAL
Qty: 14 | Refills: 0
Start: 2020-10-26 | End: 2020-11-08

## 2020-10-26 RX ORDER — CHLORHEXIDINE GLUCONATE 213 G/1000ML
15 SOLUTION TOPICAL
Qty: 210 | Refills: 0
Start: 2020-10-26 | End: 2020-11-01

## 2020-10-26 RX ADMIN — Medication 975 MILLIGRAM(S): at 13:00

## 2020-10-26 RX ADMIN — Medication 325 MILLIGRAM(S): at 11:17

## 2020-10-26 RX ADMIN — Medication 1 SPRAY(S): at 11:17

## 2020-10-26 RX ADMIN — Medication 1 SPRAY(S): at 05:19

## 2020-10-26 RX ADMIN — Medication 975 MILLIGRAM(S): at 04:14

## 2020-10-26 RX ADMIN — HEPARIN SODIUM 5000 UNIT(S): 5000 INJECTION INTRAVENOUS; SUBCUTANEOUS at 05:20

## 2020-10-26 RX ADMIN — Medication 975 MILLIGRAM(S): at 05:19

## 2020-10-26 RX ADMIN — Medication 975 MILLIGRAM(S): at 10:53

## 2020-10-26 RX ADMIN — FINASTERIDE 5 MILLIGRAM(S): 5 TABLET, FILM COATED ORAL at 11:17

## 2020-10-26 RX ADMIN — CHLORHEXIDINE GLUCONATE 15 MILLILITER(S): 213 SOLUTION TOPICAL at 05:20

## 2020-10-26 NOTE — DISCHARGE NOTE PROVIDER - NSDCACTIVITY_GEN_ALL_CORE
No heavy lifting/straining/Walking - Outdoors allowed/Showering allowed/Bathing allowed/Walking - Indoors allowed

## 2020-10-26 NOTE — DISCHARGE NOTE PROVIDER - NSDCCPCAREPLAN_GEN_ALL_CORE_FT
PRINCIPAL DISCHARGE DIAGNOSIS  Diagnosis: Lesion of nasal septum  Assessment and Plan of Treatment:

## 2020-10-26 NOTE — PROGRESS NOTE ADULT - ASSESSMENT
69M w/ hypertrophic cardiomyopathy, COPD, BPH now s/p partial rhinectomy/septectomy, left level I neck dissection, left radial forearm free flap, STSG, insertion of cadaveric bone and cartilage, and ORIF L radius.    #Neuro  -pain control as per ICU  -HOB 30 deg  -Head neutral    #CV  -  -Q4H nursing flap checks w/ doppler, q shift resident flap check    -MAP >65  -avoid pressors if possible    #Resp  -breathing comfortable on RA    #GI  -puree  -F/u calorie count    #Endocrine  -FSG     #ID  -completed ancef/flagyl    #Heme  -Keep Hgb >7, transfuse as needed    #Extr  -s/p ORIF l radius, f/u arm XR  -weight bearing per ortho    PPx  -SCDs,SQH  -ambulate as tolerated

## 2020-10-26 NOTE — DISCHARGE NOTE PROVIDER - PROVIDER TOKENS
PROVIDER:[TOKEN:[[id number]],FOLLOWUP:[1 week],ESTABLISHEDPATIENT:[T]],PROVIDER:[TOKEN:[[id number]],FOLLOWUP:[1 week],ESTABLISHEDPATIENT:[T]]

## 2020-10-26 NOTE — DISCHARGE NOTE PROVIDER - HOSPITAL COURSE
HPI:   69M w/ hypertrophic cardiomyopathy, COPD, BPH now s/p partial rhinectomy/septectomy, left level I neck dissection, left radial forearm free flap, STSG, insertion of cadaveric bone and cartilage, and ORIF L radius.    Interval:  10/20 (POD1): NAEON, AFVSS with MAPs in appropriate range. Flap warm, well-perfused, pink. Strong intraoral doppler signal and external neck signal.   10/21 (POD2): NAEON, some tachycardia yesterday/ovn. A-line/alford DC'ed. OOBTC. Flap warm, well-perfused, pink. Strong intraoral doppler signal. Arm motor/sensation intact.  10/22 (POD3): Called for L sided nose bleeding overnight. Seen and examined this AM with slow trickle from L nose, likely from septum. Afrin ordered and dressing placed. Flap appears healthy, warm, well-perfused. Strong arterial signal  10/23 (POD4): NAEON. Epistaxis resolved with afrin yesterday. Flap arterial signal remains strong. Pain controlled, tolerating CLD. No complaints.  10/24: NAEON. Tolerating FLD.  10/25: NAEON. Tolerating FLD but complains of difficulty with hot/cold foods. Ambulating. No additional epistaxis.    10/26: naeon. tolerating puree. ambulating. vac/cast/drain removed today    Physical Exam:  Constitutional: Well-developed, well-nourished.    Head:  normocephalic, atraumatic.   Face: s/p partial rhinectomy, sutures c/d/i  Nose: s/p septectomy, septum and dorsum recreated with cadaveric bone and cartilage, [place] splints in place intranasally, [place] splint in place externally  OC/OP: MMM, tongue midline, palatal defect recreated with radial forearm free flap, good triphasic signal observed over flap, sutures c/d/i  Neck:  Trachea midline. L neck dissection incision c/d/i  Extr: L forearm wrapped in xeroform/jasmin/ace, STSG taking well  Resp: NLB on RA

## 2020-10-26 NOTE — DISCHARGE NOTE NURSING/CASE MANAGEMENT/SOCIAL WORK - PATIENT PORTAL LINK FT
You can access the FollowMyHealth Patient Portal offered by [facility] by registering at the following website: [email or web address]. By joining [facility]’s FollowMyHealth portal, you will also be able to view your health information using other applications (apps) compatible with our system.

## 2020-10-26 NOTE — DISCHARGE NOTE PROVIDER - NSDCMRMEDTOKEN_GEN_ALL_CORE_FT
Advair Diskus 100 mcg-50 mcg inhalation powder: 1 puff(s) inhaled 2 times a day  aspirin 325 mg oral tablet: 1 tab(s) orally once a day   chlorhexidine 0.12% mucous membrane liquid: 15 milliliter(s) mucous membrane 2 times a day  dutasteride 0.5 mg oral capsule: 1 cap(s) orally once a day  Flomax 0.4 mg oral capsule: 1 cap(s) orally once a day  leflunomide 20 mg oral tablet: 1 tab(s) orally once a day  ProAir HFA 90 mcg/inh inhalation aerosol: 2 puff(s) inhaled every 6 hours  sodium chloride 0.65% nasal spray: 1 spray(s) nasal every 4 hours    Advair Diskus 100 mcg-50 mcg inhalation powder: 1 puff(s) inhaled 2 times a day  aspirin 325 mg oral tablet: 1 tab(s) orally once a day   chlorhexidine 0.12% mucous membrane liquid: 15 milliliter(s) mucous membrane 2 times a day  dutasteride 0.5 mg oral capsule: 1 cap(s) orally once a day  Flomax 0.4 mg oral capsule: 1 cap(s) orally once a day  leflunomide 20 mg oral tablet: 1 tab(s) orally once a day  oxycodone-acetaminophen 5 mg-325 mg oral tablet: 1 tab(s) orally every 6 hours MDD:4 as needed for severe breakthrough pain. limit daily acetaminophen to &lt;4000mg per day  ProAir HFA 90 mcg/inh inhalation aerosol: 2 puff(s) inhaled every 6 hours  sodium chloride 0.65% nasal spray: 1 spray(s) nasal every 4 hours

## 2020-10-26 NOTE — DISCHARGE NOTE PROVIDER - NSDCFUADDINST_GEN_ALL_CORE_FT
Please follow up with Dr. Delgadillo and Dr. Gonzalez in 1 week. Please call office for appointment.   Please apply bacitracin to face incision twice daily  Please use saline nasal spray 2-3x per day to keep area moist. It is ok if most of the spray does not enter nose  Continue all home medications as previously.   Left arm wound care: Twice daily, remove and replace dressing. Apply xeroform or vaseline gauze to skin graft, wrap area in jasmin, then wrap arm in ACE bandage  R thigh wound care: replace tegaderm covering over thigh as needed. It is okay for some blood to accumulate underneath tegaderm bandage  Call Dr. Gonzalez/Roche or present to ED for any difficulty breathing, bleeding that does not resolve with pressure, fever/infection

## 2020-10-26 NOTE — PROGRESS NOTE ADULT - SUBJECTIVE AND OBJECTIVE BOX
ENT Progress Note    HPI:   69M w/ hypertrophic cardiomyopathy, COPD, BPH now s/p partial rhinectomy/septectomy, left level I neck dissection, left radial forearm free flap, STSG, insertion of cadaveric bone and cartilage, and ORIF L radius.    Interval:  10/20 (POD1): NAEON, AFVSS with MAPs in appropriate range. Flap warm, well-perfused, pink. Strong intraoral doppler signal and external neck signal.   10/21 (POD2): NAEON, some tachycardia yesterday/ovn. A-line/alford DC'ed. OOBTC. Flap warm, well-perfused, pink. Strong intraoral doppler signal. Arm motor/sensation intact.  10/22 (POD3): Called for L sided nose bleeding overnight. Seen and examined this AM with slow trickle from L nose, likely from septum. Afrin ordered and dressing placed. Flap appears healthy, warm, well-perfused. Strong arterial signal  10/23 (POD4): NAEON. Epistaxis resolved with afrin yesterday. Flap arterial signal remains strong. Pain controlled, tolerating CLD. No complaints.  10/24: NAEON. Tolerating FLD.  10/25: NAEON. Tolerating FLD but complains of difficulty with hot/cold foods. Ambulating. No additional epistaxis.    10/26: naeon. tolerating puree. ambulating. vac/cast/drain removed today    PAST MEDICAL & SURGICAL HISTORY:  Arthralgia of bilateral temporomandibular joint    Tendinitis  left rotator cuff    BPH (benign prostatic hyperplasia)    Malignant neoplasm  nasal cavity    Bronchitis  chronic    Rheumatoid arthritis    Surgery, elective  left knee    History of prostate surgery  TURP    Elective surgery  Rt. foot Hallux Valaus repair-!0/2016    S/P rotator cuff repair  Rt. shoulder-2014      Allergies    No Known Allergies    Intolerances    Milk (Other)    MEDICATIONS  (STANDING):  ceFAZolin   IVPB 2000 milliGRAM(s) IV Intermittent every 8 hours  dextrose 5%. 1000 milliLiter(s) (50 mL/Hr) IV Continuous <Continuous>  dextrose 50% Injectable 25 Gram(s) IV Push once  dextrose 50% Injectable 25 Gram(s) IV Push once  dextrose 50% Injectable 12.5 Gram(s) IV Push once  insulin lispro (HumaLOG) corrective regimen sliding scale   SubCutaneous Before meals and at bedtime  lactated ringers. 1000 milliLiter(s) (100 mL/Hr) IV Continuous <Continuous>  metroNIDAZOLE  IVPB 500 milliGRAM(s) IV Intermittent every 8 hours  potassium phosphate IVPB 30 milliMole(s) IV Intermittent once    MEDICATIONS  (PRN):  dextrose 40% Gel 15 Gram(s) Oral once PRN Blood Glucose LESS THAN 70 milliGRAM(s)/deciliter  glucagon  Injectable 1 milliGRAM(s) IntraMuscular once PRN Glucose LESS THAN 70 milligrams/deciliter  HYDROmorphone  Injectable 0.5 milliGRAM(s) IV Push every 4 hours PRN Severe Pain (7 - 10)  HYDROmorphone  Injectable 0.25 milliGRAM(s) IV Push every 1 hour PRN Breakthrough      Vital Signs Last 24 Hrs  T(C): 36.7 (25 Oct 2020 04:57), Max: 37.1 (24 Oct 2020 18:44)  T(F): 98 (25 Oct 2020 04:57), Max: 98.8 (24 Oct 2020 18:44)  HR: 80 (25 Oct 2020 08:19) (74 - 92)  BP: 124/77 (25 Oct 2020 08:19) (108/59 - 129/77)  BP(mean): 93 (25 Oct 2020 08:19) (78 - 97)  RR: 22 (25 Oct 2020 08:19) (18 - 22)  SpO2: 94% (25 Oct 2020 08:19) (94% - 97%)    Physical Exam:  Constitutional: Well-developed, well-nourished.    Head:  normocephalic, atraumatic.   Face: s/p partial rhinectomy, sutures c/d/i  Nose: s/p septectomy, septum and dorsum recreated with cadaveric bone and cartilage, monten[place]ro splints in place intranasally, [place] splint in place externally  OC/OP: MMM, tongue midline, palatal defect recreated with radial forearm free flap, good triphasic signal observed over flap, sutures c/d/i  Neck:  Trachea midline. L neck dissection incision c/d/i  Extr: L forearm wrapped in xeroform/jasmin/ace, STSG taking well  Resp: NLB on RA                                              8.3    5.80  )-----------( 163      ( 25 Oct 2020 07:06 )             26.5   10-25    140  |  105  |  10  ----------------------------<  92  4.0   |  26  |  0.69    Ca    9.2      25 Oct 2020 07:06

## 2020-10-26 NOTE — DISCHARGE NOTE PROVIDER - CARE PROVIDER_API CALL
Olivia Gonzalez  OTOLARYNGOLOGY  [address] [place] [address], [address] [place] [address]  Phone: [phone]  Fax: [phone]  Established Patient  Follow Up Time: 1 week    Cari Delgadillo  OTOLARYNGOLOGY  [address] [place] [address], [address] [place] [address]  Phone: [phone]  Fax: [phone]  Established Patient  Follow Up Time: 1 week

## 2020-11-03 LAB — SURGICAL PATHOLOGY STUDY: SIGNIFICANT CHANGE UP

## 2020-11-04 ENCOUNTER — APPOINTMENT (OUTPATIENT)
Dept: OTOLARYNGOLOGY | Facility: CLINIC | Age: 70
End: 2020-11-04
Payer: COMMERCIAL

## 2020-11-04 VITALS
DIASTOLIC BLOOD PRESSURE: 72 MMHG | HEART RATE: 114 BPM | SYSTOLIC BLOOD PRESSURE: 109 MMHG | HEIGHT: 63 IN | WEIGHT: 134 LBS | TEMPERATURE: 97.2 F | BODY MASS INDEX: 23.74 KG/M2

## 2020-11-04 DIAGNOSIS — Z87.09 PERSONAL HISTORY OF OTHER DISEASES OF THE RESPIRATORY SYSTEM: ICD-10-CM

## 2020-11-04 DIAGNOSIS — E04.1 NONTOXIC SINGLE THYROID NODULE: ICD-10-CM

## 2020-11-04 DIAGNOSIS — J34.89 OTHER SPECIFIED DISORDERS OF NOSE AND NASAL SINUSES: ICD-10-CM

## 2020-11-04 DIAGNOSIS — Z87.898 PERSONAL HISTORY OF OTHER SPECIFIED CONDITIONS: ICD-10-CM

## 2020-11-04 DIAGNOSIS — Z11.59 ENCOUNTER FOR SCREENING FOR OTHER VIRAL DISEASES: ICD-10-CM

## 2020-11-04 DIAGNOSIS — T65.221A: ICD-10-CM

## 2020-11-04 DIAGNOSIS — Z87.19 PERSONAL HISTORY OF OTHER DISEASES OF THE DIGESTIVE SYSTEM: ICD-10-CM

## 2020-11-04 PROCEDURE — 99024 POSTOP FOLLOW-UP VISIT: CPT

## 2020-11-12 ENCOUNTER — APPOINTMENT (OUTPATIENT)
Dept: OTOLARYNGOLOGY | Facility: CLINIC | Age: 70
End: 2020-11-12
Payer: COMMERCIAL

## 2020-11-12 VITALS — HEIGHT: 63 IN | BODY MASS INDEX: 22.22 KG/M2 | WEIGHT: 125.4 LBS

## 2020-11-12 PROCEDURE — 99024 POSTOP FOLLOW-UP VISIT: CPT

## 2020-11-12 NOTE — HISTORY OF PRESENT ILLNESS
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 Patient states felt a lesion in his left nostril about three years ago. Recently diagnosed in July 2020 by Dr. 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 Patient had biopsy performed in 7/2020. Patient admits minimal bleeding at times. Pain when touching his nose. He admits some weight loss when he found out; changed his diet. Lost about 14 lbs. Patient denies any fevers. No night sweats. Presented at [facility] at [facility], surgery recommended. \par \par He is right handed. He wears a full upper denture plate.  [FreeTextEntry1] : 11/12/2020 70 yr old male comes in today f/u on  nasal septum squamous cell carcinoma, s/p septectomy (Gonzalez), left OCRFFF, STSG, free bone graft, free cartilage graft intranasal. Has a f/up appt with RT, to start RT on 11/23/20.  Pt states he had difficulty eating for a while. Now is eating habits are better but still no taste.

## 2020-11-12 NOTE — PHYSICAL EXAM
[Midline] : trachea located in midline position [Normal] : no rashes [de-identified] : nasal splint removed, nasal dorsum with deviation to the right, slight stepoff over left nasal bone, soft tissue envelope intact, left lateral rhinotomy incision healing well, edema of left nasal sidewall. Vazquez splints removed, nasal cavity debrided [de-identified] : Left UE: incision c/d/i, superficial sloughing on skin flap proximally, ~2cm, incision intact. STSG with ~80 take. Areas of eschar debrided, wet to dry dressing placed

## 2020-11-12 NOTE — ASSESSMENT
[FreeTextEntry1] : - continue wet-to-dry dressing to LUE\par - nasal saline spray\par - RT as scheduled\par - f/up in 3 weeks\par - discussed future revision surgeries for nasal support and straightening, would do this after radiation

## 2020-11-25 PROBLEM — E04.1 LEFT THYROID NODULE: Status: RESOLVED | Noted: 2020-08-13 | Resolved: 2020-11-25

## 2020-11-25 PROBLEM — Z87.898 HISTORY OF EPISTAXIS: Status: RESOLVED | Noted: 2017-03-14 | Resolved: 2020-11-25

## 2020-11-25 PROBLEM — T65.221A: Status: RESOLVED | Noted: 2019-11-19 | Resolved: 2020-11-25

## 2020-11-25 PROBLEM — J34.89 NASAL VESTIBULITIS: Status: RESOLVED | Noted: 2017-02-22 | Resolved: 2020-11-25

## 2020-11-25 PROBLEM — Z87.19 HISTORY OF GINGIVITIS: Status: RESOLVED | Noted: 2020-08-27 | Resolved: 2020-11-25

## 2020-11-25 PROBLEM — Z87.09 HISTORY OF DEVIATED NASAL SEPTUM: Status: RESOLVED | Noted: 2017-03-14 | Resolved: 2020-11-25

## 2020-11-25 RX ORDER — MUPIROCIN 20 MG/G
2 OINTMENT TOPICAL TWICE DAILY
Qty: 1 | Refills: 5 | Status: DISCONTINUED | COMMUNITY
Start: 2019-02-20 | End: 2020-11-04

## 2020-11-25 NOTE — HISTORY OF PRESENT ILLNESS
[de-identified] : Mr. Diaz presents s/p partial rhinectomy, septectomy and partial maxillectomy with left suprahyoid neck dissection; reconstruction of nose and hard palate with left radial forearm osteocutaneous flap on 10/19/2020.\par On 10/26 discharged to home.\par He is accompanied by his wife,\par \par Over past 2 days, mouth pain and swelling, using chlorhexidine gargles.\par Unable to take more than liquids due to pain.\par Still taking extra strength tylenol\par Still has blood clot in nostrils\par \par Left thyroid nodule with calcification was noted on PET-CT scan. He had US thyroid.\par He will need US FNA for 2 thyroid nodules on left side, after his recovery from maxillary/nasal surgery \par \par \par PATHOLOGY (10/19/2020)\par - Negative final margins on primary resection, with bony invasion by the squamous cell carcinoma.\par - Left neck lymph nodes 10, all negative for tumor.\par \par \par US THYROID (10/09/2020) at [facility] in [place], [place]\par - No prior comparison\par - ISTHMUS:  0.4 cm\par - RIGHT LOBE:  3.7 x 1.7 x 1.7 cm, with no nodules\par - LEFT LOBE:  3.6 x 1.7 x 1.7 cm, with 2 nodules\par     --- 1.1 x 0.7 x 1.0 cm predominantly hyperechoic with hypoechoic rim and focus of calcification in lower pole\par     --- 0.7 x 0.4 x 0.5 cm hyperechoic nodule with tiny echogenic foci in lower pole\par \par \par PATHOLOGY Left nasal septum biopsy (7/16/2020):\par - Invasive nonkeratinizing squamous cell carcinoma.\par - Ki-67 shows an increased proliferation rate of approximately 50%.\par \par CT MAXILLOFACIAL with contrast (08/05/2020) at [facility]:\par - The current study is interpreted in conjunction with images from concurrent PET/CT dated 8/5/2020.\par - Please refer to report of concurrent MRI for more detailed description of lesion extent. Subtle asymmetric enhancement is noted along the left lateral surface of the anterior cartilaginous septum with more confluent enhancing soft tissue noted along the junction of the lateral margin of the maxillary crest and the floor of the left nasal cavity, a finding best appreciated on coronal images 20 through 23. Here, there is underlying osseous erosion with an approximately 7 mm ill-defined lytic focus lying immediately anterior to the nasopalatine duct; see sagittal bone images 39 through 41, axial images 34 through 36 and coronal images 23 through 25. There is also a small nodular focus of enhancing soft tissue along the superior margin of the anterior left nasal septum, best seen on soft tissue coronal image 12, with irregularity of the adjacent left nasal bone evident on the corresponding bone images. As this lies at a considerable distance from the nasal cavity floor lesion, correlation with direct visual inspection is recommended for confirmation. There does appear to be FDG uptake at this site on the PET/CT study.\par - No additional nasal or nasopharyngeal soft tissue mass. There is prominent right and mild left middle turbinate pneumatization. There is a very large left-sided bony septal spur that contacts the hypoplastic left inferior turbinate and the left lateral nasal wall. Circumferential mucosal thickening lines walls of the right maxillary antrum with the remaining paranasal sinuses predominantly ventilated bilaterally. Anterior and posterior drainage pathways are patent. Orbital contents are normal. There is mild ventricular enlargement out of proportion to the degree of sulcal prominence, central atrophy versus mild communicating hydrocephalus. Mastoid air cells are clear bilaterally.\par IMPRESSION:\par Although it is difficult to define lesional extent on this CT examination, subtle asymmetric enhancement is present along the left lateral margin of the anterior nasal septum, with more confluent soft tissue along the nasal cavity floor that is associated with underlying bone erosion. A smaller nodular focus is present superiorly with suspected erosion of the left nasal bone. Please refer to concurrent MRI and PET/CT for additional assessment of lesional extent.\par \par MR ORBIT FACE AND/OR NECK with/without contrast (08/05/2020) at [facility]:\par - The current study is interpreted in conjunction with images from concurrent CT maxillofacial and PET/CT studies.\par - As on the CT study, subtle asymmetric enhancement is present along the mucosal surface of the left anterior nasal septum, with more confluent soft tissue along the nasal cavity floor associated with osseous erosion and extension of tumor into the left ventral maxilla. Additional subcentimeter nodular enhancement along the anterior left nasal roof associated with apparent erosion of the left nasal bone is not as well seen on the MRI study. The best MRI imaging correlate is on reconstructed coronal images of the volumetric T1 series, annotated and saved as key images. No additional sites of suspicious nodular soft tissue or contrast enhancement are identified. There is no pathologic contrast enhancement within skull base foramina and the cavernous sinuses are symmetric in appearance bilaterally. There is again mild ventricular prominence, central atrophy versus mild communicating hydrocephalus. There is no intracranial mass or pathologic contrast enhancement.\par IMPRESSION:\par As on the concurrent CT examination, subtle asymmetric enhancement is noted along the left anterior nasal septum, with more confluent soft tissue density along the nasal cavity floor associated with underlying osseous erosion. A second suspected focus of osseous erosion along the left nasal bone is better seen on the CT examination.\par \par \par PET-CT SKUL-THIGH ONC FDG INIT (08/05/2020) at [facility]: \par - Comparison: CT of the head 8/5/2020, CT chest 6/27/2019\par - Reference mean SUV, Liver: 2.4\par - HEAD and NECK: Focal intense FDG avidity along the left aspect of the anterior nasal septum SUV max 9.9, corresponding to CT findings. The FDG avidity extends to the erosive osseous lesion in the floor of the nasal cavity, as well as the nasal bone (4:33, 45). Subcentimeter Right cervical level 1 lymph nodes are seen (3:64) SUV max up to 2.6.\par Focally FDG avid thyroid nodule in the left lobe with punctate calcification, SUV max 4.2 (4:88).\par - LUNGS and PLEURA: Mildly FDG avid patchy groundglass opacities/centrilobular groundglass nodules pronounced in the mid lung and lower lungs, likely inflammatory.\par - MEDIASTINUM and CATHY: Mildly FDG avid small mediastinal lymph nodes and right hilar node, SUV max up to 4.5 in the periaortic region, may be reactive. Physiologic FDG avidity in the myocardium is seen.\par - HEPATOBILIARY: No focus of abnormal FDG avidity. Within normal limits.\par - PANCREAS: No focus of abnormal FDG avidity. Within normal limits.\par - SPLEEN: No focus of abnormal FDG avidity. Within normal limits.\par - ADRENALS: No focus of abnormal FDG avidity. Within normal limits.\par - GENITOURINARY: Mildly and diffusely increased FDG uptake in bilateral testes with SUV max 4.9 without CT correlation. Mild diffuse heterogeneous FDG uptake in the enlarged prostate, nonspecific and likely inflammatory. Physiologic FDG avidity along the urinary tract is seen. Photopenic right renal cysts.\par - GASTROINTESTINAL: Focal FDG avidity in the medial aspect of the duodenal antrum with SUV max 9.1, without CT correlation (604:72). Prominent FDG uptake along the proximal gastric wall without CT correlation, with SUV max 6.3 may be inflammatory. FDG avidity in the proximal sigmoid colon with SUV max 8.3 (604:36), and in the anorectal region with SUV max 12.2 without CT correlation may be physiologic.\par - PERITONEUM/RETROPERITONEUM: No focus of abnormal FDG avidity. Within normal limits.\par - LYMPH NODES: No focus of abnormal FDG avidity. Within normal limits.\par - VESSELS: No focus of abnormal FDG avidity. Within normal limits.\par - BONES and SOFT TISSUE: Increased FDG uptake surrounding the bilateral humeral heads, right greater than the left, likely inflammatory/degenerative. Area of increased FDG avidity along the paraspinal muscle in the mid thoracic spine level, may be physiologic.\par IMPRESSION:\par 1.) FDG-avid lesion along the left aspect of the anterior nasal septum, with osseous involvement in the floor of the nasal cavity as well as nasal bones.\par 2.) Mildly FDG-avid right level 1 cervical lymph nodes, may be reactive. No evidence of distant metastasis.\par 3.) Mildly FDG avid ground glass opacities and centrilobular ground glass opacities in both lungs, likely inflammatory.\par 4.) FDG-avid left thyroid nodule with calcification. Correlation with neck ultrasound is recommended.\par 5.) FDG-avid focus in the duodenal antrum without CT correlation, nonspecific. Clinical/visual correlation is recommended.\par

## 2020-11-25 NOTE — ASSESSMENT
[FreeTextEntry1] : Mr. Diaz is healing well after the nasomaxillary resection and left SOHND for squamous cell carcinoma on 10/19/2020.  He had reconstruction with left radial osteocutaneous flap.\par He is having a hard time trying to advance to soft diet due to pain in mouth.\par Radiation postop recommended due to extensive bony involvement.\par \par PLAN:\par Magic mouthwash\par He will make an appt with Dr. Clark of Radiation Oncology at [place] since will be easier to get to from his home.\par He has 2 thyroid nodules on left side that will need USG FNA when he feels well enough \par \par Return in 1 month\par

## 2020-11-25 NOTE — CONSULT LETTER
[Dear  ___] : Dear  [unfilled], [Courtesy Letter:] : I had the pleasure of seeing your patient, [unfilled], in my office today. [Please see my note below.] : Please see my note below. [Consult Closing:] : Thank you very much for allowing me to participate in the care of this patient.  If you have any questions, please do not hesitate to contact me. [Sincerely,] : Sincerely, [DrCalixto  ___] : Dr. AREVALO [DrCalixto ___] : Dr. AREVALO [FreeTextEntry2] : Lorie Arnold M.D.\par [address] [facility]\[place] [address] [FreeTextEntry1] : \par \par \par  [FreeTextEntry3] : \par Olivia Gonzalez MD \par Otolaryngology, Head and Neck Surgery \par Residency site , [facility] \par

## 2020-11-25 NOTE — PHYSICAL EXAM
[Normal] : normal appearance, well groomed, well nourished, and in no acute distress [de-identified] : Left neck healing well.  Most of the sutures were removed. [de-identified] : Healing well with good projection.   [de-identified] : Dried blood and a Xeroform gauze were removed from nasal cavity.  Splints remain in place. [de-identified] : Reconstruction of upper gum and hard palate intact, keyonna. [FreeTextEntry2] : Most of the sutures were removed from nose/lips.

## 2020-12-04 ENCOUNTER — APPOINTMENT (OUTPATIENT)
Dept: OTOLARYNGOLOGY | Facility: CLINIC | Age: 70
End: 2020-12-04
Payer: COMMERCIAL

## 2020-12-04 ENCOUNTER — APPOINTMENT (OUTPATIENT)
Dept: ORTHOPEDIC SURGERY | Facility: CLINIC | Age: 70
End: 2020-12-04
Payer: COMMERCIAL

## 2020-12-04 VITALS
SYSTOLIC BLOOD PRESSURE: 99 MMHG | TEMPERATURE: 96.1 F | DIASTOLIC BLOOD PRESSURE: 65 MMHG | HEIGHT: 63 IN | WEIGHT: 135 LBS | HEART RATE: 98 BPM | BODY MASS INDEX: 23.92 KG/M2

## 2020-12-04 DIAGNOSIS — M25.532 PAIN IN LEFT WRIST: ICD-10-CM

## 2020-12-04 PROCEDURE — 99024 POSTOP FOLLOW-UP VISIT: CPT

## 2020-12-04 PROCEDURE — 73110 X-RAY EXAM OF WRIST: CPT | Mod: LT

## 2020-12-04 RX ORDER — NUT.TX.IMPAIRED DIGESTIVE FXN 0.035-1/ML
LIQUID (ML) ORAL
Qty: 5 | Refills: 5 | Status: COMPLETED | COMMUNITY
Start: 2020-11-04 | End: 2020-12-04

## 2020-12-04 RX ORDER — MOUTHWASH COMPOUNDING BASE 227
MOUTHWASH MUCOUS MEMBRANE
Qty: 1 | Refills: 1 | Status: COMPLETED | COMMUNITY
Start: 2020-11-04 | End: 2020-12-04

## 2020-12-10 ENCOUNTER — NON-APPOINTMENT (OUTPATIENT)
Age: 70
End: 2020-12-10

## 2020-12-14 ENCOUNTER — FORM ENCOUNTER (OUTPATIENT)
Age: 70
End: 2020-12-14

## 2020-12-20 ENCOUNTER — FORM ENCOUNTER (OUTPATIENT)
Age: 70
End: 2020-12-20

## 2020-12-27 ENCOUNTER — FORM ENCOUNTER (OUTPATIENT)
Age: 70
End: 2020-12-27

## 2020-12-28 ENCOUNTER — RX RENEWAL (OUTPATIENT)
Age: 70
End: 2020-12-28

## 2020-12-30 NOTE — ASSESSMENT
[FreeTextEntry1] : Mr. Diaz is healing after the nasomaxillary resection and left SOHND for squamous cell carcinoma on 10/19/2020.  He had reconstruction with left radial osteocutaneous flap.\par He is still on liquids and purees, now due to lack of upper denture use after surgery.\par Radiation postop started 11/23/2020 in [place].\par The nasal reconstruction has shifted, and there is a marked external depression on left nose/dorsum.\par Nasal cavity debrided today.\par Thyroid nodules x 2 on left side that had increased uptake on PET-CT and need biopsy to r/o malignancy.\par \par PLAN:\par - continue RT and current meds such as Magic Mouthwash\par - He can get USG FNA of the 2 left thyroid nodules after RT complete and feeling better.\par \par Return in end January 2021\par

## 2020-12-30 NOTE — CONSULT LETTER
[Dear  ___] : Dear  [unfilled], [Courtesy Letter:] : I had the pleasure of seeing your patient, [unfilled], in my office today. [Please see my note below.] : Please see my note below. [Consult Closing:] : Thank you very much for allowing me to participate in the care of this patient.  If you have any questions, please do not hesitate to contact me. [Sincerely,] : Sincerely, [DrCalixto  ___] : Dr. AREVALO [DrCalixto ___] : Dr. AREVALO [FreeTextEntry2] : Lorie Arnold M.D.\par [address] [facility]\[place] [address] [FreeTextEntry1] : \par \par \par  [FreeTextEntry3] : \par Olivia Gonzalez MD \par Otolaryngology, Head and Neck Surgery \par Residency site , [facility] \par

## 2020-12-30 NOTE — HISTORY OF PRESENT ILLNESS
[de-identified] : Mr. Diaz is s/p partial rhinectomy, septectomy and partial maxillectomy with left suprahyoid neck dissection; reconstruction of nose and hard palate with left radial forearm osteocutaneous flap on 10/19/2020. D/c home 10/26.\par He is accompanied by his wife today.,\par \par Started RT 11/23 at [place]\par Over past 2 days, he has mouth pain and swelling.  Using chlorhexidine gargles.\par Eating liquids and purees only now because cant chew (no upper denture).  Taking Glucerna also.\par Cant breathe from right side nose. \par Nose is changing appearance over past few weeks.\par \par Left thyroid nodule with calcification was noted on PET-CT scan. He had US thyroid.\par He will need US FNA for 2 thyroid nodules on left side\par \par \par \par PATHOLOGY (10/19/2020)\par - Negative final margins on primary resection, with bony invasion by the squamous cell carcinoma.\par - Left neck lymph nodes 10, all negative for tumor.\par \par \par US THYROID (10/09/2020) at [facility] in [place], [place]\par - No prior comparison\par - ISTHMUS:  0.4 cm\par - RIGHT LOBE:  3.7 x 1.7 x 1.7 cm, with no nodules\par - LEFT LOBE:  3.6 x 1.7 x 1.7 cm, with 2 nodules\par     --- 1.1 x 0.7 x 1.0 cm predominantly hyperechoic with hypoechoic rim and focus of calcification in lower pole\par     --- 0.7 x 0.4 x 0.5 cm hyperechoic nodule with tiny echogenic foci in lower pole\par \par \par PATHOLOGY Left nasal septum biopsy (7/16/2020):\par - Invasive nonkeratinizing squamous cell carcinoma.\par - Ki-67 shows an increased proliferation rate of approximately 50%.\par \par CT MAXILLOFACIAL with contrast (08/05/2020) at [facility]:\par - The current study is interpreted in conjunction with images from concurrent PET/CT dated 8/5/2020.\par - Please refer to report of concurrent MRI for more detailed description of lesion extent. Subtle asymmetric enhancement is noted along the left lateral surface of the anterior cartilaginous septum with more confluent enhancing soft tissue noted along the junction of the lateral margin of the maxillary crest and the floor of the left nasal cavity, a finding best appreciated on coronal images 20 through 23. Here, there is underlying osseous erosion with an approximately 7 mm ill-defined lytic focus lying immediately anterior to the nasopalatine duct; see sagittal bone images 39 through 41, axial images 34 through 36 and coronal images 23 through 25. There is also a small nodular focus of enhancing soft tissue along the superior margin of the anterior left nasal septum, best seen on soft tissue coronal image 12, with irregularity of the adjacent left nasal bone evident on the corresponding bone images. As this lies at a considerable distance from the nasal cavity floor lesion, correlation with direct visual inspection is recommended for confirmation. There does appear to be FDG uptake at this site on the PET/CT study.\par - No additional nasal or nasopharyngeal soft tissue mass. There is prominent right and mild left middle turbinate pneumatization. There is a very large left-sided bony septal spur that contacts the hypoplastic left inferior turbinate and the left lateral nasal wall. Circumferential mucosal thickening lines walls of the right maxillary antrum with the remaining paranasal sinuses predominantly ventilated bilaterally. Anterior and posterior drainage pathways are patent. Orbital contents are normal. There is mild ventricular enlargement out of proportion to the degree of sulcal prominence, central atrophy versus mild communicating hydrocephalus. Mastoid air cells are clear bilaterally.\par IMPRESSION:\par Although it is difficult to define lesional extent on this CT examination, subtle asymmetric enhancement is present along the left lateral margin of the anterior nasal septum, with more confluent soft tissue along the nasal cavity floor that is associated with underlying bone erosion. A smaller nodular focus is present superiorly with suspected erosion of the left nasal bone. Please refer to concurrent MRI and PET/CT for additional assessment of lesional extent.\par \par MR ORBIT FACE AND/OR NECK with/without contrast (08/05/2020) at [facility]:\par - The current study is interpreted in conjunction with images from concurrent CT maxillofacial and PET/CT studies.\par - As on the CT study, subtle asymmetric enhancement is present along the mucosal surface of the left anterior nasal septum, with more confluent soft tissue along the nasal cavity floor associated with osseous erosion and extension of tumor into the left ventral maxilla. Additional subcentimeter nodular enhancement along the anterior left nasal roof associated with apparent erosion of the left nasal bone is not as well seen on the MRI study. The best MRI imaging correlate is on reconstructed coronal images of the volumetric T1 series, annotated and saved as key images. No additional sites of suspicious nodular soft tissue or contrast enhancement are identified. There is no pathologic contrast enhancement within skull base foramina and the cavernous sinuses are symmetric in appearance bilaterally. There is again mild ventricular prominence, central atrophy versus mild communicating hydrocephalus. There is no intracranial mass or pathologic contrast enhancement.\par IMPRESSION:\par As on the concurrent CT examination, subtle asymmetric enhancement is noted along the left anterior nasal septum, with more confluent soft tissue density along the nasal cavity floor associated with underlying osseous erosion. A second suspected focus of osseous erosion along the left nasal bone is better seen on the CT examination.\par \par \par PET-CT SKUL-THIGH ONC FDG INIT (08/05/2020) at [facility]: \par - Comparison: CT of the head 8/5/2020, CT chest 6/27/2019\par - Reference mean SUV, Liver: 2.4\par - HEAD and NECK: Focal intense FDG avidity along the left aspect of the anterior nasal septum SUV max 9.9, corresponding to CT findings. The FDG avidity extends to the erosive osseous lesion in the floor of the nasal cavity, as well as the nasal bone (4:33, 45). Subcentimeter Right cervical level 1 lymph nodes are seen (3:64) SUV max up to 2.6.\par Focally FDG avid thyroid nodule in the left lobe with punctate calcification, SUV max 4.2 (4:88).\par - LUNGS and PLEURA: Mildly FDG avid patchy groundglass opacities/centrilobular groundglass nodules pronounced in the mid lung and lower lungs, likely inflammatory.\par - MEDIASTINUM and CATHY: Mildly FDG avid small mediastinal lymph nodes and right hilar node, SUV max up to 4.5 in the periaortic region, may be reactive. Physiologic FDG avidity in the myocardium is seen.\par - HEPATOBILIARY: No focus of abnormal FDG avidity. Within normal limits.\par - PANCREAS: No focus of abnormal FDG avidity. Within normal limits.\par - SPLEEN: No focus of abnormal FDG avidity. Within normal limits.\par - ADRENALS: No focus of abnormal FDG avidity. Within normal limits.\par - GENITOURINARY: Mildly and diffusely increased FDG uptake in bilateral testes with SUV max 4.9 without CT correlation. Mild diffuse heterogeneous FDG uptake in the enlarged prostate, nonspecific and likely inflammatory. Physiologic FDG avidity along the urinary tract is seen. Photopenic right renal cysts.\par - GASTROINTESTINAL: Focal FDG avidity in the medial aspect of the duodenal antrum with SUV max 9.1, without CT correlation (604:72). Prominent FDG uptake along the proximal gastric wall without CT correlation, with SUV max 6.3 may be inflammatory. FDG avidity in the proximal sigmoid colon with SUV max 8.3 (604:36), and in the anorectal region with SUV max 12.2 without CT correlation may be physiologic.\par - PERITONEUM/RETROPERITONEUM: No focus of abnormal FDG avidity. Within normal limits.\par - LYMPH NODES: No focus of abnormal FDG avidity. Within normal limits.\par - VESSELS: No focus of abnormal FDG avidity. Within normal limits.\par - BONES and SOFT TISSUE: Increased FDG uptake surrounding the bilateral humeral heads, right greater than the left, likely inflammatory/degenerative. Area of increased FDG avidity along the paraspinal muscle in the mid thoracic spine level, may be physiologic.\par IMPRESSION:\par 1.) FDG-avid lesion along the left aspect of the anterior nasal septum, with osseous involvement in the floor of the nasal cavity as well as nasal bones.\par 2.) Mildly FDG-avid right level 1 cervical lymph nodes, may be reactive. No evidence of distant metastasis.\par 3.) Mildly FDG avid ground glass opacities and centrilobular ground glass opacities in both lungs, likely inflammatory.\par 4.) FDG-avid left thyroid nodule with calcification. Correlation with neck ultrasound is recommended.\par 5.) FDG-avid focus in the duodenal antrum without CT correlation, nonspecific. Clinical/visual correlation is recommended.\par

## 2020-12-30 NOTE — PHYSICAL EXAM
[de-identified] : Left neck healing well after neck dissection. [de-identified] : Depression left nasal sidewall and dorsum.  Graft bulging to left as seen in nostril. [de-identified] : Dried blood and crustes were removed from right side nasal cavity.  Graft visible and seems to have shifted from midline. [de-identified] : Reconstruction of upper gum and hard palate intact, healing well. [Normal] : no neck adenopathy

## 2021-01-04 ENCOUNTER — FORM ENCOUNTER (OUTPATIENT)
Age: 71
End: 2021-01-04

## 2021-01-11 ENCOUNTER — APPOINTMENT (OUTPATIENT)
Dept: ORTHOPEDIC SURGERY | Facility: CLINIC | Age: 71
End: 2021-01-11
Payer: COMMERCIAL

## 2021-01-11 PROCEDURE — 99024 POSTOP FOLLOW-UP VISIT: CPT

## 2021-01-11 PROCEDURE — 73090 X-RAY EXAM OF FOREARM: CPT | Mod: LT

## 2021-01-11 PROCEDURE — 73110 X-RAY EXAM OF WRIST: CPT | Mod: LT

## 2021-01-19 ENCOUNTER — APPOINTMENT (OUTPATIENT)
Dept: OTOLARYNGOLOGY | Facility: CLINIC | Age: 71
End: 2021-01-19
Payer: COMMERCIAL

## 2021-01-19 VITALS — TEMPERATURE: 97.7 F

## 2021-01-19 PROCEDURE — 99072 ADDL SUPL MATRL&STAF TM PHE: CPT

## 2021-01-19 PROCEDURE — 99213 OFFICE O/P EST LOW 20 MIN: CPT | Mod: 25

## 2021-01-19 PROCEDURE — 31237 NSL/SINS NDSC SURG BX POLYPC: CPT

## 2021-01-19 NOTE — PHYSICAL EXAM
[de-identified] : nasal dorsum with deviation to the right,  stepoff over left nasal bone more prominent, soft tissue envelope intact, left lateral rhinotomy incision healing well, edema and erythema of left nasal sidewall, secondary to post-radiation changes. Nasal cavity debrided, bony columella strut visible anteriorly, more on the left than right [Midline] : trachea located in midline position [Normal] : orientation to person, place, and time: normal [de-identified] : Left UE: incision healing well, distal forearm wound now completely epithelialized. No exposed tendon.

## 2021-01-19 NOTE — REASON FOR VISIT
[Subsequent Evaluation] : a subsequent evaluation for [FreeTextEntry2] : Nasal septum squamous cell carcinoma

## 2021-01-19 NOTE — HISTORY OF PRESENT ILLNESS
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 Patient states felt a lesion in his left nostril about three years ago. Recently diagnosed in July 2020 by Dr. 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 Patient had biopsy performed in 7/2020. Patient admits minimal bleeding at times. Pain when touching his nose. He admits some weight loss when he found out; changed his diet. Lost about 14 lbs. Patient denies any fevers. No night sweats. Presented at [facility] at [facility], surgery recommended. \par \par He is right handed. He wears a full upper denture plate. \par \par 11/12/2020 70 yr old male comes in today f/u on  nasal septum squamous cell carcinoma, s/p septectomy ([facility]), left OCRFFF, STSG, free bone graft, free cartilage graft intranasal. Has a f/up appt with RT, to start RT on 11/23/20.  Pt states he had difficulty eating for a while. Now is eating habits are better but still no taste.  [FreeTextEntry1] : 01/19/2021: Patient returns today following up on Nasal septum squamous cell carcinoma. Finished radiation treatment yesterday (1/18/21), in [facility] (Dr Denise Baca), 6 week course. Reports dry mouth, no taste, + nasal obstruction, started mid-way through radiation.

## 2021-01-19 NOTE — ASSESSMENT
[FreeTextEntry1] : - completed radiation 1/18/21\par - nasal saline irrigations to nose BID, instructions provided for how to do this\par - nasal saline spray\par - discussed role of revision surgery, to address deviation of nasal dorsum as well as nasal obstruction. Will wait until at least 6 months after completion of radiation. He and his wife voice understanding\par - f/up in 3 months

## 2021-01-29 ENCOUNTER — APPOINTMENT (OUTPATIENT)
Dept: OTOLARYNGOLOGY | Facility: CLINIC | Age: 71
End: 2021-01-29
Payer: COMMERCIAL

## 2021-01-29 VITALS
DIASTOLIC BLOOD PRESSURE: 83 MMHG | BODY MASS INDEX: 23.92 KG/M2 | TEMPERATURE: 97.6 F | WEIGHT: 135 LBS | HEIGHT: 63 IN | HEART RATE: 95 BPM | SYSTOLIC BLOOD PRESSURE: 124 MMHG

## 2021-01-29 PROCEDURE — 99214 OFFICE O/P EST MOD 30 MIN: CPT

## 2021-02-09 ENCOUNTER — APPOINTMENT (OUTPATIENT)
Dept: RHEUMATOLOGY | Facility: CLINIC | Age: 71
End: 2021-02-09
Payer: COMMERCIAL

## 2021-02-09 VITALS
TEMPERATURE: 97.8 F | BODY MASS INDEX: 24.45 KG/M2 | HEIGHT: 63 IN | WEIGHT: 138 LBS | DIASTOLIC BLOOD PRESSURE: 89 MMHG | HEART RATE: 74 BPM | SYSTOLIC BLOOD PRESSURE: 141 MMHG | OXYGEN SATURATION: 97 %

## 2021-02-09 DIAGNOSIS — M06.9 RHEUMATOID ARTHRITIS, UNSPECIFIED: ICD-10-CM

## 2021-02-09 PROCEDURE — 36415 COLL VENOUS BLD VENIPUNCTURE: CPT

## 2021-02-09 PROCEDURE — 99072 ADDL SUPL MATRL&STAF TM PHE: CPT

## 2021-02-09 PROCEDURE — 99213 OFFICE O/P EST LOW 20 MIN: CPT | Mod: 25

## 2021-02-09 NOTE — DISCHARGE NOTE NURSING/CASE MANAGEMENT/SOCIAL WORK - NSTRANSFEREYEGLASSESPAIRS_GEN_A_NUR
Patient mother Chelsey  notified of all information and all questions have been answered.   Per Mom Patient is already taking vitamin D 5000 IU daily. Ara should the patient increase this?    1 pair

## 2021-02-12 LAB
ALBUMIN SERPL ELPH-MCNC: 4.1 G/DL
ALP BLD-CCNC: 106 U/L
ALT SERPL-CCNC: 13 U/L
ANION GAP SERPL CALC-SCNC: 16 MMOL/L
AST SERPL-CCNC: 14 U/L
BASOPHILS # BLD AUTO: 0.01 K/UL
BASOPHILS NFR BLD AUTO: 0.1 %
BILIRUB SERPL-MCNC: <0.2 MG/DL
BUN SERPL-MCNC: 23 MG/DL
CALCIUM SERPL-MCNC: 9.8 MG/DL
CHLORIDE SERPL-SCNC: 101 MMOL/L
CO2 SERPL-SCNC: 21 MMOL/L
CREAT SERPL-MCNC: 1.02 MG/DL
CRP SERPL-MCNC: 1.46 MG/DL
EOSINOPHIL # BLD AUTO: 0.01 K/UL
EOSINOPHIL NFR BLD AUTO: 0.1 %
ERYTHROCYTE [SEDIMENTATION RATE] IN BLOOD BY WESTERGREN METHOD: 90 MM/HR
GLUCOSE SERPL-MCNC: 118 MG/DL
HCT VFR BLD CALC: 36.7 %
HGB BLD-MCNC: 10.6 G/DL
IMM GRANULOCYTES NFR BLD AUTO: 0.8 %
LYMPHOCYTES # BLD AUTO: 1.16 K/UL
LYMPHOCYTES NFR BLD AUTO: 10 %
MAN DIFF?: NORMAL
MCHC RBC-ENTMCNC: 23.6 PG
MCHC RBC-ENTMCNC: 28.9 GM/DL
MCV RBC AUTO: 81.6 FL
MONOCYTES # BLD AUTO: 0.65 K/UL
MONOCYTES NFR BLD AUTO: 5.6 %
NEUTROPHILS # BLD AUTO: 9.72 K/UL
NEUTROPHILS NFR BLD AUTO: 83.4 %
PLATELET # BLD AUTO: 304 K/UL
POTASSIUM SERPL-SCNC: 4.1 MMOL/L
PROT SERPL-MCNC: 7.8 G/DL
RBC # BLD: 4.5 M/UL
RBC # FLD: 15.9 %
SODIUM SERPL-SCNC: 137 MMOL/L
WBC # FLD AUTO: 11.64 K/UL

## 2021-02-12 RX ORDER — LEFLUNOMIDE 20 MG/1
20 TABLET, FILM COATED ORAL
Qty: 90 | Refills: 3 | Status: DISCONTINUED | COMMUNITY
Start: 2017-11-01 | End: 2021-02-12

## 2021-02-12 RX ORDER — METHYLPREDNISOLONE 4 MG/1
4 TABLET ORAL
Qty: 1 | Refills: 0 | Status: DISCONTINUED | COMMUNITY
Start: 2021-02-03 | End: 2021-02-12

## 2021-02-12 NOTE — REVIEW OF SYSTEMS
[Fever] : no fever [Chills] : no chills [Eye Pain] : no eye pain [Red Eyes] : eyes not red [Dry Eyes] : no dryness of the eyes [Earache] : no earache [Chest Pain] : no chest pain [Palpitations] : no palpitations [Leg Claudication] : no intermittent leg claudication [Shortness Of Breath] : no shortness of breath [Lower Ext Edema] : no extremity edema [Wheezing] : no wheezing [Cough] : no cough [SOB on Exertion] : no shortness of breath during exertion [Dysuria] : no dysuria [Incontinence] : no incontinence [Arthralgias] : no arthralgias [Joint Pain] : no joint pain [Joint Swelling] : no joint swelling [Joint Stiffness] : no joint stiffness [Skin Lesions] : no skin lesions [Dizziness] : no dizziness [Fainting] : no fainting [Anxiety] : no anxiety [Depression] : no depression

## 2021-02-12 NOTE — ASSESSMENT
[FreeTextEntry1] : 70 year old male presents for evaluation of seropositive nodular rheumatoid arthritis. \par Recent diagnosis of SCC. \par Have stopped Enbrel. \par Per review of ACR 2015 RA treatment guidelines DMARDs > TNFi, though some data to suggest MTX and Arava not safe in s/o non-melanoma skin cancer. \par Regardless, will need to wait for work up of calcified thyroid lesions prior to starting therapy, and will treat with Prednisone 7.5mg daily in the meantime. \par Check bloodwork today \par WIll d/w Dr. Gonzalez. \par

## 2021-02-12 NOTE — HISTORY OF PRESENT ILLNESS
[___ Month(s) Ago] : [unfilled] month(s) ago [Skin Nodules] : skin nodules [FreeTextEntry1] : Office Visit 7/13/20:\par Feels well overall \par Saw Dr. Gonzalez today, noted to have inflammatory changes in his sinus, c/f vasculitis based on physical exam. No biopsy.\par No joint pain \par Has been doing yard work for exercise.  \par "cant complain" \par Ongoing pain in L shoulder - cant raise arm overhead - not interested in injections today \par Plan: Rotator cuff tendonitis, offered depomedrol injection, patient deferred today. Will call if interested. \par Discussed inflammatory nasal sinuses and c/f vasculitis with Dr. Gonzalez - this could be a side effects of TNFi. Dr. Gonzalez to perform biopsy, will check ANCAs and FRANCISCO also. If any c/f vasculitis will consider stopping Enbrel. \par Continue Arava\par Check labs\par \par Office Visit 11/18/19:\par R ankle pain persists but all other joint pain improved on current regimen \par History of MTP / bunion surgery \par Denies any other joint pain\par Plan: Overall doing well\par Continue Arava and Enbrel at current doses\par Check labs \par XR ankle, injection today - likely OA > RA given lack of synovitis. \par \par Office Visit 4/29/19:\par Taking Enbrel and Arava \par Feeling much better \par Denies joint pain, swelling, stiffness\par Most nodules getting smaller/resolving. Still with swelling in olecranon bursa. Not painful. \par Plan: Doing well. \par Continue Enbrel and Arava.\par Check labs. \par \par Office Visit 10/18/18:\par Spent two weeks in a spa - lots of [place] and he felt this helped his joints.\par Overall feels well \par Still aches in the morning for about a half hour to an hour but this is diffuse muscle aches, denies joint pain/stiffness/swelling\par Plan: Doing well\par Continue current regimen with Enbrel and Arava \par Patient will have labs done at PCP next week and send them to my office. Have checked off the requested CBC, CMP, ESR and CMP on [facility] form today. \par \par Office Visit 6/22/18:\par No pain/swelling/stiffness\par Ongoing back pain, has been going to PT. Has only been 4 times thus far. \par Pain in his left paralumbar spine and intermittently radiates down his leg. \par Plan: Doing well, continue current management of Enbrel and Arava\par \par Office Visit 4/24/18:\par Had prostate surgery - TURP, held Enbrel prior to this and started two weeks after. Continued Arava throughout - overall feels well. \par Smoking 4 cigarettes a day still. \par Denies pain in his joints. Mild morning stiffness but no swelling. \par New lower back pain, constant and unaffected by activity. DEXA with osteopenia of AP spine no role for treatment per FRAX. \par Plan: Overall doing well. Improvement on Enbrel.\par Continue current management: Enbrel 50mg weekly. Arava 20mg daily.\par Some tenderness in thoracic spine - will XR today for fracture, patient osteopenic on DEXA on vitamin D at this time, vertebral fracture would be an indication for treatment. \par \par Office Visit 3/13/18:\par Doing well overall. Strength in hands has improved. \par Continues on Prednisone taper and is nervous as he decreases the dose - worried he will flare again. \par Skin nodules are improving. Denies swelling or morning stiffness. \par Denies fevers, chills, weight loss, night sweats, cough, SOB, dysuria. \par Continues to smoke, now 4 cigarettes a day\par Last set of labs with high BGs\par Plan: Doing very well on current regimen of Enbrel 50mg weekly, Arava 20mg daily and Prednisone taper. \par Continue to taper steroids per provided regimen. \par Continue Enbrel and Arava\par Continue Vitamin D, still needs bone density \par Continue to encourage smoking cessation. \par \par Office Visit 2/23/18: \par Patient feels well, has had three doses of Enbrel and today denies any pain however he is also taking Prednisone 15mg daily. He was flaring while waiting for the Enbrel and thus I had prescribed him a small dose in the meantime. \par Denies swelling of any joints. \par Denies weakness in hands today. \par Plan: Only had three doses of Enbrel, thus it is early to assess for a therapeutic effect.\par Plan to continue Enbrel for now and taper Prednisone - will reduce to 10mg today. Patient to notify me if he has difficulty with tapering Prednisone / flares in the setting of taper.  \par Continue Arava \par Check labs to assess disease activity and for medication toxicity. \par \par Office Visit 1/23/18:\par Patient states that since he stopped Prednisone he has more weakness in his hands mostly in his right hand, which is distressing to him as this is his dominant hand. He feels this is causing him to be slower at work and he is frustrated by this, understandably. Denies pain or swelling in any joints today.  \par He felt that his subcutaneous nodules regressed on Prednisone but have since recurred since he stopped the medication. Denies fevers, chills, nausea, vomiting, diarrhea, rashes, headaches, SOB, cough, chest pain. \par Plan: Patient has rheumatoid nodulosis and two full thickness tendon tears on MRI secondary to poorly controlled RA. He is currently on Arava 20mg daily and only suboptimally controlled - he states he felt better on higher doses of Prednisone. At this time, he has completed his prostate surgery and has no plans to undergo surgery for his tendon tears. Further, his pulmonologist re imaged his chest and the previously viewed pulmonary nodules are no longer visible, he only has reactive airway disease presumably in the setting of his smoking. There are currently no contraindications to TNFi therapy. Seeing as he can't be on MTX, will try Enbrel as this has the lowest risk of HACA formation without the additional use of MTX. Will start 50mg SQ weekly. Check quantiferon and hepatitis serologies today. Patient to follow up next week when he obtains the medication so I can show him how to administer. Will continue Arava while on Enbrel. \par \par Office visit 10/30/17: \par Patient underwent prostate surgery, recovering well. Had stopped all RA therapies (prednisone) per recommendations of his surgeon. \par Patient presented last visit with pain in his R wrist, also with notable weakness. Injected his wrist with suboptimal relief of pain but persistent weakness. Underwent an MRI with evidence of two a large full thickness tear of the triangular fibrocartilage and diffuse synovitis. \par Today he states the pain is better though he still notes it is uncomfortable. Denies swelling. \par Progressive subcutaneous nodules. \par Denies any swelling or morning stiffness of any joints today. \par No fevers or chills. No cough. WIll need to see Dr. Alves later this month for follow up of his pulmonary nodules. \par Plan: \par - Have referred him to an orthopedic hand specialist \par - Patient has been intermittently off therapy in light of surgical procedures, however will need to restart therapy today. Will treat with Predisone 15mg and MTX 15mg weekly with folic acid for now - wont start TNF yet as patient will likely be undergoing surgery for his hand in the near future and this would need to be held in light of surgery, so will start after. Would also like patient's pulmonary nodules assessed prior to biologic therapy. Will likely start with Humira. \par Office Visit 9/26/17:\par Patient with two months of wrist pain. Pain was sudden in onset. He points to his ulnar styloid.  \par Severe pain began in July but was intermittent, worse in the am with concomitant swelling but would usually resolve by the end of the day. Was given prednisone by Dr. Cruz but did not take this. When he returned from vacation in August his pain became constant. He started taking Prednisone on 9/1/17 and stopped this on 9/7/17. He was taking 10mg daily. He states this helped - though when he stopped taking this the wrist pain recurred. Patient has had this pain before but notes it has gotten much worse in the past two weeks to the point he is having difficulty sleeping. Denies numbness or weakness. Denies functional impairment though is finding it more difficult to cook at this time. Not taking any additional medications for pain. \par Has surgery for prostate ca in two weeks. TURP procedure. Dr. Regino Esparza (). Told by urologist he is not supposed to be on steroids for the procedure. \par Of note, I spoke with Dr. Rutledge, patient's pulmonologist regarding his  CXR which revealed interstitial prominence. He had a follow up CT in 10/16 which revealed pulmonary nodules, requiring 6 month follow up per Fleishner criteria but patient never followed up with Dr. Alves. Denies cough, SOB, weight loss, night sweats today. \par \par Office Visit 9/7/17:\par Patient currently only takes Celebrex PRN for his RA.\par Patient has ongoing pain in his knees, wrists and R shoulder. These are intermittent and self resolve. \mike Has two month problem of R wrist pain without swelling or morning stiffness. He was given topical therapy by Nancy in June which he never used. Was also given prednisone to take if needed, he finally took it after one week and the pain subsided, though he does not know if it was that intervention that helped - he also thinks this could be because of the weather. \mike Was initially prescribed MTX in 2014 and then underwent rotator cuff surgery. At this time he stopped the MTX and he didn'tâ€™t notice a difference in his symptoms so it was never re started. \par Denies joint swelling at this time. \par Denies morning stiffness.\par Overall today with no complaints. \par Functionally independent in iADLs. \par No pain medications required.  [Weight Loss] : no weight loss [Fever] : no fever [Chills] : no chills [Dry Mouth] : no dry mouth [Difficulty Walking] : no difficulty walking [Muscle Spasms] : no muscle spasms [Dry Eyes] : no dry eyes

## 2021-02-12 NOTE — PHYSICAL EXAM
[General Appearance - Alert] : alert [General Appearance - In No Acute Distress] : in no acute distress [Sclera] : the sclera and conjunctiva were normal [Examination Of The Oral Cavity] : the lips and gums were normal [Outer Ear] : the ears and nose were normal in appearance [Nasal Cavity] : the nasal mucosa and septum were normal [Neck Appearance] : the appearance of the neck was normal [Respiration, Rhythm And Depth] : normal respiratory rhythm and effort [Auscultation Breath Sounds / Voice Sounds] : lungs were clear to auscultation bilaterally [Heart Rate And Rhythm] : heart rate was normal and rhythm regular [Heart Sounds] : normal S1 and S2 [Edema] : there was no peripheral edema [Veins - Varicosity Changes] : there were no varicosital changes [Bowel Sounds] : normal bowel sounds [Abdomen Soft] : soft [Abdomen Tenderness] : non-tender [No Spinal Tenderness] : no spinal tenderness [Abnormal Walk] : normal gait [Musculoskeletal - Swelling] : no joint swelling seen [Motor Tone] : muscle strength and tone were normal [] : no rash [Oriented To Time, Place, And Person] : oriented to person, place, and time [Affect] : the affect was normal [Mood] : the mood was normal [FreeTextEntry1] : near complete resolution of subcutaneous nodules.  L shoulder with painful arc at 110, + empty can test, mild tenderness over subacromial bursa

## 2021-02-16 NOTE — HISTORY OF PRESENT ILLNESS
[de-identified] : Mr. Diaz is accompanied by his wife today.\par S/p partial rhinectomy, septectomy and partial maxillectomy with left suprahyoid neck dissection (Dr. Gonzalez), with reconstruction of nose and hard palate with left radial forearm osteocutaneous flap (Dr. Delgadillo) on 10/19/2020. \par D/c home 10/26.\par Postop RT (11/23/2020-01/18/2021) at [facility] (Dr. Baca)\par \par \par Today,  he reports that right nasal breathing is blocked.  Left nasal breathing blocked due to shift of graft/flap.\par Eating liquids and purees still because can't chew (no upper denture).  Taking Glucerna supplements.\par Nose appearance with concavity on left side after shift of graft/flap\par \par Left thyroid nodule with calcification was noted on PET-CT scan. He had US thyroid.\par He will need US FNA for 2 thyroid nodules on left side; wants to do in [place].\par \par Wants to know if safe for him to get COVID vaccine.\par Has not seen Dr. Lezama recently.\par \par \par PATHOLOGY (10/19/2020)\par - Negative final margins on primary resection, with bony invasion by the squamous cell carcinoma.\par - Left neck lymph nodes 10, all negative for tumor.\par \par \par US THYROID (10/09/2020) at [facility] in [place], [place]\par - No prior comparison\par - ISTHMUS:  0.4 cm\par - RIGHT LOBE:  3.7 x 1.7 x 1.7 cm, with no nodules\par - LEFT LOBE:  3.6 x 1.7 x 1.7 cm, with 2 nodules\par     --- 1.1 x 0.7 x 1.0 cm predominantly hyperechoic with hypoechoic rim and focus of calcification in lower pole\par     --- 0.7 x 0.4 x 0.5 cm hyperechoic nodule with tiny echogenic foci in lower pole\par \par \par PATHOLOGY Left nasal septum biopsy (7/16/2020):\par - Invasive nonkeratinizing squamous cell carcinoma.\par - Ki-67 shows an increased proliferation rate of approximately 50%.\par \par CT MAXILLOFACIAL with contrast (08/05/2020) at [facility]:\par - The current study is interpreted in conjunction with images from concurrent PET/CT dated 8/5/2020.\par - Please refer to report of concurrent MRI for more detailed description of lesion extent. Subtle asymmetric enhancement is noted along the left lateral surface of the anterior cartilaginous septum with more confluent enhancing soft tissue noted along the junction of the lateral margin of the maxillary crest and the floor of the left nasal cavity, a finding best appreciated on coronal images 20 through 23. Here, there is underlying osseous erosion with an approximately 7 mm ill-defined lytic focus lying immediately anterior to the nasopalatine duct; see sagittal bone images 39 through 41, axial images 34 through 36 and coronal images 23 through 25. There is also a small nodular focus of enhancing soft tissue along the superior margin of the anterior left nasal septum, best seen on soft tissue coronal image 12, with irregularity of the adjacent left nasal bone evident on the corresponding bone images. As this lies at a considerable distance from the nasal cavity floor lesion, correlation with direct visual inspection is recommended for confirmation. There does appear to be FDG uptake at this site on the PET/CT study.\par - No additional nasal or nasopharyngeal soft tissue mass. There is prominent right and mild left middle turbinate pneumatization. There is a very large left-sided bony septal spur that contacts the hypoplastic left inferior turbinate and the left lateral nasal wall. Circumferential mucosal thickening lines walls of the right maxillary antrum with the remaining paranasal sinuses predominantly ventilated bilaterally. Anterior and posterior drainage pathways are patent. Orbital contents are normal. There is mild ventricular enlargement out of proportion to the degree of sulcal prominence, central atrophy versus mild communicating hydrocephalus. Mastoid air cells are clear bilaterally.\par IMPRESSION:\par Although it is difficult to define lesional extent on this CT examination, subtle asymmetric enhancement is present along the left lateral margin of the anterior nasal septum, with more confluent soft tissue along the nasal cavity floor that is associated with underlying bone erosion. A smaller nodular focus is present superiorly with suspected erosion of the left nasal bone. Please refer to concurrent MRI and PET/CT for additional assessment of lesional extent.\par \par MR ORBIT FACE AND/OR NECK with/without contrast (08/05/2020) at [facility]:\par - The current study is interpreted in conjunction with images from concurrent CT maxillofacial and PET/CT studies.\par - As on the CT study, subtle asymmetric enhancement is present along the mucosal surface of the left anterior nasal septum, with more confluent soft tissue along the nasal cavity floor associated with osseous erosion and extension of tumor into the left ventral maxilla. Additional subcentimeter nodular enhancement along the anterior left nasal roof associated with apparent erosion of the left nasal bone is not as well seen on the MRI study. The best MRI imaging correlate is on reconstructed coronal images of the volumetric T1 series, annotated and saved as key images. No additional sites of suspicious nodular soft tissue or contrast enhancement are identified. There is no pathologic contrast enhancement within skull base foramina and the cavernous sinuses are symmetric in appearance bilaterally. There is again mild ventricular prominence, central atrophy versus mild communicating hydrocephalus. There is no intracranial mass or pathologic contrast enhancement.\par IMPRESSION:\par As on the concurrent CT examination, subtle asymmetric enhancement is noted along the left anterior nasal septum, with more confluent soft tissue density along the nasal cavity floor associated with underlying osseous erosion. A second suspected focus of osseous erosion along the left nasal bone is better seen on the CT examination.\par \par \par PET-CT SKUL-THIGH ONC FDG INIT (08/05/2020) at [facility]: \par - Comparison: CT of the head 8/5/2020, CT chest 6/27/2019\par - Reference mean SUV, Liver: 2.4\par - HEAD and NECK: Focal intense FDG avidity along the left aspect of the anterior nasal septum SUV max 9.9, corresponding to CT findings. The FDG avidity extends to the erosive osseous lesion in the floor of the nasal cavity, as well as the nasal bone (4:33, 45). Subcentimeter Right cervical level 1 lymph nodes are seen (3:64) SUV max up to 2.6.\par Focally FDG avid thyroid nodule in the left lobe with punctate calcification, SUV max 4.2 (4:88).\par - LUNGS and PLEURA: Mildly FDG avid patchy groundglass opacities/centrilobular groundglass nodules pronounced in the mid lung and lower lungs, likely inflammatory.\par - MEDIASTINUM and CATHY: Mildly FDG avid small mediastinal lymph nodes and right hilar node, SUV max up to 4.5 in the periaortic region, may be reactive. Physiologic FDG avidity in the myocardium is seen.\par - HEPATOBILIARY: No focus of abnormal FDG avidity. Within normal limits.\par - PANCREAS: No focus of abnormal FDG avidity. Within normal limits.\par - SPLEEN: No focus of abnormal FDG avidity. Within normal limits.\par - ADRENALS: No focus of abnormal FDG avidity. Within normal limits.\par - GENITOURINARY: Mildly and diffusely increased FDG uptake in bilateral testes with SUV max 4.9 without CT correlation. Mild diffuse heterogeneous FDG uptake in the enlarged prostate, nonspecific and likely inflammatory. Physiologic FDG avidity along the urinary tract is seen. Photopenic right renal cysts.\par - GASTROINTESTINAL: Focal FDG avidity in the medial aspect of the duodenal antrum with SUV max 9.1, without CT correlation (604:72). Prominent FDG uptake along the proximal gastric wall without CT correlation, with SUV max 6.3 may be inflammatory. FDG avidity in the proximal sigmoid colon with SUV max 8.3 (604:36), and in the anorectal region with SUV max 12.2 without CT correlation may be physiologic.\par - PERITONEUM/RETROPERITONEUM: No focus of abnormal FDG avidity. Within normal limits.\par - LYMPH NODES: No focus of abnormal FDG avidity. Within normal limits.\par - VESSELS: No focus of abnormal FDG avidity. Within normal limits.\par - BONES and SOFT TISSUE: Increased FDG uptake surrounding the bilateral humeral heads, right greater than the left, likely inflammatory/degenerative. Area of increased FDG avidity along the paraspinal muscle in the mid thoracic spine level, may be physiologic.\par IMPRESSION:\par 1.) FDG-avid lesion along the left aspect of the anterior nasal septum, with osseous involvement in the floor of the nasal cavity as well as nasal bones.\par 2.) Mildly FDG-avid right level 1 cervical lymph nodes, may be reactive. No evidence of distant metastasis.\par 3.) Mildly FDG avid ground glass opacities and centrilobular ground glass opacities in both lungs, likely inflammatory.\par 4.) FDG-avid left thyroid nodule with calcification. Correlation with neck ultrasound is recommended.\par 5.) FDG-avid focus in the duodenal antrum without CT correlation, nonspecific. Clinical/visual correlation is recommended.\par

## 2021-02-16 NOTE — CONSULT LETTER
[Please see my note below.] : Please see my note below. [Consult Closing:] : Thank you very much for allowing me to participate in the care of this patient.  If you have any questions, please do not hesitate to contact me. [Sincerely,] : Sincerely, [DrCalixto  ___] : Dr. AREVALO [DrCalixto ___] : Dr. AREVALO [Dear  ___] : Dear  [unfilled], [Courtesy Letter:] : I had the pleasure of seeing your patient, [unfilled], in my office today. [FreeTextEntry1] : \par \par \par  [FreeTextEntry3] : \par Olivia Gonzalez MD \par Otolaryngology, Head and Neck Surgery \par \par

## 2021-02-16 NOTE — PHYSICAL EXAM
[Midline] : trachea located in midline position [Normal] : no neck adenopathy [FreeTextEntry1] : No hoarseness. [de-identified] : s/p left SOHND; scars well-healed.  No mass. [de-identified] : Thyroid gland normal size, no palpable nodules.  [de-identified] : Nasal dorsum with deviation to the right.  Concavity left side nose where bone missing.  [de-identified] : Nasal cavity debrided of large crusts via right nostril;  bone graft columella visible  anteriorly, more on the left than right. [de-identified] : Hard palate intact s/p flap repair. [de-identified] : Left UE: incision healing well, distal forearm wound now completely epithelialized. No exposed tendon.

## 2021-02-16 NOTE — ASSESSMENT
[FreeTextEntry1] : Mr. Diaz is healing after the nasomaxillary resection and left SOHND for squamous cell carcinoma on 10/19/2020. He had reconstruction with left radial osteocutaneous flap.\par He is still on liquids and purees, due to lack of upper denture use after surgery.\par Radiation postop completed a few weeks ago in [place].\par The nasal reconstruction has shifted, and there is a stable marked external depression on left nose/dorsum.\par Nasal cavity debrided today, so he can breathe on right side.\par Thyroid nodules x 2 on left side that had increased uptake on PET-CT and will need biopsy to r/o malignancy.\par \par PLAN:\par - continue current meds such as saline spray\par - Order USG FNA of the 2 left thyroid nodules to be done in [place].  He will call office to tell me where he wants to have it done ([facility], most likely)\par - f/u with Dr. Lezama re COVID vaccine and transition to new rheumatologist\par \par Return in 1-2 months

## 2021-02-26 ENCOUNTER — APPOINTMENT (OUTPATIENT)
Dept: OTOLARYNGOLOGY | Facility: CLINIC | Age: 71
End: 2021-02-26
Payer: COMMERCIAL

## 2021-02-26 VITALS
TEMPERATURE: 96.4 F | BODY MASS INDEX: 24.45 KG/M2 | HEART RATE: 81 BPM | HEIGHT: 63 IN | SYSTOLIC BLOOD PRESSURE: 125 MMHG | WEIGHT: 138 LBS | DIASTOLIC BLOOD PRESSURE: 79 MMHG

## 2021-02-26 DIAGNOSIS — J34.0 ABSCESS, FURUNCLE AND CARBUNCLE OF NOSE: ICD-10-CM

## 2021-02-26 PROCEDURE — 99214 OFFICE O/P EST MOD 30 MIN: CPT

## 2021-02-26 PROCEDURE — 99072 ADDL SUPL MATRL&STAF TM PHE: CPT

## 2021-02-26 RX ORDER — ZOLPIDEM TARTRATE 5 MG/1
5 TABLET ORAL
Qty: 20 | Refills: 0 | Status: COMPLETED | COMMUNITY
Start: 2020-12-15 | End: 2021-02-26

## 2021-02-26 RX ORDER — ASPIRIN 325 MG/1
325 TABLET ORAL
Qty: 14 | Refills: 0 | Status: COMPLETED | COMMUNITY
Start: 2020-10-26 | End: 2021-02-26

## 2021-03-01 ENCOUNTER — APPOINTMENT (OUTPATIENT)
Dept: OTOLARYNGOLOGY | Facility: CLINIC | Age: 71
End: 2021-03-01
Payer: COMMERCIAL

## 2021-03-01 ENCOUNTER — OUTPATIENT (OUTPATIENT)
Dept: OUTPATIENT SERVICES | Facility: HOSPITAL | Age: 71
LOS: 1 days | Discharge: HOME | End: 2021-03-01
Payer: COMMERCIAL

## 2021-03-01 ENCOUNTER — RESULT REVIEW (OUTPATIENT)
Age: 71
End: 2021-03-01

## 2021-03-01 DIAGNOSIS — M95.0 ACQUIRED DEFORMITY OF NOSE: ICD-10-CM

## 2021-03-01 DIAGNOSIS — Z98.890 OTHER SPECIFIED POSTPROCEDURAL STATES: Chronic | ICD-10-CM

## 2021-03-01 DIAGNOSIS — Z41.9 ENCOUNTER FOR PROCEDURE FOR PURPOSES OTHER THAN REMEDYING HEALTH STATE, UNSPECIFIED: Chronic | ICD-10-CM

## 2021-03-01 DIAGNOSIS — C30.0 MALIGNANT NEOPLASM OF NASAL CAVITY: ICD-10-CM

## 2021-03-01 PROBLEM — J34.0: Status: RESOLVED | Noted: 2017-03-09 | Resolved: 2021-03-01

## 2021-03-01 PROCEDURE — 99072 ADDL SUPL MATRL&STAF TM PHE: CPT

## 2021-03-01 PROCEDURE — 70487 CT MAXILLOFACIAL W/DYE: CPT | Mod: 26

## 2021-03-01 PROCEDURE — 99214 OFFICE O/P EST MOD 30 MIN: CPT

## 2021-03-01 NOTE — CONSULT LETTER
[Dear  ___] : Dear  [unfilled], [Courtesy Letter:] : I had the pleasure of seeing your patient, [unfilled], in my office today. [Please see my note below.] : Please see my note below. [Consult Closing:] : Thank you very much for allowing me to participate in the care of this patient.  If you have any questions, please do not hesitate to contact me. [Sincerely,] : Sincerely, [FreeTextEntry1] : \par \par \par  [FreeTextEntry3] : \par Olivia Gonzalez MD \par Otolaryngology, Head and Neck Surgery \par \par   [DrCalixto  ___] : Dr. AREVALO [DrCalixto ___] : Dr. AREVALO

## 2021-03-01 NOTE — PHYSICAL EXAM
[FreeTextEntry1] : No hoarseness.  No distress. [de-identified] : s/p left SOHND; scars well-healed.  No mass. [de-identified] : Thyroid gland normal size, no palpable nodules.  [de-identified] : Nasal dorsum with deviation to the right.  Concavity left side nose where bone missing. Bone graft is protruding out of left side dome, with scant yellow d/c and mild overlying erythema; no collection. [de-identified] : Normal on right. [de-identified] : Nasal cavity debrided of large crusts via right nostril;  bone graft shift severly to left. [Midline] : trachea located in midline position [de-identified] : Hard palate intact s/p flap repair. [Normal] : no neck adenopathy

## 2021-03-01 NOTE — ASSESSMENT
[FreeTextEntry1] : Mr. Diaz is s/p nasomaxillary resection and left SOHND for squamous cell carcinoma on 10/19/2020. He had reconstruction with left radial osteocutaneous flap.  Postop radiation completed 40 days ago.\par He is still on liquids and purees, now due to lack of upper denture use after surgery.\par The nasal reconstruction had shifted, and there is a marked external depression on left nose/dorsum and also obstruction of left nasal airway by bone shift.\par Right nasal cavity debrided today--> can breathe and smell now\par Bump on left nasal dome is the bone graft coming thru the skin.  NO active drainage from site to culture (the bit of yellow is the crusting found in the nose)\par Labs with evidence of infection. WBC elevated with left shift.  ESR and CRP are elevated; levels were normal in 2020.   \par --> Cipro\par --> I spoke with Dr. Delgadillo in patient's presence today.  He will see Dr Delgadillo at [facility] office on 3/01 at 2 pm.\par Reconstruction time table moved up due to the finding today.\par --> continue nasal irrigations.\par \par Thyroid nodule on left side, that had increased uptake on PET-CT, was benign on USG FNA\par --> follow with yearly US\par

## 2021-03-01 NOTE — HISTORY OF PRESENT ILLNESS
[de-identified] : Mr. Diaz is accompanied by his wife today.\par S/p partial rhinectomy, septectomy and partial maxillectomy with left suprahyoid neck dissection (Dr. Gonzalez), with reconstruction of nose and hard palate with left radial forearm osteocutaneous flap (Dr. Delgadillo) on 10/19/2020. \par D/c home 10/26.\par Postop RT (11/23/2020-01/18/2021) at [facility] (Dr. Baca)\par \par Today, he reports that a hard bump popped up on the on nose 2-3 days ago; nontender; no drainage.\par Has right nasal blockage again and can't smell; was cleared of crusting at last 1 month ago.\par Known shift of bone graft to left, causing left nasal obstruction and concavity left side wall.\par \par He had USG FNA of Left thyroid nodule with calcification that was noted on PET-CT scan and subsequent US thyroid.\par Cytology is benign.\par \par He was a new rheumatologist in [place] since Dr. Lezama is moving to [place].\par Recent labs indicate possible infection.\par \par \par LABS\par (02/09/2021) - WBC 11.64 with 83% PMNs; ESR 90, CRT 1.46\par \par USG FNA Left thyroid nodule, 1 cm (02/12/2021), read at [facility]:\par - Benign colloid nodule (Wish[facility] 2)\par \par \par PATHOLOGY (10/19/2020)\par - Negative final margins on primary resection, with bony invasion by the squamous cell carcinoma.\par - Left neck lymph nodes 10, all negative for tumor.\par \par \par US THYROID (10/09/2020) at [facility] in [place], [place]\par - No prior comparison\par - ISTHMUS: 0.4 cm\par - RIGHT LOBE: 3.7 x 1.7 x 1.7 cm, with no nodules\par - LEFT LOBE: 3.6 x 1.7 x 1.7 cm, with 2 nodules\par  --- 1.1 x 0.7 x 1.0 cm predominantly hyperechoic with hypoechoic rim and focus of calcification in lower pole\par  --- 0.7 x 0.4 x 0.5 cm hyperechoic nodule with tiny echogenic foci in lower pole\par \par \par PATHOLOGY Left nasal septum biopsy (7/16/2020):\par - Invasive nonkeratinizing squamous cell carcinoma.\par - Ki-67 shows an increased proliferation rate of approximately 50%.\par \par CT MAXILLOFACIAL with contrast (08/05/2020) at [facility]:\par - The current study is interpreted in conjunction with images from concurrent PET/CT dated 8/5/2020.\par - Please refer to report of concurrent MRI for more detailed description of lesion extent. Subtle asymmetric enhancement is noted along the left lateral surface of the anterior cartilaginous septum with more confluent enhancing soft tissue noted along the junction of the lateral margin of the maxillary crest and the floor of the left nasal cavity, a finding best appreciated on coronal images 20 through 23. Here, there is underlying osseous erosion with an approximately 7 mm ill-defined lytic focus lying immediately anterior to the nasopalatine duct; see sagittal bone images 39 through 41, axial images 34 through 36 and coronal images 23 through 25. There is also a small nodular focus of enhancing soft tissue along the superior margin of the anterior left nasal septum, best seen on soft tissue coronal image 12, with irregularity of the adjacent left nasal bone evident on the corresponding bone images. As this lies at a considerable distance from the nasal cavity floor lesion, correlation with direct visual inspection is recommended for confirmation. There does appear to be FDG uptake at this site on the PET/CT study.\par - No additional nasal or nasopharyngeal soft tissue mass. There is prominent right and mild left middle turbinate pneumatization. There is a very large left-sided bony septal spur that contacts the hypoplastic left inferior turbinate and the left lateral nasal wall. Circumferential mucosal thickening lines walls of the right maxillary antrum with the remaining paranasal sinuses predominantly ventilated bilaterally. Anterior and posterior drainage pathways are patent. Orbital contents are normal. There is mild ventricular enlargement out of proportion to the degree of sulcal prominence, central atrophy versus mild communicating hydrocephalus. Mastoid air cells are clear bilaterally.\par IMPRESSION:\par Although it is difficult to define lesional extent on this CT examination, subtle asymmetric enhancement is present along the left lateral margin of the anterior nasal septum, with more confluent soft tissue along the nasal cavity floor that is associated with underlying bone erosion. A smaller nodular focus is present superiorly with suspected erosion of the left nasal bone. Please refer to concurrent MRI and PET/CT for additional assessment of lesional extent.\par \par MR ORBIT FACE AND/OR NECK with/without contrast (08/05/2020) at [facility]:\par - The current study is interpreted in conjunction with images from concurrent CT maxillofacial and PET/CT studies.\par - As on the CT study, subtle asymmetric enhancement is present along the mucosal surface of the left anterior nasal septum, with more confluent soft tissue along the nasal cavity floor associated with osseous erosion and extension of tumor into the left ventral maxilla. Additional subcentimeter nodular enhancement along the anterior left nasal roof associated with apparent erosion of the left nasal bone is not as well seen on the MRI study. The best MRI imaging correlate is on reconstructed coronal images of the volumetric T1 series, annotated and saved as key images. No additional sites of suspicious nodular soft tissue or contrast enhancement are identified. There is no pathologic contrast enhancement within skull base foramina and the cavernous sinuses are symmetric in appearance bilaterally. There is again mild ventricular prominence, central atrophy versus mild communicating hydrocephalus. There is no intracranial mass or pathologic contrast enhancement.\par IMPRESSION:\par As on the concurrent CT examination, subtle asymmetric enhancement is noted along the left anterior nasal septum, with more confluent soft tissue density along the nasal cavity floor associated with underlying osseous erosion. A second suspected focus of osseous erosion along the left nasal bone is better seen on the CT examination.\par \par \par PET-CT SKUL-THIGH ONC FDG INIT (08/05/2020) at [facility]: \par - Comparison: CT of the head 8/5/2020, CT chest 6/27/2019\par - Reference mean SUV, Liver: 2.4\par - HEAD and NECK: Focal intense FDG avidity along the left aspect of the anterior nasal septum SUV max 9.9, corresponding to CT findings. The FDG avidity extends to the erosive osseous lesion in the floor of the nasal cavity, as well as the nasal bone (4:33, 45). Subcentimeter Right cervical level 1 lymph nodes are seen (3:64) SUV max up to 2.6.\par Focally FDG avid thyroid nodule in the left lobe with punctate calcification, SUV max 4.2 (4:88).\par - LUNGS and PLEURA: Mildly FDG avid patchy groundglass opacities/centrilobular groundglass nodules pronounced in the mid lung and lower lungs, likely inflammatory.\par - MEDIASTINUM and CATHY: Mildly FDG avid small mediastinal lymph nodes and right hilar node, SUV max up to 4.5 in the periaortic region, may be reactive. Physiologic FDG avidity in the myocardium is seen.\par - HEPATOBILIARY: No focus of abnormal FDG avidity. Within normal limits.\par - PANCREAS: No focus of abnormal FDG avidity. Within normal limits.\par - SPLEEN: No focus of abnormal FDG avidity. Within normal limits.\par - ADRENALS: No focus of abnormal FDG avidity. Within normal limits.\par - GENITOURINARY: Mildly and diffusely increased FDG uptake in bilateral testes with SUV max 4.9 without CT correlation. Mild diffuse heterogeneous FDG uptake in the enlarged prostate, nonspecific and likely inflammatory. Physiologic FDG avidity along the urinary tract is seen. Photopenic right renal cysts.\par - GASTROINTESTINAL: Focal FDG avidity in the medial aspect of the duodenal antrum with SUV max 9.1, without CT correlation (604:72). Prominent FDG uptake along the proximal gastric wall without CT correlation, with SUV max 6.3 may be inflammatory. FDG avidity in the proximal sigmoid colon with SUV max 8.3 (604:36), and in the anorectal region with SUV max 12.2 without CT correlation may be physiologic.\par - PERITONEUM/RETROPERITONEUM: No focus of abnormal FDG avidity. Within normal limits.\par - LYMPH NODES: No focus of abnormal FDG avidity. Within normal limits.\par - VESSELS: No focus of abnormal FDG avidity. Within normal limits.\par - BONES and SOFT TISSUE: Increased FDG uptake surrounding the bilateral humeral heads, right greater than the left, likely inflammatory/degenerative. Area of increased FDG avidity along the paraspinal muscle in the mid thoracic spine level, may be physiologic.\par IMPRESSION:\par 1.) FDG-avid lesion along the left aspect of the anterior nasal septum, with osseous involvement in the floor of the nasal cavity as well as nasal bones.\par 2.) Mildly FDG-avid right level 1 cervical lymph nodes, may be reactive. No evidence of distant metastasis.\par 3.) Mildly FDG avid ground glass opacities and centrilobular ground glass opacities in both lungs, likely inflammatory.\par 4.) FDG-avid left thyroid nodule with calcification. Correlation with neck ultrasound is recommended.\par 5.) FDG-avid focus in the duodenal antrum without CT correlation, nonspecific. Clinical/visual correlation is recommended.\par

## 2021-03-02 ENCOUNTER — RESULT REVIEW (OUTPATIENT)
Age: 71
End: 2021-03-02

## 2021-03-02 ENCOUNTER — OUTPATIENT (OUTPATIENT)
Dept: OUTPATIENT SERVICES | Facility: HOSPITAL | Age: 71
LOS: 1 days | Discharge: HOME | End: 2021-03-02
Payer: COMMERCIAL

## 2021-03-02 VITALS
HEART RATE: 82 BPM | RESPIRATION RATE: 18 BRPM | HEIGHT: 61 IN | TEMPERATURE: 98 F | SYSTOLIC BLOOD PRESSURE: 139 MMHG | WEIGHT: 145.06 LBS | DIASTOLIC BLOOD PRESSURE: 77 MMHG | OXYGEN SATURATION: 99 %

## 2021-03-02 DIAGNOSIS — Z98.890 OTHER SPECIFIED POSTPROCEDURAL STATES: Chronic | ICD-10-CM

## 2021-03-02 DIAGNOSIS — Z01.818 ENCOUNTER FOR OTHER PREPROCEDURAL EXAMINATION: ICD-10-CM

## 2021-03-02 DIAGNOSIS — C30.0 MALIGNANT NEOPLASM OF NASAL CAVITY: ICD-10-CM

## 2021-03-02 DIAGNOSIS — Z41.9 ENCOUNTER FOR PROCEDURE FOR PURPOSES OTHER THAN REMEDYING HEALTH STATE, UNSPECIFIED: Chronic | ICD-10-CM

## 2021-03-02 LAB
ALBUMIN SERPL ELPH-MCNC: 3.9 G/DL — SIGNIFICANT CHANGE UP (ref 3.5–5.2)
ALP SERPL-CCNC: 103 U/L — SIGNIFICANT CHANGE UP (ref 30–115)
ALT FLD-CCNC: 15 U/L — SIGNIFICANT CHANGE UP (ref 0–41)
ANION GAP SERPL CALC-SCNC: 9 MMOL/L — SIGNIFICANT CHANGE UP (ref 7–14)
APTT BLD: 33.3 SEC — SIGNIFICANT CHANGE UP (ref 27–39.2)
AST SERPL-CCNC: 16 U/L — SIGNIFICANT CHANGE UP (ref 0–41)
BASOPHILS # BLD AUTO: 0.02 K/UL — SIGNIFICANT CHANGE UP (ref 0–0.2)
BASOPHILS NFR BLD AUTO: 0.2 % — SIGNIFICANT CHANGE UP (ref 0–1)
BILIRUB SERPL-MCNC: <0.2 MG/DL — SIGNIFICANT CHANGE UP (ref 0.2–1.2)
BUN SERPL-MCNC: 22 MG/DL — HIGH (ref 10–20)
CALCIUM SERPL-MCNC: 10.1 MG/DL — SIGNIFICANT CHANGE UP (ref 8.5–10.1)
CHLORIDE SERPL-SCNC: 100 MMOL/L — SIGNIFICANT CHANGE UP (ref 98–110)
CO2 SERPL-SCNC: 24 MMOL/L — SIGNIFICANT CHANGE UP (ref 17–32)
CREAT SERPL-MCNC: 1.1 MG/DL — SIGNIFICANT CHANGE UP (ref 0.7–1.5)
EOSINOPHIL # BLD AUTO: 0.03 K/UL — SIGNIFICANT CHANGE UP (ref 0–0.7)
EOSINOPHIL NFR BLD AUTO: 0.3 % — SIGNIFICANT CHANGE UP (ref 0–8)
GLUCOSE SERPL-MCNC: 116 MG/DL — HIGH (ref 70–99)
HCT VFR BLD CALC: 35.9 % — LOW (ref 42–52)
HGB BLD-MCNC: 10.8 G/DL — LOW (ref 14–18)
IMM GRANULOCYTES NFR BLD AUTO: 0.9 % — HIGH (ref 0.1–0.3)
INR BLD: 1.13 RATIO — SIGNIFICANT CHANGE UP (ref 0.65–1.3)
LYMPHOCYTES # BLD AUTO: 1.22 K/UL — SIGNIFICANT CHANGE UP (ref 1.2–3.4)
LYMPHOCYTES # BLD AUTO: 13.1 % — LOW (ref 20.5–51.1)
MCHC RBC-ENTMCNC: 22.9 PG — LOW (ref 27–31)
MCHC RBC-ENTMCNC: 30.1 G/DL — LOW (ref 32–37)
MCV RBC AUTO: 76.2 FL — LOW (ref 80–94)
MONOCYTES # BLD AUTO: 0.6 K/UL — SIGNIFICANT CHANGE UP (ref 0.1–0.6)
MONOCYTES NFR BLD AUTO: 6.4 % — SIGNIFICANT CHANGE UP (ref 1.7–9.3)
NEUTROPHILS # BLD AUTO: 7.37 K/UL — HIGH (ref 1.4–6.5)
NEUTROPHILS NFR BLD AUTO: 79.1 % — HIGH (ref 42.2–75.2)
NRBC # BLD: 0 /100 WBCS — SIGNIFICANT CHANGE UP (ref 0–0)
PLATELET # BLD AUTO: 246 K/UL — SIGNIFICANT CHANGE UP (ref 130–400)
POTASSIUM SERPL-MCNC: 4.9 MMOL/L — SIGNIFICANT CHANGE UP (ref 3.5–5)
POTASSIUM SERPL-SCNC: 4.9 MMOL/L — SIGNIFICANT CHANGE UP (ref 3.5–5)
PROT SERPL-MCNC: 7.7 G/DL — SIGNIFICANT CHANGE UP (ref 6–8)
PROTHROM AB SERPL-ACNC: 13 SEC — HIGH (ref 9.95–12.87)
RBC # BLD: 4.71 M/UL — SIGNIFICANT CHANGE UP (ref 4.7–6.1)
RBC # FLD: 16.9 % — HIGH (ref 11.5–14.5)
SODIUM SERPL-SCNC: 133 MMOL/L — LOW (ref 135–146)
WBC # BLD: 9.32 K/UL — SIGNIFICANT CHANGE UP (ref 4.8–10.8)
WBC # FLD AUTO: 9.32 K/UL — SIGNIFICANT CHANGE UP (ref 4.8–10.8)

## 2021-03-02 PROCEDURE — 72050 X-RAY EXAM NECK SPINE 4/5VWS: CPT | Mod: 26

## 2021-03-02 PROCEDURE — 93010 ELECTROCARDIOGRAM REPORT: CPT

## 2021-03-02 PROCEDURE — 71046 X-RAY EXAM CHEST 2 VIEWS: CPT | Mod: 26

## 2021-03-02 NOTE — H&P PST ADULT - HISTORY OF PRESENT ILLNESS
71 y/o male presents to [facility] for nasal debridement, removal of hardware, ablation of tissue with Dr. Delgadillo in [facility] [place] on 3/9/21    Pt had partial rhinectomy, septectomy and partial maxillectomy with left suprahyoid neck dissection (Dr. Gonzalez), with reconstruction of nose and hard palate with left radial forearm osteocutaneous flap on 10/19/20. Pt now reports a hard bump popped up on his nose last week that is nontender, no drainage. Pt states that he has right nasal blockage and can't smell. Pt is now ready for above procedure to alleviate discomfort.    Pt has sees a pulmonologist, pt states that he had bronchitis often. Pt is on a ventolin prn. Pt states last exacerbation that he needed to use rescue inhaler many years ago.    Pt has RA, pt was on Embrel pt discontinued in 5/20 following dx of cancer. Pt recently on prednisone 1 week ago due to flair of RA pain in neck.    Pt placed on cipro by Dr. Gonzalez due to elevated WBC, pt started on 2/27/21 for 10 days.     PATIENT CURRENTLY DENIES CHEST PAIN  SHORTNESS OF BREATH  PALPITATIONS,  DYSURIA, OR UPPER RESPIRATORY INFECTION IN PAST 2 WEEKS  EXERCISE  TOLERANCE  1-2 FLIGHT OF STAIRS  WITHOUT SHORTNESS OF BREATH  denies travel outside the [place] in the past 30 days  pt denies any covid s/s, or tested positive in the past  pt advised self quarantine till day of procedure    Anesthesia Alert  NO--Difficult Airway  NO--History of neck surgery or radiation  Yes--Limited ROM of neck,   NO--History of Malignant hyperthermia  NO--No personal or family history of Pseudocholinesterase deficiency.  NO--Prior Anesthesia Complication  NO--Latex Allergy  NO--Loose teeth, upper dentures  Yes--History of Rheumatoid Arthritis  NO--HARVEY  NO--Other_____    Encounter for other preprocedural examination    Malignant neoplasm of nasal cavity    ^C30.0/ 74244/ 92242/ 86507                                                    03/09/21 [facility]    Encounter for other preprocedural examination    Malignant neoplasm of nasal cavity    Arthralgia of bilateral temporomandibular joint    Tendinitis    BPH (benign prostatic hyperplasia)    Malignant neoplasm    Bronchitis    Rheumatoid arthritis    Surgery, elective    History of prostate surgery    Elective surgery    S/P rotator cuff repair

## 2021-03-02 NOTE — H&P PST ADULT - NSICDXPASTMEDICALHX_GEN_ALL_CORE_FT
PAST MEDICAL HISTORY:  Arthralgia of bilateral temporomandibular joint     BPH (benign prostatic hyperplasia)     Bronchitis chronic    Malignant neoplasm nasal cavity    Rheumatoid arthritis     Tendinitis left rotator cuff

## 2021-03-02 NOTE — H&P PST ADULT - NSICDXPASTSURGICALHX_GEN_ALL_CORE_FT
PAST SURGICAL HISTORY:  Elective surgery Rt. foot Hallux Valaus repair-!0/2016    History of prostate surgery TURP    S/P rotator cuff repair Rt. shoulder-2014    Surgery, elective left knee

## 2021-03-02 NOTE — H&P PST ADULT - REASON FOR ADMISSION
69 y/o male presents to [facility] for nasal debridement, removal of hardware, ablation of tissue with Dr. Delgadillo in [facility] [place] on 3/9/21

## 2021-03-04 ENCOUNTER — APPOINTMENT (OUTPATIENT)
Dept: PULMONOLOGY | Facility: CLINIC | Age: 71
End: 2021-03-04
Payer: COMMERCIAL

## 2021-03-04 VITALS
HEART RATE: 106 BPM | HEIGHT: 63 IN | BODY MASS INDEX: 24.68 KG/M2 | OXYGEN SATURATION: 96 % | SYSTOLIC BLOOD PRESSURE: 119 MMHG | TEMPERATURE: 97.9 F | DIASTOLIC BLOOD PRESSURE: 76 MMHG | WEIGHT: 139.31 LBS

## 2021-03-04 PROCEDURE — 99072 ADDL SUPL MATRL&STAF TM PHE: CPT

## 2021-03-04 PROCEDURE — 99214 OFFICE O/P EST MOD 30 MIN: CPT

## 2021-03-04 NOTE — ASSESSMENT
[FreeTextEntry1] : 69yo man w/ T1N0M0 nasal cavity SCC s/p resection and reconstruction with ENT, recent former smoker w/ cx bronchitis and enrolled in lung [place] LDCT program here for follow-up and respiratory pre-op eval. \par \par #Chronic bronchitis\par Patient is clinically stable from respiratory point of view. He has only used his Advair PRN and generally doesn't feel respiratory symptoms in the months following smoking cessation. Does not feel impaired from nasal surgery. Exercise capacity is good. Denies wheezing or dyspnea. Remains abstinent from smoking. \par - discussed holding off continuing Advair for now and will trial observing with only JORDIN PRN monthotherapy and patient was in agreement to try this\par - he will resume advair use if he develops persistent symptoms and notify our office but otherwise continue to use PRN JORDIN for wheezing/tightness\par - he is due for a PFT but will defer in the setting of recent nasal surgery and pending reconstructions \par - he is overdue for LDCT but told patient to call us next time he needs scan of maxillofacial from ENT so we could also obtain chest study at that time\par \par #Preop respiratory eval\par DEVORA IGUDIN is optimized for surgery. he is to be extubated once fully awake and able to protect airway. The patient is to be monitored in the recovery room. They might benefit for high flow oxygen or noninvasive ventilation to prevent or reverse atelectasis. Patient is to be admitted to a monitored bed postoperatively. Avoid oversedation. DEVORA is high risk for DVT and will require bimodal agents for DVT prophylaxis early mobilization is recommended. he is to use the incentive spirometry postoperative. \par \par Return in 3-6mos after surgery

## 2021-03-04 NOTE — HISTORY OF PRESENT ILLNESS
[TextBox_4] : Here for follow-up - has cx bronchitis, and recent diagnosis of T1M0N0 SCC of nasal cavity - sees Dr. Gonzalez and Dr. Delgadillo for ENT. sp resection and pending reconstructive surgery for nasal bone graft following initial surgery. Has quit smoking cold turkey about 1 year and has remained abstinent to date. PMD Dr. Recinos (PH: [phone]) requesting pulm clearance to compliment medical clearance before next surgery. He denies respiratory systems and has only intermittently used advair since last office visit. No ED or UC visits for exacerbation of his bronchitis.

## 2021-03-06 ENCOUNTER — OUTPATIENT (OUTPATIENT)
Dept: OUTPATIENT SERVICES | Facility: HOSPITAL | Age: 71
LOS: 1 days | Discharge: HOME | End: 2021-03-06

## 2021-03-06 ENCOUNTER — LABORATORY RESULT (OUTPATIENT)
Age: 71
End: 2021-03-06

## 2021-03-06 DIAGNOSIS — Z11.59 ENCOUNTER FOR SCREENING FOR OTHER VIRAL DISEASES: ICD-10-CM

## 2021-03-06 DIAGNOSIS — Z41.9 ENCOUNTER FOR PROCEDURE FOR PURPOSES OTHER THAN REMEDYING HEALTH STATE, UNSPECIFIED: Chronic | ICD-10-CM

## 2021-03-06 DIAGNOSIS — Z98.890 OTHER SPECIFIED POSTPROCEDURAL STATES: Chronic | ICD-10-CM

## 2021-03-07 NOTE — PHYSICAL EXAM
[de-identified] : nasal dorsum with deviation to the right,  stepoff over left nasal bone, soft tissue envelope intact, columellar bony reconstruction free end exposed at nasal tip, to the left of midline, left lateral rhinotomy incision well healed [Midline] : trachea located in midline position [Normal] : no rashes [de-identified] : Left UE: incision healing well, distal forearm wound now completely epithelialized. No exposed tendon.

## 2021-03-07 NOTE — HISTORY OF PRESENT ILLNESS
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 Patient states felt a lesion in his left nostril about three years ago. Recently diagnosed in July 2020 by Dr. 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 Patient had biopsy performed in 7/2020. Patient admits minimal bleeding at times. Pain when touching his nose. He admits some weight loss when he found out; changed his diet. Lost about 14 lbs. Patient denies any fevers. No night sweats. Presented at H&N TB at [facility], surgery recommended. \par \par He is right handed. He wears a full upper denture plate. \par \par 11/12/2020 70 yr old male comes in today f/u on  nasal septum squamous cell carcinoma, s/p septectomy (Carlos), left OCRFFF, STSG, free bone graft, free cartilage graft intranasal. Has a f/up appt with RT, to start RT on 11/23/20.  Pt states he had difficulty eating for a while. Now is eating habits are better but still no taste. \par \par 01/19/2021: Patient returns today following up on Nasal septum squamous cell carcinoma. Finished radiation treatment yesterday (1/18/21), in [facility] (Dr Denise Baca), 6 week course. Reports dry mouth, no taste, + nasal obstruction, started mid-way through radiation.  [FreeTextEntry1] : \par 3/1/21: Patient presents today following up on squamous cell carcinoma of nasal cavity. Patient states started having nasal bone deformity on 2/25/21. At first was told it was a boil on his nose; given antibiotics. Patient saw Dr. Gonzalez who suspected part of the bony reconstruction was coming through his skin. Denies pain, mild nasal obstruction.

## 2021-03-07 NOTE — ASSESSMENT
[FreeTextEntry1] : - nasal framework reconstruction has shifted significant since undergoing radiation. Discussed need for removal of exposed bony columellar segment. Timing of definitive reconstructive surgery of the nose to be determined based on how he heals after removal of the bony segment. Discussed imaging needs following radiation. Dr Gonzalez will delay this by several weeks. Discussed risks, benefits, and alternatives to treatment in extensive detail. Patient asked questions and these were answered to his apparent satisfaction. Patient gave informed written consent.\par - CT maxface with contrast for surgical planning

## 2021-03-08 NOTE — ASU PATIENT PROFILE, ADULT - PMH
Arthralgia of bilateral temporomandibular joint    BPH (benign prostatic hyperplasia)    Bronchitis  chronic  Malignant neoplasm  nasal cavity  Rheumatoid arthritis    Tendinitis  left rotator cuff

## 2021-03-08 NOTE — ASU PATIENT PROFILE, ADULT - PSH
Elective surgery  Rt. foot Hallux Valaus repair-!0/2016  History of prostate surgery  TURP  S/P rotator cuff repair  Rt. shoulder-2014  Surgery, elective  left knee

## 2021-03-09 ENCOUNTER — RESULT REVIEW (OUTPATIENT)
Age: 71
End: 2021-03-09

## 2021-03-09 ENCOUNTER — APPOINTMENT (OUTPATIENT)
Dept: OTOLARYNGOLOGY | Facility: AMBULATORY SURGERY CENTER | Age: 71
End: 2021-03-09

## 2021-03-09 ENCOUNTER — INPATIENT (INPATIENT)
Facility: HOSPITAL | Age: 71
LOS: 0 days | Discharge: HOME | End: 2021-03-10
Attending: OTOLARYNGOLOGY | Admitting: OTOLARYNGOLOGY
Payer: COMMERCIAL

## 2021-03-09 VITALS
HEART RATE: 69 BPM | SYSTOLIC BLOOD PRESSURE: 132 MMHG | WEIGHT: 145.06 LBS | HEIGHT: 61 IN | DIASTOLIC BLOOD PRESSURE: 68 MMHG | TEMPERATURE: 98 F | OXYGEN SATURATION: 98 % | RESPIRATION RATE: 18 BRPM

## 2021-03-09 DIAGNOSIS — J44.9 CHRONIC OBSTRUCTIVE PULMONARY DISEASE, UNSPECIFIED: ICD-10-CM

## 2021-03-09 DIAGNOSIS — Z85.828 PERSONAL HISTORY OF OTHER MALIGNANT NEOPLASM OF SKIN: ICD-10-CM
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
## 2021-03-09 DIAGNOSIS — N40.0 BENIGN PROSTATIC HYPERPLASIA WITHOUT LOWER URINARY TRACT SYMPTOMS: ICD-10-CM

## 2021-03-09 DIAGNOSIS — I42.2 OTHER HYPERTROPHIC CARDIOMYOPATHY: ICD-10-CM

## 2021-03-09 DIAGNOSIS — Z98.890 OTHER SPECIFIED POSTPROCEDURAL STATES: Chronic | ICD-10-CM
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
## 2021-03-09 DIAGNOSIS — Z41.9 ENCOUNTER FOR PROCEDURE FOR PURPOSES OTHER THAN REMEDYING HEALTH STATE, UNSPECIFIED: Chronic | ICD-10-CM

## 2021-03-09 DIAGNOSIS — Z92.3 PERSONAL HISTORY OF IRRADIATION: ICD-10-CM

## 2021-03-09 DIAGNOSIS — T84.398A: ICD-10-CM

## 2021-03-09 DIAGNOSIS — M95.0 ACQUIRED DEFORMITY OF NOSE: ICD-10-CM

## 2021-03-09 DIAGNOSIS — M06.9 RHEUMATOID ARTHRITIS, UNSPECIFIED: ICD-10-CM

## 2021-03-09 DIAGNOSIS — Z85.22 PERSONAL HISTORY OF MALIGNANT NEOPLASM OF NASAL CAVITIES, MIDDLE EAR, AND ACCESSORY SINUSES: ICD-10-CM

## 2021-03-09 PROCEDURE — 31237 NSL/SINS NDSC SURG BX POLYPC: CPT

## 2021-03-09 PROCEDURE — 88304 TISSUE EXAM BY PATHOLOGIST: CPT | Mod: 26

## 2021-03-09 PROCEDURE — 30310 REMOVE NASAL FOREIGN BODY: CPT

## 2021-03-09 PROCEDURE — 88300 SURGICAL PATH GROSS: CPT | Mod: 26,59

## 2021-03-09 PROCEDURE — 20680 REMOVAL OF IMPLANT DEEP: CPT

## 2021-03-09 PROCEDURE — 88311 DECALCIFY TISSUE: CPT | Mod: 26

## 2021-03-09 RX ORDER — OXYCODONE AND ACETAMINOPHEN 5; 325 MG/1; MG/1
1 TABLET ORAL EVERY 6 HOURS
Refills: 0 | Status: DISCONTINUED | OUTPATIENT
Start: 2021-03-09 | End: 2021-03-10

## 2021-03-09 RX ORDER — FINASTERIDE 5 MG/1
5 TABLET, FILM COATED ORAL DAILY
Refills: 0 | Status: DISCONTINUED | OUTPATIENT
Start: 2021-03-09 | End: 2021-03-10

## 2021-03-09 RX ORDER — PREDNISOLONE 5 MG
7.5 TABLET ORAL DAILY
Refills: 0 | Status: DISCONTINUED | OUTPATIENT
Start: 2021-03-09 | End: 2021-03-09

## 2021-03-09 RX ORDER — ONDANSETRON 8 MG/1
4 TABLET, FILM COATED ORAL ONCE
Refills: 0 | Status: DISCONTINUED | OUTPATIENT
Start: 2021-03-09 | End: 2021-03-09

## 2021-03-09 RX ORDER — SENNA PLUS 8.6 MG/1
2 TABLET ORAL AT BEDTIME
Refills: 0 | Status: DISCONTINUED | OUTPATIENT
Start: 2021-03-09 | End: 2021-03-10

## 2021-03-09 RX ORDER — SODIUM CHLORIDE 9 MG/ML
1000 INJECTION INTRAMUSCULAR; INTRAVENOUS; SUBCUTANEOUS
Refills: 0 | Status: DISCONTINUED | OUTPATIENT
Start: 2021-03-09 | End: 2021-03-10

## 2021-03-09 RX ORDER — ALBUTEROL 90 UG/1
1 AEROSOL, METERED ORAL EVERY 6 HOURS
Refills: 0 | Status: DISCONTINUED | OUTPATIENT
Start: 2021-03-09 | End: 2021-03-10

## 2021-03-09 RX ORDER — ACETAMINOPHEN 500 MG
650 TABLET ORAL EVERY 6 HOURS
Refills: 0 | Status: DISCONTINUED | OUTPATIENT
Start: 2021-03-09 | End: 2021-03-10

## 2021-03-09 RX ORDER — HEPARIN SODIUM 5000 [USP'U]/ML
5000 INJECTION INTRAVENOUS; SUBCUTANEOUS EVERY 8 HOURS
Refills: 0 | Status: DISCONTINUED | OUTPATIENT
Start: 2021-03-09 | End: 2021-03-10

## 2021-03-09 RX ORDER — SODIUM CHLORIDE 9 MG/ML
1000 INJECTION, SOLUTION INTRAVENOUS
Refills: 0 | Status: DISCONTINUED | OUTPATIENT
Start: 2021-03-09 | End: 2021-03-09

## 2021-03-09 RX ORDER — OXYCODONE AND ACETAMINOPHEN 5; 325 MG/1; MG/1
1 TABLET ORAL EVERY 4 HOURS
Refills: 0 | Status: DISCONTINUED | OUTPATIENT
Start: 2021-03-09 | End: 2021-03-09

## 2021-03-09 RX ORDER — HYDROMORPHONE HYDROCHLORIDE 2 MG/ML
0.5 INJECTION INTRAMUSCULAR; INTRAVENOUS; SUBCUTANEOUS
Refills: 0 | Status: DISCONTINUED | OUTPATIENT
Start: 2021-03-09 | End: 2021-03-09

## 2021-03-09 RX ORDER — SODIUM CHLORIDE 0.65 %
1 AEROSOL, SPRAY (ML) NASAL
Refills: 0 | Status: DISCONTINUED | OUTPATIENT
Start: 2021-03-09 | End: 2021-03-10

## 2021-03-09 RX ADMIN — HEPARIN SODIUM 5000 UNIT(S): 5000 INJECTION INTRAVENOUS; SUBCUTANEOUS at 22:20

## 2021-03-09 RX ADMIN — ALBUTEROL 1 PUFF(S): 90 AEROSOL, METERED ORAL at 20:18

## 2021-03-09 RX ADMIN — SODIUM CHLORIDE 100 MILLILITER(S): 9 INJECTION, SOLUTION INTRAVENOUS at 09:11

## 2021-03-09 NOTE — ASU DISCHARGE PLAN (ADULT/PEDIATRIC) - CARE PROVIDER_API CALL
Cari Delgadillo)  Otolaryngology  [address] [place] [address], [address] [place] [address]  Phone: [phone]  Fax: [phone]  Follow Up Time: 1 week

## 2021-03-09 NOTE — BRIEF OPERATIVE NOTE - OPERATION/FINDINGS
bone graft secured to mendoza pre-maxilla, removed using wire cutters, crusting in nasal cavity, debrided bone graft secured to mendoza pre-maxilla, removed using wire cutters, crusting in nasal cavity, debrided, nasal dorsum wound closed primarily

## 2021-03-09 NOTE — PROGRESS NOTE ADULT - SUBJECTIVE AND OBJECTIVE BOX
POC     Procedure:  Nasal Endoscopy, Debridement nasal cavity, Removal hardware nasal cavity, Removal bone graft, complex closure nasal dorsum  70 y.o M with PMH  hypertrophic cardiomyopathy, COPD, BPH now s/p partial rhinectomy/septectomy, left level I neck dissection, left radial forearm free flap, STSG, insertion of cadaveric bone and cartilage, and ORIF L radius POD# 0 s/p Nasal Endoscopy, Debridement nasal cavity, Removal hardware nasal cavity, Removal bone graft, complex closure nasal dorsum. Admitted to ENT    Pt. seen and examined at bedside Vent Unit, + Flatus, no BM, No void yet. Pt. denies difficulty breathing, CP, fever, chills, N/V, abdominal pain.   SpO2 94 % on room air.     ROS:   [ x ] A 10 Point Review of Systems was negative except where noted       acetaminophen   Tablet .. 650 milliGRAM(s) Oral every 6 hours PRN  ALBUTerol    90 MICROgram(s) HFA Inhaler 1 Puff(s) Inhalation every 6 hours  finasteride 5 milliGRAM(s) Oral daily  HYDROmorphone  Injectable 0.5 milliGRAM(s) IV Push every 10 minutes PRN  lactated ringers. 1000 milliLiter(s) IV Continuous <Continuous>  ondansetron Injectable 4 milliGRAM(s) IV Push once PRN  oxycodone    5 mG/acetaminophen 325 mG 1 Tablet(s) Oral every 6 hours PRN  senna 2 Tablet(s) Oral at bedtime PRN  sodium chloride 0.65% Nasal 1 Spray(s) Both Nostrils two times a day PRN  sodium chloride 0.9%. 1000 milliLiter(s) IV Continuous <Continuous>      Vital Signs Last 24 Hrs  T(C): 35.8 (09 Mar 2021 14:51), Max: 36.4 (09 Mar 2021 06:42)  T(F): 96.4 (09 Mar 2021 14:51), Max: 97.5 (09 Mar 2021 06:42)  HR: 85 (09 Mar 2021 14:51) (54 - 85)  BP: 109/62 (09 Mar 2021 14:51) (108/59 - 136/77)  BP(mean): 80 (09 Mar 2021 14:51) (80 - 80)  RR: 16 (09 Mar 2021 14:51) (11 - 18)  SpO2: 97% (09 Mar 2021 14:51) (97% - 100%)      PE  Constitutional: NAD, well-developed, well nourished   HEENT: NC, EOMI  Nose: septal deformity noted over th bridge sutures in place, no evidence of active bleeding. B/L nare w/o evidence of active bleeding, + septal deformity noted. Band aid re-applied to nose.   Neck: no pain, no LAD.   Back: No CVA tenderness  Respiratory: No accessory respiratory muscle use  Abd: Soft, NT/ND     Extremities:  GUERRERO x 4, no edema. Large laft radial scar well healed   Neurological: A/O x 3  Psychiatric: Normal mood, normal affect

## 2021-03-09 NOTE — BRIEF OPERATIVE NOTE - NSICDXBRIEFPROCEDURE_GEN_ALL_CORE_FT
PROCEDURES:  Endoscopy, nasal, adult 09-Mar-2021 07:52:09 debridement nasal cavity, removal hardware nasal cavity, removal bone graft Cari Delgadillo   PROCEDURES:  Endoscopy, nasal, adult 09-Mar-2021 07:52:09 debridement nasal cavity, removal hardware nasal cavity, removal bone graft, complex closure nasal dorsum Cari Delgadillo

## 2021-03-09 NOTE — PATIENT PROFILE ADULT - DO YOU FEEL LIKE HURTING YOURSELF OR OTHERS?
Discharge Summary  Hospitalist    Date of Admission:  12/19/2020  Date of Discharge:  12/22/2020  Discharging Provider: Mary Nunez MD  Date of Service (when I saw the patient): 12/22/20    Discharge Diagnoses   Closed displaced intertrochanteric fracture of left femur, initial encounter s/p cephalomedullary nailing of left intertrochanteric hip fracture    Fall, initial encounter    Knee abrasion, left, initial encounter    History of Present Illness   Please refer H & P for details.      Hospital Course     Braxton Mantilla is a 72 year old male admitted on 12/19/2020. He presents with hip pain following a fall.        Closed displaced intertrochanteric fracture of left femur, initial encounter (H)    Fall, initial encounter    Knee abrasion, left, initial encounter    Assessment: Presents with left hip pain in setting of what appears to be a mechanical fall on the day of admission.  X-ray of his left pelvis shows a mild to moderate displacement of his left intertrochanteric fracture.  His CT head was negative given there was concern of some degree of head trauma.  His chest x-ray shows patchy left basilar opacities, but he is without any significant pulmonary symptoms.    Plan:   -Orthopedic surgery consultation -> s/p  cephalomedullary nailing of left intertrochanteric hip fracture  -pain control as needed  -Fall precautions  -Regular diet  -PT/OT/SW: Discharged to TCU for further rehab in stable condition  -PTA Xarelto resumed for DVT prophylaxis       Chronic atrial fibrillation (H)    Long term current use of anticoagulant therapy    Assessment/Plan: Resumed PTA Xarelto  Currently rate controlled, continue PTA BB       Essential hypertension    Assessment/Plan: resume PTA BB / Lisinopril / Norvasc       Hereditary and idiopathic peripheral neuropathy      Type 2 diabetes mellitus (H)    Assessment/Plan: currently diet controlled  Started on gabapentin at bedtime       Hypothyroidism    Assessment/Plan:  continue PTA thyroid replacement     HLD  -Continue PTA simvastatin.  Is on high-dose simvastatin and amlodipine.  Defer to PCP to consider decreasing simvastatin dosing given interaction with amlodipine.       Mary Nunez MD, MD      Pending Results   These results will be followed up by Hospitalist team.  Unresulted Labs Ordered in the Past 30 Days of this Admission     No orders found from 11/19/2020 to 12/20/2020.          Code Status   Full Code       Primary Care Physician   Physician No Ref-Primary    Follow-ups Needed After Discharge   Follow-up Appointments     Follow Up and recommended labs and tests      Follow up with Dr. Parisi at [facility] 2 weeks post surgery or ortho provider   at TCU  Call [phone] for appointment and questions.             Physical Exam   Temp: 98  F (36.7  C) Temp src: Oral BP: 119/75 Pulse: 61   Resp: 16 SpO2: 99 % O2 Device: None (Room air)    Vitals:    12/19/20 1045   Weight: 65.8 kg (145 lb)     Vital Signs with Ranges  Temp:  [98  F (36.7  C)-99.3  F (37.4  C)] 98  F (36.7  C)  Pulse:  [] 61  Resp:  [16] 16  BP: (107-119)/(58-78) 119/75  SpO2:  [92 %-99 %] 99 %  I/O last 3 completed shifts:  In: -   Out: 700 [Urine:700]  Constitutional: awake, alert, cooperative, no apparent distress.   Respiratory: Nonlabored breathing  Cardiovascular: RRR   GI: soft, non-distended, non-tender.   Neurologic: Awake, alert, oriented to name, place and time.  Fluent speech, no facial asymmetry  Neuropsychiatric: normal mood and affect         Discharge Disposition   Discharged to short-term care facility  Condition at discharge: Stable    Consultations This Hospital Stay   ORTHOPEDIC SURGERY IP CONSULT  PHYSICAL THERAPY ADULT IP CONSULT  OCCUPATIONAL THERAPY ADULT IP CONSULT  CARE MANAGEMENT / SOCIAL WORK IP CONSULT  PHYSICAL THERAPY ADULT IP CONSULT  OCCUPATIONAL THERAPY ADULT IP CONSULT    Time Spent on this Encounter   Mary ALEX MD, personally saw the patient today and  spent greater than 30 minutes discharging this patient.    Discharge Orders      General info for SNF    Length of Stay Estimate: Short Term Care: Estimated # of Days <30  Condition at Discharge: Improving  Level of care:skilled   Rehabilitation Potential: Good  Admission H&P remains valid and up-to-date: Yes  Recent Chemotherapy: N/A  Use Nursing Home Standing Orders: Yes     Mantoux instructions    Give two-step Mantoux (PPD) Per Facility Policy Yes     Reason for your hospital stay    Left hip fracture     Wound care (specify)    Site:   Left lateral hip  Instructions:  Keep dressings intact, change if >60% saturated or peeling off.  Keep incision dry, able to shower over dressings     Activity - Up with assistive device    Up with walker     Weight bearing status    WBAT Left LE with walker     Follow Up and recommended labs and tests    Follow up with Dr. Parisi at [facility] 2 weeks post surgery or ortho provider at [facility]  Call [phone] for appointment and questions.     Physical Therapy Adult Consult    Evaluate and treat as clinically indicated.    Reason:  Left hip fracture: IM Nail     Occupational Therapy Adult Consult    Evaluate and treat as clinically indicated.    Reason:  Left hip fracture: IM Nail     Fall precautions     Crutches DME    DME Documentation: Describe the reason for need to support medical necessity: Impaired gait status post hip surgery. I, the undersigned, certify that the above prescribed supplies are medically necessary for this patient and is both reasonable and necessary in reference to accepted standards of medical practice in the treatment of this patient's condition and is not prescribed as a convenience.     Cane DME    DME Documentation: Describe the reason for need to support medical necessity: Impaired gait status post hip surgery. I, the undersigned, certify that the above prescribed supplies are medically necessary for this patient and is both reasonable and necessary in  "reference to accepted standards of medical practice in the treatment of this patient's condition and is not prescribed as a convenience.     Walker DME    : DME Documentation: Describe the reason for need to support medical necessity: Impaired gait status post hip surgery. I, the undersigned, certify that the above prescribed supplies are medically necessary for this patient and is both reasonable and necessary in reference to accepted standards of medical practice in the treatment of this patient's condition and is not prescribed as a convenience.     Advance Diet as Tolerated    Follow this diet upon discharge: Orders Placed This Encounter      Advance Diet as Tolerated: Regular Diet Adult     Discharge Medications   Current Discharge Medication List      START taking these medications    Details   gabapentin (NEURONTIN) 100 MG capsule Take 1 capsule (100 mg) by mouth At Bedtime  Qty:      Associated Diagnoses: Closed displaced intertrochanteric fracture of left femur, initial encounter (H)      ondansetron (ZOFRAN-ODT) 4 MG ODT tab Take 1 tablet (4 mg) by mouth every 6 hours as needed for nausea or vomiting  Qty: 8 tablet, Refills: 0    Associated Diagnoses: Closed displaced intertrochanteric fracture of left femur, initial encounter (H)      oxyCODONE (ROXICODONE) 5 MG tablet Take 1-2 tablets (5-10 mg) by mouth every 4 hours as needed for moderate to severe pain 1 tab po q 4 hrs prn pain scale \"1-5\"  2 tabs po q 4 hrs prn pain scale \"6-10\"  Qty: 30 tablet, Refills: 0    Associated Diagnoses: Closed displaced intertrochanteric fracture of left femur, initial encounter (H)         CONTINUE these medications which have CHANGED    Details   acetaminophen (TYLENOL) 325 MG tablet Take 2 tablets (650 mg) by mouth every 4 hours as needed for other (For optimal non-opioid multimodal pain management to improve pain control.)  Qty: 30 tablet, Refills: 0    Associated Diagnoses: Closed displaced intertrochanteric fracture " of left femur, initial encounter (H)         CONTINUE these medications which have NOT CHANGED    Details   amLODIPine (NORVASC) 10 MG tablet Take 10 mg by mouth daily      lactulose (CHRONULAC) 10 GM/15ML solution Take 10 g by mouth 2 times daily as needed for constipation      levothyroxine (SYNTHROID/LEVOTHROID) 112 MCG tablet Take 112 mcg by mouth daily      lisinopril (ZESTRIL) 40 MG tablet Take 20 mg by mouth daily      methocarbamol (ROBAXIN) 500 MG tablet Take 500 mg by mouth 2 times daily as needed for muscle spasms      metoprolol succinate ER (TOPROL-XL) 100 MG 24 hr tablet Take 50 mg by mouth daily      rivaroxaban ANTICOAGULANT (XARELTO) 20 MG TABS tablet Take 20 mg by mouth daily (with dinner)      senna-docusate (SENOKOT-S/PERICOLACE) 8.6-50 MG tablet Take 1 tablet by mouth daily And daily PRN      sildenafil (VIAGRA) 100 MG tablet Take 100 mg by mouth daily as needed      simvastatin (ZOCOR) 80 MG tablet Take by mouth At Bedtime           Allergies   No Known Allergies  Data   Most Recent 3 CBC's:  Recent Labs   Lab Test 12/22/20  0902 12/21/20  0703 12/20/20  1041 12/19/20  0846   WBC 8.0  --  8.3 11.4*   HGB 11.8* 12.2* 12.8* 13.6   *  --  109* 106*     --  190 234      Most Recent 3 BMP's:  Recent Labs   Lab Test 12/22/20  0902 12/21/20  0703 12/20/20  1041 12/19/20  0846     --  139 138   POTASSIUM 4.6  --  4.3 4.1   CHLORIDE 108  --  106 106   CO2 30  --  28 25   BUN 6*  --  11 9   CR 0.68  --  0.93 1.00   ANIONGAP 2*  --  5 7   FADI 8.5  --  8.6 8.6   * 96 87 116*     Most Recent 2 LFT's:No lab results found.  Most Recent INR's and Anticoagulation Dosing History:  Anticoagulation Dose History     Recent Dosing and Labs Latest Ref Rng & Units 12/19/2020    INR 0.86 - 1.14 1.27(H)        Most Recent 3 Troponin's:No lab results found.  Most Recent Cholesterol Panel:No lab results found.  Most Recent 6 Bacteria Isolates From Any Culture (See EPIC Reports for Culture  Details):No lab results found.  Most Recent TSH, T4 and A1c Labs:No lab results found.    Results for orders placed or performed during the hospital encounter of 12/19/20   XR Pelvis w Hip Left 1 View    Narrative    PELVIS AND HIP LEFT ONE VIEW   12/19/2020 9:01 AM     HISTORY: Pain    COMPARISON: 3/11/2004 x-ray.      Impression    IMPRESSION: Mild to moderate displacement of left intertrochanteric  fracture. Hip joint spaces are fairly well preserved. Aortoiliac stent  seen.    FAUSTO HAILE MD   CT Head w/o Contrast    Narrative    CT SCAN OF THE HEAD WITHOUT CONTRAST   12/19/2020 11:03 AM     HISTORY: Fell, bumped head, on Xarelto.    TECHNIQUE: Axial images of the head and coronal reformations without  IV contrast material. Radiation dose for this scan was reduced using  automated exposure control, adjustment of the mA and/or kV according  to patient size, or iterative reconstruction technique.    COMPARISON: None.    FINDINGS:     Intracranial contents: There is no evidence for midline shift mass or  mass effect. No evidence for acute infarction. No extra-axial blood  products or fluid collections. Mild generalized cerebral and  cerebellar parenchymal volume loss, age appropriate. Mild chronic  small vessel ischemic/degenerative changes of aging are identified in  the deep white matter both cerebral hemispheres. No extra-axial blood  products or fluid collections are noted. Satisfactory position of the  cerebellar tonsils. Sella appears within normal limits.    There is no evidence of intracranial hemorrhage, mass, acute infarct  or anomaly.    Visualized orbits/sinuses/mastoids: Paranasal sinuses and mastoid air  cells are clear with no air-fluid levels. Debris/cerumen in left EAC  suggested. Degenerative changes both TMJs. No acute intraorbital  process.    Osseous structures/soft tissues: No evidence for acute fracture of the  calvarium or skull base. No significant swelling of the facial or  scalp  tissues is apparent. No significant scalp hematoma is  identified.      Impression    IMPRESSION:   1. No acute intracranial process.  2. No fractures  3. Mild chronic-appearing ischemic changes intracranially as above,  age appropriate.  4. No extra-axial blood products/fluid collections are noted.      KEVON FENG MD   XR Chest 1 View    Narrative    XR CHEST 1 VW   12/19/2020 9:01 AM     HISTORY: Fall, possible broken hip    COMPARISON: None.      Impression    IMPRESSION:   1. Patchy left basilar opacities, may represent atelectasis or  pneumonitis and follow-up is recommended to assess for resolution.  2. No pleural effusion or pneumothorax.  3. Cardiomediastinal silhouette unremarkable.    MARBIN SZYMANSKI MD   XR Surgery OLINDA L/T 5 Min Fluoro w Stills    Narrative    EXAM: XR SURGERY C-ARM FLUORO LESS THAN 5 MIN W STILLS  LOCATION: [facility]  DATE/TIME: 12/20/2020 8:00 PM    INDICATION: Intraoperative views.  COMPARISON: X-ray from 12/19/2020.      Impression    IMPRESSION: 5 images of the left hip demonstrate a cephalomedullary screw fixing an intertrochanteric fracture in good position. No evidence of complication.              no

## 2021-03-09 NOTE — CHART NOTE - NSCHARTNOTEFT_GEN_A_CORE
PACU ANESTHESIA ADMISSION NOTE      Procedure: Endoscopy, nasal, adult  debridement nasal cavity, removal hardware nasal cavity, removal bone graft, complex closure nasal dorsum      Post op diagnosis:  Acquired nasal deformity        ____  Intubated  TV:______       Rate: ______      FiO2: ______    _x___  Patent Airway    _x___  Full return of protective reflexes    __  Full recovery from anesthesia / back to baseline status    Vitals:  T97.6  HR: 62  BP: 136/77  RR: 18 (03-09-21 @ 09:21) (18 - 18)  SpO2: 100% (03-09-21 @ 09:21) (98% - 100%)    Mental Status:  _x___ Awake   _____ Alert   _____ Drowsy   _____ Sedated    Nausea/Vomiting:  _x___  NO       ______Yes,   See Post - Op Orders         Pain Scale (0-10):  __0___    Treatment: _x___ None    ____ See Post - Op/PCA Orders    Post - Operative Fluids:   __x__ Oral   ____ See Post - Op Orders    Plan: Discharge:   ___Home       __x___Floor     _____Critical Care    _____  Other:_________________    Comments:  No anesthesia issues or complications noted.  Discharge when criteria met.

## 2021-03-10 ENCOUNTER — TRANSCRIPTION ENCOUNTER (OUTPATIENT)
Age: 71
End: 2021-03-10

## 2021-03-10 VITALS
TEMPERATURE: 97 F | RESPIRATION RATE: 19 BRPM | DIASTOLIC BLOOD PRESSURE: 65 MMHG | HEART RATE: 68 BPM | SYSTOLIC BLOOD PRESSURE: 111 MMHG

## 2021-03-10 LAB
ANION GAP SERPL CALC-SCNC: 8 MMOL/L — SIGNIFICANT CHANGE UP (ref 7–14)
BUN SERPL-MCNC: 19 MG/DL — SIGNIFICANT CHANGE UP (ref 10–20)
CALCIUM SERPL-MCNC: 9.1 MG/DL — SIGNIFICANT CHANGE UP (ref 8.5–10.1)
CHLORIDE SERPL-SCNC: 105 MMOL/L — SIGNIFICANT CHANGE UP (ref 98–110)
CO2 SERPL-SCNC: 25 MMOL/L — SIGNIFICANT CHANGE UP (ref 17–32)
CREAT SERPL-MCNC: 1 MG/DL — SIGNIFICANT CHANGE UP (ref 0.7–1.5)
GLUCOSE SERPL-MCNC: 67 MG/DL — LOW (ref 70–99)
HCT VFR BLD CALC: 32.9 % — LOW (ref 42–52)
HGB BLD-MCNC: 9.8 G/DL — LOW (ref 14–18)
MCHC RBC-ENTMCNC: 23 PG — LOW (ref 27–31)
MCHC RBC-ENTMCNC: 29.8 G/DL — LOW (ref 32–37)
MCV RBC AUTO: 77 FL — LOW (ref 80–94)
NRBC # BLD: 0 /100 WBCS — SIGNIFICANT CHANGE UP (ref 0–0)
PLATELET # BLD AUTO: 234 K/UL — SIGNIFICANT CHANGE UP (ref 130–400)
POTASSIUM SERPL-MCNC: 4.3 MMOL/L — SIGNIFICANT CHANGE UP (ref 3.5–5)
POTASSIUM SERPL-SCNC: 4.3 MMOL/L — SIGNIFICANT CHANGE UP (ref 3.5–5)
RBC # BLD: 4.27 M/UL — LOW (ref 4.7–6.1)
RBC # FLD: 17.7 % — HIGH (ref 11.5–14.5)
SODIUM SERPL-SCNC: 138 MMOL/L — SIGNIFICANT CHANGE UP (ref 135–146)
WBC # BLD: 6.2 K/UL — SIGNIFICANT CHANGE UP (ref 4.8–10.8)
WBC # FLD AUTO: 6.2 K/UL — SIGNIFICANT CHANGE UP (ref 4.8–10.8)

## 2021-03-10 PROCEDURE — 13151 CMPLX RPR E/N/E/L 1.1-2.5 CM: CPT

## 2021-03-10 RX ADMIN — Medication 7.5 MILLIGRAM(S): at 06:35

## 2021-03-10 RX ADMIN — HEPARIN SODIUM 5000 UNIT(S): 5000 INJECTION INTRAVENOUS; SUBCUTANEOUS at 06:35

## 2021-03-10 NOTE — DISCHARGE NOTE PROVIDER - NSDCMRMEDTOKEN_GEN_ALL_CORE_FT
dutasteride 0.5 mg oral capsule: 1 cap(s) orally once a day  Flomax 0.4 mg oral capsule: 1 cap(s) orally once a day  predniSONE: 7.5 tab(s) orally once a day  ProAir HFA 90 mcg/inh inhalation aerosol: 2 puff(s) inhaled every 6 hours

## 2021-03-10 NOTE — PROGRESS NOTE ADULT - SUBJECTIVE AND OBJECTIVE BOX
Subjective:   Pt. seen and examined at bedside in NAD, no acute events overnight, stattes he feels his nose is stuffy did not use Nasal saline today. Awaiting  D/C home. Afebrile.     ROS:   [ x ] A 10 Point Review of Systems was negative except where noted      acetaminophen   Tablet .. 650 milliGRAM(s) Oral every 6 hours PRN  ALBUTerol    90 MICROgram(s) HFA Inhaler 1 Puff(s) Inhalation every 6 hours  finasteride 5 milliGRAM(s) Oral daily  heparin   Injectable 5000 Unit(s) SubCutaneous every 8 hours  oxycodone    5 mG/acetaminophen 325 mG 1 Tablet(s) Oral every 6 hours PRN  predniSONE   Tablet 7.5 milliGRAM(s) Oral daily  senna 2 Tablet(s) Oral at bedtime PRN  sodium chloride 0.65% Nasal 1 Spray(s) Both Nostrils two times a day PRN  sodium chloride 0.9%. 1000 milliLiter(s) IV Continuous <Continuous>        Vital Signs Last 24 Hrs  T(C): 36.1 (10 Mar 2021 06:25), Max: 36.1 (09 Mar 2021 20:19)  T(F): 97 (10 Mar 2021 06:25), Max: 97 (09 Mar 2021 20:19)  HR: 83 (10 Mar 2021 06:25) (54 - 85)  BP: 116/66 (10 Mar 2021 06:25) (108/59 - 121/64)  BP(mean): 62 (10 Mar 2021 06:25) (62 - 87)  RR: 18 (10 Mar 2021 06:25) (11 - 20)  SpO2: 97% (10 Mar 2021 07:23) (96% - 99%)      PE  Constitutional: NAD, well-developed, well nourished   HEENT: NC, EOMI  Nose: Nose: septal deformity noted over th bridge sutures in place, no evidence of active bleeding. B/L nare w/o evidence of active bleeding, + septal deformity.    Neck: no pain  Back: No CVA tenderness  Respiratory: No accessory respiratory muscle use  Abd: Soft, NT/ND    Extremities: GUERRERO  x4   Neurological: A/O x 3  Psychiatric: Normal mood, normal affect        LABS:                        9.8    6.20  )-----------( 234      ( 10 Mar 2021 08:41 )             32.9

## 2021-03-10 NOTE — DISCHARGE NOTE NURSING/CASE MANAGEMENT/SOCIAL WORK - PATIENT PORTAL LINK FT
You can access the FollowMyHealth Patient Portal offered by [facility] by registering at the following website: [email or web address]. By joining [employer]’s FollowMyHealth portal, you will also be able to view your health information using other applications (apps) compatible with our system.

## 2021-03-10 NOTE — DISCHARGE NOTE PROVIDER - NSDCFUADDINST_GEN_ALL_CORE_FT
Tylenol 650 mg 1 tab PO Q6H as needed for pain   Cont. All Home medications   Cont. Nasal spray to both nares twice a day   F/U with Dr. Delgadillo in 1 week   In case of fever, chills, uncontrolled bleeding please come back to ED and Call Dr. Mancini.

## 2021-03-10 NOTE — DISCHARGE NOTE PROVIDER - REASON FOR ADMISSION
overnight observation s/p nasal Endoscopy, Debridement nasal cavity, Removal hardware nasal cavity, Removal bone graft, complex closure nasal dorsum.

## 2021-03-10 NOTE — DISCHARGE NOTE PROVIDER - NSDCFUSCHEDAPPT_GEN_ALL_CORE_FT
DEVORA KATZ ; 03/26/2021 ; [facility] Otolaryng [address]  DEVORA KATZ ; 04/23/2021 ; [facility] Orthosurg [address]

## 2021-03-10 NOTE — DISCHARGE NOTE PROVIDER - NSDCCPCAREPLAN_GEN_ALL_CORE_FT
PRINCIPAL DISCHARGE DIAGNOSIS  Diagnosis: Acquired nasal deformity  Assessment and Plan of Treatment:

## 2021-03-10 NOTE — PROGRESS NOTE ADULT - ASSESSMENT
Stable, 70 y.o M  POD# 1 s/p Nasal Endoscopy, Debridement nasal cavity, Removal hardware nasal cavity, Removal bone graft, complex closure nasal dorsum.   hypertrophic cardiomyopathy.   COPD,   BPH  s/p TURP (2018)  h/o partial rhinectomy/septectomy, left level I neck dissection, left radial forearm free flap, STSG, insertion of cadaveric bone and cartilage, and ORIF L radius      Plan:   Analgesia prn x pain  Resume home meds  Stable X D/C home today   Nasal saline spray to b/l nares twice/day  F/u with Dr. Delgadillo in 1 week     
70 y.o M  POD# 0 s/p Nasal Endoscopy, Debridement nasal cavity, Removal hardware nasal cavity, Removal bone graft, complex closure nasal dorsum.   hypertrophic cardiomyopathy,   COPD,   BPH  s/p TURP (2018)  h/o partial rhinectomy/septectomy, left level I neck dissection, left radial forearm free flap, STSG, insertion of cadaveric bone and cartilage, and ORIF L radius      Plan:  Admission to Dr. Delgadillo x Overnight observation   Analgesia prn x pain   B/L SCD's   OOB to chair ambulate  Cont Nasal Saline B/L nares BID  Cont. SpO2 monitoring    Cont. Incentive spirometry   Will d/w Dr. Delgadillo need for SQ Heparin  for DVT ppx (as per Pulmonary: high risk x DVT will require bimodal agents x DVT, early embolization, incentive spirometry)  Advance diet as tolerated, requesting to change diet to soft (no dentures were taken from home)    Heplock fluids when diet is tolerated   AM labs   If Pt. is stable overnight possible D/C home tomorrow AM.   Cont. Home medications

## 2021-03-10 NOTE — DISCHARGE NOTE PROVIDER - CARE PROVIDER_API CALL
Cari Delgadillo)  Otolaryngology  [address] [place] [address], [address] [place] [address]  Phone: [phone]  Fax: [phone]  Follow Up Time:

## 2021-03-10 NOTE — DISCHARGE NOTE PROVIDER - NSDCCPTREATMENT_GEN_ALL_CORE_FT
PRINCIPAL PROCEDURE  Procedure: Endoscopy, nasal, adult  Findings and Treatment: debridement nasal cavity, removal hardware nasal cavity, removal bone graft, complex closure nasal dorsum

## 2021-03-23 ENCOUNTER — APPOINTMENT (OUTPATIENT)
Dept: OTOLARYNGOLOGY | Facility: CLINIC | Age: 71
End: 2021-03-23
Payer: COMMERCIAL

## 2021-03-23 DIAGNOSIS — D50.8 OTHER IRON DEFICIENCY ANEMIAS: ICD-10-CM

## 2021-03-23 DIAGNOSIS — F17.200 NICOTINE DEPENDENCE, UNSPECIFIED, UNCOMPLICATED: ICD-10-CM

## 2021-03-23 LAB — SURGICAL PATHOLOGY STUDY: SIGNIFICANT CHANGE UP

## 2021-03-23 PROCEDURE — 99024 POSTOP FOLLOW-UP VISIT: CPT

## 2021-03-23 NOTE — PHYSICAL EXAM
[de-identified] : nasal dorsum with deviation to the right,  stepoff over left nasal bone, soft tissue envelope intact, nasal tip ptosis and deviated to right, rhinotomy incision well healed, soft tissue edema surrounding this [Midline] : trachea located in midline position [Normal] : no rashes [de-identified] : Left UE: incision healing well, distal forearm wound now completely epithelialized. No exposed tendon.

## 2021-03-23 NOTE — HISTORY OF PRESENT ILLNESS
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 Patient states felt a lesion in his left nostril about three years ago. Recently diagnosed in July 2020 by Dr. 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 Patient had biopsy performed in 7/2020. Patient admits minimal bleeding at times. Pain when touching his nose. He admits some weight loss when he found out; changed his diet. Lost about 14 lbs. Patient denies any fevers. No night sweats. Presented at [facility] at [facility], surgery recommended. \par \par He is right handed. He wears a full upper denture plate. \par \par 11/12/2020 70 yr old male comes in today f/u on  nasal septum squamous cell carcinoma, s/p septectomy (Carlos), left OCRFFF, STSG, free bone graft, free cartilage graft intranasal. Has a f/up appt with RT, to start RT on 11/23/20.  Pt states he had difficulty eating for a while. Now is eating habits are better but still no taste. \par \par 01/19/2021: Patient returns today following up on Nasal septum squamous cell carcinoma. Finished radiation treatment yesterday (1/18/21), in [facility] (Dr Denise Baca), 6 week course. Reports dry mouth, no taste, + nasal obstruction, started mid-way through radiation. \par \par \par 3/1/21: Patient presents today following up on squamous cell carcinoma of nasal cavity. Patient states started having nasal bone deformity on 2/25/21. At first was told it was a boil on his nose; given antibiotics. Patient saw Dr. Gonzalez who suspected part of the bony reconstruction was coming through his skin. Denies pain, mild nasal obstruction.  [FreeTextEntry1] : \par 3/23/21: Patient is s/p nasal framework reconstruction 3/9/21. Patient had CT scan 3/1/21, underwent removal of bone graft 2 weeks ago, has done well since then. Patient doing well. No issues since surgery. He admits numbness.

## 2021-03-23 NOTE — ASSESSMENT
[FreeTextEntry1] : - healing well after bone graft removal nasal cavity\par - f/up with Dr Gonzalez later this week as scheduled\par -  f/up in 2 months, will discuss further management at that time regarding reconstructive surgery for nose
No complaints

## 2021-03-23 NOTE — REASON FOR VISIT
[Post-Operative Visit] : a post-operative visit [FreeTextEntry2] : s/p nasal framework reconstruction 3/9/21

## 2021-04-08 ENCOUNTER — APPOINTMENT (OUTPATIENT)
Dept: OTOLARYNGOLOGY | Facility: CLINIC | Age: 71
End: 2021-04-08
Payer: COMMERCIAL

## 2021-04-08 VITALS
WEIGHT: 145 LBS | DIASTOLIC BLOOD PRESSURE: 87 MMHG | HEIGHT: 63 IN | TEMPERATURE: 96.5 F | BODY MASS INDEX: 25.69 KG/M2 | SYSTOLIC BLOOD PRESSURE: 123 MMHG | HEART RATE: 90 BPM

## 2021-04-08 DIAGNOSIS — J34.0 ABSCESS, FURUNCLE AND CARBUNCLE OF NOSE: ICD-10-CM

## 2021-04-08 PROCEDURE — 31237 NSL/SINS NDSC SURG BX POLYPC: CPT | Mod: LT,58

## 2021-04-08 PROCEDURE — 99024 POSTOP FOLLOW-UP VISIT: CPT

## 2021-04-08 RX ORDER — CIPROFLOXACIN HYDROCHLORIDE 500 MG/1
500 TABLET, FILM COATED ORAL TWICE DAILY
Qty: 20 | Refills: 0 | Status: COMPLETED | COMMUNITY
Start: 2021-02-26 | End: 2021-04-08

## 2021-04-08 RX ORDER — OXYCODONE AND ACETAMINOPHEN 5; 325 MG/1; MG/1
5-325 TABLET ORAL
Qty: 20 | Refills: 0 | Status: COMPLETED | COMMUNITY
Start: 2021-03-08 | End: 2021-04-08

## 2021-04-13 NOTE — PROGRESS NOTE ADULT - ASSESSMENT
Received request for Duloxetine 60mg   Rx last refilled on 02/11/2021 for 30 day supply w/2 RF  Refill refused, too soon   67 yo male s/p TURP

## 2021-04-26 ENCOUNTER — APPOINTMENT (OUTPATIENT)
Dept: ORTHOPEDIC SURGERY | Facility: CLINIC | Age: 71
End: 2021-04-26

## 2021-04-30 PROBLEM — J34.0 CELLULITIS OF NOSE: Status: RESOLVED | Noted: 2021-02-26 | Resolved: 2021-04-30

## 2021-04-30 NOTE — HISTORY OF PRESENT ILLNESS
[de-identified] : Mr. Diaz is accompanied by his wife today.\par S/p partial rhinectomy, septectomy and partial maxillectomy with left suprahyoid neck dissection (Dr. Gonzalez), with reconstruction of nose and hard palate with left radial forearm osteocutaneous flap (Dr. Delgadillo) on 10/19/2020. \par D/c home 10/26.\par Postop RT (11/23/2020-01/18/2021) at [place] (Dr. aBca)\par S/p nasal framework reconstruction and removal of bone graft 3/09/21. \par \par Today, he reports feeling like pressure inside nose/head pushing on outside when he is upright.  Cannot breathe through nose for last week.\par Also notes swelling of cheeks and submental area.\par No nasal bleeding.\par He c/o pain posterior neck right of midline.\par \par He had USG FNA of Left thyroid nodule with calcification that was noted on PET-CT scan and subsequent US thyroid.\par Cytology was benign.\par \par \par CT MAXILLOFACIAL w/ contrast (03/01/2021) at [facility]:\par - COMPARISON:  Postcontrast CT scan of the facial bones dated August 5, 2020.\par - The patient is status post nasomaxillary resection for squamous cell carcinoma with reconstruction. There has been partial resection of the hard palate (midportion) with placement of a bone graft and metallic plate and screws. There has been resection of the left nasal bone with deformity of the overlying adjacent skin, resection of the nasal septum, left inferior turbinate, partial resection of the left middle turbinate, and partial resection of the right inferior turbinate.\par - Surgical clips in the left submandibular space. There has been resection of the left submandibular gland. Soft tissue thickening in the lateral aspect left submandibular space consistent with post therapeutic changes.\par - There is ill-defined abnormal soft tissue anterior and inferior to the nasal cavity (predominantly to the left of midline) extending into the left nasal cavity, medial to the left orbit, and anterior to the left maxillary sinus which likely represents post therapeutic changes. 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 The left frontal sinus outflow tract, right infundibulum, and sphenoethmoidal recesses are patent.\par - Degenerative changes of the visualized cervical spine.\par IMPRESSION:\par In comparison with the prior postcontrast CT scan of the facial bones dated August 5, 2020:\par There has been interval surgical intervention.\par The previously described asymmetric enhancement in the left lateral margin of the anterior nasal septum and the nasal cavity with adjacent bony erosion is no longer demonstrated.\par Abnormal soft tissue anterior and inferior to the nasal cavity (predominantly to the left of midline) extending into the left nasal cavity, medial to the left orbit, and anterior to the left maxillary sinus which likely represents post therapeutic changes. Residual and/or recurrent neoplasm is not excluded. Continued follow-up is advised.\par (Images were reviewed.) \par \par XR C-SPINE COMPLETE 4-5 VIEWS (03/02/2021) at [facility]:\par - No acute osseous abnormality.C3-4 demonstrates no spondylolisthesis in neutral with trace retrolisthesis in flexion and reduction in extension. Trace anterolisthesis C5 relative to C4 without movement from flexion to extension. Severe C5-C6 degenerative disc disease. Atlantoaxial joint is stable. No prevertebral soft tissue swelling surgical clips left neck\par IMPRESSION:\par 1.) No acute osseous abnormality\par 2.) C3-4 trace retrolisthesis in flexion with reduction in extension and neutral positions. Findings suggest ligamentous laxity/spinal instability\par \par \par LABS\par (02/09/2021) - WBC 11.64 with 83% PMNs; ESR 90, CRT 1.46\par \par USG FNA Left thyroid nodule, 1 cm (02/12/2021), read at [facility]:\par - Benign colloid nodule ([place] 2)\par \par \par PATHOLOGY (10/19/2020)\par - Negative final margins on primary resection, with bony invasion by the squamous cell carcinoma.\par - Left neck lymph nodes 10, all negative for tumor.\par \par \par US THYROID (10/09/2020) at [facility] in [place], [place]\par - No prior comparison\par - ISTHMUS: 0.4 cm\par - RIGHT LOBE: 3.7 x 1.7 x 1.7 cm, with no nodules\par - LEFT LOBE: 3.6 x 1.7 x 1.7 cm, with 2 nodules\par  --- 1.1 x 0.7 x 1.0 cm predominantly hyperechoic with hypoechoic rim and focus of calcification in lower pole\par  --- 0.7 x 0.4 x 0.5 cm hyperechoic nodule with tiny echogenic foci in lower pole\par \par \par PATHOLOGY Left nasal septum biopsy (7/16/2020):\par - Invasive nonkeratinizing squamous cell carcinoma.\par - Ki-67 shows an increased proliferation rate of approximately 50%.\par \par CT MAXILLOFACIAL with contrast (08/05/2020) at [facility]:\par - The current study is interpreted in conjunction with images from concurrent PET/CT dated 8/5/2020.\par - Please refer to report of concurrent MRI for more detailed description of lesion extent. Subtle asymmetric enhancement is noted along the left lateral surface of the anterior cartilaginous septum with more confluent enhancing soft tissue noted along the junction of the lateral margin of the maxillary crest and the floor of the left nasal cavity, a finding best appreciated on coronal images 20 through 23. Here, there is underlying osseous erosion with an approximately 7 mm ill-defined lytic focus lying immediately anterior to the nasopalatine duct; see sagittal bone images 39 through 41, axial images 34 through 36 and coronal images 23 through 25. There is also a small nodular focus of enhancing soft tissue along the superior margin of the anterior left nasal septum, best seen on soft tissue coronal image 12, with irregularity of the adjacent left nasal bone evident on the corresponding bone images. As this lies at a considerable distance from the nasal cavity floor lesion, correlation with direct visual inspection is recommended for confirmation. There does appear to be FDG uptake at this site on the PET/CT study.\par - No additional nasal or nasopharyngeal soft tissue mass. There is prominent right and mild left middle turbinate pneumatization. There is a very large left-sided bony septal spur that contacts the hypoplastic left inferior turbinate and the left lateral nasal wall. Circumferential mucosal thickening lines walls of the right maxillary antrum with the remaining paranasal sinuses predominantly ventilated bilaterally. Anterior and posterior drainage pathways are patent. Orbital contents are normal. There is mild ventricular enlargement out of proportion to the degree of sulcal prominence, central atrophy versus mild communicating hydrocephalus. Mastoid air cells are clear bilaterally.\par IMPRESSION:\par Although it is difficult to define lesional extent on this CT examination, subtle asymmetric enhancement is present along the left lateral margin of the anterior nasal septum, with more confluent soft tissue along the nasal cavity floor that is associated with underlying bone erosion. A smaller nodular focus is present superiorly with suspected erosion of the left nasal bone. Please refer to concurrent MRI and PET/CT for additional assessment of lesional extent.\par \par MR ORBIT FACE AND/OR NECK with/without contrast (08/05/2020) at [facility]:\par - The current study is interpreted in conjunction with images from concurrent CT maxillofacial and PET/CT studies.\par - As on the CT study, subtle asymmetric enhancement is present along the mucosal surface of the left anterior nasal septum, with more confluent soft tissue along the nasal cavity floor associated with osseous erosion and extension of tumor into the left ventral maxilla. Additional subcentimeter nodular enhancement along the anterior left nasal roof associated with apparent erosion of the left nasal bone is not as well seen on the MRI study. The best MRI imaging correlate is on reconstructed coronal images of the volumetric T1 series, annotated and saved as key images. No additional sites of suspicious nodular soft tissue or contrast enhancement are identified. There is no pathologic contrast enhancement within skull base foramina and the cavernous sinuses are symmetric in appearance bilaterally. There is again mild ventricular prominence, central atrophy versus mild communicating hydrocephalus. There is no intracranial mass or pathologic contrast enhancement.\par IMPRESSION:\par As on the concurrent CT examination, subtle asymmetric enhancement is noted along the left anterior nasal septum, with more confluent soft tissue density along the nasal cavity floor associated with underlying osseous erosion. A second suspected focus of osseous erosion along the left nasal bone is better seen on the CT examination.\par \par \par PET-CT SKUL-THIGH ONC FDG INIT (08/05/2020) at [facility]: \par - Comparison: CT of the head 8/5/2020, CT chest 6/27/2019\par - Reference mean SUV, Liver: 2.4\par - HEAD and NECK: Focal intense FDG avidity along the left aspect of the anterior nasal septum SUV max 9.9, corresponding to CT findings. The FDG avidity extends to the erosive osseous lesion in the floor of the nasal cavity, as well as the nasal bone (4:33, 45). Subcentimeter Right cervical level 1 lymph nodes are seen (3:64) SUV max up to 2.6.\par Focally FDG avid thyroid nodule in the left lobe with punctate calcification, SUV max 4.2 (4:88).\par - LUNGS and PLEURA: Mildly FDG avid patchy groundglass opacities/centrilobular groundglass nodules pronounced in the mid lung and lower lungs, likely inflammatory.\par - MEDIASTINUM and CATHY: Mildly FDG avid small mediastinal lymph nodes and right hilar node, SUV max up to 4.5 in the periaortic region, may be reactive. Physiologic FDG avidity in the myocardium is seen.\par - HEPATOBILIARY: No focus of abnormal FDG avidity. Within normal limits.\par - PANCREAS: No focus of abnormal FDG avidity. Within normal limits.\par - SPLEEN: No focus of abnormal FDG avidity. Within normal limits.\par - ADRENALS: No focus of abnormal FDG avidity. Within normal limits.\par - GENITOURINARY: Mildly and diffusely increased FDG uptake in bilateral testes with SUV max 4.9 without CT correlation. Mild diffuse heterogeneous FDG uptake in the enlarged prostate, nonspecific and likely inflammatory. Physiologic FDG avidity along the urinary tract is seen. Photopenic right renal cysts.\par - GASTROINTESTINAL: Focal FDG avidity in the medial aspect of the duodenal antrum with SUV max 9.1, without CT correlation (604:72). Prominent FDG uptake along the proximal gastric wall without CT correlation, with SUV max 6.3 may be inflammatory. FDG avidity in the proximal sigmoid colon with SUV max 8.3 (604:36), and in the anorectal region with SUV max 12.2 without CT correlation may be physiologic.\par - PERITONEUM/RETROPERITONEUM: No focus of abnormal FDG avidity. Within normal limits.\par - LYMPH NODES: No focus of abnormal FDG avidity. Within normal limits.\par - VESSELS: No focus of abnormal FDG avidity. Within normal limits.\par - BONES and SOFT TISSUE: Increased FDG uptake surrounding the bilateral humeral heads, right greater than the left, likely inflammatory/degenerative. Area of increased FDG avidity along the paraspinal muscle in the mid thoracic spine level, may be physiologic.\par IMPRESSION:\par 1.) FDG-avid lesion along the left aspect of the anterior nasal septum, with osseous involvement in the floor of the nasal cavity as well as nasal bones.\par 2.) Mildly FDG-avid right level 1 cervical lymph nodes, may be reactive. No evidence of distant metastasis.\par 3.) Mildly FDG avid ground glass opacities and centrilobular ground glass opacities in both lungs, likely inflammatory.\par 4.) FDG-avid left thyroid nodule with calcification. Correlation with neck ultrasound is recommended.\par 5.) FDG-avid focus in the duodenal antrum without CT correlation, nonspecific. Clinical/visual correlation is recommended.\par

## 2021-04-30 NOTE — CONSULT LETTER
[Dear  ___] : Dear  [unfilled], [Courtesy Letter:] : I had the pleasure of seeing your patient, [unfilled], in my office today. [Please see my note below.] : Please see my note below. [Consult Closing:] : Thank you very much for allowing me to participate in the care of this patient.  If you have any questions, please do not hesitate to contact me. [Sincerely,] : Sincerely, [FreeTextEntry2] : Lorie Arnold MD\par [address] [facility]\par [place] [address] [FreeTextEntry1] : \par \par \par  [FreeTextEntry3] : \par Olivia Gonzalez MD \par Otolaryngology, Head and Neck Surgery \par \par   [DrCalixto  ___] : Dr. AREVALO [DrCalixto ___] : Dr. AREVALO [___] : [unfilled]

## 2021-04-30 NOTE — PHYSICAL EXAM
[FreeTextEntry1] : No hoarseness.  No distress. [de-identified] : Thyroid gland normal size, no palpable nodules.  [de-identified] : s/p left SOHND; scars well-healed.  No mass. [de-identified] : Skin over nasal dorsum intact, no redeness.  Mild depression left side inferior to nasal bone. [de-identified] : Large hard crusts removed from nasal cavity where septum is mostly gone. [Midline] : trachea located in midline position [de-identified] : Hard palate intact s/p flap repair. [Normal] : no neck adenopathy

## 2021-05-04 ENCOUNTER — APPOINTMENT (OUTPATIENT)
Dept: ORTHOPEDIC SURGERY | Facility: CLINIC | Age: 71
End: 2021-05-04
Payer: COMMERCIAL

## 2021-05-04 VITALS
HEIGHT: 62 IN | OXYGEN SATURATION: 98 % | WEIGHT: 147 LBS | SYSTOLIC BLOOD PRESSURE: 123 MMHG | BODY MASS INDEX: 27.05 KG/M2 | HEART RATE: 83 BPM | DIASTOLIC BLOOD PRESSURE: 82 MMHG

## 2021-05-04 DIAGNOSIS — S61.502A: ICD-10-CM

## 2021-05-04 DIAGNOSIS — S52.552A OTHER EXTRAARTICULAR FRACTURE OF LOWER END OF LEFT RADIUS, INITIAL ENCOUNTER FOR CLOSED FRACTURE: ICD-10-CM

## 2021-05-04 PROCEDURE — 99072 ADDL SUPL MATRL&STAF TM PHE: CPT

## 2021-05-04 PROCEDURE — 73110 X-RAY EXAM OF WRIST: CPT | Mod: LT

## 2021-05-04 PROCEDURE — 99214 OFFICE O/P EST MOD 30 MIN: CPT

## 2021-05-04 NOTE — ASSESSMENT
[FreeTextEntry1] : 70-year-old gentleman w/ RA and SCCA nasopharyngeal  6 months status post osteocutaneous graft left radius/forearm complicated by postoperative wound issues and probable infection all now well-healed.\par He does have some mild general discomfort and occasional pain in the forearm and wrist.  I am not sure the hardware is causing any of the pain and I would be reluctant to remove hardware at this time which could actually lead to increased risk of complications or pain.  I suggested that he give it a year to make sure the bone matures.  With removing the hardware there can be an increased risk of a stress fracture, infection, neurovascular injury.\par If his pain resolves then I would recommend leaving the hardware alone.  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  Heat and ice as needed.  Occupational hand therapy.  Follow-up in 2 mos\par He can also follow-up with a hand specialist\par

## 2021-05-04 NOTE — REASON FOR VISIT
[Post Operative Visit] : a post operative visit for [Spouse] : spouse [FreeTextEntry2] : forearm surgery

## 2021-05-04 NOTE — HISTORY OF PRESENT ILLNESS
[de-identified] : Mr. Wong is a 70-year-old right-hand-dominant walker gentleman who had squamous cell carcinoma nasomaxillary status post resection and then reconstruction using a left radial forearm osteocutaneous flap 10/19/20.  The distal radius was stabilized with plate and screws by Dr. New.  It sounds like there is some wound breakdown postoperatively and delayed wound healing.\par Dr. New no longer takes his insurance so he comes in for follow-up to see me.\par He complains of some pain and weakness in the forearm still.  He had done some physical therapy.  He was wondering if he should have the hardware removed.\par No F/C

## 2021-05-04 NOTE — PHYSICAL EXAM
[de-identified] : Left upper extremity\par Healed volar incision.\par No palpable or prominent hardware.\par Range of motion of the forearm is with about  70 degrees supination and 60 degrees pronation. 50 DF and 60 PF wrist \par Elbow ROM 0 - 150 flexion\par Sensation intact hand and forearm\par  strength is 5-/5 intact finger flexion extension.  Intact AIN, PIN and ulnar nerve function.  Mild atrophy in the hand and forearm.\par Mild tenderness through the forearm.\par Tender at the distal ulna and ulnar carpal joint.  Tender mildly at the CMC joint of the thumb. [de-identified] : \par X-rays right wrist and distal forearm AP, lateral and oblique views show intact plate and screws radial shaft without any change from prior x-rays.  No evidence of loosening.  Evidence of the radial bone graft harvest with deformity.  Degenerative Cystic changes in the ulnar styloid.  Mild degeneration thumb CMC joint.

## 2021-05-07 ENCOUNTER — APPOINTMENT (OUTPATIENT)
Dept: OTOLARYNGOLOGY | Facility: CLINIC | Age: 71
End: 2021-05-07
Payer: COMMERCIAL

## 2021-05-07 VITALS
BODY MASS INDEX: 27.75 KG/M2 | TEMPERATURE: 95.5 F | HEIGHT: 62 IN | HEART RATE: 93 BPM | DIASTOLIC BLOOD PRESSURE: 71 MMHG | SYSTOLIC BLOOD PRESSURE: 120 MMHG | WEIGHT: 150.8 LBS

## 2021-05-07 DIAGNOSIS — H93.13 TINNITUS, BILATERAL: ICD-10-CM

## 2021-05-07 DIAGNOSIS — H93.12 TINNITUS, LEFT EAR: ICD-10-CM

## 2021-05-07 PROCEDURE — 31237 NSL/SINS NDSC SURG BX POLYPC: CPT | Mod: 50,58

## 2021-05-07 PROCEDURE — 99024 POSTOP FOLLOW-UP VISIT: CPT

## 2021-05-11 ENCOUNTER — RESULT REVIEW (OUTPATIENT)
Age: 71
End: 2021-05-11

## 2021-05-11 ENCOUNTER — OUTPATIENT (OUTPATIENT)
Dept: OUTPATIENT SERVICES | Facility: HOSPITAL | Age: 71
LOS: 1 days | End: 2021-05-11
Payer: COMMERCIAL

## 2021-05-11 DIAGNOSIS — Z41.9 ENCOUNTER FOR PROCEDURE FOR PURPOSES OTHER THAN REMEDYING HEALTH STATE, UNSPECIFIED: Chronic | ICD-10-CM

## 2021-05-11 DIAGNOSIS — Z98.890 OTHER SPECIFIED POSTPROCEDURAL STATES: Chronic | ICD-10-CM

## 2021-05-11 LAB — GLUCOSE BLDC GLUCOMTR-MCNC: 76 MG/DL — SIGNIFICANT CHANGE UP (ref 70–99)

## 2021-05-11 PROCEDURE — 82962 GLUCOSE BLOOD TEST: CPT

## 2021-05-11 PROCEDURE — 78815 PET IMAGE W/CT SKULL-THIGH: CPT

## 2021-05-11 PROCEDURE — A9552: CPT

## 2021-05-11 PROCEDURE — 78815 PET IMAGE W/CT SKULL-THIGH: CPT | Mod: 26

## 2021-05-26 ENCOUNTER — NON-APPOINTMENT (OUTPATIENT)
Age: 71
End: 2021-05-26

## 2021-05-26 PROBLEM — H93.12 LEFT-SIDED TINNITUS: Status: RESOLVED | Noted: 2020-07-09 | Resolved: 2021-05-26

## 2021-05-26 PROBLEM — H93.13 TINNITUS OF BOTH EARS: Status: ACTIVE | Noted: 2021-05-26

## 2021-05-26 NOTE — ASSESSMENT
[FreeTextEntry1] : Mr. KATZ had nasal cavity debrided today. \par No evidence of gross tumor in nasal cavity.  SCC internal nose, s/p resection and postop RT/chemo, ended 01/18/21.  s/p removal of bone graft from nasal cavity in 3/2021.\par Cheek and submental sweling is fat consolidation post RT\par --> PET/CT scan on 5/11/2021\par --> massage cheeks and submental area.  May need lymphatic drainage therapy.\par \par Return in 1 month

## 2021-05-26 NOTE — PROCEDURE
[FreeTextEntry6] : with DEBRIDEMENT:\par \par Nasal cavity debrided of crusting with suction and forceps after topical Pillo-Synephrine and 2% lidocaine were applied.  Crusting is less and looser than in past.\par No lesions seen in the common nasal cavity.  No bleeding.\par \par

## 2021-05-26 NOTE — PHYSICAL EXAM
[de-identified] : s/p left SOHND; scars well-healed.  No mass. [de-identified] : Thyroid gland normal size, no palpable nodules.  [de-identified] : Nasal dorsum with deviation to the right,  stepoff over left nasal bone more prominent, soft tissue envelope intact, left lateral rhinotomy incision healed well, edema and erythema of left nasal sidewall, secondary to post-radiation changes. Nasal cavity debrided, bony columella strut visible anteriorly, more on the left than right. Nose debrided today [Midline] : trachea located in midline position [de-identified] : Edentulous upper. [de-identified] : Hard palate intact s/p flap repair. [Normal] : no neck adenopathy

## 2021-05-26 NOTE — HISTORY OF PRESENT ILLNESS
[de-identified] : Mr. Diaz is accompanied by his wife today.\par S/p partial rhinectomy, septectomy and partial maxillectomy with left suprahyoid neck dissection (Dr. Gonzalez), with reconstruction of nose and hard palate with left radial forearm osteocutaneous flap (Dr. Delgadillo) on 10/19/2020. \par D/c home 10/26.\par Postop RT (11/23/2020-01/18/2021) at [place] (Dr. Baca)\par S/p nasal framework reconstruction and removal of bone graft 3/09/21 (Dr. Delgadillo)\par \par Today he is here for nasal debridement before PET-CT on 5/11.  Using Alkalol nasal rinses daily.  No nasal pain or bleed.\par He has bilateral ear ringing that comes and goes, sounds like the ocean, over past few weeks; louder after had PT for his Cspine.  No change in hearing; ear pain or vertigo.\par Still has the swelling of cheeks and under chin related to RT side effect.\par \par CT MAXILLOFACIAL w/ contrast (03/01/2021) at [facility]:\par - COMPARISON: Postcontrast CT scan of the facial bones dated August 5, 2020.\par - The patient is status post nasomaxillary resection for squamous cell carcinoma with reconstruction. There has been partial resection of the hard palate (midportion) with placement of a bone graft and metallic plate and screws. There has been resection of the left nasal bone with deformity of the overlying adjacent skin, resection of the nasal septum, left inferior turbinate, partial resection of the left middle turbinate, and partial resection of the right inferior turbinate.\par - Surgical clips in the left submandibular space. There has been resection of the left submandibular gland. Soft tissue thickening in the lateral aspect left submandibular space consistent with post therapeutic changes.\par - There is ill-defined abnormal soft tissue anterior and inferior to the nasal cavity (predominantly to the left of midline) extending into the left nasal cavity, medial to the left orbit, and anterior to the left maxillary sinus which likely represents post therapeutic changes. 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 The left frontal sinus outflow tract, right infundibulum, and sphenoethmoidal recesses are patent.\par - Degenerative changes of the visualized cervical spine.\par IMPRESSION:\par In comparison with the prior postcontrast CT scan of the facial bones dated August 5, 2020:\par There has been interval surgical intervention.\par The previously described asymmetric enhancement in the left lateral margin of the anterior nasal septum and the nasal cavity with adjacent bony erosion is no longer demonstrated.\par Abnormal soft tissue anterior and inferior to the nasal cavity (predominantly to the left of midline) extending into the left nasal cavity, medial to the left orbit, and anterior to the left maxillary sinus which likely represents post therapeutic changes. Residual and/or recurrent neoplasm is not excluded. Continued follow-up is advised.\par (Images were reviewed.) \par \par XR C-SPINE COMPLETE 4-5 VIEWS (03/02/2021) at [facility]:\par - No acute osseous abnormality.C3-4 demonstrates no spondylolisthesis in neutral with trace retrolisthesis in flexion and reduction in extension. Trace anterolisthesis C5 relative to C4 without movement from flexion to extension. Severe C5-C6 degenerative disc disease. Atlantoaxial joint is stable. No prevertebral soft tissue swelling surgical clips left neck\par IMPRESSION:\par 1.) No acute osseous abnormality\par 2.) C3-4 trace retrolisthesis in flexion with reduction in extension and neutral positions. Findings suggest ligamentous laxity/spinal instability\par \par LABS\par (02/09/2021) - WBC 11.64 with 83% PMNs; ESR 90, CRT 1.46\par \par USG FNA Left thyroid nodule, 1 cm (02/12/2021), read at [facility]:\par - Benign colloid nodule (Nellis 2)\par \par \par PATHOLOGY (10/19/2020)\par - Negative final margins on primary resection, with bony invasion by the squamous cell carcinoma.\par - Left neck lymph nodes 10, all negative for tumor.\par \par \par US THYROID (10/09/2020) at [facility] in [place], [place]\par - No prior comparison\par - ISTHMUS: 0.4 cm\par - RIGHT LOBE: 3.7 x 1.7 x 1.7 cm, with no nodules\par - LEFT LOBE: 3.6 x 1.7 x 1.7 cm, with 2 nodules\par  --- 1.1 x 0.7 x 1.0 cm predominantly hyperechoic with hypoechoic rim and focus of calcification in lower pole\par  --- 0.7 x 0.4 x 0.5 cm hyperechoic nodule with tiny echogenic foci in lower pole\par \par \par PATHOLOGY Left nasal septum biopsy (7/16/2020):\par - Invasive nonkeratinizing squamous cell carcinoma.\par - Ki-67 shows an increased proliferation rate of approximately 50%.\par \par CT MAXILLOFACIAL with contrast (08/05/2020) at [facility]:\par - The current study is interpreted in conjunction with images from concurrent PET/CT dated 8/5/2020.\par - Please refer to report of concurrent MRI for more detailed description of lesion extent. Subtle asymmetric enhancement is noted along the left lateral surface of the anterior cartilaginous septum with more confluent enhancing soft tissue noted along the junction of the lateral margin of the maxillary crest and the floor of the left nasal cavity, a finding best appreciated on coronal images 20 through 23. Here, there is underlying osseous erosion with an approximately 7 mm ill-defined lytic focus lying immediately anterior to the nasopalatine duct; see sagittal bone images 39 through 41, axial images 34 through 36 and coronal images 23 through 25. There is also a small nodular focus of enhancing soft tissue along the superior margin of the anterior left nasal septum, best seen on soft tissue coronal image 12, with irregularity of the adjacent left nasal bone evident on the corresponding bone images. As this lies at a considerable distance from the nasal cavity floor lesion, correlation with direct visual inspection is recommended for confirmation. There does appear to be FDG uptake at this site on the PET/CT study.\par - No additional nasal or nasopharyngeal soft tissue mass. There is prominent right and mild left middle turbinate pneumatization. There is a very large left-sided bony septal spur that contacts the hypoplastic left inferior turbinate and the left lateral nasal wall. Circumferential mucosal thickening lines walls of the right maxillary antrum with the remaining paranasal sinuses predominantly ventilated bilaterally. Anterior and posterior drainage pathways are patent. Orbital contents are normal. There is mild ventricular enlargement out of proportion to the degree of sulcal prominence, central atrophy versus mild communicating hydrocephalus. Mastoid air cells are clear bilaterally.\par IMPRESSION:\par Although it is difficult to define lesional extent on this CT examination, subtle asymmetric enhancement is present along the left lateral margin of the anterior nasal septum, with more confluent soft tissue along the nasal cavity floor that is associated with underlying bone erosion. A smaller nodular focus is present superiorly with suspected erosion of the left nasal bone. Please refer to concurrent MRI and PET/CT for additional assessment of lesional extent.\par \par MR ORBIT FACE AND/OR NECK with/without contrast (08/05/2020) at [facility]:\par - The current study is interpreted in conjunction with images from concurrent CT maxillofacial and PET/CT studies.\par - As on the CT study, subtle asymmetric enhancement is present along the mucosal surface of the left anterior nasal septum, with more confluent soft tissue along the nasal cavity floor associated with osseous erosion and extension of tumor into the left ventral maxilla. Additional subcentimeter nodular enhancement along the anterior left nasal roof associated with apparent erosion of the left nasal bone is not as well seen on the MRI study. The best MRI imaging correlate is on reconstructed coronal images of the volumetric T1 series, annotated and saved as key images. No additional sites of suspicious nodular soft tissue or contrast enhancement are identified. There is no pathologic contrast enhancement within skull base foramina and the cavernous sinuses are symmetric in appearance bilaterally. There is again mild ventricular prominence, central atrophy versus mild communicating hydrocephalus. There is no intracranial mass or pathologic contrast enhancement.\par IMPRESSION:\par As on the concurrent CT examination, subtle asymmetric enhancement is noted along the left anterior nasal septum, with more confluent soft tissue density along the nasal cavity floor associated with underlying osseous erosion. A second suspected focus of osseous erosion along the left nasal bone is better seen on the CT examination.\par \par \par PET-CT SKUL-THIGH ONC FDG INIT (08/05/2020) at [facility]: \par - Comparison: CT of the head 8/5/2020, CT chest 6/27/2019\par - Reference mean SUV, Liver: 2.4\par - HEAD and NECK: Focal intense FDG avidity along the left aspect of the anterior nasal septum SUV max 9.9, corresponding to CT findings. The FDG avidity extends to the erosive osseous lesion in the floor of the nasal cavity, as well as the nasal bone (4:33, 45). Subcentimeter Right cervical level 1 lymph nodes are seen (3:64) SUV max up to 2.6.\par Focally FDG avid thyroid nodule in the left lobe with punctate calcification, SUV max 4.2 (4:88).\par - LUNGS and PLEURA: Mildly FDG avid patchy groundglass opacities/centrilobular groundglass nodules pronounced in the mid lung and lower lungs, likely inflammatory.\par - MEDIASTINUM and CATHY: Mildly FDG avid small mediastinal lymph nodes and right hilar node, SUV max up to 4.5 in the periaortic region, may be reactive. Physiologic FDG avidity in the myocardium is seen.\par - HEPATOBILIARY: No focus of abnormal FDG avidity. Within normal limits.\par - PANCREAS: No focus of abnormal FDG avidity. Within normal limits.\par - SPLEEN: No focus of abnormal FDG avidity. Within normal limits.\par - ADRENALS: No focus of abnormal FDG avidity. Within normal limits.\par - GENITOURINARY: Mildly and diffusely increased FDG uptake in bilateral testes with SUV max 4.9 without CT correlation. Mild diffuse heterogeneous FDG uptake in the enlarged prostate, nonspecific and likely inflammatory. Physiologic FDG avidity along the urinary tract is seen. Photopenic right renal cysts.\par - GASTROINTESTINAL: Focal FDG avidity in the medial aspect of the duodenal antrum with SUV max 9.1, without CT correlation (604:72). Prominent FDG uptake along the proximal gastric wall without CT correlation, with SUV max 6.3 may be inflammatory. FDG avidity in the proximal sigmoid colon with SUV max 8.3 (604:36), and in the anorectal region with SUV max 12.2 without CT correlation may be physiologic.\par - PERITONEUM/RETROPERITONEUM: No focus of abnormal FDG avidity. Within normal limits.\par - LYMPH NODES: No focus of abnormal FDG avidity. Within normal limits.\par - VESSELS: No focus of abnormal FDG avidity. Within normal limits.\par - BONES and SOFT TISSUE: Increased FDG uptake surrounding the bilateral humeral heads, right greater than the left, likely inflammatory/degenerative. Area of increased FDG avidity along the paraspinal muscle in the mid thoracic spine level, may be physiologic.\par IMPRESSION:\par 1.) FDG-avid lesion along the left aspect of the anterior nasal septum, with osseous involvement in the floor of the nasal cavity as well as nasal bones.\par 2.) Mildly FDG-avid right level 1 cervical lymph nodes, may be reactive. No evidence of distant metastasis.\par 3.) Mildly FDG avid ground glass opacities and centrilobular ground glass opacities in both lungs, likely inflammatory.\par 4.) FDG-avid left thyroid nodule with calcification. Correlation with neck ultrasound is recommended.\par 5.) FDG-avid focus in the duodenal antrum without CT correlation, nonspecific. Clinical/visual correlation is recommended.\par

## 2021-06-03 ENCOUNTER — APPOINTMENT (OUTPATIENT)
Dept: OTOLARYNGOLOGY | Facility: CLINIC | Age: 71
End: 2021-06-03
Payer: COMMERCIAL

## 2021-06-03 DIAGNOSIS — I89.0 LYMPHEDEMA, NOT ELSEWHERE CLASSIFIED: ICD-10-CM

## 2021-06-03 PROCEDURE — 99024 POSTOP FOLLOW-UP VISIT: CPT

## 2021-06-03 PROCEDURE — 31231 NASAL ENDOSCOPY DX: CPT | Mod: 58

## 2021-06-03 NOTE — ASSESSMENT
[FreeTextEntry1] : - PT for lymphedema\par - f/up in this office with myself and cosmetic facial plastic surgeon, Dr Carter, to discuss next stage of nasal reconstruction, on 6/25/21

## 2021-06-03 NOTE — HISTORY OF PRESENT ILLNESS
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 Patient states felt a lesion in his left nostril about three years ago. Recently diagnosed in July 2020 by Dr. 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 Patient had biopsy performed in 7/2020. Patient admits minimal bleeding at times. Pain when touching his nose. He admits some weight loss when he found out; changed his diet. Lost about 14 lbs. Patient denies any fevers. No night sweats. Presented at [facility] at [facility], surgery recommended. \par \par He is right handed. He wears a full upper denture plate. \par \par 11/12/2020 70 yr old male comes in today f/u on  nasal septum squamous cell carcinoma, s/p septectomy (Carlos), left OCRFFF, STSG, free bone graft, free cartilage graft intranasal. Has a f/up appt with RT, to start RT on 11/23/20.  Pt states he had difficulty eating for a while. Now is eating habits are better but still no taste. \par \par 01/19/2021: Patient returns today following up on Nasal septum squamous cell carcinoma. Finished radiation treatment yesterday (1/18/21), in [facility] (Dr Denise Baca), 6 week course. Reports dry mouth, no taste, + nasal obstruction, started mid-way through radiation. \par \par \par 3/1/21: Patient presents today following up on squamous cell carcinoma of nasal cavity. Patient states started having nasal bone deformity on 2/25/21. At first was told it was a boil on his nose; given antibiotics. Patient saw Dr. Gonzalez who suspected part of the bony reconstruction was coming through his skin. Denies pain, mild nasal obstruction. \par \par 3/23/21: Patient is s/p nasal framework reconstruction 3/9/21. Patient had CT scan 3/1/21, underwent removal of bone graft 2 weeks ago, has done well since then. Patient doing well. No issues since surgery. He admits numbness.  [FreeTextEntry1] : \par 6/3/21: Patient is s/p nasal framework reconstruction 3/9/21. Has no new changes since last visit. He underwent PET recently, possible lung lesion, scheduled to see pulmonologist for consultation 6/8/21. For the past 2 weeks he notes he had difficulty breathing through his nose.

## 2021-06-03 NOTE — PHYSICAL EXAM
[Midline] : trachea located in midline position [Normal] : no rashes [de-identified] : nasal dorsum with deviation to the right,  soft tissue envelope intact, nasal tip ptosis and deviated to right, causing narrowing of left nasal cavity, rhinotomy incision well healed, upper lip incision well healed [de-identified] : well integrated skin paddle anterior maxilla [de-identified] : Left UE: incision healing well, distal forearm wound now completely epithelialized, well healed incision

## 2021-06-03 NOTE — DATA REVIEWED
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  Restaging to help determine subsequent treatment strategy.\par \par Procedure: Blood glucose: 76 mg/dL; Intravenous access was established and the patient was injected with 9.93 mCi of [id number]. After approximately 1 hour in a quiet room, the patient was positioned on the imaging table with the arms as high above the head as possible.  PET/CT imaging was then performed with the standard protocol from the base of the skull to the mid thighs, followed by head and neck. The CT scan is a low dose protocol with images used for attenuation correction and localization only.\par \par PET images were reconstructed in axial, sagittal and coronal planes using CT based attenuation correction.  Images were displayed as PET, CT and fused data sets as well as maximum intensity pixel projections.\par \par Comparison: PET/CT from 5/11/2020\par \par Findings:\par Reference mean SUV, Liver: 2.6 SUV\par \par Head and neck: Status post rhinectomy, septectomy and partial maxillectomy with osteocutaneous reconstruction. Status post recent removal of bone graft. There is diffuse linear FDG uptake along the reconstructed palate and maxillectomy bed which may represent posttreatment/postsurgical changes. There is linear FDG uptake along the lateral aspects of the nasal cavity bilaterally which also may be secondary to posttreatment/postsurgical changes. Surgical clips are noted within the left suprahilar neck secondary to neck dissection. There is no evidence of cervical lymphadenopathy. Physiologic FDG avidity in the pharyngeal lymphoid tissue is seen.\par \par Lungs and pleura: 1.2 cm round glass opacity within the left upper lobe exhibits mild FDG avidity (SUV 2.4, previously 1.1). This groundglass nodule was in retrospect present on CT chest 10/5/2017 although has a more solid nodular appearance. Again demonstrated is mild FDG uptake within several additional groundglass opacities predominantly within the left lung which are likely inflammatory.\par Mediastinum and az: Symmetric mild FDG uptake within nonenlarged bilateral hilar lymph nodes which by pattern and distribution most likely secondary to inflammatory changes.\par Heart and pericardium: Physiologic FDG avidity in the myocardium is seen. Within normal limits.\par Chest wall: No focus of abnormal FDG avidity. Within normal limits.\par \par \par Hepatobiliary: No focus of abnormal FDG avidity. Within normal limits.\par Pancreas: No focus of abnormal FDG avidity. Within normal limits.\par Spleen: No focus of abnormal FDG avidity. Within normal limits.\par Adrenals: No focus of abnormal FDG avidity. Within normal limits.\par Genitourinary: Right renal cysts are noted.. Physiologic FDG avidity along the urinary tract is seen.\par Gastrointestinal: No focus of abnormal FDG avidity. Within normal limits. Physiologic FDG uptake in the bowel is seen.\par Peritoneum/retroperitoneum: No focus of abnormal FDG avidity. Within normal limits.\par Lymph nodes: Stable appearance of small right level 1 cervical lymph node with mild FDG avidity (SUV 2.0 compared to SUV 2.6 previously).\par Vessels: Mild atherosclerosis of aorta.\par \par Bones and soft tissue: No focus of abnormal FDG avidity.\par \par \par Impression:\par \par Since prior PET/CT 8/5/2020, postsurgical changes within the nasal soft tissues and hard palate with predominantly linear FDG uptake without CT correlate which may represent postsurgical/posttreatment changes. No definite evidence of residual or recurrent malignancy. No evidence of cervical lymphadenopathy.\par \par 1.2 cm left upper lobe groundglass opacity with increased mild FDG uptake and has a more solid appearance and therefore malignancy cannot be excluded. Consider tissue sampling for further evaluation.\par \par \par \par \par \par Thank you for the opportunity to participate in the care of this patient.\par \par BRADY PETERS M.D., RADIOLOGY RESIDENT\par This document has been electronically signed.\par ABIMBOLA SOLIS MD; Attending Radiologist\par This document has been electronically signed. May 11 2021  4:50PM\par \par

## 2021-06-08 ENCOUNTER — APPOINTMENT (OUTPATIENT)
Dept: PULMONOLOGY | Facility: CLINIC | Age: 71
End: 2021-06-08
Payer: COMMERCIAL

## 2021-06-08 VITALS
OXYGEN SATURATION: 96 % | HEART RATE: 84 BPM | DIASTOLIC BLOOD PRESSURE: 80 MMHG | BODY MASS INDEX: 27.79 KG/M2 | HEIGHT: 62 IN | SYSTOLIC BLOOD PRESSURE: 121 MMHG | TEMPERATURE: 98.2 F | WEIGHT: 151 LBS

## 2021-06-08 PROCEDURE — 99072 ADDL SUPL MATRL&STAF TM PHE: CPT

## 2021-06-08 PROCEDURE — 36415 COLL VENOUS BLD VENIPUNCTURE: CPT

## 2021-06-08 PROCEDURE — 99214 OFFICE O/P EST MOD 30 MIN: CPT | Mod: 25

## 2021-06-09 ENCOUNTER — TRANSCRIPTION ENCOUNTER (OUTPATIENT)
Age: 71
End: 2021-06-09

## 2021-06-09 LAB
ALBUMIN SERPL ELPH-MCNC: 4 G/DL
ALP BLD-CCNC: 86 U/L
ALT SERPL-CCNC: 26 U/L
ANION GAP SERPL CALC-SCNC: 13 MMOL/L
APTT BLD: 30.6 SEC
AST SERPL-CCNC: 20 U/L
BASOPHILS # BLD AUTO: 0.03 K/UL
BASOPHILS NFR BLD AUTO: 0.3 %
BILIRUB SERPL-MCNC: 0.2 MG/DL
BUN SERPL-MCNC: 22 MG/DL
CALCIUM SERPL-MCNC: 9.8 MG/DL
CHLORIDE SERPL-SCNC: 102 MMOL/L
CO2 SERPL-SCNC: 23 MMOL/L
CREAT SERPL-MCNC: 1.2 MG/DL
EOSINOPHIL # BLD AUTO: 0.09 K/UL
EOSINOPHIL NFR BLD AUTO: 1 %
GLUCOSE SERPL-MCNC: 103 MG/DL
HCT VFR BLD CALC: 43.4 %
HGB BLD-MCNC: 13.1 G/DL
IMM GRANULOCYTES NFR BLD AUTO: 0.9 %
INR PPP: 1.06 RATIO
LYMPHOCYTES # BLD AUTO: 1.02 K/UL
LYMPHOCYTES NFR BLD AUTO: 11 %
MAN DIFF?: NORMAL
MCHC RBC-ENTMCNC: 25.6 PG
MCHC RBC-ENTMCNC: 30.2 GM/DL
MCV RBC AUTO: 84.9 FL
MONOCYTES # BLD AUTO: 0.76 K/UL
MONOCYTES NFR BLD AUTO: 8.2 %
NEUTROPHILS # BLD AUTO: 7.31 K/UL
NEUTROPHILS NFR BLD AUTO: 78.6 %
PLATELET # BLD AUTO: 166 K/UL
POTASSIUM SERPL-SCNC: 4.3 MMOL/L
PROT SERPL-MCNC: 7.2 G/DL
PT BLD: 12.5 SEC
RBC # BLD: 5.11 M/UL
RBC # FLD: 18.4 %
SODIUM SERPL-SCNC: 138 MMOL/L
WBC # FLD AUTO: 9.29 K/UL

## 2021-06-09 NOTE — ASSESSMENT
[FreeTextEntry1] : 69yo man w/ T1N0M0 nasal cavity SCC s/p resection and reconstruction with ENT, recent former smoker w/ cx bronchitis and enrolled in lung CA LDCT program here.\par \par #Chronic bronchitis\par Patient is clinically stable from respiratory point of view. He has only used his Advair PRN.  Pt has not smoked in over a year and has sustained smoking cessation. Does not feel impaired from nasal surgery. Exercise capacity is good. Denies wheezing or dyspnea. Remains abstinent from smoking. \par - discussed holding off continuing Advair for now and will trial observing with only JORDIN PRN monthotherapy \par - he will resume advair use if he develops persistent symptoms and notify our office but otherwise continue to use PRN JORDIN for wheezing/tightness\par - he is due for a PFT but will defer in the setting of recent nasal surgery and pending reconstructions \par \par #Pulmonary Nodule \par \par PET scan 5/11/2021 1.2 cm TYLER GGO with increased mild FDG uptake and more solid appearance and therefore malignancy cannot be ruled out.  Pt is high risk for malignancy due to nasal cavity SCC and smoking history.  Discussed with pt the need for biopsy.  Pt in agreement to move forward with CT guided biopsy.  Lab work drawn today in office for biopsy.  Will follow up post pathology regarding next steps.\par

## 2021-06-18 ENCOUNTER — APPOINTMENT (OUTPATIENT)
Dept: OTOLARYNGOLOGY | Facility: CLINIC | Age: 71
End: 2021-06-18
Payer: COMMERCIAL

## 2021-06-18 VITALS
WEIGHT: 150 LBS | SYSTOLIC BLOOD PRESSURE: 135 MMHG | HEIGHT: 62 IN | BODY MASS INDEX: 27.6 KG/M2 | HEART RATE: 86 BPM | TEMPERATURE: 97.6 F | DIASTOLIC BLOOD PRESSURE: 76 MMHG

## 2021-06-18 DIAGNOSIS — H81.10 BENIGN PAROXYSMAL VERTIGO, UNSPECIFIED EAR: ICD-10-CM

## 2021-06-18 PROCEDURE — 99214 OFFICE O/P EST MOD 30 MIN: CPT

## 2021-06-18 PROCEDURE — 99072 ADDL SUPL MATRL&STAF TM PHE: CPT

## 2021-06-25 ENCOUNTER — APPOINTMENT (OUTPATIENT)
Dept: OTOLARYNGOLOGY | Facility: CLINIC | Age: 71
End: 2021-06-25
Payer: COMMERCIAL

## 2021-06-25 PROCEDURE — 99072 ADDL SUPL MATRL&STAF TM PHE: CPT

## 2021-06-25 PROCEDURE — 99213 OFFICE O/P EST LOW 20 MIN: CPT | Mod: 25

## 2021-06-25 PROCEDURE — 31231 NASAL ENDOSCOPY DX: CPT

## 2021-06-25 NOTE — PHYSICAL EXAM
[Normal] : lingual tonsils are normal [Midline] : trachea located in midline position [de-identified] : nasal dorsum with deviation to the right,  soft tissue envelope intact, nasal tip ptosis and deviated to right, causing narrowing and slit-like opening of left nasal nare rhinotomy incision well healed, upper lip incision well healed [de-identified] : well integrated skin paddle anterior maxilla s/p osteocutaneous radial forearm free flap

## 2021-06-25 NOTE — HISTORY OF PRESENT ILLNESS
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 Patient states felt a lesion in his left nostril about three years ago. Recently diagnosed in July 2020 by Dr. 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 Patient had biopsy performed in 7/2020. Patient admits minimal bleeding at times. Pain when touching his nose. He admits some weight loss when he found out; changed his diet. Lost about 14 lbs. Patient denies any fevers. No night sweats. Presented at [facility] at [facility], surgery recommended. \par \par He is right handed. He wears a full upper denture plate. \par \par 11/12/2020 70 yr old male comes in today f/u on  nasal septum squamous cell carcinoma, s/p septectomy (Carlos), left OCRFFF, STSG, free bone graft, free cartilage graft intranasal. Has a f/up appt with RT, to start RT on 11/23/20.  Pt states he had difficulty eating for a while. Now is eating habits are better but still no taste. \par \par 01/19/2021: Patient returns today following up on Nasal septum squamous cell carcinoma. Finished radiation treatment yesterday (1/18/21), in [facility] (Dr Denise Baca), 6 week course. Reports dry mouth, no taste, + nasal obstruction, started mid-way through radiation. \par \par \par 3/1/21: Patient presents today following up on squamous cell carcinoma of nasal cavity. Patient states started having nasal bone deformity on 2/25/21. At first was told it was a boil on his nose; given antibiotics. Patient saw Dr. Gonzalez who suspected part of the bony reconstruction was coming through his skin. Denies pain, mild nasal obstruction. \par \par 3/23/21: Patient is s/p nasal framework reconstruction 3/9/21. Patient had CT scan 3/1/21, underwent removal of bone graft 2 weeks ago, has done well since then. Patient doing well. No issues since surgery. He admits numbness. \par \par \par 6/3/21: Patient is s/p nasal framework reconstruction 3/9/21. Has no new changes since last visit. He underwent PET recently, possible lung lesion, scheduled to see pulmonologist for consultation 6/8/21. For the past 2 weeks he notes he had difficulty breathing through his nose.  [FreeTextEntry1] : 06/25/21 : Patient returns today following up on nasal obstruction , acquired nasal deformity. Still having difficulty breathing from nose since last visit, left side worse. He is also wanting to discuss external appearance of nose, states he is here to discuss surgical reconstruction options. \par hx of dizziness, still symptomatic daily, feels imbalance, never had vng performed, did not attend vestibular therapy.

## 2021-06-25 NOTE — ASSESSMENT
[FreeTextEntry1] : - discussed surgical options including nasal alar graft using auricular cartilage to provide support to ptotic tip and left nare narrowing. He is interested in addressing the appearance of his nose as well. Will refer patient to be evaluated by cosmetic facial plastic surgeon with background in complex head and neck patients, Dr Billy Larson\par - f/up with Dr Gonzalez for surveillance as scheduled\par \par

## 2021-06-25 NOTE — REASON FOR VISIT
[Subsequent Evaluation] : a subsequent evaluation for [FreeTextEntry2] : nasal obstruction , acquired nasal deformity

## 2021-07-06 ENCOUNTER — RESULT REVIEW (OUTPATIENT)
Age: 71
End: 2021-07-06

## 2021-07-06 ENCOUNTER — APPOINTMENT (OUTPATIENT)
Dept: INTERVENTIONAL RADIOLOGY/VASCULAR | Facility: HOSPITAL | Age: 71
End: 2021-07-06
Payer: COMMERCIAL

## 2021-07-06 ENCOUNTER — OUTPATIENT (OUTPATIENT)
Dept: OUTPATIENT SERVICES | Facility: HOSPITAL | Age: 71
LOS: 1 days | End: 2021-07-06
Payer: COMMERCIAL

## 2021-07-06 ENCOUNTER — APPOINTMENT (OUTPATIENT)
Dept: CT IMAGING | Facility: HOSPITAL | Age: 71
End: 2021-07-06
Payer: COMMERCIAL

## 2021-07-06 DIAGNOSIS — Z41.9 ENCOUNTER FOR PROCEDURE FOR PURPOSES OTHER THAN REMEDYING HEALTH STATE, UNSPECIFIED: Chronic | ICD-10-CM

## 2021-07-06 DIAGNOSIS — Z98.890 OTHER SPECIFIED POSTPROCEDURAL STATES: Chronic | ICD-10-CM

## 2021-07-06 PROCEDURE — 32408 CORE NDL BX LNG/MED PERQ: CPT

## 2021-07-06 PROCEDURE — 88333 PATH CONSLTJ SURG CYTO XM 1: CPT

## 2021-07-06 PROCEDURE — 88173 CYTOPATH EVAL FNA REPORT: CPT | Mod: 26

## 2021-07-06 PROCEDURE — C1769: CPT

## 2021-07-06 PROCEDURE — 88333 PATH CONSLTJ SURG CYTO XM 1: CPT | Mod: 26,59

## 2021-07-06 PROCEDURE — 71045 X-RAY EXAM CHEST 1 VIEW: CPT | Mod: 26

## 2021-07-06 PROCEDURE — 88305 TISSUE EXAM BY PATHOLOGIST: CPT

## 2021-07-06 PROCEDURE — 71045 X-RAY EXAM CHEST 1 VIEW: CPT

## 2021-07-06 PROCEDURE — 88305 TISSUE EXAM BY PATHOLOGIST: CPT | Mod: 26

## 2021-07-06 PROCEDURE — 88173 CYTOPATH EVAL FNA REPORT: CPT

## 2021-07-08 LAB
NON-GYNECOLOGICAL CYTOLOGY STUDY: SIGNIFICANT CHANGE UP
SURGICAL PATHOLOGY STUDY: SIGNIFICANT CHANGE UP

## 2021-07-28 PROBLEM — H81.10 BENIGN PAROXYSMAL POSITIONAL VERTIGO: Status: RESOLVED | Noted: 2021-06-18 | Resolved: 2021-07-28

## 2021-07-28 NOTE — CONSULT LETTER
[Please see my note below.] : Please see my note below. [Consult Closing:] : Thank you very much for allowing me to participate in the care of this patient.  If you have any questions, please do not hesitate to contact me. [Sincerely,] : Sincerely, [Dear  ___] : Dear  [unfilled], [Courtesy Letter:] : I had the pleasure of seeing your patient, [unfilled], in my office today. [FreeTextEntry2] : Lorie Arnold MD\par [address] [facility] [address]\par [place] [address] [FreeTextEntry1] : \par \par \par  [FreeTextEntry3] : \par Olivia Gonzalez MD \par Otolaryngology, Head and Neck Surgery \par \par   [DrCalixto  ___] : Dr. AREVALO [DrCalixto ___] : Dr. AREVALO

## 2021-07-28 NOTE — ASSESSMENT
[FreeTextEntry1] : Mr. KATZ has fairly clear nasal cavity on exam today.  No evidence of gross tumor in nasal cavity. \par S/p resection internal nasal SCCa and postop RT/chemo, ended 01/18/21. \par S/p removal of bone graft from nasal cavity in 3/2021.  \par Cheek and submental swelling is fat consolidation post RT\par \par 1.)  Reconstruction of nose should be done as planned by Dr. Delgadillo.  I do not recommend waiting for 1 year post treatment because the tissue envelope will fibrose more and make reconstruction more difficult.\par \par 2.) New dizziness when walking, in addition to spinning sensation when lying in bed and turning to right\par Testing today is c/w benign paroxysmal positional vertigo \par --> vestibular therapy with a balance therapist. He will get it in [place]\par \par 3.) Neck/occipital pain\par --> continue f/u with physical therapist\par \par 4.) Left upper lobe lung nodule found on PET/CT this spring\par  --> proceed with CT-guided FNA as ordered by Dr. Alves\par \par Return in 3 months

## 2021-07-28 NOTE — HISTORY OF PRESENT ILLNESS
[de-identified] : Mr. Diaz is accompanied by his wife today.\par S/p partial rhinectomy, septectomy and partial maxillectomy with left suprahyoid neck dissection (Dr. Gonzalez), with reconstruction of nose and hard palate with left radial forearm osteocutaneous flap (Dr. Delgadillo) on 10/19/2020. \par D/c home 10/26.\par Postop RT (11/23/2020-01/18/2021) at [place] (Dr. Baca)\par S/p nasal framework reconstruction and removal of bone graft 3/09/21 (Dr. Delgadillo)\par \par Able to breathe through nose. No pain nose/mouth.\par To see Dr Delgadillo re nasal reconstruction.  He reports that Dr. Baca told him to wait for 1 year post treatment to get the reconstruction done.\par He c/o intermittent dizziness for months. He doesn’t feel grounded when he walks.  No spinning sensation.\par Hx of bilateral SNHL and tinnitus.  No change in hearing;tinnitus; no ear pain or ear discharge. \par He is has pressure in neck and occipital area.  When he has the pressure, has decreased hearing and feel more off balance.  When lying in bed and turns to right, he has spinning sensation.  If bends down and gets up, also gets dizziness (not spinning).  He is supposed to get MRI neck ordered by his physiatrist. \par Dr. Alves ordered CT-guided FNA of 1.2 cm nodule in [place]  to r/o malignancy.\par \par \par PETCT SKUL-[facility] ONC FDG SUBS (05/11/2021) at [facility]:\par - Comparison: PET/CT from 5/11/2020\par - Reference mean SUV, Liver: 2.6 SUV\par * Head and neck: Status post rhinectomy, septectomy and partial maxillectomy with osteocutaneous reconstruction. Status post recent removal of bone graft. There is diffuse linear FDG uptake along the reconstructed palate and maxillectomy bed which may represent posttreatment/postsurgical changes. There is linear FDG uptake along the lateral aspects of the nasal cavity bilaterally which also may be secondary to posttreatment/postsurgical changes. Surgical clips are noted within the left suprahilar neck secondary to neck dissection. There is no evidence of cervical lymphadenopathy. Physiologic FDG avidity in the pharyngeal lymphoid tissue is seen.\par * Lungs and pleura: 1.2 cm round glass opacity within the left upper lobe exhibits mild FDG avidity (SUV 2.4, previously 1.1). This groundglass nodule was in retrospect present on CT chest 10/5/2017 although has a more solid nodular appearance. Again demonstrated is mild FDG uptake within several additional groundglass opacities predominantly within the left lung which are likely inflammatory.\par * Mediastinum and cathy: Symmetric mild FDG uptake within nonenlarged bilateral hilar lymph nodes which by pattern and distribution most likely secondary to inflammatory changes.\par * Heart and pericardium: Physiologic FDG avidity in the myocardium is seen. Within normal limits.\par * Chest wall: No focus of abnormal FDG avidity. Within normal limits.\par * Hepatobiliary, Pancreas, Spleen, Adrenals, Peritoneum/retroperitoneum, Gastrointestinal : No focus of abnormal FDG avidity. Within normal limits. Physiologic FDG uptake in the bowel is seen.\par * Genitourinary: Right renal cysts are noted.. Physiologic FDG avidity along the urinary tract is seen.\par * Lymph nodes: Stable appearance of small right level 1 cervical lymph node with mild FDG avidity (SUV 2.0 compared to SUV 2.6 previously).\par * Vessels: Mild atherosclerosis of aorta.\par * Bones and soft tissue: No focus of abnormal FDG avidity.\par IMPRESSION:\par 1.) Since prior PET/CT 8/5/2020, postsurgical changes within the nasal soft tissues and hard palate with predominantly linear FDG uptake without CT correlate which may represent postsurgical/posttreatment changes. No definite evidence of residual or recurrent malignancy. No evidence of cervical lymphadenopathy.\par 2.) 1.2 cm left upper lobe groundglass opacity with increased mild FDG uptake and has a more solid appearance and therefore malignancy cannot be excluded. Consider tissue sampling for further evaluation.\par \par \par CT MAXILLOFACIAL w/ contrast (03/01/2021) at [facility]:\par - COMPARISON: Postcontrast CT scan of the facial bones dated August 5, 2020.\par - The patient is status post nasomaxillary resection for squamous cell carcinoma with reconstruction. There has been partial resection of the hard palate (midportion) with placement of a bone graft and metallic plate and screws. There has been resection of the left nasal bone with deformity of the overlying adjacent skin, resection of the nasal septum, left inferior turbinate, partial resection of the left middle turbinate, and partial resection of the right inferior turbinate.\par - Surgical clips in the left submandibular space. There has been resection of the left submandibular gland. Soft tissue thickening in the lateral aspect left submandibular space consistent with post therapeutic changes.\par - There is ill-defined abnormal soft tissue anterior and inferior to the nasal cavity (predominantly to the left of midline) extending into the left nasal cavity, medial to the left orbit, and anterior to the left maxillary sinus which likely represents post therapeutic changes. 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 The left frontal sinus outflow tract, right infundibulum, and sphenoethmoidal recesses are patent.\par - Degenerative changes of the visualized cervical spine.\par IMPRESSION:\par In comparison with the prior postcontrast CT scan of the facial bones dated August 5, 2020:\par There has been interval surgical intervention.\par The previously described asymmetric enhancement in the left lateral margin of the anterior nasal septum and the nasal cavity with adjacent bony erosion is no longer demonstrated.\par Abnormal soft tissue anterior and inferior to the nasal cavity (predominantly to the left of midline) extending into the left nasal cavity, medial to the left orbit, and anterior to the left maxillary sinus which likely represents post therapeutic changes. Residual and/or recurrent neoplasm is not excluded. Continued follow-up is advised.\par (Images were reviewed.) \par \par XR C-SPINE COMPLETE 4-5 VIEWS (03/02/2021) at [facility]:\par - No acute osseous abnormality.C3-4 demonstrates no spondylolisthesis in neutral with trace retrolisthesis in flexion and reduction in extension. Trace anterolisthesis C5 relative to C4 without movement from flexion to extension. Severe C5-C6 degenerative disc disease. Atlantoaxial joint is stable. No prevertebral soft tissue swelling surgical clips left neck\par IMPRESSION:\par 1.) No acute osseous abnormality\par 2.) C3-4 trace retrolisthesis in flexion with reduction in extension and neutral positions. Findings suggest ligamentous laxity/spinal instability\par \par LABS\par (02/09/2021) - WBC 11.64 with 83% PMNs; ESR 90, CRT 1.46\par \par USG FNA Left thyroid nodule, 1 cm (02/12/2021), read at [facility]:\par - Benign colloid nodule ([place] 2)\par \par \par PATHOLOGY (10/19/2020)\par - Negative final margins on primary resection, with bony invasion by the squamous cell carcinoma.\par - Left neck lymph nodes 10, all negative for tumor.\par \par US THYROID (10/09/2020) at [facility] in [place], [place]\par - No prior comparison\par - ISTHMUS: 0.4 cm\par - RIGHT LOBE: 3.7 x 1.7 x 1.7 cm, with no nodules\par - LEFT LOBE: 3.6 x 1.7 x 1.7 cm, with 2 nodules\par  --- 1.1 x 0.7 x 1.0 cm predominantly hyperechoic with hypoechoic rim and focus of calcification in lower pole\par  --- 0.7 x 0.4 x 0.5 cm hyperechoic nodule with tiny echogenic foci in lower pole\par \par \par PATHOLOGY Left nasal septum biopsy (7/16/2020):\par - Invasive nonkeratinizing squamous cell carcinoma.\par - Ki-67 shows an increased proliferation rate of approximately 50%.\par \par CT MAXILLOFACIAL with contrast (08/05/2020) at [facility]:\par - The current study is interpreted in conjunction with images from concurrent PET/CT dated 8/5/2020.\par - Please refer to report of concurrent MRI for more detailed description of lesion extent. Subtle asymmetric enhancement is noted along the left lateral surface of the anterior cartilaginous septum with more confluent enhancing soft tissue noted along the junction of the lateral margin of the maxillary crest and the floor of the left nasal cavity, a finding best appreciated on coronal images 20 through 23. Here, there is underlying osseous erosion with an approximately 7 mm ill-defined lytic focus lying immediately anterior to the nasopalatine duct; see sagittal bone images 39 through 41, axial images 34 through 36 and coronal images 23 through 25. There is also a small nodular focus of enhancing soft tissue along the superior margin of the anterior left nasal septum, best seen on soft tissue coronal image 12, with irregularity of the adjacent left nasal bone evident on the corresponding bone images. As this lies at a considerable distance from the nasal cavity floor lesion, correlation with direct visual inspection is recommended for confirmation. There does appear to be FDG uptake at this site on the PET/CT study.\par - No additional nasal or nasopharyngeal soft tissue mass. There is prominent right and mild left middle turbinate pneumatization. There is a very large left-sided bony septal spur that contacts the hypoplastic left inferior turbinate and the left lateral nasal wall. Circumferential mucosal thickening lines walls of the right maxillary antrum with the remaining paranasal sinuses predominantly ventilated bilaterally. Anterior and posterior drainage pathways are patent. Orbital contents are normal. There is mild ventricular enlargement out of proportion to the degree of sulcal prominence, central atrophy versus mild communicating hydrocephalus. Mastoid air cells are clear bilaterally.\par IMPRESSION:\par Although it is difficult to define lesional extent on this CT examination, subtle asymmetric enhancement is present along the left lateral margin of the anterior nasal septum, with more confluent soft tissue along the nasal cavity floor that is associated with underlying bone erosion. A smaller nodular focus is present superiorly with suspected erosion of the left nasal bone. Please refer to concurrent MRI and PET/CT for additional assessment of lesional extent.\par \par MR ORBIT FACE AND/OR NECK with/without contrast (08/05/2020) at [facility]:\par - The current study is interpreted in conjunction with images from concurrent CT maxillofacial and PET/CT studies.\par - As on the CT study, subtle asymmetric enhancement is present along the mucosal surface of the left anterior nasal septum, with more confluent soft tissue along the nasal cavity floor associated with osseous erosion and extension of tumor into the left ventral maxilla. Additional subcentimeter nodular enhancement along the anterior left nasal roof associated with apparent erosion of the left nasal bone is not as well seen on the MRI study. The best MRI imaging correlate is on reconstructed coronal images of the volumetric T1 series, annotated and saved as key images. No additional sites of suspicious nodular soft tissue or contrast enhancement are identified. There is no pathologic contrast enhancement within skull base foramina and the cavernous sinuses are symmetric in appearance bilaterally. There is again mild ventricular prominence, central atrophy versus mild communicating hydrocephalus. There is no intracranial mass or pathologic contrast enhancement.\par IMPRESSION:\par As on the concurrent CT examination, subtle asymmetric enhancement is noted along the left anterior nasal septum, with more confluent soft tissue density along the nasal cavity floor associated with underlying osseous erosion. A second suspected focus of osseous erosion along the left nasal bone is better seen on the CT examination.\par \par \par PET-CT SKUL-THIGH ONC FDG INIT (08/05/2020) at [facility]: \par - Comparison: CT of the head 8/5/2020, CT chest 6/27/2019\par - Reference mean SUV, Liver: 2.4\par - HEAD and NECK: Focal intense FDG avidity along the left aspect of the anterior nasal septum SUV max 9.9, corresponding to CT findings. The FDG avidity extends to the erosive osseous lesion in the floor of the nasal cavity, as well as the nasal bone (4:33, 45). Subcentimeter Right cervical level 1 lymph nodes are seen (3:64) SUV max up to 2.6.\par Focally FDG avid thyroid nodule in the left lobe with punctate calcification, SUV max 4.2 (4:88).\par - LUNGS and PLEURA: Mildly FDG avid patchy groundglass opacities/centrilobular groundglass nodules pronounced in the mid lung and lower lungs, likely inflammatory.\par - MEDIASTINUM and CATHY: Mildly FDG avid small mediastinal lymph nodes and right hilar node, SUV max up to 4.5 in the periaortic region, may be reactive. Physiologic FDG avidity in the myocardium is seen.\par - HEPATOBILIARY: No focus of abnormal FDG avidity. Within normal limits.\par - PANCREAS: No focus of abnormal FDG avidity. Within normal limits.\par - SPLEEN: No focus of abnormal FDG avidity. Within normal limits.\par - ADRENALS: No focus of abnormal FDG avidity. Within normal limits.\par - GENITOURINARY: Mildly and diffusely increased FDG uptake in bilateral testes with SUV max 4.9 without CT correlation. Mild diffuse heterogeneous FDG uptake in the enlarged prostate, nonspecific and likely inflammatory. Physiologic FDG avidity along the urinary tract is seen. Photopenic right renal cysts.\par - GASTROINTESTINAL: Focal FDG avidity in the medial aspect of the duodenal antrum with SUV max 9.1, without CT correlation (604:72). Prominent FDG uptake along the proximal gastric wall without CT correlation, with SUV max 6.3 may be inflammatory. FDG avidity in the proximal sigmoid colon with SUV max 8.3 (604:36), and in the anorectal region with SUV max 12.2 without CT correlation may be physiologic.\par - PERITONEUM/RETROPERITONEUM: No focus of abnormal FDG avidity. Within normal limits.\par - LYMPH NODES: No focus of abnormal FDG avidity. Within normal limits.\par - VESSELS: No focus of abnormal FDG avidity. Within normal limits.\par - BONES and SOFT TISSUE: Increased FDG uptake surrounding the bilateral humeral heads, right greater than the left, likely inflammatory/degenerative. Area of increased FDG avidity along the paraspinal muscle in the mid thoracic spine level, may be physiologic.\par IMPRESSION:\par 1.) FDG-avid lesion along the left aspect of the anterior nasal septum, with osseous involvement in the floor of the nasal cavity as well as nasal bones.\par 2.) Mildly FDG-avid right level 1 cervical lymph nodes, may be reactive. No evidence of distant metastasis.\par 3.) Mildly FDG avid ground glass opacities and centrilobular ground glass opacities in both lungs, likely inflammatory.\par 4.) FDG-avid left thyroid nodule with calcification. Correlation with neck ultrasound is recommended.\par 5.) FDG-avid focus in the duodenal antrum without CT correlation, nonspecific. Clinical/visual correlation is recommended.\par He doesn’t feel grounded when he walks.

## 2021-07-28 NOTE — PHYSICAL EXAM
[de-identified] : s/p left SOHND; scars well-healed.  No mass. [de-identified] : Thyroid gland normal size, no palpable nodules.  [de-identified] : nasal dorsum with deviation to the right,  stepoff over left nasal bone more prominent, soft tissue envelope intact, left lateral rhinotomy incision healed well.  Tissue with post-radiation changes.  [de-identified] : Crusting not present in nasal cavity now. [Midline] : trachea located in midline position [de-identified] : Edentulous upper. [de-identified] : Hard palate intact s/p flap repair. [Normal] : cranial nerves 2-12 intact [] : Tandem Romberg test is negative [FreeTextEntry2] : Cheek and submental sweling is stable. [de-identified] : Fukuda step test negative. [de-identified] : RIGHT side down is positive for nystagmus.

## 2021-08-09 NOTE — ASSESSMENT
[FreeTextEntry1] : 70-year-old gentleman w/ RA and SCCA nasopharyngeal  6 months status post osteocutaneous graft left radius/forearm complicated by postoperative wound issues and probable infection all now well-healed.\par He does have some mild general discomfort and occasional pain in the forearm and wrist. \par He can also follow-up with a hand specialist\par

## 2021-08-09 NOTE — HISTORY OF PRESENT ILLNESS
[de-identified] : Mr. Diaz is a 70-year-old right-hand-dominant walker gentleman who had squamous cell carcinoma nasomaxillary status post resection and then reconstruction using a left radial forearm osteocutaneous flap 10/19/20.  The distal radius was stabilized with plate and screws by Dr. New.  It sounds like there is some wound breakdown postoperatively and delayed wound healing.\par

## 2021-08-09 NOTE — ASU PATIENT PROFILE, ADULT - NSTRANFUSIONPLAN_GEN_ALL_CORE_SIUH
unknown Area M Indication Text: Tumors in this location are included in Area M (cheek, forehead, scalp, neck, jawline and pretibial skin).  Mohs surgery is indicated for tumors in these anatomic locations.

## 2021-08-09 NOTE — PHYSICAL EXAM
[de-identified] : Left upper extremity\par Healed volar incision.\par No palpable or prominent hardware.\par Range of motion of the forearm is with about  70 degrees supination and 60 degrees pronation. 50 DF and 60 PF wrist \par Elbow ROM 0 - 150 flexion\par Sensation intact hand and forearm\par  strength is 5-/5 intact finger flexion extension.  Intact AIN, PIN and ulnar nerve function.  Mild atrophy in the hand and forearm.\par Mild tenderness through the forearm.\par Tender at the distal ulna and ulnar carpal joint.  Tender mildly at the CMC joint of the thumb. [de-identified] : \par X-rays right wrist and distal forearm AP, lateral and oblique views show intact plate and screws radial shaft without any change from prior x-rays.  No evidence of loosening.  Evidence of the radial bone graft harvest with deformity.  Degenerative Cystic changes in the ulnar styloid.  Mild degeneration thumb CMC joint.

## 2021-08-10 ENCOUNTER — APPOINTMENT (OUTPATIENT)
Dept: ORTHOPEDIC SURGERY | Facility: CLINIC | Age: 71
End: 2021-08-10

## 2021-09-17 ENCOUNTER — APPOINTMENT (OUTPATIENT)
Dept: OTOLARYNGOLOGY | Facility: CLINIC | Age: 71
End: 2021-09-17
Payer: COMMERCIAL

## 2021-09-17 VITALS
HEART RATE: 95 BPM | SYSTOLIC BLOOD PRESSURE: 108 MMHG | DIASTOLIC BLOOD PRESSURE: 81 MMHG | HEIGHT: 62 IN | WEIGHT: 145 LBS | BODY MASS INDEX: 26.68 KG/M2 | TEMPERATURE: 98.7 F

## 2021-09-17 DIAGNOSIS — Z87.09 PERSONAL HISTORY OF OTHER DISEASES OF THE RESPIRATORY SYSTEM: ICD-10-CM

## 2021-09-17 PROCEDURE — 99214 OFFICE O/P EST MOD 30 MIN: CPT

## 2021-09-17 RX ORDER — PREDNISONE 5 MG/1
5 TABLET ORAL
Qty: 100 | Refills: 2 | Status: DISCONTINUED | COMMUNITY
Start: 2021-02-09 | End: 2021-09-17

## 2021-09-17 RX ORDER — PREDNISONE 2.5 MG/1
2.5 TABLET ORAL
Qty: 100 | Refills: 2 | Status: DISCONTINUED | COMMUNITY
Start: 2021-02-09 | End: 2021-09-17

## 2021-09-17 NOTE — ASSESSMENT
[FreeTextEntry1] : Mr. KATZ has clear nasal cavity on exam today, with no evidence of recurrent squamous cell carcinoma.\par S/p resection internal nasal SCCa and postop RT/chemo, ended 01/18/21. \par S/p removal of bone graft from nasal cavity in 3/2021.  \par --> Likely next PET/CT for surveillance will have to wait for at least January 2022 to account for inflammation after nasal reconstruction.\par \par Reconstruction of nose should be done this fall.  He will see Dr. Larson in 2 weeks.\par \par Dizziness is less frequent and less severe.\par He had VNG at [place] but I do not have the report.\par --> i will call him next week after I have obtained the report.\par \par Neck/occipital pain are improved after PT\par \par Left upper lobe lung nodule found on PET/CT this spring was diagnosed as fibrotic parenchyma on core biopsy\par  --> fup with planned repeat CT as ordered by Dr. Alves\par \par Return in December\par

## 2021-09-17 NOTE — CONSULT LETTER
[Dear  ___] : Dear  [unfilled], [Courtesy Letter:] : I had the pleasure of seeing your patient, [unfilled], in my office today. [Please see my note below.] : Please see my note below. [Consult Closing:] : Thank you very much for allowing me to participate in the care of this patient.  If you have any questions, please do not hesitate to contact me. [Sincerely,] : Sincerely, [DrCalixto  ___] : Dr. AREVALO [DrCalixto ___] : Dr. AREVALO [FreeTextEntry2] : Lorie Arnold MD\par [address] [facility]\par [facility] [address] [FreeTextEntry1] : \par \par \par  [FreeTextEntry3] : \par Olivia Gonzalez MD \par Otolaryngology, Head and Neck Surgery \par \par

## 2021-09-17 NOTE — PHYSICAL EXAM
[FreeTextEntry1] : No hoarseness.  [de-identified] : s/p left SOHND; well-healed.  No mass. [de-identified] : Thyroid gland normal size, no palpable nodules.  [de-identified] : Nasal lower 1/2 with deviation to the left due to contracture of left side tissue envelope and missing left nasal bone.  S/p left lateral rhinotomy  [de-identified] : No crusting.  Nasal cavity clear. Most of septum was resected. [Midline] : trachea located in midline position [de-identified] : Edentulous upper. [de-identified] : Hard palate intact s/p flap repair. [Normal] : no neck adenopathy

## 2021-09-17 NOTE — HISTORY OF PRESENT ILLNESS
[de-identified] : Mr. Diaz is accompanied by his wife today.\par S/p partial rhinectomy, septectomy and partial maxillectomy with left suprahyoid neck dissection (Dr. Gonzalez), with reconstruction of nose and hard palate with left radial forearm osteocutaneous flap (Dr. Delgadillo) on 10/19/2020. \par D/c home 10/26.\par Postop RT (11/23/2020-01/18/2021) at [facility] (Dr. Baca)\par S/p nasal framework reconstruction and removal of bone graft 3/09/21 (Dr. Delgadillo)\par \par Able to breathe through nose. Uses irrigations to prevent crusting.  No pain nose/mouth.\par He feels his nose is getting smaller.  Cheek and submental swelling is less promeinent.\par He has an appointment with Dr. Larson plastic surgeon on 9/27.\par The dizziness and spinning sensation improved. He had VNG at [facility],but I do not have the results.\par Hx of bilateral SNHL and tinnitus. No change in hearing;tinnitus; no ear pain or ear discharge. \par Hx of pressure in neck and occipital area which was diagnosed as osteosarcosis; improved a little with PT.\par CT-guided biopsy of 1.2 cm TYLER nodule (7/06/2021) fibrotic lung parenchyma; no neoplasm.  He will have a repeat CT chest in 6 months.\par His rheumatologist is looking into starting him on infusion of infliximab after he heals from nasal reconstruction.\par \par \par CT-guided biopsies of 1.2 cm YTLER nodule (7/06/2021) at [facility] \par 1.) Core biopsy\par - Fibrotic lung parenchyma with reactive appearing type II pneumocytes, increased macrophages and fresh blood.  - No neoplasm visualized \par 2.) FNA\par  - ATYPICAL FINDINGS\par  - Pneumocytes with mild reactive-repairative appearing atypia.  Cell block  scant, predominantly lysed blood.\par \par \par PETCT SKUL-THI ONC FDG SUBS (05/11/2021) at [facility]:\par - Comparison: PET/CT from 5/11/2020\par - Reference mean SUV, Liver: 2.6 SUV\par * Head and neck: Status post rhinectomy, septectomy and partial maxillectomy with osteocutaneous reconstruction. Status post recent removal of bone graft. There is diffuse linear FDG uptake along the reconstructed palate and maxillectomy bed which may represent posttreatment/postsurgical changes. There is linear FDG uptake along the lateral aspects of the nasal cavity bilaterally which also may be secondary to posttreatment/postsurgical changes. Surgical clips are noted within the left suprahilar neck secondary to neck dissection. There is no evidence of cervical lymphadenopathy. Physiologic FDG avidity in the pharyngeal lymphoid tissue is seen.\par * Lungs and pleura: 1.2 cm round glass opacity within the left upper lobe exhibits mild FDG avidity (SUV 2.4, previously 1.1). This groundglass nodule was in retrospect present on CT chest 10/5/2017 although has a more solid nodular appearance. Again demonstrated is mild FDG uptake within several additional groundglass opacities predominantly within the left lung which are likely inflammatory.\par * Mediastinum and cathy: Symmetric mild FDG uptake within nonenlarged bilateral hilar lymph nodes which by pattern and distribution most likely secondary to inflammatory changes.\par * Heart and pericardium: Physiologic FDG avidity in the myocardium is seen. Within normal limits.\par * Chest wall: No focus of abnormal FDG avidity. Within normal limits.\par * Hepatobiliary, Pancreas, Spleen, Adrenals, Peritoneum/retroperitoneum, Gastrointestinal : No focus of abnormal FDG avidity. Within normal limits. Physiologic FDG uptake in the bowel is seen.\par * Genitourinary: Right renal cysts are noted.. Physiologic FDG avidity along the urinary tract is seen.\par * Lymph nodes: Stable appearance of small right level 1 cervical lymph node with mild FDG avidity (SUV 2.0 compared to SUV 2.6 previously).\par * Vessels: Mild atherosclerosis of aorta.\par * Bones and soft tissue: No focus of abnormal FDG avidity.\par IMPRESSION:\par 1.) Since prior PET/CT 8/5/2020, postsurgical changes within the nasal soft tissues and hard palate with predominantly linear FDG uptake without CT correlate which may represent postsurgical/posttreatment changes. No definite evidence of residual or recurrent malignancy. No evidence of cervical lymphadenopathy.\par 2.) 1.2 cm left upper lobe groundglass opacity with increased mild FDG uptake and has a more solid appearance and therefore malignancy cannot be excluded. Consider tissue sampling for further evaluation.\par \par \par CT MAXILLOFACIAL w/ contrast (03/01/2021) at [facility]:\par - COMPARISON: Postcontrast CT scan of the facial bones dated August 5, 2020.\par - The patient is status post nasomaxillary resection for squamous cell carcinoma with reconstruction. There has been partial resection of the hard palate (midportion) with placement of a bone graft and metallic plate and screws. There has been resection of the left nasal bone with deformity of the overlying adjacent skin, resection of the nasal septum, left inferior turbinate, partial resection of the left middle turbinate, and partial resection of the right inferior turbinate.\par - Surgical clips in the left submandibular space. There has been resection of the left submandibular gland. Soft tissue thickening in the lateral aspect left submandibular space consistent with post therapeutic changes.\par - There is ill-defined abnormal soft tissue anterior and inferior to the nasal cavity (predominantly to the left of midline) extending into the left nasal cavity, medial to the left orbit, and anterior to the left maxillary sinus which likely represents post therapeutic changes. 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 The left frontal sinus outflow tract, right infundibulum, and sphenoethmoidal recesses are patent.\par - Degenerative changes of the visualized cervical spine.\par IMPRESSION:\par In comparison with the prior postcontrast CT scan of the facial bones dated August 5, 2020:\par There has been interval surgical intervention.\par The previously described asymmetric enhancement in the left lateral margin of the anterior nasal septum and the nasal cavity with adjacent bony erosion is no longer demonstrated.\par Abnormal soft tissue anterior and inferior to the nasal cavity (predominantly to the left of midline) extending into the left nasal cavity, medial to the left orbit, and anterior to the left maxillary sinus which likely represents post therapeutic changes. Residual and/or recurrent neoplasm is not excluded. Continued follow-up is advised.\par (Images were reviewed.) \par \par XR C-SPINE COMPLETE 4-5 VIEWS (03/02/2021) at [facility]:\par - No acute osseous abnormality.C3-4 demonstrates no spondylolisthesis in neutral with trace retrolisthesis in flexion and reduction in extension. Trace anterolisthesis C5 relative to C4 without movement from flexion to extension. Severe C5-C6 degenerative disc disease. Atlantoaxial joint is stable. No prevertebral soft tissue swelling surgical clips left neck\par IMPRESSION:\par 1.) No acute osseous abnormality\par 2.) C3-4 trace retrolisthesis in flexion with reduction in extension and neutral positions. Findings suggest ligamentous laxity/spinal instability\par \par LABS\par (02/09/2021) - WBC 11.64 with 83% PMNs; ESR 90, CRT 1.46\par \par USG FNA Left thyroid nodule, 1 cm (02/12/2021), read at [facility]:\par - Benign colloid nodule (Cobb 2)\par \par \par PATHOLOGY (10/19/2020)\par - Negative final margins on primary resection, with bony invasion by the squamous cell carcinoma.\par - Left neck lymph nodes 10, all negative for tumor.\par \par US THYROID (10/09/2020) at [facility] in [place], [place]\par - No prior comparison\par - ISTHMUS: 0.4 cm\par - RIGHT LOBE: 3.7 x 1.7 x 1.7 cm, with no nodules\par - LEFT LOBE: 3.6 x 1.7 x 1.7 cm, with 2 nodules\par  --- 1.1 x 0.7 x 1.0 cm predominantly hyperechoic with hypoechoic rim and focus of calcification in lower pole\par  --- 0.7 x 0.4 x 0.5 cm hyperechoic nodule with tiny echogenic foci in lower pole\par \par \par PATHOLOGY Left nasal septum biopsy (7/16/2020):\par - Invasive nonkeratinizing squamous cell carcinoma.\par - Ki-67 shows an increased proliferation rate of approximately 50%.\par \par CT MAXILLOFACIAL with contrast (08/05/2020) at [facility]:\par - The current study is interpreted in conjunction with images from concurrent PET/CT dated 8/5/2020.\par - Please refer to report of concurrent MRI for more detailed description of lesion extent. Subtle asymmetric enhancement is noted along the left lateral surface of the anterior cartilaginous septum with more confluent enhancing soft tissue noted along the junction of the lateral margin of the maxillary crest and the floor of the left nasal cavity, a finding best appreciated on coronal images 20 through 23. Here, there is underlying osseous erosion with an approximately 7 mm ill-defined lytic focus lying immediately anterior to the nasopalatine duct; see sagittal bone images 39 through 41, axial images 34 through 36 and coronal images 23 through 25. There is also a small nodular focus of enhancing soft tissue along the superior margin of the anterior left nasal septum, best seen on soft tissue coronal image 12, with irregularity of the adjacent left nasal bone evident on the corresponding bone images. As this lies at a considerable distance from the nasal cavity floor lesion, correlation with direct visual inspection is recommended for confirmation. There does appear to be FDG uptake at this site on the PET/CT study.\par - No additional nasal or nasopharyngeal soft tissue mass. There is prominent right and mild left middle turbinate pneumatization. There is a very large left-sided bony septal spur that contacts the hypoplastic left inferior turbinate and the left lateral nasal wall. Circumferential mucosal thickening lines walls of the right maxillary antrum with the remaining paranasal sinuses predominantly ventilated bilaterally. Anterior and posterior drainage pathways are patent. Orbital contents are normal. There is mild ventricular enlargement out of proportion to the degree of sulcal prominence, central atrophy versus mild communicating hydrocephalus. Mastoid air cells are clear bilaterally.\par IMPRESSION:\par Although it is difficult to define lesional extent on this CT examination, subtle asymmetric enhancement is present along the left lateral margin of the anterior nasal septum, with more confluent soft tissue along the nasal cavity floor that is associated with underlying bone erosion. A smaller nodular focus is present superiorly with suspected erosion of the left nasal bone. Please refer to concurrent MRI and PET/CT for additional assessment of lesional extent.\par \par MR ORBIT FACE AND/OR NECK with/without contrast (08/05/2020) at [facility]:\par - The current study is interpreted in conjunction with images from concurrent CT maxillofacial and PET/CT studies.\par - As on the CT study, subtle asymmetric enhancement is present along the mucosal surface of the left anterior nasal septum, with more confluent soft tissue along the nasal cavity floor associated with osseous erosion and extension of tumor into the left ventral maxilla. Additional subcentimeter nodular enhancement along the anterior left nasal roof associated with apparent erosion of the left nasal bone is not as well seen on the MRI study. The best MRI imaging correlate is on reconstructed coronal images of the volumetric T1 series, annotated and saved as key images. No additional sites of suspicious nodular soft tissue or contrast enhancement are identified. There is no pathologic contrast enhancement within skull base foramina and the cavernous sinuses are symmetric in appearance bilaterally. There is again mild ventricular prominence, central atrophy versus mild communicating hydrocephalus. There is no intracranial mass or pathologic contrast enhancement.\par IMPRESSION:\par As on the concurrent CT examination, subtle asymmetric enhancement is noted along the left anterior nasal septum, with more confluent soft tissue density along the nasal cavity floor associated with underlying osseous erosion. A second suspected focus of osseous erosion along the left nasal bone is better seen on the CT examination.\par \par \par PET-CT SKUL-THIGH ONC FDG INIT (08/05/2020) at [facility]: \par - Comparison: CT of the head 8/5/2020, CT chest 6/27/2019\par - Reference mean SUV, Liver: 2.4\par - HEAD and NECK: Focal intense FDG avidity along the left aspect of the anterior nasal septum SUV max 9.9, corresponding to CT findings. The FDG avidity extends to the erosive osseous lesion in the floor of the nasal cavity, as well as the nasal bone (4:33, 45). Subcentimeter Right cervical level 1 lymph nodes are seen (3:64) SUV max up to 2.6.\par Focally FDG avid thyroid nodule in the left lobe with punctate calcification, SUV max 4.2 (4:88).\par - LUNGS and PLEURA: Mildly FDG avid patchy groundglass opacities/centrilobular groundglass nodules pronounced in the mid lung and lower lungs, likely inflammatory.\par - MEDIASTINUM and CATHY: Mildly FDG avid small mediastinal lymph nodes and right hilar node, SUV max up to 4.5 in the periaortic region, may be reactive. Physiologic FDG avidity in the myocardium is seen.\par - HEPATOBILIARY: No focus of abnormal FDG avidity. Within normal limits.\par - PANCREAS: No focus of abnormal FDG avidity. Within normal limits.\par - SPLEEN: No focus of abnormal FDG avidity. Within normal limits.\par - ADRENALS: No focus of abnormal FDG avidity. Within normal limits.\par - GENITOURINARY: Mildly and diffusely increased FDG uptake in bilateral testes with SUV max 4.9 without CT correlation. Mild diffuse heterogeneous FDG uptake in the enlarged prostate, nonspecific and likely inflammatory. Physiologic FDG avidity along the urinary tract is seen. Photopenic right renal cysts.\par - GASTROINTESTINAL: Focal FDG avidity in the medial aspect of the duodenal antrum with SUV max 9.1, without CT correlation (604:72). Prominent FDG uptake along the proximal gastric wall without CT correlation, with SUV max 6.3 may be inflammatory. FDG avidity in the proximal sigmoid colon with SUV max 8.3 (604:36), and in the anorectal region with SUV max 12.2 without CT correlation may be physiologic.\par - PERITONEUM/RETROPERITONEUM: No focus of abnormal FDG avidity. Within normal limits.\par - LYMPH NODES: No focus of abnormal FDG avidity. Within normal limits.\par - VESSELS: No focus of abnormal FDG avidity. Within normal limits.\par - BONES and SOFT TISSUE: Increased FDG uptake surrounding the bilateral humeral heads, right greater than the left, likely inflammatory/degenerative. Area of increased FDG avidity along the paraspinal muscle in the mid thoracic spine level, may be physiologic.\par IMPRESSION:\par 1.) FDG-avid lesion along the left aspect of the anterior nasal septum, with osseous involvement in the floor of the nasal cavity as well as nasal bones.\par 2.) Mildly FDG-avid right level 1 cervical lymph nodes, may be reactive. No evidence of distant metastasis.\par 3.) Mildly FDG avid ground glass opacities and centrilobular ground glass opacities in both lungs, likely inflammatory.\par 4.) FDG-avid left thyroid nodule with calcification. Correlation with neck ultrasound is recommended.\par 5.) FDG-avid focus in the duodenal antrum without CT correlation, nonspecific. Clinical/visual correlation is recommended.\par He doesn’t feel grounded when he walks. \par \par

## 2021-09-17 NOTE — DATA REVIEWED
[de-identified] : \par AUDIOGRAM (03/07/2019)\par RIGHT:  Hearing within normal limits through 2K Hz, sloping to severe SNHL\par LEFT:   Hearing within normal limits through 2K Hz, sloping to severe  SNHL\par Tympanograms  A  bilateral   \par Word recognition 100%  bilateral   \par \par AUDIOGRAM (04/07/2017)\par RIGHT:  Hearing within normal limits except for mild HL at 250 Hz\par LEFT:   Mild conductive hearing loss through 1K Hz, rising to within normal limits with conductive components at 2K and 4K Hz.\par Tympanograms  C  bilateral   \par Word recognition 100%  bilateral   \par DPOAEs:  responses were absent at most tested frequencies bilateral \par

## 2021-09-30 NOTE — DIETITIAN INITIAL EVALUATION ADULT. - CALCULATED FROM (CAL/KG)
7072 Hydroquinone Counseling:  Patient advised that medication may result in skin irritation, lightening (hypopigmentation), dryness, and burning.  In the event of skin irritation, the patient was advised to reduce the amount of the drug applied or use it less frequently.  Rarely, spots that are treated with hydroquinone can become darker (pseudoochronosis).  Should this occur, patient instructed to stop medication and call the office. The patient verbalized understanding of the proper use and possible adverse effects of hydroquinone.  All of the patient's questions and concerns were addressed.

## 2021-10-04 ENCOUNTER — APPOINTMENT (OUTPATIENT)
Dept: PULMONOLOGY | Facility: CLINIC | Age: 71
End: 2021-10-04
Payer: COMMERCIAL

## 2021-10-04 ENCOUNTER — NON-APPOINTMENT (OUTPATIENT)
Age: 71
End: 2021-10-04

## 2021-10-04 VITALS
SYSTOLIC BLOOD PRESSURE: 131 MMHG | TEMPERATURE: 97.7 F | OXYGEN SATURATION: 95 % | WEIGHT: 145 LBS | BODY MASS INDEX: 26.68 KG/M2 | HEART RATE: 97 BPM | HEIGHT: 62 IN | DIASTOLIC BLOOD PRESSURE: 77 MMHG

## 2021-10-04 DIAGNOSIS — Z87.891 PERSONAL HISTORY OF NICOTINE DEPENDENCE: ICD-10-CM

## 2021-10-04 PROCEDURE — 99214 OFFICE O/P EST MOD 30 MIN: CPT

## 2021-10-04 NOTE — ASSESSMENT
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 He has only used his  albuterol PRN.  Pt has not smoked in over a year and has sustained smoking cessation. Does not feel impaired from nasal surgery. Exercise capacity is good. Denies wheezing or dyspnea. Remains abstinent from smoking. \par - he is due for a PFT but will defer in the setting of recent nasal surgery and pending reconstruction\par - 10/2020  FEV1/FVC ratio greater than 70 without significant response to bronchodilator.  Normal TLC and DLCO.\par \par #Pulmonary Nodule \par \par PET scan 5/11/2021 1.2 cm TYLER GGO with increased mild FDG uptake and more solid appearance and therefore malignancy cannot be ruled out.  Pt is high risk for malignancy due to nasal cavity SCC and smoking history.  7/6/201 TYLER atypical findings.  Pneumocytes and mild reactive -reparative appearing atypia. fibrotic lung parenchyma with reactive reactive appearing pneumocytes, increased macrophages and fresh blood.  No neoplasm visualized. Negative for underlying malignant cells. Although there is some atypical cells. I will repeat the CT scan of the chest in 6 months. \par \par #Preop\par \par DEVORA FARLEYUDIN  is optimized for surgery. he  is to be extubated once fully awake and able to protect airway.  The patient is to be monitored in the recovery room. They might benefit for high flow oxygen or noninvasive ventilation to prevent or reverse atelectasis.  Patient is to be admitted to a monitored bed postoperatively.  Avoid oversedation.  DEVORA is high risk for DVT and will require bimodal agents for DVT prophylaxis early mobilization is recommended. he is to use the incentive spirometry postoperative. \par \par No need for CXR recent imaging in july for clearance.\par \par \par

## 2021-10-04 NOTE — END OF VISIT
[Time Spent: ___ minutes] : I have spent [unfilled] minutes of time on the encounter. [FreeTextEntry3] : Pt seen with JUAN Constantino and agree on the above plan of care.

## 2021-10-04 NOTE — REASON FOR VISIT
[Follow-Up] : a follow-up visit [Abnormal CXR/ Chest CT] : an abnormal CXR/ chest CT [COPD] : COPD [Pulmonary Nodules] : pulmonary nodules [TextBox_44] : PCP Dr singh  [TextBox_13] : [phone]

## 2021-10-04 NOTE — HISTORY OF PRESENT ILLNESS
[Former] : former [TextBox_4] : Pt is here for clearance for nasal surgery and reconstruction on 10/14/2021 with Dr. Larson.  Pt with PMH of chronic bronchitis, and  T1M0N0 SCC of nasal cavity CA - sees Dr. Gonzalez and Dr. Delgadillo for ENT. sp resection. He had 30 treatments of radiation.\par \par He has quit smoking cold turkey over a 1 year and has remained abstinent to date.   \par \par Denies coughing up sputum, hemoptysis, fevers, wheezing.  He is able to walk without SOB but has SOb with stairs.    He is followed by Rheumatology for RA. No ED or UC visits for exacerbation of his bronchitis.  Appetite is good and not loosing weight.  He is sleeping well.  \par \par PET scan 5/11/2021 1.2 cm TYLER GGO with increased mild FDG uptake and more solid appearance.\par \par 7/6/201 TYLER atypical findings.  Pneumocytes and mild reactive -reparative appearing atypia. fibrotic lung parenchyma with reactive reactive appearing pneumocytes, increased macrophages and fresh blood.  No neoplasm visualized. Negative for underlying malignant cells. Although there is some atypical cells. I will repeat the CT scan of the chest in 6 months. \par \par Pt was seen by PCP and cardiology for clearance.  \par

## 2021-10-13 ENCOUNTER — TRANSCRIPTION ENCOUNTER (OUTPATIENT)
Age: 71
End: 2021-10-13

## 2021-10-13 VITALS
HEART RATE: 96 BPM | HEIGHT: 62 IN | OXYGEN SATURATION: 95 % | DIASTOLIC BLOOD PRESSURE: 74 MMHG | TEMPERATURE: 98 F | SYSTOLIC BLOOD PRESSURE: 103 MMHG | RESPIRATION RATE: 16 BRPM | WEIGHT: 143.74 LBS

## 2021-10-13 NOTE — PRE-OP CHECKLIST - NSSDAENDDT_GEN_ALL_CORE
14-Oct-2021 09:56 Xeljanz Counseling: I discussed with the patient the risks of Xeljanz therapy including increased risk of infection, liver issues, headache, diarrhea, or cold symptoms. Live vaccines should be avoided. They were instructed to call if they have any problems.

## 2021-10-14 ENCOUNTER — INPATIENT (INPATIENT)
Facility: HOSPITAL | Age: 71
LOS: 1 days | Discharge: HOME | DRG: 940 | End: 2021-10-16
Attending: OTOLARYNGOLOGY | Admitting: OTOLARYNGOLOGY
Payer: COMMERCIAL

## 2021-10-14 DIAGNOSIS — Z98.890 OTHER SPECIFIED POSTPROCEDURAL STATES: Chronic | ICD-10-CM

## 2021-10-14 DIAGNOSIS — Z41.9 ENCOUNTER FOR PROCEDURE FOR PURPOSES OTHER THAN REMEDYING HEALTH STATE, UNSPECIFIED: Chronic | ICD-10-CM

## 2021-10-14 LAB
GRAM STN FLD: SIGNIFICANT CHANGE UP
SPECIMEN SOURCE: SIGNIFICANT CHANGE UP

## 2021-10-14 PROCEDURE — 71045 X-RAY EXAM CHEST 1 VIEW: CPT | Mod: 26

## 2021-10-14 RX ORDER — ONDANSETRON 8 MG/1
4 TABLET, FILM COATED ORAL EVERY 6 HOURS
Refills: 0 | Status: DISCONTINUED | OUTPATIENT
Start: 2021-10-14 | End: 2021-10-16

## 2021-10-14 RX ORDER — PIPERACILLIN AND TAZOBACTAM 4; .5 G/20ML; G/20ML
3.38 INJECTION, POWDER, LYOPHILIZED, FOR SOLUTION INTRAVENOUS ONCE
Refills: 0 | Status: COMPLETED | OUTPATIENT
Start: 2021-10-14 | End: 2021-10-14

## 2021-10-14 RX ORDER — FINASTERIDE 5 MG/1
5 TABLET, FILM COATED ORAL DAILY
Refills: 0 | Status: DISCONTINUED | OUTPATIENT
Start: 2021-10-15 | End: 2021-10-15

## 2021-10-14 RX ORDER — DEXAMETHASONE 0.5 MG/5ML
10 ELIXIR ORAL ONCE
Refills: 0 | Status: COMPLETED | OUTPATIENT
Start: 2021-10-14 | End: 2021-10-14

## 2021-10-14 RX ORDER — HYDROMORPHONE HYDROCHLORIDE 2 MG/ML
2 INJECTION INTRAMUSCULAR; INTRAVENOUS; SUBCUTANEOUS
Refills: 0 | Status: DISCONTINUED | OUTPATIENT
Start: 2021-10-14 | End: 2021-10-16

## 2021-10-14 RX ORDER — BUPIVACAINE 13.3 MG/ML
20 INJECTION, SUSPENSION, LIPOSOMAL INFILTRATION ONCE
Refills: 0 | Status: DISCONTINUED | OUTPATIENT
Start: 2021-10-14 | End: 2021-10-16

## 2021-10-14 RX ORDER — SODIUM CHLORIDE 9 MG/ML
1000 INJECTION, SOLUTION INTRAVENOUS
Refills: 0 | Status: DISCONTINUED | OUTPATIENT
Start: 2021-10-14 | End: 2021-10-15

## 2021-10-14 RX ORDER — SENNA PLUS 8.6 MG/1
1 TABLET ORAL AT BEDTIME
Refills: 0 | Status: DISCONTINUED | OUTPATIENT
Start: 2021-10-14 | End: 2021-10-16

## 2021-10-14 RX ORDER — OXYMETAZOLINE HYDROCHLORIDE 0.5 MG/ML
3 SPRAY NASAL EVERY 12 HOURS
Refills: 0 | Status: DISCONTINUED | OUTPATIENT
Start: 2021-10-14 | End: 2021-10-16

## 2021-10-14 RX ORDER — HEPARIN SODIUM 5000 [USP'U]/ML
5000 INJECTION INTRAVENOUS; SUBCUTANEOUS EVERY 8 HOURS
Refills: 0 | Status: DISCONTINUED | OUTPATIENT
Start: 2021-10-14 | End: 2021-10-16

## 2021-10-14 RX ORDER — HYDROMORPHONE HYDROCHLORIDE 2 MG/ML
2 INJECTION INTRAMUSCULAR; INTRAVENOUS; SUBCUTANEOUS
Refills: 0 | Status: DISCONTINUED | OUTPATIENT
Start: 2021-10-14 | End: 2021-10-14

## 2021-10-14 RX ORDER — PIPERACILLIN AND TAZOBACTAM 4; .5 G/20ML; G/20ML
3.38 INJECTION, POWDER, LYOPHILIZED, FOR SOLUTION INTRAVENOUS EVERY 8 HOURS
Refills: 0 | Status: COMPLETED | OUTPATIENT
Start: 2021-10-15 | End: 2021-10-15

## 2021-10-14 RX ORDER — TAMSULOSIN HYDROCHLORIDE 0.4 MG/1
0.4 CAPSULE ORAL AT BEDTIME
Refills: 0 | Status: DISCONTINUED | OUTPATIENT
Start: 2021-10-14 | End: 2021-10-16

## 2021-10-14 RX ORDER — MUPIROCIN 20 MG/G
1 OINTMENT TOPICAL
Refills: 0 | Status: DISCONTINUED | OUTPATIENT
Start: 2021-10-14 | End: 2021-10-16

## 2021-10-14 RX ORDER — BENZOCAINE AND MENTHOL 5; 1 G/100ML; G/100ML
1 LIQUID ORAL
Refills: 0 | Status: DISCONTINUED | OUTPATIENT
Start: 2021-10-14 | End: 2021-10-14

## 2021-10-14 RX ORDER — INFLUENZA VIRUS VACCINE 15; 15; 15; 15 UG/.5ML; UG/.5ML; UG/.5ML; UG/.5ML
0.7 SUSPENSION INTRAMUSCULAR ONCE
Refills: 0 | Status: DISCONTINUED | OUTPATIENT
Start: 2021-10-14 | End: 2021-10-16

## 2021-10-14 RX ORDER — LANOLIN ALCOHOL/MO/W.PET/CERES
5 CREAM (GRAM) TOPICAL AT BEDTIME
Refills: 0 | Status: DISCONTINUED | OUTPATIENT
Start: 2021-10-14 | End: 2021-10-16

## 2021-10-14 RX ORDER — ALBUTEROL 90 UG/1
2 AEROSOL, METERED ORAL EVERY 6 HOURS
Refills: 0 | Status: DISCONTINUED | OUTPATIENT
Start: 2021-10-14 | End: 2021-10-16

## 2021-10-14 RX ORDER — BENZOCAINE AND MENTHOL 5; 1 G/100ML; G/100ML
1 LIQUID ORAL
Refills: 0 | Status: DISCONTINUED | OUTPATIENT
Start: 2021-10-14 | End: 2021-10-16

## 2021-10-14 RX ORDER — INFLUENZA VIRUS VACCINE 15; 15; 15; 15 UG/.5ML; UG/.5ML; UG/.5ML; UG/.5ML
0.5 SUSPENSION INTRAMUSCULAR ONCE
Refills: 0 | Status: DISCONTINUED | OUTPATIENT
Start: 2021-10-14 | End: 2021-10-14

## 2021-10-14 RX ORDER — HYDROMORPHONE HYDROCHLORIDE 2 MG/ML
1 INJECTION INTRAMUSCULAR; INTRAVENOUS; SUBCUTANEOUS
Refills: 0 | Status: DISCONTINUED | OUTPATIENT
Start: 2021-10-14 | End: 2021-10-16

## 2021-10-14 RX ORDER — ACETAMINOPHEN 500 MG
650 TABLET ORAL EVERY 6 HOURS
Refills: 0 | Status: DISCONTINUED | OUTPATIENT
Start: 2021-10-14 | End: 2021-10-16

## 2021-10-14 RX ADMIN — Medication 102 MILLIGRAM(S): at 21:44

## 2021-10-14 RX ADMIN — HEPARIN SODIUM 5000 UNIT(S): 5000 INJECTION INTRAVENOUS; SUBCUTANEOUS at 22:35

## 2021-10-14 RX ADMIN — PIPERACILLIN AND TAZOBACTAM 200 GRAM(S): 4; .5 INJECTION, POWDER, LYOPHILIZED, FOR SOLUTION INTRAVENOUS at 21:09

## 2021-10-14 NOTE — H&P ADULT - ASSESSMENT
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   - Admit to SDU  - Pain control  - HSQ  - Zosyn  - Mupirocin  - post-op CXR  - IS  - PT  - post-op labs  - IVF  - Diet  - F/u surgical cx

## 2021-10-14 NOTE — H&P ADULT - NSHPPHYSICALEXAM_GEN_ALL_CORE
Gen - NAD, pleasant and conversant  Nose - anterior nares clear, +nasal deformity  OC/OP - MMM, tongue midline, oropharynx clear  Neck - soft, flat, no LAD  Respiratory - breathing comfortably, unlabored breathing

## 2021-10-14 NOTE — BRIEF OPERATIVE NOTE - OPERATION/FINDINGS
Complex nasal reconstruction with left ear and rib cartilage. Repair of nasal cicatrix. Removal of exposed bone intranasally from prior reconstruction and intranasal flap closure

## 2021-10-14 NOTE — H&P ADULT - HISTORY OF PRESENT ILLNESS
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

## 2021-10-15 LAB
ANISOCYTOSIS BLD QL: SLIGHT — SIGNIFICANT CHANGE UP
BASOPHILS # BLD AUTO: 0 K/UL — SIGNIFICANT CHANGE UP (ref 0–0.2)
BASOPHILS NFR BLD AUTO: 0 % — SIGNIFICANT CHANGE UP (ref 0–2)
COVID-19 SPIKE DOMAIN AB INTERP: POSITIVE
COVID-19 SPIKE DOMAIN ANTIBODY RESULT: >250 U/ML — HIGH
DACRYOCYTES BLD QL SMEAR: SLIGHT — SIGNIFICANT CHANGE UP
EOSINOPHIL # BLD AUTO: 0 K/UL — SIGNIFICANT CHANGE UP (ref 0–0.5)
EOSINOPHIL NFR BLD AUTO: 0 % — SIGNIFICANT CHANGE UP (ref 0–6)
GIANT PLATELETS BLD QL SMEAR: PRESENT — SIGNIFICANT CHANGE UP
HCT VFR BLD CALC: 39.8 % — SIGNIFICANT CHANGE UP (ref 39–50)
HGB BLD-MCNC: 12.2 G/DL — LOW (ref 13–17)
HYPOCHROMIA BLD QL: SLIGHT — SIGNIFICANT CHANGE UP
LYMPHOCYTES # BLD AUTO: 0.08 K/UL — LOW (ref 1–3.3)
LYMPHOCYTES # BLD AUTO: 0.9 % — LOW (ref 13–44)
MACROCYTES BLD QL: SLIGHT — SIGNIFICANT CHANGE UP
MANUAL SMEAR VERIFICATION: SIGNIFICANT CHANGE UP
MCHC RBC-ENTMCNC: 26.6 PG — LOW (ref 27–34)
MCHC RBC-ENTMCNC: 30.7 GM/DL — LOW (ref 32–36)
MCV RBC AUTO: 86.7 FL — SIGNIFICANT CHANGE UP (ref 80–100)
MICROCYTES BLD QL: SLIGHT — SIGNIFICANT CHANGE UP
MONOCYTES # BLD AUTO: 0.31 K/UL — SIGNIFICANT CHANGE UP (ref 0–0.9)
MONOCYTES NFR BLD AUTO: 3.5 % — SIGNIFICANT CHANGE UP (ref 2–14)
MYELOCYTES NFR BLD: 0.9 % — HIGH (ref 0–0)
NEUTROPHILS # BLD AUTO: 8.26 K/UL — HIGH (ref 1.8–7.4)
NEUTROPHILS NFR BLD AUTO: 93.8 % — HIGH (ref 43–77)
NEUTS BAND # BLD: 0.9 % — SIGNIFICANT CHANGE UP (ref 0–8)
OVALOCYTES BLD QL SMEAR: SLIGHT — SIGNIFICANT CHANGE UP
PLAT MORPH BLD: ABNORMAL
PLATELET # BLD AUTO: 192 K/UL — SIGNIFICANT CHANGE UP (ref 150–400)
POIKILOCYTOSIS BLD QL AUTO: SLIGHT — SIGNIFICANT CHANGE UP
RBC # BLD: 4.59 M/UL — SIGNIFICANT CHANGE UP (ref 4.2–5.8)
RBC # FLD: 15.6 % — HIGH (ref 10.3–14.5)
RBC BLD AUTO: ABNORMAL
SARS-COV-2 IGG+IGM SERPL QL IA: >250 U/ML — HIGH
SARS-COV-2 IGG+IGM SERPL QL IA: POSITIVE
SPHEROCYTES BLD QL SMEAR: SLIGHT — SIGNIFICANT CHANGE UP
WBC # BLD: 8.72 K/UL — SIGNIFICANT CHANGE UP (ref 3.8–10.5)
WBC # FLD AUTO: 8.72 K/UL — SIGNIFICANT CHANGE UP (ref 3.8–10.5)

## 2021-10-15 RX ORDER — PIPERACILLIN AND TAZOBACTAM 4; .5 G/20ML; G/20ML
3.38 INJECTION, POWDER, LYOPHILIZED, FOR SOLUTION INTRAVENOUS EVERY 6 HOURS
Refills: 0 | Status: DISCONTINUED | OUTPATIENT
Start: 2021-10-15 | End: 2021-10-16

## 2021-10-15 RX ADMIN — MUPIROCIN 1 APPLICATION(S): 20 OINTMENT TOPICAL at 20:32

## 2021-10-15 RX ADMIN — HEPARIN SODIUM 5000 UNIT(S): 5000 INJECTION INTRAVENOUS; SUBCUTANEOUS at 22:11

## 2021-10-15 RX ADMIN — TAMSULOSIN HYDROCHLORIDE 0.4 MILLIGRAM(S): 0.4 CAPSULE ORAL at 22:11

## 2021-10-15 RX ADMIN — MUPIROCIN 1 APPLICATION(S): 20 OINTMENT TOPICAL at 05:23

## 2021-10-15 RX ADMIN — PIPERACILLIN AND TAZOBACTAM 200 GRAM(S): 4; .5 INJECTION, POWDER, LYOPHILIZED, FOR SOLUTION INTRAVENOUS at 17:00

## 2021-10-15 RX ADMIN — HEPARIN SODIUM 5000 UNIT(S): 5000 INJECTION INTRAVENOUS; SUBCUTANEOUS at 05:24

## 2021-10-15 RX ADMIN — Medication 5 MILLIGRAM(S): at 05:24

## 2021-10-15 RX ADMIN — HEPARIN SODIUM 5000 UNIT(S): 5000 INJECTION INTRAVENOUS; SUBCUTANEOUS at 14:37

## 2021-10-15 RX ADMIN — PIPERACILLIN AND TAZOBACTAM 200 GRAM(S): 4; .5 INJECTION, POWDER, LYOPHILIZED, FOR SOLUTION INTRAVENOUS at 06:42

## 2021-10-15 RX ADMIN — BENZOCAINE AND MENTHOL 1 LOZENGE: 5; 1 LIQUID ORAL at 17:00

## 2021-10-15 RX ADMIN — SENNA PLUS 1 TABLET(S): 8.6 TABLET ORAL at 22:11

## 2021-10-15 NOTE — PHYSICAL THERAPY INITIAL EVALUATION ADULT - GENERAL OBSERVATIONS, REHAB EVAL
PT IE completed. Patient received semi supine in bed +tele, +RA, +nasal incision C/D/I, +nasal gauze C/D/I, +ear plug, NAD, willing to work with PT

## 2021-10-15 NOTE — PHYSICAL THERAPY INITIAL EVALUATION ADULT - PERTINENT HX OF CURRENT PROBLEM, REHAB EVAL
Stable, 70 y.o M  POD# 1 s/p Nasal Endoscopy, Debridement nasal cavity, Removal hardware nasal cavity, Removal bone graft, complex closure nasal dorsum.

## 2021-10-15 NOTE — PHYSICAL THERAPY INITIAL EVALUATION ADULT - DID THE PATIENT HAVE SURGERY?
Complex nasal reconstruction with left ear and rib cartilage. Repair of nasal cicatrix. Removal of exposed bone intranasally from prior reconstruction and intranasal flap closure/yes

## 2021-10-15 NOTE — PHYSICAL THERAPY INITIAL EVALUATION ADULT - PRECAUTIONS/LIMITATIONS, REHAB EVAL
Avapro 300 sent, that is equivalent to valsartan 320 but ask patient to monitor blood pressure with the change and possibly change back to valsartan when available   fall precautions

## 2021-10-15 NOTE — PHYSICAL THERAPY INITIAL EVALUATION ADULT - ADDITIONAL COMMENTS
Patient is a community ambulator who lives in a private house with his wife with ~2 PEPE and 1 flight of stairs inside. Per patient, he currently resides on the ground floor of the house. Patient reports being independent with all ADLs prior and denies use of any assistive devices when ambulating.

## 2021-10-16 ENCOUNTER — TRANSCRIPTION ENCOUNTER (OUTPATIENT)
Age: 71
End: 2021-10-16

## 2021-10-16 VITALS — TEMPERATURE: 98 F

## 2021-10-16 LAB
-  CEFAZOLIN: SIGNIFICANT CHANGE UP
-  CLINDAMYCIN: SIGNIFICANT CHANGE UP
-  ERYTHROMYCIN: SIGNIFICANT CHANGE UP
-  LINEZOLID: SIGNIFICANT CHANGE UP
-  OXACILLIN: SIGNIFICANT CHANGE UP
-  RIFAMPIN: SIGNIFICANT CHANGE UP
-  TRIMETHOPRIM/SULFAMETHOXAZOLE: SIGNIFICANT CHANGE UP
-  VANCOMYCIN: SIGNIFICANT CHANGE UP
CULTURE RESULTS: SIGNIFICANT CHANGE UP
METHOD TYPE: SIGNIFICANT CHANGE UP
ORGANISM # SPEC MICROSCOPIC CNT: SIGNIFICANT CHANGE UP
ORGANISM # SPEC MICROSCOPIC CNT: SIGNIFICANT CHANGE UP
SPECIMEN SOURCE: SIGNIFICANT CHANGE UP

## 2021-10-16 RX ORDER — OXYCODONE AND ACETAMINOPHEN 5; 325 MG/1; MG/1
1 TABLET ORAL
Qty: 12 | Refills: 0
Start: 2021-10-16 | End: 2021-10-20

## 2021-10-16 RX ADMIN — PIPERACILLIN AND TAZOBACTAM 200 GRAM(S): 4; .5 INJECTION, POWDER, LYOPHILIZED, FOR SOLUTION INTRAVENOUS at 05:08

## 2021-10-16 RX ADMIN — PIPERACILLIN AND TAZOBACTAM 200 GRAM(S): 4; .5 INJECTION, POWDER, LYOPHILIZED, FOR SOLUTION INTRAVENOUS at 00:31

## 2021-10-16 RX ADMIN — Medication 5 MILLIGRAM(S): at 05:07

## 2021-10-16 RX ADMIN — HEPARIN SODIUM 5000 UNIT(S): 5000 INJECTION INTRAVENOUS; SUBCUTANEOUS at 05:08

## 2021-10-16 RX ADMIN — MUPIROCIN 1 APPLICATION(S): 20 OINTMENT TOPICAL at 05:08

## 2021-10-16 NOTE — DISCHARGE NOTE PROVIDER - HOSPITAL COURSE
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 Diet was advanced as tolerated and pain was well controlled on medication. On day of discharge, pt deemed stable and ready to return home with plan to follow up as an outpatient.

## 2021-10-16 NOTE — DISCHARGE NOTE PROVIDER - NSDCCPCAREPLAN_GEN_ALL_CORE_FT
PRINCIPAL DISCHARGE DIAGNOSIS  Diagnosis: S/P nasal surgery  Assessment and Plan of Treatment:

## 2021-10-16 NOTE — DISCHARGE NOTE PROVIDER - CARE PROVIDER_API CALL
Billy Larson)  [facility] Otolaryngology  Head and Neck Surgery Surgical Services  [address] [place] [address], [address]  [place] [address]  Phone: [phone]  Fax: [phone]  Follow Up Time:

## 2021-10-16 NOTE — DISCHARGE NOTE PROVIDER - NSDCFUADDINST_GEN_ALL_CORE_FT
Activity: No heavy lifting or strenuous activity. Walk as tolerated. Physical therapy per routine. Keep the xeroform on the ear, it will be removed in clinic. Keep ear dry. You may shower. No baths, hot tubs or swimming until approved by your surgeon. Follow up 1 week after discharge. Call office for appointment. Call office for fever >101.5 or redness or drainage from wound.  Activity: No heavy lifting or strenuous activity. Walk as tolerated. Physical therapy per routine. Keep the xeroform on the ear, it will be removed in clinic. Keep ear dry. You may shower. No baths, hot tubs or swimming until approved by your surgeon. Follow up 1 week after discharge. Call office for appointment. Follow up with Dr Larson on 10/25/21. Call office for fever >101.5 or redness or drainage from wound.     Medications have been sent to your pharmacy. Please call to confirm that scripts have been sent.

## 2021-10-16 NOTE — DISCHARGE NOTE NURSING/CASE MANAGEMENT/SOCIAL WORK - PATIENT PORTAL LINK FT
You can access the FollowMyHealth Patient Portal offered by [facility] by registering at the following website: [email or web address]. By joining [employer]’s FollowMyHealth portal, you will also be able to view your health information using other applications (apps) compatible with our system.

## 2021-10-16 NOTE — PROGRESS NOTE ADULT - SUBJECTIVE AND OBJECTIVE BOX
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     Vital Signs Last 24 Hrs  T(C): 36.5 (16 Oct 2021 05:25), Max: 37.2 (15 Oct 2021 21:24)  T(F): 97.7 (16 Oct 2021 05:25), Max: 98.9 (15 Oct 2021 21:24)  HR: 72 (16 Oct 2021 05:14) (66 - 106)  BP: 154/92 (16 Oct 2021 05:14) (96/53 - 154/92)  BP(mean): 119 (16 Oct 2021 05:14) (67 - 119)  RR: 17 (16 Oct 2021 05:14) (17 - 18)  SpO2: 93% (16 Oct 2021 05:14) (93% - 95%)    p/e  aaox3  postauricular incision intact cdi, xeroform in bayron and postauricular in place sutured, no hematoma  nose: no epistaxis, no bleeding  oc: dry   neck soft    A/P: 70M w/ RA, BPH, Chronic bronchitis, hx of cardiomyopath, SCC of septum s/p total septectomy, partial palatectomy, and 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   - dc today  - regular diet  - will d/w attending re abx  - pain medications will be sent    ----------------------------------------------  Jose Gomez MD  Resident  Department of Otolaryngology - Head and Neck Surgery  [facility]

## 2021-10-16 NOTE — DISCHARGE NOTE PROVIDER - NSDCMRMEDTOKEN_GEN_ALL_CORE_FT
dutasteride 0.5 mg oral capsule: 1 cap(s) orally once a day  Flomax 0.4 mg oral capsule: 1 cap(s) orally once a day  Percocet 5 mg-325 mg oral tablet: 1 tab(s) orally every 6 hours, As Needed for pain. MDD:4 tab  ProAir HFA 90 mcg/inh inhalation aerosol: 2 puff(s) inhaled every 6 hours   dutasteride 0.5 mg oral capsule: 1 cap(s) orally once a day  Flomax 0.4 mg oral capsule: 1 cap(s) orally once a day  ProAir HFA 90 mcg/inh inhalation aerosol: 2 puff(s) inhaled every 6 hours
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
## 2021-10-22 DIAGNOSIS — Z48.1 ENCOUNTER FOR PLANNED POSTPROCEDURAL WOUND CLOSURE: ICD-10-CM

## 2021-10-22 DIAGNOSIS — J42 UNSPECIFIED CHRONIC BRONCHITIS: ICD-10-CM

## 2021-10-22 DIAGNOSIS — C30.0 MALIGNANT NEOPLASM OF NASAL CAVITY: ICD-10-CM

## 2021-10-22 DIAGNOSIS — M06.9 RHEUMATOID ARTHRITIS, UNSPECIFIED: ICD-10-CM

## 2021-10-22 DIAGNOSIS — T86.831 BONE GRAFT FAILURE: ICD-10-CM

## 2021-10-22 DIAGNOSIS — J34.89 OTHER SPECIFIED DISORDERS OF NOSE AND NASAL SINUSES: ICD-10-CM

## 2021-10-22 DIAGNOSIS — L90.5 SCAR CONDITIONS AND FIBROSIS OF SKIN: ICD-10-CM

## 2021-10-22 DIAGNOSIS — T86.828 OTHER COMPLICATIONS OF SKIN GRAFT (ALLOGRAFT) (AUTOGRAFT): ICD-10-CM

## 2021-10-22 DIAGNOSIS — T85.698A OTHER MECHANICAL COMPLICATION OF OTHER SPECIFIED INTERNAL PROSTHETIC DEVICES, IMPLANTS AND GRAFTS, INITIAL ENCOUNTER: ICD-10-CM
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
## 2021-10-29 PROCEDURE — 87075 CULTR BACTERIA EXCEPT BLOOD: CPT

## 2021-10-29 PROCEDURE — 71045 X-RAY EXAM CHEST 1 VIEW: CPT

## 2021-10-29 PROCEDURE — C1889: CPT

## 2021-10-29 PROCEDURE — 97161 PT EVAL LOW COMPLEX 20 MIN: CPT

## 2021-10-29 PROCEDURE — 86850 RBC ANTIBODY SCREEN: CPT

## 2021-10-29 PROCEDURE — 86900 BLOOD TYPING SEROLOGIC ABO: CPT

## 2021-10-29 PROCEDURE — 87070 CULTURE OTHR SPECIMN AEROBIC: CPT

## 2021-10-29 PROCEDURE — 85025 COMPLETE CBC W/AUTO DIFF WBC: CPT

## 2021-10-29 PROCEDURE — 36415 COLL VENOUS BLD VENIPUNCTURE: CPT

## 2021-10-29 PROCEDURE — 86901 BLOOD TYPING SEROLOGIC RH(D): CPT

## 2021-10-29 PROCEDURE — 86769 SARS-COV-2 COVID-19 ANTIBODY: CPT

## 2021-10-29 PROCEDURE — 87186 SC STD MICRODIL/AGAR DIL: CPT

## 2021-10-29 PROCEDURE — C9399: CPT

## 2021-12-22 ENCOUNTER — APPOINTMENT (OUTPATIENT)
Dept: OTOLARYNGOLOGY | Facility: CLINIC | Age: 71
End: 2021-12-22
Payer: COMMERCIAL

## 2021-12-22 VITALS
HEIGHT: 62 IN | BODY MASS INDEX: 26.68 KG/M2 | WEIGHT: 145 LBS | SYSTOLIC BLOOD PRESSURE: 121 MMHG | HEART RATE: 81 BPM | TEMPERATURE: 96.1 F | DIASTOLIC BLOOD PRESSURE: 85 MMHG

## 2021-12-22 DIAGNOSIS — M54.2 CERVICALGIA: ICD-10-CM

## 2021-12-22 DIAGNOSIS — R22.0 LOCALIZED SWELLING, MASS AND LUMP, HEAD: ICD-10-CM

## 2021-12-22 DIAGNOSIS — R13.11 DYSPHAGIA, ORAL PHASE: ICD-10-CM

## 2021-12-22 DIAGNOSIS — Z01.811 ENCOUNTER FOR PREPROCEDURAL RESPIRATORY EXAMINATION: ICD-10-CM

## 2021-12-22 PROBLEM — M77.9 ENTHESOPATHY, UNSPECIFIED: Chronic | Status: ACTIVE | Noted: 2020-10-16

## 2021-12-22 PROBLEM — J44.1 CHRONIC OBSTRUCTIVE PULMONARY DISEASE WITH (ACUTE) EXACERBATION: Chronic | Status: ACTIVE | Noted: 2021-10-13

## 2021-12-22 PROCEDURE — 99214 OFFICE O/P EST MOD 30 MIN: CPT

## 2021-12-27 ENCOUNTER — NON-APPOINTMENT (OUTPATIENT)
Age: 71
End: 2021-12-27

## 2021-12-27 PROBLEM — M54.2 MUSCULOSKELETAL NECK PAIN: Status: ACTIVE | Noted: 2021-04-30

## 2021-12-27 PROBLEM — R22.0 FACIAL SWELLING: Status: RESOLVED | Noted: 2021-05-26 | Resolved: 2021-12-27

## 2021-12-27 PROBLEM — R13.11 ORAL PHASE DYSPHAGIA: Status: RESOLVED | Noted: 2020-11-25 | Resolved: 2021-12-27

## 2021-12-27 PROBLEM — Z01.811 PREOP PULMONARY/RESPIRATORY EXAM: Status: RESOLVED | Noted: 2021-10-04 | Resolved: 2021-12-27

## 2021-12-27 PROBLEM — Z01.811 PREOP PULMONARY/RESPIRATORY EXAM: Status: RESOLVED | Noted: 2020-10-08 | Resolved: 2021-12-27

## 2021-12-27 NOTE — DATA REVIEWED
[de-identified] : \par AUDIOGRAM (03/07/2019)\par RIGHT:  Hearing within normal limits through 2K Hz, sloping to severe SNHL\par LEFT:   Hearing within normal limits through 2K Hz, sloping to severe  SNHL\par Tympanograms  A  bilateral   \par Word recognition 100%  bilateral   \par \par AUDIOGRAM (04/07/2017)\par RIGHT:  Hearing within normal limits except for mild HL at 250 Hz\par LEFT:   Mild conductive hearing loss through 1K Hz, rising to within normal limits with conductive components at 2K and 4K Hz.\par Tympanograms  C  bilateral   \par Word recognition 100%  bilateral   \par DPOAEs:  responses were absent at most tested frequencies bilateral \par

## 2021-12-27 NOTE — HISTORY OF PRESENT ILLNESS
[de-identified] : Mr. Diaz is accompanied by his wife today.\par S/p partial rhinectomy, septectomy and partial maxillectomy with left suprahyoid neck dissection (Dr. Gonzalez), with reconstruction of nose and hard palate with left radial forearm osteocutaneous flap (Dr. Delgadillo) on 10/19/2020. \par Postop RT (11/23/2020-01/18/2021) at [place] (Dr. Baca)\par S/p nasal framework reconstruction and removal of bone graft 3/09/21 (Dr. Delgadillo)\par S/p nasal reconstruction surgery 10/14/2021 (Dr. Larson).  \par \par Currently, nasal breathing has improved but he has some leaking from the nose.  He still cannot breathe through left nostril, which is closed.  No pain or bleeding from nose/mouth.\par Dr. Larson has one more procedure planned for him.\par \par The dizziness and spinning sensation improved, almost gone. He had [facility] at [facility].\par Hx of bilateral SNHL and tinnitus. No change in hearing;tinnitus; no ear pain or ear discharge. \par Pressure in neck and occipital area (diagnosed as osteosarcosis) greatly improved with PT.\par CT-guided biopsy of 1.2 cm TYLER nodule (7/06/2021) showed fibrotic lung parenchyma; no neoplasm. He is to have a repeat CT chest in 6 months but not ordered yet.\par His rheumatologist is looking into starting him on infusion of infliximab after he heals from nasal reconstruction.\par \par \par CT-guided biopsies of 1.2 cm TYLER nodule (7/06/2021) at [facility] \par 1.) Core biopsy\par - Fibrotic lung parenchyma with reactive appearing type II pneumocytes, increased macrophages and fresh blood. - No neoplasm visualized \par 2.) FNA\par  - ATYPICAL FINDINGS\par  - Pneumocytes with mild reactive-repairative appearing atypia. Cell block scant, predominantly lysed blood.\par \par \par PETCT SKUL-THI ONC FDG SUBS (05/11/2021) at [facility]:\par - Comparison: PET/CT from 5/11/2020\par - Reference mean SUV, Liver: 2.6 SUV\par * Head and neck: Status post rhinectomy, septectomy and partial maxillectomy with osteocutaneous reconstruction. Status post recent removal of bone graft. There is diffuse linear FDG uptake along the reconstructed palate and maxillectomy bed which may represent posttreatment/postsurgical changes. There is linear FDG uptake along the lateral aspects of the nasal cavity bilaterally which also may be secondary to posttreatment/postsurgical changes. Surgical clips are noted within the left suprahilar neck secondary to neck dissection. There is no evidence of cervical lymphadenopathy. Physiologic FDG avidity in the pharyngeal lymphoid tissue is seen.\par * Lungs and pleura: 1.2 cm round glass opacity within the left upper lobe exhibits mild FDG avidity (SUV 2.4, previously 1.1). This groundglass nodule was in retrospect present on CT chest 10/5/2017 although has a more solid nodular appearance. Again demonstrated is mild FDG uptake within several additional groundglass opacities predominantly within the left lung which are likely inflammatory.\par * Mediastinum and cathy: Symmetric mild FDG uptake within nonenlarged bilateral hilar lymph nodes which by pattern and distribution most likely secondary to inflammatory changes.\par * Heart and pericardium: Physiologic FDG avidity in the myocardium is seen. Within normal limits.\par * Chest wall: No focus of abnormal FDG avidity. Within normal limits.\par * Hepatobiliary, Pancreas, Spleen, Adrenals, Peritoneum/retroperitoneum, Gastrointestinal : No focus of abnormal FDG avidity. Within normal limits. Physiologic FDG uptake in the bowel is seen.\par * Genitourinary: Right renal cysts are noted.. Physiologic FDG avidity along the urinary tract is seen.\par * Lymph nodes: Stable appearance of small right level 1 cervical lymph node with mild FDG avidity (SUV 2.0 compared to SUV 2.6 previously).\par * Vessels: Mild atherosclerosis of aorta.\par * Bones and soft tissue: No focus of abnormal FDG avidity.\par IMPRESSION:\par 1.) Since prior PET/CT 8/5/2020, postsurgical changes within the nasal soft tissues and hard palate with predominantly linear FDG uptake without CT correlate which may represent postsurgical/posttreatment changes. No definite evidence of residual or recurrent malignancy. No evidence of cervical lymphadenopathy.\par 2.) 1.2 cm left upper lobe groundglass opacity with increased mild FDG uptake and has a more solid appearance and therefore malignancy cannot be excluded. Consider tissue sampling for further evaluation.\par \par \par CT MAXILLOFACIAL w/ contrast (03/01/2021) at [facility]:\par - COMPARISON: Postcontrast CT scan of the facial bones dated August 5, 2020.\par - The patient is status post nasomaxillary resection for squamous cell carcinoma with reconstruction. There has been partial resection of the hard palate (midportion) with placement of a bone graft and metallic plate and screws. There has been resection of the left nasal bone with deformity of the overlying adjacent skin, resection of the nasal septum, left inferior turbinate, partial resection of the left middle turbinate, and partial resection of the right inferior turbinate.\par - Surgical clips in the left submandibular space. There has been resection of the left submandibular gland. Soft tissue thickening in the lateral aspect left submandibular space consistent with post therapeutic changes.\par - There is ill-defined abnormal soft tissue anterior and inferior to the nasal cavity (predominantly to the left of midline) extending into the left nasal cavity, medial to the left orbit, and anterior to the left maxillary sinus which likely represents post therapeutic changes. 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 The left frontal sinus outflow tract, right infundibulum, and sphenoethmoidal recesses are patent.\par - Degenerative changes of the visualized cervical spine.\par IMPRESSION:\par In comparison with the prior postcontrast CT scan of the facial bones dated August 5, 2020:\par There has been interval surgical intervention.\par The previously described asymmetric enhancement in the left lateral margin of the anterior nasal septum and the nasal cavity with adjacent bony erosion is no longer demonstrated.\par Abnormal soft tissue anterior and inferior to the nasal cavity (predominantly to the left of midline) extending into the left nasal cavity, medial to the left orbit, and anterior to the left maxillary sinus which likely represents post therapeutic changes. Residual and/or recurrent neoplasm is not excluded. Continued follow-up is advised.\par (Images were reviewed.) \par \par XR C-SPINE COMPLETE 4-5 VIEWS (03/02/2021) at [facility]:\par - No acute osseous abnormality.C3-4 demonstrates no spondylolisthesis in neutral with trace retrolisthesis in flexion and reduction in extension. Trace anterolisthesis C5 relative to C4 without movement from flexion to extension. Severe C5-C6 degenerative disc disease. Atlantoaxial joint is stable. No prevertebral soft tissue swelling surgical clips left neck\par IMPRESSION:\par 1.) No acute osseous abnormality\par 2.) C3-4 trace retrolisthesis in flexion with reduction in extension and neutral positions. Findings suggest ligamentous laxity/spinal instability\par \par LABS\par (02/09/2021) - WBC 11.64 with 83% PMNs; ESR 90, CRT 1.46\par \par USG FNA Left thyroid nodule, 1 cm (02/12/2021), read at [facility]:\par - Benign colloid nodule ([place] 2)\par \par \par PATHOLOGY (10/19/2020)\par - Negative final margins on primary resection, with bony invasion by the squamous cell carcinoma.\par - Left neck lymph nodes 10, all negative for tumor.\par \par US THYROID (10/09/2020) at [facility] in [place], [place]\par - No prior comparison\par - ISTHMUS: 0.4 cm\par - RIGHT LOBE: 3.7 x 1.7 x 1.7 cm, with no nodules\par - LEFT LOBE: 3.6 x 1.7 x 1.7 cm, with 2 nodules\par  --- 1.1 x 0.7 x 1.0 cm predominantly hyperechoic with hypoechoic rim and focus of calcification in lower pole\par  --- 0.7 x 0.4 x 0.5 cm hyperechoic nodule with tiny echogenic foci in lower pole\par \par \par PATHOLOGY Left nasal septum biopsy (7/16/2020):\par - Invasive nonkeratinizing squamous cell carcinoma.\par - Ki-67 shows an increased proliferation rate of approximately 50%.\par \par CT MAXILLOFACIAL with contrast (08/05/2020) at [facility]:\par - The current study is interpreted in conjunction with images from concurrent PET/CT dated 8/5/2020.\par - Please refer to report of concurrent MRI for more detailed description of lesion extent. Subtle asymmetric enhancement is noted along the left lateral surface of the anterior cartilaginous septum with more confluent enhancing soft tissue noted along the junction of the lateral margin of the maxillary crest and the floor of the left nasal cavity, a finding best appreciated on coronal images 20 through 23. Here, there is underlying osseous erosion with an approximately 7 mm ill-defined lytic focus lying immediately anterior to the nasopalatine duct; see sagittal bone images 39 through 41, axial images 34 through 36 and coronal images 23 through 25. There is also a small nodular focus of enhancing soft tissue along the superior margin of the anterior left nasal septum, best seen on soft tissue coronal image 12, with irregularity of the adjacent left nasal bone evident on the corresponding bone images. As this lies at a considerable distance from the nasal cavity floor lesion, correlation with direct visual inspection is recommended for confirmation. There does appear to be FDG uptake at this site on the PET/CT study.\par - No additional nasal or nasopharyngeal soft tissue mass. There is prominent right and mild left middle turbinate pneumatization. There is a very large left-sided bony septal spur that contacts the hypoplastic left inferior turbinate and the left lateral nasal wall. Circumferential mucosal thickening lines walls of the right maxillary antrum with the remaining paranasal sinuses predominantly ventilated bilaterally. Anterior and posterior drainage pathways are patent. Orbital contents are normal. There is mild ventricular enlargement out of proportion to the degree of sulcal prominence, central atrophy versus mild communicating hydrocephalus. Mastoid air cells are clear bilaterally.\par IMPRESSION:\par Although it is difficult to define lesional extent on this CT examination, subtle asymmetric enhancement is present along the left lateral margin of the anterior nasal septum, with more confluent soft tissue along the nasal cavity floor that is associated with underlying bone erosion. A smaller nodular focus is present superiorly with suspected erosion of the left nasal bone. Please refer to concurrent MRI and PET/CT for additional assessment of lesional extent.\par \par MR ORBIT FACE AND/OR NECK with/without contrast (08/05/2020) at [facility]:\par - The current study is interpreted in conjunction with images from concurrent CT maxillofacial and PET/CT studies.\par - As on the CT study, subtle asymmetric enhancement is present along the mucosal surface of the left anterior nasal septum, with more confluent soft tissue along the nasal cavity floor associated with osseous erosion and extension of tumor into the left ventral maxilla. Additional subcentimeter nodular enhancement along the anterior left nasal roof associated with apparent erosion of the left nasal bone is not as well seen on the MRI study. The best MRI imaging correlate is on reconstructed coronal images of the volumetric T1 series, annotated and saved as key images. No additional sites of suspicious nodular soft tissue or contrast enhancement are identified. There is no pathologic contrast enhancement within skull base foramina and the cavernous sinuses are symmetric in appearance bilaterally. There is again mild ventricular prominence, central atrophy versus mild communicating hydrocephalus. There is no intracranial mass or pathologic contrast enhancement.\par IMPRESSION:\par As on the concurrent CT examination, subtle asymmetric enhancement is noted along the left anterior nasal septum, with more confluent soft tissue density along the nasal cavity floor associated with underlying osseous erosion. A second suspected focus of osseous erosion along the left nasal bone is better seen on the CT examination.\par \par \par PET-CT SKUL-THIGH ONC FDG INIT (08/05/2020) at [facility]: \par - Comparison: CT of the head 8/5/2020, CT chest 6/27/2019\par - Reference mean SUV, Liver: 2.4\par - HEAD and NECK: Focal intense FDG avidity along the left aspect of the anterior nasal septum SUV max 9.9, corresponding to CT findings. The FDG avidity extends to the erosive osseous lesion in the floor of the nasal cavity, as well as the nasal bone (4:33, 45). Subcentimeter Right cervical level 1 lymph nodes are seen (3:64) SUV max up to 2.6.\par Focally FDG avid thyroid nodule in the left lobe with punctate calcification, SUV max 4.2 (4:88).\par - LUNGS and PLEURA: Mildly FDG avid patchy groundglass opacities/centrilobular groundglass nodules pronounced in the mid lung and lower lungs, likely inflammatory.\par - MEDIASTINUM and CATHY: Mildly FDG avid small mediastinal lymph nodes and right hilar node, SUV max up to 4.5 in the periaortic region, may be reactive. Physiologic FDG avidity in the myocardium is seen.\par - HEPATOBILIARY: No focus of abnormal FDG avidity. Within normal limits.\par - PANCREAS: No focus of abnormal FDG avidity. Within normal limits.\par - SPLEEN: No focus of abnormal FDG avidity. Within normal limits.\par - ADRENALS: No focus of abnormal FDG avidity. Within normal limits.\par - GENITOURINARY: Mildly and diffusely increased FDG uptake in bilateral testes with SUV max 4.9 without CT correlation. Mild diffuse heterogeneous FDG uptake in the enlarged prostate, nonspecific and likely inflammatory. Physiologic FDG avidity along the urinary tract is seen. Photopenic right renal cysts.\par - GASTROINTESTINAL: Focal FDG avidity in the medial aspect of the duodenal antrum with SUV max 9.1, without CT correlation (604:72). Prominent FDG uptake along the proximal gastric wall without CT correlation, with SUV max 6.3 may be inflammatory. FDG avidity in the proximal sigmoid colon with SUV max 8.3 (604:36), and in the anorectal region with SUV max 12.2 without CT correlation may be physiologic.\par - PERITONEUM/RETROPERITONEUM: No focus of abnormal FDG avidity. Within normal limits.\par - LYMPH NODES: No focus of abnormal FDG avidity. Within normal limits.\par - VESSELS: No focus of abnormal FDG avidity. Within normal limits.\par - BONES and SOFT TISSUE: Increased FDG uptake surrounding the bilateral humeral heads, right greater than the left, likely inflammatory/degenerative. Area of increased FDG avidity along the paraspinal muscle in the mid thoracic spine level, may be physiologic.\par IMPRESSION:\par 1.) FDG-avid lesion along the left aspect of the anterior nasal septum, with osseous involvement in the floor of the nasal cavity as well as nasal bones.\par 2.) Mildly FDG-avid right level 1 cervical lymph nodes, may be reactive. No evidence of distant metastasis.\par 3.) Mildly FDG avid ground glass opacities and centrilobular ground glass opacities in both lungs, likely inflammatory.\par 4.) FDG-avid left thyroid nodule with calcification. Correlation with neck ultrasound is recommended.\par 5.) FDG-avid focus in the duodenal antrum without CT correlation, nonspecific. Clinical/visual correlation is recommended.\par He doesn’t feel grounded when he walks. \par \par

## 2021-12-27 NOTE — PHYSICAL EXAM
[FreeTextEntry1] : No hoarseness. [de-identified] : No mass.  S/p left SOHND. [de-identified] : Nasal dorsum is rebuilt.  Left nare is collapsed and small; right nare is normal and patent. [de-identified] : Thin crusted mucus suctioned masal cavity.  No lesions seen.  Septum previously resected. [Midline] : trachea located in midline position [de-identified] : Edentulous upper. [de-identified] : Hard palate intact s/p flap repair.  No lesions. [Normal] : no neck adenopathy

## 2021-12-27 NOTE — CONSULT LETTER
[Dear  ___] : Dear  [unfilled], [Courtesy Letter:] : I had the pleasure of seeing your patient, [unfilled], in my office today. [Please see my note below.] : Please see my note below. [Consult Closing:] : Thank you very much for allowing me to participate in the care of this patient.  If you have any questions, please do not hesitate to contact me. [Sincerely,] : Sincerely, [FreeTextEntry2] : Lorie Arnold MD\par [address] [facility]\par [facility] [address] [FreeTextEntry1] : \par \par \par  [FreeTextEntry3] : \par Olivia Gonzalez MD \par Otolaryngology, Head and Neck Surgery \par \par   [DrCalixto  ___] : Dr. AREVALO [DrCalixto ___] : Dr. AREVALO

## 2021-12-27 NOTE — ASSESSMENT
[FreeTextEntry1] : Mr. KATZ has no evidence of recurrent nasal cavity SCC today.  Expected crusting was debrided from the nose.  S/p resection internal nasal SCCa and postop RT/chemo, ended 01/18/21. \par S/p removal of bone graft from nasal cavity in 3/2021.  S/p nasal reconstruction with Dr. Larson 10/14/21.\par --> PET/CT for surveillance either prior to second surgery or 3 months after.\par --> irrigate nasal cavity with saline\par \par Dizziness is improved, almost gone.\par He had VNG at [place] but I do not have the report.\par --> i will call him next week after I have obtained the report.\par \par Neck/occipital pain are improved after PT\par \par Left upper lobe lung nodule found on PET/CT this spring was diagnosed as fibrotic parenchyma on core biopsy\par  --> fup with Dr. Alves\par  --> CT chest ordered for April 2022\par \par Return in March 2022

## 2022-01-10 NOTE — CONSULT LETTER
Pls address [Dear  ___] : Dear  [unfilled], [Courtesy Letter:] : I had the pleasure of seeing your patient, [unfilled], in my office today. [Please see my note below.] : Please see my note below. [Consult Closing:] : Thank you very much for allowing me to participate in the care of this patient.  If you have any questions, please do not hesitate to contact me. [Sincerely,] : Sincerely, [FreeTextEntry2] : Lorie Arnold MD\par [address] [facility]\par [facility] [address] [FreeTextEntry1] : \par \par \par  [FreeTextEntry3] : \par Olivia Gonzalez MD \par Otolaryngology, Head and Neck Surgery \par \par   [DrCalixto  ___] : Dr. AREVALO [DrCalixto ___] : Dr. AREVALO

## 2022-01-24 ENCOUNTER — RESULT REVIEW (OUTPATIENT)
Age: 72
End: 2022-01-24

## 2022-01-24 ENCOUNTER — OUTPATIENT (OUTPATIENT)
Dept: OUTPATIENT SERVICES | Facility: HOSPITAL | Age: 72
LOS: 1 days | End: 2022-01-24
Payer: COMMERCIAL

## 2022-01-24 ENCOUNTER — APPOINTMENT (OUTPATIENT)
Dept: CT IMAGING | Facility: HOSPITAL | Age: 72
End: 2022-01-24

## 2022-01-24 DIAGNOSIS — Z98.890 OTHER SPECIFIED POSTPROCEDURAL STATES: Chronic | ICD-10-CM

## 2022-01-24 DIAGNOSIS — Z41.9 ENCOUNTER FOR PROCEDURE FOR PURPOSES OTHER THAN REMEDYING HEALTH STATE, UNSPECIFIED: Chronic | ICD-10-CM

## 2022-01-24 PROCEDURE — 71250 CT THORAX DX C-: CPT | Mod: 26

## 2022-01-24 PROCEDURE — 71250 CT THORAX DX C-: CPT

## 2022-03-23 ENCOUNTER — APPOINTMENT (OUTPATIENT)
Dept: OTOLARYNGOLOGY | Facility: CLINIC | Age: 72
End: 2022-03-23
Payer: COMMERCIAL

## 2022-03-23 VITALS
TEMPERATURE: 98.3 F | WEIGHT: 145 LBS | DIASTOLIC BLOOD PRESSURE: 88 MMHG | HEIGHT: 62 IN | BODY MASS INDEX: 26.68 KG/M2 | SYSTOLIC BLOOD PRESSURE: 138 MMHG | HEART RATE: 81 BPM

## 2022-03-23 DIAGNOSIS — H90.3 SENSORINEURAL HEARING LOSS, BILATERAL: ICD-10-CM

## 2022-03-23 PROCEDURE — 31231 NASAL ENDOSCOPY DX: CPT

## 2022-03-23 PROCEDURE — 99214 OFFICE O/P EST MOD 30 MIN: CPT | Mod: 25

## 2022-03-23 NOTE — H&P ADULT - NSCORESITESY/N_GEN_A_CORE_RD
[facility]                                     Progress Note    Patient Name: Angela Jones  Date: 3/23/2022         Service Type: Individual      Session Start Time: 11:00am  Session End Time: 11:45am     Session Length: 45 minutes    Session #: 2    Attendees: Client attended alone    Service Modality:  Video Visit:      Provider verified identity through the following two step process.  Patient provided:  Patient is known previously to provider    Telemedicine Visit: The patient's condition can be safely assessed and treated via synchronous audio and visual telemedicine encounter.      Reason for Telemedicine Visit: Patient has requested telehealth visit    Originating Site (Patient Location): Patient's home    Distant Site (Provider Location): Provider Remote Setting- Home Office    Consent:  The patient/guardian has verbally consented to: the potential risks and benefits of telemedicine (video visit) versus in person care; bill my insurance or make self-payment for services provided; and responsibility for payment of non-covered services.     Patient would like the video invitation sent by:  My Chart    Mode of Communication:  Video Conference via [employer]    As the provider I attest to compliance with applicable laws and regulations related to telemedicine.    DATA  Interactive Complexity: No  Crisis: No        Progress Since Last Session (Related to Symptoms / Goals / Homework):   Symptoms: No change :feeling anxious    Homework: Partially completed      Episode of Care Goals: Minimal progress - PREPARATION (Decided to change - considering how); Intervened by negotiating a change plan and determining options / strategies for behavior change, identifying triggers, exploring social supports, and working towards setting a date to begin behavior change     Current / Ongoing Stressors and Concerns:   'work is work,' no conversation yet with manager, finds it difficult to initiate the  conversation, feeling more anxious these past two weeks and not sure what triggered it, did receive a phone call from the counseling center, does have a few phone calls to make that she hasn't gotten around to yet, discusses some minor social problems with a coworker       Treatment Objective(s) Addressed in This Session:   identify the fears / thoughts that contribute to feeling anxious  state understanding of stressors and relationship to physical symptoms  Increase interest, engagement, and pleasure in doing things  Decrease frequency and intensity of feeling down, depressed, hopeless  Identify negative self-talk and behaviors: challenge core beliefs, myths, and actions  Gain insight     Intervention:   Discussed mindfulness as being aware of what we are experiencing while we are experiencing it.  Contrasted this with avoidance and rumination.  Explored the role of mindfulness as an overall coping strategy for managing depression, anxiety, and strong emotions.  Explained concept of state of mind using SIFT (sensations, images, feelings, thoughts) pneumonic.  Led client in a guided mindfulness exercise focusing on sensations, images, feelings, and thoughts.  Discussed mindfulness as a tool to intentionally shift our awareness and focus as needed.    Assessments completed prior to visit:   The following assessments were completed by patient for this visit:  PHQ9:   PHQ-9 SCORE 1/25/2021 4/16/2021 9/16/2021 12/23/2021 1/19/2022 2/15/2022 3/9/2022   PHQ-9 Total Score - - - - - - -   PHQ-9 Total Score MyChart - - - 12 (Moderate depression) 8 (Mild depression) 10 (Moderate depression) 8 (Mild depression)   PHQ-9 Total Score 15 10 6 12 8 10 8     GAD7:   JOSHUA-7 SCORE 9/16/2021 1/19/2022 2/3/2022 2/15/2022 2/15/2022 3/9/2022 3/9/2022   Total Score - - - - - - -   Total Score - 5 (mild anxiety) 7 (mild anxiety) 7 (mild anxiety) 10 (moderate anxiety) 7 (mild anxiety) 6 (mild anxiety)   Total Score 5 5 7 - 7 6 7   Total  No Score - - - - - - -     PROMIS 10-Global Health (only subscores and total score):   PROMIS-10 Scores Only 12/30/2021 1/19/2022 2/15/2022 3/9/2022   Global Mental Health Score 11 11 9 12   Global Physical Health Score 11 11 11 11   PROMIS TOTAL - SUBSCORES 22 22 20 23         ASSESSMENT: Current Emotional / Mental Status (status of significant symptoms):   Risk status (Self / Other harm or suicidal ideation)   Patient denies current fears or concerns for personal safety.   Patient denies current or recent suicidal ideation or behaviors.   Patient denies current or recent homicidal ideation or behaviors.   Patient denies current or recent self injurious behavior or ideation.   Patient denies other safety concerns.   Patient reports there has been no change in risk factors since their last session.     Patient reports there has been no change in protective factors since their last session.     Recommended that patient call 911 or go to the local ED should there be a change in any of these risk factors.     Appearance:   Appropriate    Eye Contact:   Good    Psychomotor Behavior: Hyperactive  Restless    Attitude:   Cooperative    Orientation:   All   Speech    Rate / Production: Pressured  Stutters    Volume:  Normal    Mood:    Anxious    Affect:    Constricted    Thought Content:  Clear    Thought Form:  Coherent    Insight:    Good      Medication Review:   No changes to current psychiatric medication(s)     Medication Compliance:   Yes     Changes in Health Issues:   None reported     Chemical Use Review:   Substance Use: Chemical use reviewed, no active concerns identified      Tobacco Use: No current tobacco use.      Diagnosis:  1. Autism spectrum disorder    2. JOSHUA (generalized anxiety disorder)    3. Mild episode of recurrent major depressive disorder (H)    4. Attention deficit hyperactivity disorder (ADHD), other type        Collateral Reports Completed:   Not Applicable    PLAN: (Patient Tasks / Therapist  Tasks / Other)  Patient will return for a virtual visit in 2 weeks  Patient will pursue referral for couples counseling   Patient agrees to create a to-do list in order to reduce anxiety around incomplete tasks    There has been demonstrated improvement in functioning while patient has been engaged in psychotherapy/psychological service- if withdrawn the patient would deteriorate and/or relapse.       Fanny Cobb, [facility]                                                         ______________________________________________________________________    Individual Treatment Plan    Patient's Name: Angela Jones  YOB: 1996    Date of Creation: 3/9/2022  Date Treatment Plan Last Reviewed/Revised: 1/19/2022    DSM5 Diagnoses: Autism Spectrum Disorder 299.00(F84.0)  Associated with another neurodevelopmental, mental or behavioral disorder and Attention-Deficit/Hyperactivity Disorder  314.01 (F90.9) Unspecified Attention -Deficit / Hyperactivity Disorder, 296.31 (F33.0) Major Depressive Disorder, Recurrent Episode, Mild _ or 300.02 (F41.1) Generalized Anxiety Disorder  Psychosocial / Contextual Factors: 25 year old  female, , no children   PROMIS (reviewed every 90 days):   See above    Referral / Collaboration:  Was/were discussed and patient will pursue.    Anticipated number of session for this episode of care: 9  Anticipation frequency of session: every 2-3 weeks  Anticipated Duration of each session: 38-52 minutes  Treatment plan will be reviewed in 90 days or when goals have been changed.       MeasurableTreatment Goal(s) related to diagnosis / functional impairment(s)  Goal 1: Patient will manage symptoms of anxiety     I will know I've met my goal when I feel more confident in my ability to cope with stress with fewer meltdowns.      Objective #A (Patient Action)    Patient will identify the initial signs or symptoms of anxiety.  Status: New - Date: 3/9/2022      Intervention(s)  Therapist will teach help patient gain insight into signs of stress (physically and emotionally).    Objective #B  Patient will use relaxation strategies multiple times per day to reduce the physical symptoms of anxiety.  Status: New - Date: 3/9/2022     Intervention(s)  Therapist will teach diaphragmatic breathing and other grounding skills.    Objective #C  Patient will use thought-stopping strategy daily to reduce intrusive thoughts.  Status: New - Date: 3/9/2022     Intervention(s)  Therapist will teach thought stopping techniques.      Goal 2: Patient will manage symptoms of depression    I will know I've met my goal when I feel more confident in my ability to express my emotions in a healthy way.      Objective #A (Patient Action)    Status: New - Date: 3/9/2022     Patient will Increase interest, engagement, and pleasure in doing things.    Intervention(s)  Therapist will teach emotional recognition/identification. *.    Objective #B  Patient will Identify negative self-talk and behaviors: challenge core beliefs, myths, and actions.    Status: New - Date: 3/9/2022     Intervention(s)  Therapist will help patient practice positive self-talk and thought re-framing.      Goal 3: Patient will improve communication patterns in the relationship    I will know I've met my goal when we attend couples counseling and prioritize our relationship.      Objective #A (Patient Action)    Status: New - Date: 3/9/2022     Patient will get established with a couples counselor through [facility] (referral made on 3/9/22).    Intervention(s)  Therapist will monitor referral process and follow-up on their relationship problems and improvements      Patient has reviewed and agreed to the above plan.      Fanny Cobb, [facility]  March 9, 2022

## 2022-03-25 PROBLEM — H90.3 BILATERAL SENSORINEURAL HEARING LOSS: Status: ACTIVE | Noted: 2020-01-09

## 2022-03-25 NOTE — DATA REVIEWED
[de-identified] : \par VNG (8/09/2021) at [place]:\par - Bithermal caloric testing revealed a 14% reduced vestibular response (RVR) in right ear, which is within normal limits.\par - Right beating nystagmus was 3% stronger, also within normal limits.\par - No significant spontaneous nystagmus observed.\par - Saccades, smooth pursuit and optokinetics were within normal limits.\par - [place]-Hallpike maneuver and positional testing was not performed in view of report of back and neck pain.\par IMPRESSION:\par 1.) Normal and symmetrical caloric responses to warm and cool air irrigation\par 2.) Normal oculomotor findings \par \par AUDIOGRAM (03/07/2019)\par RIGHT:  Hearing within normal limits through 2K Hz, sloping to severe SNHL\par LEFT:   Hearing within normal limits through 2K Hz, sloping to severe  SNHL\par Tympanograms  A  bilateral   \par Word recognition 100%  bilateral   \par \par AUDIOGRAM (04/07/2017)\par RIGHT:  Hearing within normal limits except for mild HL at 250 Hz\par LEFT:   Mild conductive hearing loss through 1K Hz, rising to within normal limits with conductive components at 2K and 4K Hz.\par Tympanograms  C  bilateral   \par Word recognition 100%  bilateral   \par DPOAEs:  responses were absent at most tested frequencies bilateral \par

## 2022-03-25 NOTE — PROCEDURE
[FreeTextEntry6] : \par Indication:  intranasal squamous cell carcinoma \par -Verbal consent was obtained from patient prior to exam. \par - Pillo-Synephrine and lidocaine 2% spray applied to nose bilaterally.\par Nasal endoscopy was performed with flexible scope.\par Findings: \par -- Inferior turbinate resected on left; partially resected on right.  \par -- Septum was previously resected almost totally.\par -- No polyps either side nose\par -- Yellow crusting  bilateral middle turbinate remnants and columella.\par -- Nasopharynx without mass or exudate\par -- Eustachian orifices were clear bilateral\par \par The patient tolerated the procedure well.\par

## 2022-03-25 NOTE — ADDENDUM
[FreeTextEntry1] : Documented by Ron Worthington acting as a scribe for Dr. Olivia Gonzalez on 03/23/2022.

## 2022-03-25 NOTE — ASSESSMENT
[FreeTextEntry1] : Mr. 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 Gonzalez), with reconstruction of nose and hard palate with left radial forearm osteocutaneous flap (Dr. Delgadillo) on 10/19/2020. \par Postop RT (11/23/2020-01/18/2021) at [place] (Dr. Baca)\par S/p nasal framework reconstruction and removal of bone graft 3/09/21 (Dr. Delgadillo)\par S/p nasal reconstruction surgery 10/14/2021 (Dr. Larson).\par No evidence of recurrent nasal cavity SCC today.  THere is crusting present.\par --> PET/CT for surveillance in May 2022 since I cannot fully assess area of nasal roof.\par --> irrigate nasal cavity with saline\par \par Dizziness is improved, but still present.  He had normal VNG and negative Omari-Halpike.\par --> vestibular therapy\par \par Thyroid nodules on left side.  FNA 2021 left nodule benign\par --> new US thyroid this year\par \par Telephone visit in a few weeks, after PET/CT\par Office visit in 3-4 months.\par

## 2022-03-25 NOTE — CONSULT LETTER
[Dear  ___] : Dear  [unfilled], [Courtesy Letter:] : I had the pleasure of seeing your patient, [unfilled], in my office today. [Please see my note below.] : Please see my note below. [Consult Closing:] : Thank you very much for allowing me to participate in the care of this patient.  If you have any questions, please do not hesitate to contact me. [Sincerely,] : Sincerely, [FreeTextEntry2] : Lorie Arnold MD\par [address] [facility]\par [facility] [address] [FreeTextEntry1] : \par \par \par  [FreeTextEntry3] : \par Olivia Gonzalez MD \par Otolaryngology, Head and Neck Surgery \par \par   [DrCalixto  ___] : Dr. AREVALO [DrCalixto ___] : Dr. AREVALO [___] : [unfilled]

## 2022-03-25 NOTE — HISTORY OF PRESENT ILLNESS
[de-identified] : Mr. Diaz is accompanied by his wife today.\par \par S/p partial rhinectomy, septectomy and partial maxillectomy with left suprahyoid neck dissection (Dr. Gonzalez), with reconstruction of nose and hard palate with left radial forearm osteocutaneous flap (Dr. Delgadillo) on 10/19/2020. \par Postop RT (11/23/2020-01/18/2021) at [place] (Dr. Baca)\par S/p nasal framework reconstruction and removal of bone graft 3/09/21 (Dr. Delgadillo)\par S/p nasal reconstruction surgery 10/14/2021 (Dr. Larson). \par \par Nasal breathing is the same.  He irrigates and clears nose daily.  He still cannot breathe through left nostril, which is very contracted.  No pain or bleeding from nose/mouth. \par He is to see Dr. Larson on April 12 and discuss another reconstructive procedure to address the left nostril.\par He is feeling more weak and sleepy during the day.\par His dizziness and spinning sensation is less than before but still occurring approximately twice weekly, usually when he gets in and out of bed.  He had VNG at [facility], which was normal.  He has not gone for vestibular therapy. Hx of bilateral SNHL and tinnitus. No change in hearing or tinnitus; no ear pain or ear discharge. \par Pressure in neck and occipital area (diagnosed as osteosarcosis) was greatly improved with PT.\par CT-guided biopsy of 1.2 cm TYLER nodule (7/06/2021) showed fibrotic lung parenchyma; no neoplasm.  On CT chest (01/24/22), this TYLER nodule resolved; new 5 mm RLL nodule.  He is followed by Dr. Alves; next CT chest in 6 months. \par His rheumatologist started him on infusion of infliximab q 6 months.  Currently tapering down prednisone after infusion.\par Thyroid nodules in left lobe.  FNA benign left lobe nodule in 2021\par \par \par CT CHEST noncontrast (01/24/2022) at [facility]:\par - Comparison: CT chest dated 7/6/2021, PET/CT dated 5/11/2021, and CT chest dated 6/27/2019.\par * Lungs and large airways: Since 6/27/2019, there has been interval development of a 5 mm solid nodule within the posterior segment of the right lower lobe (series 5 image 183); this nodule was not clearly demonstrated on CT chest from 7/6/2021 or PET/CT from 5/11/2021. Compared to imaging from 5/11/2021, there has been near complete interval resolution of left upper lobe groundglass nodule. Compared to imaging from 6/27/2019, there has been no significant interval change in 4 mm solid nodule within the apical segment of the right upper lobe (series 5 image 88). Multiple stable solid subcentimeter and micronodules are noted. Central airways are patent. Continued evidence of linear parenchymal bands of scarring and/or atelectasis with associated traction bronchiectasis involving the inferior aspects of the midlung zones. There are peripheral reticular opacities involving the mid and lower lung zones more prominently with traction bronchiectasis and bronchiolectasis no definite honeycomb lung formation is identified.\par * Pleura:  No pleural effusion.\par * Mediastinum and hilar regions: No thoracic lymphadenopathy.\par * Heart and pericardium:  Cardiomegaly. No pericardial effusion. Normal aortic and mild mitral annular valve calcification.\par *Vessels:  Normal.\par * Chest wall and lower neck:  New focal infiltration of the subcutaneous soft tissues overlying the left lower chest; correlate for recent trauma.\par * Upper abdomen: Right renal cysts measuring 6.8 cm and 6.2 cm. Colonic diverticulosis. Nonobstructing punctate parapelvic left renal calculi.\par * Bones: Moderate degenerative changes of the spine. Chronic appearing superior endplate compression deformity of the T11 vertebral body.\par IIMPRESSION:\par 1.) Interval development of a 5 mm solid nodule within the right lower lobe and interval resolution of left upper lobe groundglass nodule. No significant interval change in additional pulmonary nodules. Given the waxing and waning characteristics of these nodules, infectious/inflammatory etiologies are favored over neoplasia. Short interval surveillance in this patient with a provided history of extra pulmonary neoplasm as per protocol is suggested.\par 2.) Stable linear parenchymal scarring and traction bronchiectasis involving both midlung zones. The sequela of a chronic infectious and/or inflammatory process and in view the distribution pulmonary nontuberculous mycobacterial infection cannot be excluded.\par 3.) Peripheral interstitial lung pattern with mild pulmonary fibrosis and no definite honeycomb lung formation involving the mid and lower lung zones more prominently. There is temporal heterogeneity. No significant air-trapping. Abbreviated differential includes a probable UIP pattern.\par \par \par CT-guided biopsies of 1.2 cm TYLER nodule (7/06/2021) at [facility] \par 1.) Core biopsy\par - Fibrotic lung parenchyma with reactive appearing type II pneumocytes, increased macrophages and fresh blood. - No neoplasm visualized \par 2.) FNA\par  - ATYPICAL FINDINGS\par  - Pneumocytes with mild reactive-repairative appearing atypia. Cell block scant, predominantly lysed blood.\par \par \par PETCT SKUL-THI ONC FDG SUBS (05/11/2021) at [facility]:\par - Comparison: PET/CT from 5/11/2020\par - Reference mean SUV, Liver: 2.6 SUV\par * Head and neck: Status post rhinectomy, septectomy and partial maxillectomy with osteocutaneous reconstruction. Status post recent removal of bone graft. There is diffuse linear FDG uptake along the reconstructed palate and maxillectomy bed which may represent posttreatment/postsurgical changes. There is linear FDG uptake along the lateral aspects of the nasal cavity bilaterally which also may be secondary to posttreatment/postsurgical changes. Surgical clips are noted within the left suprahilar neck secondary to neck dissection. There is no evidence of cervical lymphadenopathy. Physiologic FDG avidity in the pharyngeal lymphoid tissue is seen.\par * Lungs and pleura: 1.2 cm round glass opacity within the left upper lobe exhibits mild FDG avidity (SUV 2.4, previously 1.1). This groundglass nodule was in retrospect present on CT chest 10/5/2017 although has a more solid nodular appearance. Again demonstrated is mild FDG uptake within several additional groundglass opacities predominantly within the left lung which are likely inflammatory.\par * Mediastinum and az: Symmetric mild FDG uptake within nonenlarged bilateral hilar lymph nodes which by pattern and distribution most likely secondary to inflammatory changes.\par * Heart and pericardium: Physiologic FDG avidity in the myocardium is seen. Within normal limits.\par * Chest wall: No focus of abnormal FDG avidity. Within normal limits.\par * Hepatobiliary, Pancreas, Spleen, Adrenals, Peritoneum/retroperitoneum, Gastrointestinal : No focus of abnormal FDG avidity. Within normal limits. Physiologic FDG uptake in the bowel is seen.\par * Genitourinary: Right renal cysts are noted.. Physiologic FDG avidity along the urinary tract is seen.\par * Lymph nodes: Stable appearance of small right level 1 cervical lymph node with mild FDG avidity (SUV 2.0 compared to SUV 2.6 previously).\par * Vessels: Mild atherosclerosis of aorta.\par * Bones and soft tissue: No focus of abnormal FDG avidity.\par IMPRESSION:\par 1.) Since prior PET/CT 8/5/2020, postsurgical changes within the nasal soft tissues and hard palate with predominantly linear FDG uptake without CT correlate which may represent postsurgical/posttreatment changes. No definite evidence of residual or recurrent malignancy. No evidence of cervical lymphadenopathy.\par 2.) 1.2 cm left upper lobe groundglass opacity with increased mild FDG uptake and has a more solid appearance and therefore malignancy cannot be excluded. Consider tissue sampling for further evaluation.\par \par \par CT MAXILLOFACIAL w/ contrast (03/01/2021) at [facility]:\par - COMPARISON: Postcontrast CT scan of the facial bones dated August 5, 2020.\par - The patient is status post nasomaxillary resection for squamous cell carcinoma with reconstruction. There has been partial resection of the hard palate (midportion) with placement of a bone graft and metallic plate and screws. There has been resection of the left nasal bone with deformity of the overlying adjacent skin, resection of the nasal septum, left inferior turbinate, partial resection of the left middle turbinate, and partial resection of the right inferior turbinate.\par - Surgical clips in the left submandibular space. There has been resection of the left submandibular gland. Soft tissue thickening in the lateral aspect left submandibular space consistent with post therapeutic changes.\par - There is ill-defined abnormal soft tissue anterior and inferior to the nasal cavity (predominantly to the left of midline) extending into the left nasal cavity, medial to the left orbit, and anterior to the left maxillary sinus which likely represents post therapeutic changes. 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 The left frontal sinus outflow tract, right infundibulum, and sphenoethmoidal recesses are patent.\par - Degenerative changes of the visualized cervical spine.\par IMPRESSION:\par In comparison with the prior postcontrast CT scan of the facial bones dated August 5, 2020:\par There has been interval surgical intervention.\par The previously described asymmetric enhancement in the left lateral margin of the anterior nasal septum and the nasal cavity with adjacent bony erosion is no longer demonstrated.\par Abnormal soft tissue anterior and inferior to the nasal cavity (predominantly to the left of midline) extending into the left nasal cavity, medial to the left orbit, and anterior to the left maxillary sinus which likely represents post therapeutic changes. Residual and/or recurrent neoplasm is not excluded. Continued follow-up is advised.\par (Images were reviewed.) \par \par XR C-SPINE COMPLETE 4-5 VIEWS (03/02/2021) at [facility]:\par - No acute osseous abnormality.C3-4 demonstrates no spondylolisthesis in neutral with trace retrolisthesis in flexion and reduction in extension. Trace anterolisthesis C5 relative to C4 without movement from flexion to extension. Severe C5-C6 degenerative disc disease. Atlantoaxial joint is stable. No prevertebral soft tissue swelling surgical clips left neck\par IMPRESSION:\par 1.) No acute osseous abnormality\par 2.) C3-4 trace retrolisthesis in flexion with reduction in extension and neutral positions. Findings suggest ligamentous laxity/spinal instability\par \par LABS\par (02/09/2021) - WBC 11.64 with 83% PMNs; ESR 90, CRT 1.46\par \par USG FNA Left thyroid nodule, 1 cm (02/12/2021), read at [facility]:\par - Benign colloid nodule (Hop[facility] 2)\par \par \par PATHOLOGY (10/19/2020)\par - Negative final margins on primary resection, with bony invasion by the squamous cell carcinoma.\par - Left neck lymph nodes 10, all negative for tumor.\par \par US THYROID (10/09/2020) at [facility] in [place], [place]\par - No prior comparison\par - ISTHMUS: 0.4 cm\par - RIGHT LOBE: 3.7 x 1.7 x 1.7 cm, with no nodules\par - LEFT LOBE: 3.6 x 1.7 x 1.7 cm, with 2 nodules\par  --- 1.1 x 0.7 x 1.0 cm predominantly hyperechoic with hypoechoic rim and focus of calcification in lower pole\par  --- 0.7 x 0.4 x 0.5 cm hyperechoic nodule with tiny echogenic foci in lower pole\par \par \par PATHOLOGY Left nasal septum biopsy (7/16/2020):\par - Invasive nonkeratinizing squamous cell carcinoma.\par - Ki-67 shows an increased proliferation rate of approximately 50%.\par \par CT MAXILLOFACIAL with contrast (08/05/2020) at [facility]:\par - The current study is interpreted in conjunction with images from concurrent PET/CT dated 8/5/2020.\par - Please refer to report of concurrent MRI for more detailed description of lesion extent. Subtle asymmetric enhancement is noted along the left lateral surface of the anterior cartilaginous septum with more confluent enhancing soft tissue noted along the junction of the lateral margin of the maxillary crest and the floor of the left nasal cavity, a finding best appreciated on coronal images 20 through 23. Here, there is underlying osseous erosion with an approximately 7 mm ill-defined lytic focus lying immediately anterior to the nasopalatine duct; see sagittal bone images 39 through 41, axial images 34 through 36 and coronal images 23 through 25. There is also a small nodular focus of enhancing soft tissue along the superior margin of the anterior left nasal septum, best seen on soft tissue coronal image 12, with irregularity of the adjacent left nasal bone evident on the corresponding bone images. As this lies at a considerable distance from the nasal cavity floor lesion, correlation with direct visual inspection is recommended for confirmation. There does appear to be FDG uptake at this site on the PET/CT study.\par - No additional nasal or nasopharyngeal soft tissue mass. There is prominent right and mild left middle turbinate pneumatization. There is a very large left-sided bony septal spur that contacts the hypoplastic left inferior turbinate and the left lateral nasal wall. Circumferential mucosal thickening lines walls of the right maxillary antrum with the remaining paranasal sinuses predominantly ventilated bilaterally. Anterior and posterior drainage pathways are patent. Orbital contents are normal. There is mild ventricular enlargement out of proportion to the degree of sulcal prominence, central atrophy versus mild communicating hydrocephalus. Mastoid air cells are clear bilaterally.\par IMPRESSION:\par Although it is difficult to define lesional extent on this CT examination, subtle asymmetric enhancement is present along the left lateral margin of the anterior nasal septum, with more confluent soft tissue along the nasal cavity floor that is associated with underlying bone erosion. A smaller nodular focus is present superiorly with suspected erosion of the left nasal bone. Please refer to concurrent MRI and PET/CT for additional assessment of lesional extent.\par \par MR ORBIT FACE AND/OR NECK with/without contrast (08/05/2020) at [facility]:\par - The current study is interpreted in conjunction with images from concurrent CT maxillofacial and PET/CT studies.\par - As on the CT study, subtle asymmetric enhancement is present along the mucosal surface of the left anterior nasal septum, with more confluent soft tissue along the nasal cavity floor associated with osseous erosion and extension of tumor into the left ventral maxilla. Additional subcentimeter nodular enhancement along the anterior left nasal roof associated with apparent erosion of the left nasal bone is not as well seen on the MRI study. The best MRI imaging correlate is on reconstructed coronal images of the volumetric T1 series, annotated and saved as key images. No additional sites of suspicious nodular soft tissue or contrast enhancement are identified. There is no pathologic contrast enhancement within skull base foramina and the cavernous sinuses are symmetric in appearance bilaterally. There is again mild ventricular prominence, central atrophy versus mild communicating hydrocephalus. There is no intracranial mass or pathologic contrast enhancement.\par IMPRESSION:\par As on the concurrent CT examination, subtle asymmetric enhancement is noted along the left anterior nasal septum, with more confluent soft tissue density along the nasal cavity floor associated with underlying osseous erosion. A second suspected focus of osseous erosion along the left nasal bone is better seen on the CT examination.\par \par \par PET-CT SKUL-THIGH ONC FDG INIT (08/05/2020) at [facility]: \par - Comparison: CT of the head 8/5/2020, CT chest 6/27/2019\par - Reference mean SUV, Liver: 2.4\par - HEAD and NECK: Focal intense FDG avidity along the left aspect of the anterior nasal septum SUV max 9.9, corresponding to CT findings. The FDG avidity extends to the erosive osseous lesion in the floor of the nasal cavity, as well as the nasal bone (4:33, 45). Subcentimeter Right cervical level 1 lymph nodes are seen (3:64) SUV max up to 2.6.\par Focally FDG avid thyroid nodule in the left lobe with punctate calcification, SUV max 4.2 (4:88).\par - LUNGS and PLEURA: Mildly FDG avid patchy groundglass opacities/centrilobular groundglass nodules pronounced in the mid lung and lower lungs, likely inflammatory.\par - MEDIASTINUM and [place]: Mildly FDG avid small mediastinal lymph nodes and right hilar node, SUV max up to 4.5 in the periaortic region, may be reactive. Physiologic FDG avidity in the myocardium is seen.\par - HEPATOBILIARY: No focus of abnormal FDG avidity. Within normal limits.\par - PANCREAS: No focus of abnormal FDG avidity. Within normal limits.\par - SPLEEN: No focus of abnormal FDG avidity. Within normal limits.\par - ADRENALS: No focus of abnormal FDG avidity. Within normal limits.\par - GENITOURINARY: Mildly and diffusely increased FDG uptake in bilateral testes with SUV max 4.9 without CT correlation. Mild diffuse heterogeneous FDG uptake in the enlarged prostate, nonspecific and likely inflammatory. Physiologic FDG avidity along the urinary tract is seen. Photopenic right renal cysts.\par - GASTROINTESTINAL: Focal FDG avidity in the medial aspect of the duodenal antrum with SUV max 9.1, without CT correlation (604:72). Prominent FDG uptake along the proximal gastric wall without CT correlation, with SUV max 6.3 may be inflammatory. FDG avidity in the proximal sigmoid colon with SUV max 8.3 (604:36), and in the anorectal region with SUV max 12.2 without CT correlation may be physiologic.\par - PERITONEUM/RETROPERITONEUM: No focus of abnormal FDG avidity. Within normal limits.\par - LYMPH NODES: No focus of abnormal FDG avidity. Within normal limits.\par - VESSELS: No focus of abnormal FDG avidity. Within normal limits.\par - BONES and SOFT TISSUE: Increased FDG uptake surrounding the bilateral humeral heads, right greater than the left, likely inflammatory/degenerative. Area of increased FDG avidity along the paraspinal muscle in the mid thoracic spine level, may be physiologic.\par IMPRESSION:\par 1.) FDG-avid lesion along the left aspect of the anterior nasal septum, with osseous involvement in the floor of the nasal cavity as well as nasal bones.\par 2.) Mildly FDG-avid right level 1 cervical lymph nodes, may be reactive. No evidence of distant metastasis.\par 3.) Mildly FDG avid ground glass opacities and centrilobular ground glass opacities in both lungs, likely inflammatory.\par 4.) FDG-avid left thyroid nodule with calcification. Correlation with neck ultrasound is recommended.\par 5.) FDG-avid focus in the duodenal antrum without CT correlation, nonspecific. Clinical/visual correlation is recommended.\par He doesn’t feel grounded when he walks. \par \par VNG (8/09/2021) at [place]:\par - Bithermal caloris testing revealed a 14% reduced vestibular response (RVR) in right ear, which is within normal limits.\par - Right beating nystagmus was 3% stronger, also within normal limits.\par - No significant spontaneous nystagmus observed.\par - Saccades, smooth pursuit and optokinetics were within normal limits.\par - Omari-Hallpike maneuver and positional testing was not performed in view of report of back and neck pain.\par IMPRESSION:\par 1.) Normal and symmetrical caloric responses to warm and cool air irrigation\par 2.) Normal oculomotor findings \par

## 2022-03-25 NOTE — END OF VISIT
[FreeTextEntry3] : All medical record entries made by the Scribe were at my, Dr. Olivia Gonzalez, direction and personally dictated by me on 03/23/2022. I have reviewed the chart and agree that the record accurately reflects my personal performance of the history, physical exam, assessment and plan. I have also personally directed, reviewed, and agreed with the chart. [Time Spent: ___ minutes] : I have spent [unfilled] minutes of time on the encounter.

## 2022-03-25 NOTE — PHYSICAL EXAM
[Nasal Endoscopy Performed] : nasal endoscopy was performed, see procedure section for findings [Midline] : trachea located in midline position [Normal] : no neck adenopathy [FreeTextEntry1] : No hoarseness. [de-identified] : S/p neck dissection.  No mass noted. [de-identified] : Thyroid gland normal size, no palpable nodules.  [de-identified] : Contraction and closure of the left nare.  Dorsum and tip are supported.  Right nare open. [de-identified] : Edentulous upper. [de-identified] : Hard palate intact s/p flap repair.  No lesions seen.

## 2022-04-11 PROBLEM — Z11.59 SCREENING FOR VIRAL DISEASE: Status: RESOLVED | Noted: 2020-10-07 | Resolved: 2020-11-25

## 2022-04-12 ENCOUNTER — APPOINTMENT (OUTPATIENT)
Dept: PULMONOLOGY | Facility: CLINIC | Age: 72
End: 2022-04-12
Payer: COMMERCIAL

## 2022-04-12 VITALS
OXYGEN SATURATION: 96 % | HEIGHT: 62 IN | BODY MASS INDEX: 26.68 KG/M2 | WEIGHT: 145 LBS | SYSTOLIC BLOOD PRESSURE: 103 MMHG | DIASTOLIC BLOOD PRESSURE: 67 MMHG | HEART RATE: 94 BPM | TEMPERATURE: 97.7 F

## 2022-04-12 PROCEDURE — 99214 OFFICE O/P EST MOD 30 MIN: CPT

## 2022-04-12 RX ORDER — RITUXIMAB 10 MG/ML
INJECTION, SOLUTION INTRAVENOUS
Refills: 0 | Status: ACTIVE | COMMUNITY

## 2022-04-12 NOTE — HISTORY OF PRESENT ILLNESS
[Former] : former [TextBox_4] : Pt under went  nasal surgery and reconstruction on 10/14/2021 with Dr. Larson.  he is following with with MD in 2 weeks for potential for another reconstructive surgery.  Pt with PMH chonric bronchitis, and  [id number] SCC of nasal cavity CA - sees Dr. Gonzalez and Dr. Delgadillo for ENT. sp resection. He had 30 treatments of radiation.\par \par He has quit smoking cold turkey over a 1 year and has remained abstinent to date.   \par \par Denies coughing up sputum, hemoptysis, fevers, wheezing.  He is able to walk without SOB but has SOB with stairs (2 flights of stairs.   He is followed by Rheumatology for RA and is on ritumab every 6 months and 1 mg of prednisone. No ED or UC visits for exacerbation of his bronchitis.  Appetite is good and not loosing weight.  He is sleeping well.  He does not snore and denies waking up SOB.  \par \par He is not using Advair and rarely using albuterol.  Using albuterol maybe once a month.  He denies any ER or steroids for breathing.  h\par \par PET scan 5/11/2021 1.2 cm TYLER GGO with increased mild FDG uptake and more solid appearance.\par \par CT scan 1/24/2022 Interval development of 5 mm solid nodule and resolution of TYLER GG nodule.  Waxing and waning characteristics of nodules.  Stable linear parenchymal scarring and traction bronchiectasis.  Peripheral interstitial lung pattern with mild pulmonary fibrosis  \par \par \par

## 2022-04-12 NOTE — REASON FOR VISIT
[Follow-Up] : a follow-up visit [Abnormal CXR/ Chest CT] : an abnormal CXR/ chest CT [COPD] : COPD [Pulmonary Nodules] : pulmonary nodules [TextBox_44] : PCP Dr singh  [TextBox_13] : [phone]

## 2022-04-12 NOTE — PROCEDURE
[FreeTextEntry1] : \par ACC: [id number] EXAM: CT CHEST\par \par PROCEDURE DATE: 01/24/2022\par \par \par \par INTERPRETATION: CT SCAN OF CHEST\par \par History: Nodule surveillance. 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 Axial, coronal, and sagittal multiplanar reformatted images were produced. Thin section axial images and axial MIPS were also produced. Intravenous contrast material was not administered, as ordered.\par \par Comparison: CT chest dated 7/6/2021, PET/CT dated 5/11/2021, and CT chest dated 6/27/2019.\par \par Findings:\par \par Lungs and large airways: Since 6/27/2019, there has been interval development of a 5 mm solid nodule within the posterior segment of the right lower lobe (series 5 image 183); this nodule was not clearly demonstrated on CT chest from 7/6/2021 or PET/CT from 5/11/2021. Compared to imaging from 5/11/2021, there has been near complete interval resolution of left upper lobe groundglass nodule. Compared to imaging from 6/27/2019, there has been no significant interval change in 4 mm solid nodule within the apical segment of the right upper lobe (series 5 image 88). Multiple stable solid subcentimeter and micronodules are noted. Central airways are patent. Continued evidence of linear parenchymal bands of scarring and/or atelectasis with associated traction bronchiectasis involving the inferior aspects of the midlung zones. There are peripheral reticular opacities involving the mid and lower lung zones more prominently with traction bronchiectasis and bronchiolectasis no definite honeycomb lung formation is identified.\par \par Pleura: No pleural effusion.\par \par Mediastinum and hilar regions: No thoracic lymphadenopathy.\par \par Heart and pericardium: Cardiomegaly. No pericardial effusion. Normal aortic and mild mitral annular valve calcification.\par \par Vessels: Normal.\par \par Chest wall and lower neck: New focal infiltration of the subcutaneous soft tissues overlying the left lower chest; correlate for recent trauma.\par \par Upper abdomen: Right renal cysts measuring 6.8 cm and 6.2 cm. Colonic diverticulosis. Nonobstructing punctate parapelvic left renal calculi.\par \par Bones: Moderate degenerative changes of the spine. Chronic appearing superior endplate compression deformity of the T11 vertebral body.\par \par \par Impression:\par \par 1. Interval development of a 5 mm solid nodule within the right lower lobe and interval resolution of left upper lobe groundglass nodule. No significant interval change in additional pulmonary nodules. Given the waxing and waning characteristics of these nodules, infectious/inflammatory etiologies are favored over neoplasia. Short interval surveillance in this patient with a provided history of extra pulmonary neoplasm as per protocol is suggested.\par 2. Stable linear parenchymal scarring and traction bronchiectasis involving both midlung zones. The sequela of a chronic infectious and/or inflammatory process and in view the distribution pulmonary nontuberculous mycobacterial infection cannot be excluded.\par 3. Peripheral interstitial lung pattern with mild pulmonary fibrosis and no definite honeycomb lung formation involving the mid and lower lung zones more prominently. There is temporal heterogeneity. No significant air-trapping. Abbreviated differential includes a probable UIP pattern.

## 2022-04-12 NOTE — ASSESSMENT
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 He has only used his  albuterol PRN.  Pt has not smoked in over a year and has sustained smoking cessation. Does not feel impaired from nasal surgery. Exercise capacity is good. Denies wheezing or dyspnea. Remains abstinent from smoking. \par - he is due for a PFT but will defer in the setting of recent nasal surgery and pending reconstruction\par - 10/2020  FEV1/FVC ratio greater than 70 without significant response to bronchodilator.  Normal TLC and DLCO.\par \par #Pulmonary Nodule \par \par PET scan 5/11/2021 1.2 cm TYLER GGO with increased mild FDG uptake and more solid appearance and therefore malignancy cannot be ruled out.  Pt is high risk for malignancy due to nasal cavity SCC and smoking history.  CT scan 1/24/2022 Interval development of 5 mm solid nodule and resolution of TYLER GG nodule.  Waxing and waning characteristics of nodules.  Stable linear parenchymal scarring and traction bronchiectasis.  Peripheral interstitial lung pattern with mild pulmonary fibrosis.  Pt was ordered repeat CT scan of the chest and PET by Dr. Gonzalez.  Pt is notify when they get it done will follow on pulmonary nodules. \par \par \par #Preop\par \par DEVORA IGUDIN  is optimized for surgery. he  is to be extubated once fully awake and able to protect airway.  The patient is to be monitored in the recovery room. They might benefit for high flow oxygen or noninvasive ventilation to prevent or reverse atelectasis.   Avoid oversedation.  DEVORA is high risk for DVT and will require bimodal agents for DVT prophylaxis early mobilization is recommended. he is to use the incentive spirometry postoperative. \par \par \par \par \par

## 2022-04-13 NOTE — BRIEF OPERATIVE NOTE - BRIEF OP NOTE DRAINS
22Fr 3-way Carlos catheter Humira Counseling:  I discussed with the patient the risks of adalimumab including but not limited to myelosuppression, immunosuppression, autoimmune hepatitis, demyelinating diseases, lymphoma, and serious infections.  The patient understands that monitoring is required including a PPD at baseline and must alert us or the primary physician if symptoms of infection or other concerning signs are noted.

## 2022-05-24 ENCOUNTER — OUTPATIENT (OUTPATIENT)
Dept: OUTPATIENT SERVICES | Facility: HOSPITAL | Age: 72
LOS: 1 days | End: 2022-05-24
Payer: COMMERCIAL

## 2022-05-24 ENCOUNTER — APPOINTMENT (OUTPATIENT)
Dept: NUCLEAR MEDICINE | Facility: HOSPITAL | Age: 72
End: 2022-05-24

## 2022-05-24 ENCOUNTER — APPOINTMENT (OUTPATIENT)
Dept: CT IMAGING | Facility: HOSPITAL | Age: 72
End: 2022-05-24

## 2022-05-24 DIAGNOSIS — Z41.9 ENCOUNTER FOR PROCEDURE FOR PURPOSES OTHER THAN REMEDYING HEALTH STATE, UNSPECIFIED: Chronic | ICD-10-CM

## 2022-05-24 DIAGNOSIS — Z98.890 OTHER SPECIFIED POSTPROCEDURAL STATES: Chronic | ICD-10-CM

## 2022-05-24 LAB
GLUCOSE BLDC GLUCOMTR-MCNC: 87 MG/DL — SIGNIFICANT CHANGE UP (ref 70–99)
POCT ISTAT CREATININE: 1 MG/DL — SIGNIFICANT CHANGE UP (ref 0.5–1.3)

## 2022-05-24 PROCEDURE — 82565 ASSAY OF CREATININE: CPT

## 2022-05-24 PROCEDURE — 71260 CT THORAX DX C+: CPT | Mod: 26

## 2022-05-24 PROCEDURE — 71260 CT THORAX DX C+: CPT

## 2022-05-24 PROCEDURE — 82962 GLUCOSE BLOOD TEST: CPT

## 2022-05-24 PROCEDURE — A9552: CPT

## 2022-05-24 PROCEDURE — 78815 PET IMAGE W/CT SKULL-THIGH: CPT

## 2022-05-24 PROCEDURE — 78815 PET IMAGE W/CT SKULL-THIGH: CPT | Mod: 26,PS

## 2022-07-27 ENCOUNTER — APPOINTMENT (OUTPATIENT)
Dept: OTOLARYNGOLOGY | Facility: CLINIC | Age: 72
End: 2022-07-27

## 2022-07-27 VITALS
WEIGHT: 141 LBS | SYSTOLIC BLOOD PRESSURE: 126 MMHG | BODY MASS INDEX: 25.95 KG/M2 | HEART RATE: 86 BPM | HEIGHT: 62 IN | TEMPERATURE: 98.6 F | DIASTOLIC BLOOD PRESSURE: 72 MMHG

## 2022-07-27 DIAGNOSIS — H81.11 BENIGN PAROXYSMAL VERTIGO, RIGHT EAR: ICD-10-CM

## 2022-07-27 PROCEDURE — 31231 NASAL ENDOSCOPY DX: CPT | Mod: 1L

## 2022-07-27 PROCEDURE — 99214 OFFICE O/P EST MOD 30 MIN: CPT | Mod: 1L,25

## 2022-07-27 RX ORDER — PREDNISONE 1 MG/1
1 TABLET ORAL
Refills: 0 | Status: COMPLETED | COMMUNITY
End: 2022-07-27

## 2022-10-17 ENCOUNTER — APPOINTMENT (OUTPATIENT)
Dept: PULMONOLOGY | Facility: CLINIC | Age: 72
End: 2022-10-17

## 2022-10-17 VITALS
OXYGEN SATURATION: 97 % | RESPIRATION RATE: 12 BRPM | SYSTOLIC BLOOD PRESSURE: 136 MMHG | DIASTOLIC BLOOD PRESSURE: 86 MMHG | WEIGHT: 141 LBS | HEART RATE: 75 BPM | HEIGHT: 62 IN | BODY MASS INDEX: 25.95 KG/M2 | TEMPERATURE: 98.1 F

## 2022-10-17 DIAGNOSIS — Z23 ENCOUNTER FOR IMMUNIZATION: ICD-10-CM

## 2022-10-17 PROCEDURE — 90662 IIV NO PRSV INCREASED AG IM: CPT

## 2022-10-17 PROCEDURE — G0008: CPT

## 2022-10-17 PROCEDURE — 99213 OFFICE O/P EST LOW 20 MIN: CPT | Mod: 25

## 2022-10-17 NOTE — ASSESSMENT
[FreeTextEntry1] : COPD\par \par The patient is clinically stable.  The patient is off bronchodilator.  The patient is stop smoking and the baseline oxygen saturation is normal.  His exercise capacity improved.  The patient is on bronchodilator.  The patient is to be scheduled for PFT next visit.  The last PFT from last year was unremarkable.\par \par I reviewed the CT scan of the chest from May and he has small airway disease related to his chronic bronchitis type\par \par Pulmonary nodule\par \par There has not been any change in the nodule and will follow with CT scan in May.\par \par Exam revealed rales at the bases and a CT scan revealed some reticulation could be early interstitial lung abnormalities.  We will follow-up with CT scan and PFT next visit.\par \par Flu vaccine was given left deltoid\par \par The patient is clinically stable and cleared pulmonary wisefor surgery that is planned for nasal reconstruction

## 2022-10-17 NOTE — HISTORY OF PRESENT ILLNESS
[Former] : former [Never] : never [TextBox_4] : Is doing very well.  He is sleeping well and eating well.  He went to [place] was walking around and it silly after 2 to 3 days he was able to go up and down the hills much easier than he was.  No coughing no wheezing no coughing blood.  He sleeps well. [ESS] : 0

## 2022-10-27 ENCOUNTER — TRANSCRIPTION ENCOUNTER (OUTPATIENT)
Age: 72
End: 2022-10-27

## 2022-10-27 NOTE — ASU PATIENT PROFILE, ADULT - NSICDXPASTMEDICALHX_GEN_ALL_CORE_FT
PAST MEDICAL HISTORY:  BPH (benign prostatic hyperplasia)     Bronchitis chronic    COPD exacerbation     Hypertrophic obstructive cardiomyopathy     Malignant neoplasm nasal cavity    Rheumatoid arthritis     Tendinitis right rotator cuff

## 2022-10-27 NOTE — ASU PATIENT PROFILE, ADULT - NSICDXPASTSURGICALHX_GEN_ALL_CORE_FT
PAST SURGICAL HISTORY:  Elective surgery Rt. foot Hallux Valaus repair-!0/2016    History of prostate surgery TURP    History of surgery nose surgery x2    S/P rotator cuff repair Rt. shoulder-2014    Surgery, elective left knee

## 2022-10-28 ENCOUNTER — OUTPATIENT (OUTPATIENT)
Dept: OUTPATIENT SERVICES | Facility: HOSPITAL | Age: 72
LOS: 1 days | Discharge: HOME | End: 2022-10-28

## 2022-10-28 ENCOUNTER — TRANSCRIPTION ENCOUNTER (OUTPATIENT)
Age: 72
End: 2022-10-28

## 2022-10-28 VITALS
OXYGEN SATURATION: 97 % | WEIGHT: 141.98 LBS | HEIGHT: 62 IN | DIASTOLIC BLOOD PRESSURE: 81 MMHG | SYSTOLIC BLOOD PRESSURE: 144 MMHG | TEMPERATURE: 99 F | HEART RATE: 73 BPM | RESPIRATION RATE: 16 BRPM

## 2022-10-28 VITALS
TEMPERATURE: 97 F | SYSTOLIC BLOOD PRESSURE: 127 MMHG | OXYGEN SATURATION: 96 % | DIASTOLIC BLOOD PRESSURE: 72 MMHG | RESPIRATION RATE: 18 BRPM | HEART RATE: 77 BPM

## 2022-10-28 DIAGNOSIS — Z41.9 ENCOUNTER FOR PROCEDURE FOR PURPOSES OTHER THAN REMEDYING HEALTH STATE, UNSPECIFIED: Chronic | ICD-10-CM

## 2022-10-28 DIAGNOSIS — Z98.890 OTHER SPECIFIED POSTPROCEDURAL STATES: Chronic | ICD-10-CM

## 2022-10-28 RX ORDER — OXYCODONE AND ACETAMINOPHEN 5; 325 MG/1; MG/1
1 TABLET ORAL EVERY 4 HOURS
Refills: 0 | Status: DISCONTINUED | OUTPATIENT
Start: 2022-10-28 | End: 2022-10-28

## 2022-10-28 RX ORDER — SODIUM CHLORIDE 9 MG/ML
1000 INJECTION, SOLUTION INTRAVENOUS
Refills: 0 | Status: DISCONTINUED | OUTPATIENT
Start: 2022-10-28 | End: 2022-10-28

## 2022-10-28 RX ORDER — ACETAMINOPHEN 500 MG
1000 TABLET ORAL ONCE
Refills: 0 | Status: COMPLETED | OUTPATIENT
Start: 2022-10-28 | End: 2022-10-28

## 2022-10-28 RX ORDER — OXYCODONE HYDROCHLORIDE 5 MG/1
5 TABLET ORAL EVERY 4 HOURS
Refills: 0 | Status: DISCONTINUED | OUTPATIENT
Start: 2022-10-28 | End: 2022-10-28

## 2022-10-28 RX ORDER — APREPITANT 80 MG/1
40 CAPSULE ORAL ONCE
Refills: 0 | Status: COMPLETED | OUTPATIENT
Start: 2022-10-28 | End: 2022-10-28

## 2022-10-28 RX ORDER — FENTANYL CITRATE 50 UG/ML
25 INJECTION INTRAVENOUS
Refills: 0 | Status: DISCONTINUED | OUTPATIENT
Start: 2022-10-28 | End: 2022-10-28

## 2022-10-28 RX ADMIN — Medication 1000 MILLIGRAM(S): at 10:59

## 2022-10-28 RX ADMIN — APREPITANT 40 MILLIGRAM(S): 80 CAPSULE ORAL at 11:01

## 2022-10-28 NOTE — ASU DISCHARGE PLAN (ADULT/PEDIATRIC) - NS MD DC FALL RISK RISK
For information on Fall & Injury Prevention, visit: [email or web address] OR  [email or web address] OR  https://www.cdc.gov/steadi/patient.html Super

## 2022-10-28 NOTE — PRE-ANESTHESIA EVALUATION ADULT - NSANTHOSAYNRD_GEN_A_CORE
No. HAVREY screening performed.  STOP BANG Legend: 0-2 = LOW Risk; 3-4 = INTERMEDIATE Risk; 5-8 = HIGH Risk

## 2022-10-28 NOTE — BRIEF OPERATIVE NOTE - NSICDXBRIEFPROCEDURE_GEN_ALL_CORE_FT
PROCEDURES:  Reconstruction, nasal valve, using conchal cartilage graft 28-Oct-2022 16:35:30  Billy Larson  Graft, cartilage, ear to face 28-Oct-2022 16:36:51  Billy Larson  Adjacent tissue transfer, nose, defect 10 sq cm or less 28-Oct-2022 16:37:29  Billy Larson

## 2022-12-02 ENCOUNTER — APPOINTMENT (OUTPATIENT)
Dept: OTOLARYNGOLOGY | Facility: CLINIC | Age: 72
End: 2022-12-02

## 2022-12-02 VITALS
WEIGHT: 141 LBS | SYSTOLIC BLOOD PRESSURE: 137 MMHG | DIASTOLIC BLOOD PRESSURE: 82 MMHG | BODY MASS INDEX: 25.95 KG/M2 | TEMPERATURE: 96.7 F | HEIGHT: 62 IN | HEART RATE: 81 BPM

## 2022-12-02 DIAGNOSIS — E04.2 NONTOXIC MULTINODULAR GOITER: ICD-10-CM

## 2022-12-02 DIAGNOSIS — Z87.898 PERSONAL HISTORY OF OTHER SPECIFIED CONDITIONS: ICD-10-CM

## 2022-12-02 PROBLEM — H81.11 BENIGN PAROXYSMAL POSITIONAL VERTIGO OF RIGHT EAR: Status: RESOLVED | Noted: 2021-07-28 | Resolved: 2022-12-02

## 2022-12-02 PROCEDURE — 99214 OFFICE O/P EST MOD 30 MIN: CPT | Mod: 25

## 2022-12-02 PROCEDURE — 31231 NASAL ENDOSCOPY DX: CPT

## 2022-12-02 RX ORDER — TRAMADOL HYDROCHLORIDE 50 MG/1
50 TABLET, COATED ORAL
Qty: 10 | Refills: 0 | Status: COMPLETED | COMMUNITY
Start: 2022-10-24

## 2022-12-02 RX ORDER — CEFPROZIL 500 MG/1
500 TABLET ORAL
Qty: 14 | Refills: 0 | Status: COMPLETED | COMMUNITY
Start: 2022-11-14

## 2022-12-02 NOTE — DATA REVIEWED
[de-identified] : \par VNG (8/09/2021) at [place]:\par - Bithermal caloric testing revealed a 14% reduced vestibular response (RVR) in right ear, which is within normal limits.\par - Right beating nystagmus was 3% stronger, also within normal limits.\par - No significant spontaneous nystagmus observed.\par - Saccades, smooth pursuit and optokinetics were within normal limits.\par - [place]-Hallpike maneuver and positional testing was not performed in view of report of back and neck pain.\par IMPRESSION:\par 1.) Normal and symmetrical caloric responses to warm and cool air irrigation\par 2.) Normal oculomotor findings \par \par AUDIOGRAM (03/07/2019)\par RIGHT:  Hearing within normal limits through 2K Hz, sloping to severe SNHL\par LEFT:   Hearing within normal limits through 2K Hz, sloping to severe  SNHL\par Tympanograms  A  bilateral   \par Word recognition 100%  bilateral   \par \par AUDIOGRAM (04/07/2017)\par RIGHT:  Hearing within normal limits except for mild HL at 250 Hz\par LEFT:   Mild conductive hearing loss through 1K Hz, rising to within normal limits with conductive components at 2K and 4K Hz.\par Tympanograms  C  bilateral   \par Word recognition 100%  bilateral   \par DPOAEs:  responses were absent at most tested frequencies bilateral \par

## 2022-12-02 NOTE — PROCEDURE
[FreeTextEntry6] : \par Indication: nasal cancer\par - Verbal consent was obtained from patient prior to exam.\par Findings: \par - Inferior turbinates almost completely resected bilateral. Inferior meatus clear bilateral.\par - Septum resected almost completely in prior surgery.  Crusting on nasal septum remnant, middle turbinates and left nasal side wall was removed. \par - No polyps or lesions seen in nasal cavity. No blood noted.\par - Mucus is dry\par - Superior turbinates normal bilateral.\par - Nasopharynx without mass or exudate.  Adenoids were not visible\par - Eustachian orifices were clear bilateral.\par \par The patient tolerated the procedure well. \par

## 2022-12-02 NOTE — ADDENDUM
[FreeTextEntry1] : Documented by Cary Clarke acting as a scribe for Dr. Olivia Gonzalez on 07/27/2022.

## 2022-12-02 NOTE — PHYSICAL EXAM
[Nasal Endoscopy Performed] : nasal endoscopy was performed, see procedure section for findings [Midline] : trachea located in midline position [Removed] : palatine tonsils previously removed [FreeTextEntry1] : No hoarseness.  [de-identified] : S/p neck dissection.  No mass noted. [de-identified] : Thyroid gland normal size, no palpable nodules.  [de-identified] : Contraction and closure of the left nare.  Dorsum and tip are supported.  Right nare open. [de-identified] : Upper edentulous.  [de-identified] : Partially resected upper central, reconstructed.  [de-identified] : Reconstructed anterior palate looks well healed with visible skin panel.   [Normal] : no neck adenopathy

## 2022-12-02 NOTE — HISTORY OF PRESENT ILLNESS
[de-identified] : Mr. Diaz is accompanied by his wife today.\par \par S/p partial rhinectomy, septectomy and partial maxillectomy with left suprahyoid neck dissection (Dr. Gonzalez), with reconstruction of nose and hard palate with left radial forearm osteocutaneous flap (Dr. Delgadillo) on 10/19/2020. \par Postop RT (11/23/2020-01/18/2021) at [place] (Dr. Baca)\par S/p nasal framework reconstruction and removal of bone graft 3/09/21 (Dr. Delgadillo)\par S/p nasal reconstruction surgery 10/14/2021 (Dr. Larson). \par \par In October 2022, he had another nasal surgery by Dr. Larson -- reconstruction of left nasal sidewall and ala with soft tissue flap and conchal cartilage graft (from right ear) -- which allows a little nasal breathing on the left side.  He still wears a mask in public to avoid stares.\par On 11/26/22, he spit out a blood clot  that he pulled down from the nose.  \par Dr. Alves ordered new PET-CT scan for May/June 2023.\par Overall he feels well.\par \par VISIT 7/27/2022\par Nasal breathing is the same. He irrigates and clears nose daily. He still cannot breathe through left nostril since nare is very contracted. \par About 1 month ago he sneezed, and blood clot came out of his nose. No other bleeding since then. \par No pain or bleeding from nose/mouth. \par He is having another nasal reconstruction surgery in September with Dr. Larson.\par The dizziness/spinning sensation is not bothering him. He did not go for vestibular therapy. \par Hx of bilateral SNHL and tinnitus. No change in hearing or tinnitus; no ear pain or ear discharge. \par Pressure in neck and occipital area (diagnosed as osteosarcosis) was greatly improved with PT.\par CT-guided biopsy of 1.2 cm TYLER nodule (7/06/2021) showed fibrotic lung parenchyma; no neoplasm. On CT chest (01/24/22), this TYLER nodule resolved; new 5 mm RLL nodule. He is followed by Dr. Alves; next CT chest in 6 months. \par His rheumatologist started him on infusion of infliximab q 6 months.\par Hx thyroid nodules in left lobe. FNA benign left lobe nodule in 2021. He did not get a new thyroid US.\par \par VISIT 3/23/2022\par Nasal breathing is the same. He irrigates and clears nose daily. He still cannot breathe through left nostril, which is very contracted. No pain or bleeding from nose/mouth. \par He is to see Dr. Larson on April 12 and discuss another reconstructive procedure to address the left nostril.\par He is feeling more weak and sleepy during the day.\par His dizziness and spinning sensation is less than before but still occurring approximately twice weekly, usually when he gets in and out of bed. He had VNG at [facility], which was normal. He has not gone for vestibular therapy. Hx of bilateral SNHL and tinnitus. No change in hearing or tinnitus; no ear pain or ear discharge. \par Pressure in neck and occipital area (diagnosed as osteosarcosis) was greatly improved with PT.\par CT-guided biopsy of 1.2 cm TYLER nodule (7/06/2021) showed fibrotic lung parenchyma; no neoplasm. On CT chest (01/24/22), this TYLER nodule resolved; new 5 mm RLL nodule. He is followed by Dr. Alves; next CT chest in 6 months. \par His rheumatologist started him on infusion of infliximab q 6 months. Currently tapering down prednisone after infusion.\par Thyroid nodules in left lobe. FNA benign left lobe nodule in 2021\par \par VISIT 12/22/2021\par Currently, nasal breathing has improved but he has some leaking from the nose. He still cannot breathe through left nostril, which is closed. No pain or bleeding from nose/mouth.\par Dr. Larson has one more procedure planned for him.\par The dizziness and spinning sensation improved, almost gone. He had VNG at [facility].\par Hx of bilateral SNHL and tinnitus. No change in hearing;tinnitus; no ear pain or ear discharge. \par Pressure in neck and occipital area (diagnosed as osteosarcosis) greatly improved with PT.\par CT-guided biopsy of 1.2 cm TYLER nodule (7/06/2021) showed fibrotic lung parenchyma; no neoplasm. He is to have a repeat CT chest in 6 months but not ordered yet.\par His rheumatologist is looking into starting him on infusion of infliximab after he heals from nasal reconstruction.\par \par \par PETCT SKUL-THI ONC FDG SUBS (05/24/2022) at [facility] \par - Comparison: CT chest 1/24/2021 and PET/CT 5/11/2021\par * Head and neck: There is been interval reconstructive nasal surgery since prior PET/CT on 10/14/2021 with removal of bone graft and reconstruction using left ear and rib cartilage. There is soft tissue density within the anterior portion of the nasal cavity in the left with focal FDG uptake (SUV 5.0). There is a large septal defect. There is focal FDG uptake within the left ear likely secondary to postsurgical changes. Again demonstrated is linear uptake in the reconstructed palate and maxillectomy bed which has decreased from prior exam (SUV 7.2 versus 18.4) likely secondary to posttreatment/postsurgical changes. Postsurgical changes are noted with surgical clips in the left suprahyoid neck secondary to neck dissection.\par * Lungs and large airways: There is near complete resolution of the left upper lobe groundglass nodule without significant FDG uptake. There is a stable 5 mm right lower lobe pulmonary nodule without significant FDG uptake (series 2 image 59). Again demonstrated is mild FDG uptake within left lung groundglass opacities similar to prior examination. No new FDG avid pulmonary nodules.\par * Pleura:  No pleural effusion.\par * Mediastinum and hilar regions: Stable bilateral hilar and mediastinal mild FDG avid lymph nodes likely inflammatory\par * Heart and pericardium:  Heart size is normal. No pericardial effusion.\par * Vessels:  Normal.\par * Liver, Spleen, Pancreas,Adrenal glands:  Normal.\par * Gallbladder: No radiopaque stones gallbladder.\par * Kidneys: Right renal cysts are noted..\par * Abdominal and pelvic adenopathy:  No lymphadenopathy in abdomen or pelvis.\par * Ascites: None.\par * Gastrointestinal tract: Normal.\par * Pelvic organs: No pathologic FDG uptake. Enlarged prostate gland.\par * Soft tissues: There is FDG uptake adjacent to the left anterior seventh rib likely secondary postsurgical changes..\par * Bones: No pathologic FDG uptake.\par IMPRESSION: \par - Since prior PET/CT 5/11/2021, interval nasal reconstruction with focal FDG uptake in the anterior nasal cavity which may be secondary to postsurgical changes although correlation with physical examination and attention on follow-up imaging is recommended. Focal FDG uptake in the left ear and left anterior chest wall adjacent to the seventh rib likely secondary to postsurgical changes.\par - Interval near complete resolution of left upper lobe groundglass nodule without FDG uptake. Right lower lobe pulmonary nodule is below PET resolution.\par - No additional sites of pathologic FDG uptake to suggest biologic tumor activity.\par \par \par CT CHEST noncontrast (01/24/2022) at [facility]:\par - Comparison: CT chest dated 7/6/2021, PET/CT dated 5/11/2021, and CT chest dated 6/27/2019.\par * Lungs and large airways: Since 6/27/2019, there has been interval development of a 5 mm solid nodule within the posterior segment of the right lower lobe (series 5 image 183); this nodule was not clearly demonstrated on CT chest from 7/6/2021 or PET/CT from 5/11/2021. Compared to imaging from 5/11/2021, there has been near complete interval resolution of left upper lobe groundglass nodule. Compared to imaging from 6/27/2019, there has been no significant interval change in 4 mm solid nodule within the apical segment of the right upper lobe (series 5 image 88). Multiple stable solid subcentimeter and micronodules are noted. Central airways are patent. Continued evidence of linear parenchymal bands of scarring and/or atelectasis with associated traction bronchiectasis involving the inferior aspects of the midlung zones. There are peripheral reticular opacities involving the mid and lower lung zones more prominently with traction bronchiectasis and bronchiolectasis no definite honeycomb lung formation is identified.\par * Pleura: No pleural effusion.\par * Mediastinum and hilar regions: No thoracic lymphadenopathy.\par * Heart and pericardium: Cardiomegaly. No pericardial effusion. Normal aortic and mild mitral annular valve calcification.\par *Vessels: Normal.\par * Chest wall and lower neck: New focal infiltration of the subcutaneous soft tissues overlying the left lower chest; correlate for recent trauma.\par * Upper abdomen: Right renal cysts measuring 6.8 cm and 6.2 cm. Colonic diverticulosis. Nonobstructing punctate parapelvic left renal calculi.\par * Bones: Moderate degenerative changes of the spine. Chronic appearing superior endplate compression deformity of the T11 vertebral body.\par IIMPRESSION:\par 1.) Interval development of a 5 mm solid nodule within the right lower lobe and interval resolution of left upper lobe groundglass nodule. No significant interval change in additional pulmonary nodules. Given the waxing and waning characteristics of these nodules, infectious/inflammatory etiologies are favored over neoplasia. Short interval surveillance in this patient with a provided history of extra pulmonary neoplasm as per protocol is suggested.\par 2.) Stable linear parenchymal scarring and traction bronchiectasis involving both midlung zones. The sequela of a chronic infectious and/or inflammatory process and in view the distribution pulmonary nontuberculous mycobacterial infection cannot be excluded.\par 3.) Peripheral interstitial lung pattern with mild pulmonary fibrosis and no definite honeycomb lung formation involving the mid and lower lung zones more prominently. There is temporal heterogeneity. No significant air-trapping. Abbreviated differential includes a probable UIP pattern.\par \par \par CT-guided biopsies of 1.2 cm TYLER nodule (7/06/2021) at [facility] \par 1.) Core biopsy\par - Fibrotic lung parenchyma with reactive appearing type II pneumocytes, increased macrophages and fresh blood. - No neoplasm visualized \par 2.) FNA\par  - ATYPICAL FINDINGS\par  - Pneumocytes with mild reactive-repairative appearing atypia. Cell block scant, predominantly lysed blood.\par \par \par PETCT SKUL-THI ONC FDG SUBS (05/11/2021) at [facility]:\par - Comparison: PET/CT from 5/11/2020\par - Reference mean SUV, Liver: 2.6 SUV\par * Head and neck: Status post rhinectomy, septectomy and partial maxillectomy with osteocutaneous reconstruction. Status post recent removal of bone graft. There is diffuse linear FDG uptake along the reconstructed palate and maxillectomy bed which may represent posttreatment/postsurgical changes. There is linear FDG uptake along the lateral aspects of the nasal cavity bilaterally which also may be secondary to posttreatment/postsurgical changes. Surgical clips are noted within the left suprahilar neck secondary to neck dissection. There is no evidence of cervical lymphadenopathy. Physiologic FDG avidity in the pharyngeal lymphoid tissue is seen.\par * Lungs and pleura: 1.2 cm round glass opacity within the left upper lobe exhibits mild FDG avidity (SUV 2.4, previously 1.1). This groundglass nodule was in retrospect present on CT chest 10/5/2017 although has a more solid nodular appearance. Again demonstrated is mild FDG uptake within several additional groundglass opacities predominantly within the left lung which are likely inflammatory.\par * Mediastinum and cathy: Symmetric mild FDG uptake within nonenlarged bilateral hilar lymph nodes which by pattern and distribution most likely secondary to inflammatory changes.\par * Heart and pericardium: Physiologic FDG avidity in the myocardium is seen. Within normal limits.\par * Chest wall: No focus of abnormal FDG avidity. Within normal limits.\par * Hepatobiliary, Pancreas, Spleen, Adrenals, Peritoneum/retroperitoneum, Gastrointestinal : No focus of abnormal FDG avidity. Within normal limits. Physiologic FDG uptake in the bowel is seen.\par * Genitourinary: Right renal cysts are noted.. Physiologic FDG avidity along the urinary tract is seen.\par * Lymph nodes: Stable appearance of small right level 1 cervical lymph node with mild FDG avidity (SUV 2.0 compared to SUV 2.6 previously).\par * Vessels: Mild atherosclerosis of aorta.\par * Bones and soft tissue: No focus of abnormal FDG avidity.\par IMPRESSION:\par 1.) Since prior PET/CT 8/5/2020, postsurgical changes within the nasal soft tissues and hard palate with predominantly linear FDG uptake without CT correlate which may represent postsurgical/posttreatment changes. No definite evidence of residual or recurrent malignancy. No evidence of cervical lymphadenopathy.\par 2.) 1.2 cm left upper lobe groundglass opacity with increased mild FDG uptake and has a more solid appearance and therefore malignancy cannot be excluded. Consider tissue sampling for further evaluation.\par \par \par CT MAXILLOFACIAL w/ contrast (03/01/2021) at [facility]:\par - COMPARISON: Postcontrast CT scan of the facial bones dated August 5, 2020.\par - The patient is status post nasomaxillary resection for squamous cell carcinoma with reconstruction. There has been partial resection of the hard palate (midportion) with placement of a bone graft and metallic plate and screws. There has been resection of the left nasal bone with deformity of the overlying adjacent skin, resection of the nasal septum, left inferior turbinate, partial resection of the left middle turbinate, and partial resection of the right inferior turbinate.\par - Surgical clips in the left submandibular space. There has been resection of the left submandibular gland. Soft tissue thickening in the lateral aspect left submandibular space consistent with post therapeutic changes.\par - There is ill-defined abnormal soft tissue anterior and inferior to the nasal cavity (predominantly to the left of midline) extending into the left nasal cavity, medial to the left orbit, and anterior to the left maxillary sinus which likely represents post therapeutic changes. 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 The left frontal sinus outflow tract, right infundibulum, and sphenoethmoidal recesses are patent.\par - Degenerative changes of the visualized cervical spine.\par IMPRESSION:\par In comparison with the prior postcontrast CT scan of the facial bones dated August 5, 2020:\par There has been interval surgical intervention.\par The previously described asymmetric enhancement in the left lateral margin of the anterior nasal septum and the nasal cavity with adjacent bony erosion is no longer demonstrated.\par Abnormal soft tissue anterior and inferior to the nasal cavity (predominantly to the left of midline) extending into the left nasal cavity, medial to the left orbit, and anterior to the left maxillary sinus which likely represents post therapeutic changes. Residual and/or recurrent neoplasm is not excluded. Continued follow-up is advised.\par (Images were reviewed.) \par \par \par XR C-SPINE COMPLETE 4-5 VIEWS (03/02/2021) at [facility]:\par - No acute osseous abnormality.C3-4 demonstrates no spondylolisthesis in neutral with trace retrolisthesis in flexion and reduction in extension. Trace anterolisthesis C5 relative to C4 without movement from flexion to extension. Severe C5-C6 degenerative disc disease. Atlantoaxial joint is stable. No prevertebral soft tissue swelling surgical clips left neck\par IMPRESSION:\par 1.) No acute osseous abnormality\par 2.) C3-4 trace retrolisthesis in flexion with reduction in extension and neutral positions. Findings suggest ligamentous laxity/spinal instability\par \par \par LABS\par (02/09/2021) - WBC 11.64 with 83% PMNs; ESR 90, CRT 1.46\par \par \par USG FNA Left thyroid nodule, 1 cm (02/12/2021), read at [facility]:\par - Benign colloid nodule ([place] 2)\par \par \par PATHOLOGY (10/19/2020)\par - Negative final margins on primary resection, with bony invasion by the squamous cell carcinoma.\par - Left neck lymph nodes 10, all negative for tumor.\par \par US THYROID (10/09/2020) at [facility] in [place], [place]\par - No prior comparison\par - ISTHMUS: 0.4 cm\par - RIGHT LOBE: 3.7 x 1.7 x 1.7 cm, with no nodules\par - LEFT LOBE: 3.6 x 1.7 x 1.7 cm, with 2 nodules\par  --- 1.1 x 0.7 x 1.0 cm predominantly hyperechoic with hypoechoic rim and focus of calcification in lower pole\par  --- 0.7 x 0.4 x 0.5 cm hyperechoic nodule with tiny echogenic foci in lower pole\par \par \par CT MAXILLOFACIAL with contrast (08/05/2020) at [facility]:\par - The current study is interpreted in conjunction with images from concurrent PET/CT dated 8/5/2020.\par - Please refer to report of concurrent MRI for more detailed description of lesion extent. Subtle asymmetric enhancement is noted along the left lateral surface of the anterior cartilaginous septum with more confluent enhancing soft tissue noted along the junction of the lateral margin of the maxillary crest and the floor of the left nasal cavity, a finding best appreciated on coronal images 20 through 23. Here, there is underlying osseous erosion with an approximately 7 mm ill-defined lytic focus lying immediately anterior to the nasopalatine duct; see sagittal bone images 39 through 41, axial images 34 through 36 and coronal images 23 through 25. There is also a small nodular focus of enhancing soft tissue along the superior margin of the anterior left nasal septum, best seen on soft tissue coronal image 12, with irregularity of the adjacent left nasal bone evident on the corresponding bone images. As this lies at a considerable distance from the nasal cavity floor lesion, correlation with direct visual inspection is recommended for confirmation. There does appear to be FDG uptake at this site on the PET/CT study.\par - No additional nasal or nasopharyngeal soft tissue mass. There is prominent right and mild left middle turbinate pneumatization. There is a very large left-sided bony septal spur that contacts the hypoplastic left inferior turbinate and the left lateral nasal wall. Circumferential mucosal thickening lines walls of the right maxillary antrum with the remaining paranasal sinuses predominantly ventilated bilaterally. Anterior and posterior drainage pathways are patent. Orbital contents are normal. There is mild ventricular enlargement out of proportion to the degree of sulcal prominence, central atrophy versus mild communicating hydrocephalus. Mastoid air cells are clear bilaterally.\par IMPRESSION:\par Although it is difficult to define lesional extent on this CT examination, subtle asymmetric enhancement is present along the left lateral margin of the anterior nasal septum, with more confluent soft tissue along the nasal cavity floor that is associated with underlying bone erosion. A smaller nodular focus is present superiorly with suspected erosion of the left nasal bone. Please refer to concurrent MRI and PET/CT for additional assessment of lesional extent.\par \par \par MR ORBIT FACE AND/OR NECK with/without contrast (08/05/2020) at [facility]:\par - The current study is interpreted in conjunction with images from concurrent CT maxillofacial and PET/CT studies.\par - As on the CT study, subtle asymmetric enhancement is present along the mucosal surface of the left anterior nasal septum, with more confluent soft tissue along the nasal cavity floor associated with osseous erosion and extension of tumor into the left ventral maxilla. Additional subcentimeter nodular enhancement along the anterior left nasal roof associated with apparent erosion of the left nasal bone is not as well seen on the MRI study. The best MRI imaging correlate is on reconstructed coronal images of the volumetric T1 series, annotated and saved as key images. No additional sites of suspicious nodular soft tissue or contrast enhancement are identified. There is no pathologic contrast enhancement within skull base foramina and the cavernous sinuses are symmetric in appearance bilaterally. There is again mild ventricular prominence, central atrophy versus mild communicating hydrocephalus. There is no intracranial mass or pathologic contrast enhancement.\par IMPRESSION:\par As on the concurrent CT examination, subtle asymmetric enhancement is noted along the left anterior nasal septum, with more confluent soft tissue density along the nasal cavity floor associated with underlying osseous erosion. A second suspected focus of osseous erosion along the left nasal bone is better seen on the CT examination.\par \par \par PET-CT SKUL-THIGH ONC FDG INIT (08/05/2020) at [facility]: \par - Comparison: CT of the head 8/5/2020, CT chest 6/27/2019\par - Reference mean SUV, Liver: 2.4\par - HEAD and NECK: Focal intense FDG avidity along the left aspect of the anterior nasal septum SUV max 9.9, corresponding to CT findings. The FDG avidity extends to the erosive osseous lesion in the floor of the nasal cavity, as well as the nasal bone (4:33, 45). Subcentimeter Right cervical level 1 lymph nodes are seen (3:64) SUV max up to 2.6.\par Focally FDG avid thyroid nodule in the left lobe with punctate calcification, SUV max 4.2 (4:88).\par - LUNGS and PLEURA: Mildly FDG avid patchy groundglass opacities/centrilobular groundglass nodules pronounced in the mid lung and lower lungs, likely inflammatory.\par - MEDIASTINUM and CATHY: Mildly FDG avid small mediastinal lymph nodes and right hilar node, SUV max up to 4.5 in the periaortic region, may be reactive. Physiologic FDG avidity in the myocardium is seen.\par - HEPATOBILIARY: No focus of abnormal FDG avidity. Within normal limits.\par - PANCREAS: No focus of abnormal FDG avidity. Within normal limits.\par - SPLEEN: No focus of abnormal FDG avidity. Within normal limits.\par - ADRENALS: No focus of abnormal FDG avidity. Within normal limits.\par - GENITOURINARY: Mildly and diffusely increased FDG uptake in bilateral testes with SUV max 4.[address] correlation[address] Mild diffuse heterogeneous FDG uptake in the enlarged prostate, nonspecific and likely inflammatory. Physiologic FDG avidity along the urinary tract is seen. Photopenic right renal cysts.\par - GASTROINTESTINAL: Focal FDG avidity in the medial aspect of the duodenal antrum with SUV max 9.1, without CT correlation (604:72). Prominent FDG uptake along the proximal gastric wall without CT correlation, with SUV max 6.3 may be inflammatory. FDG avidity in the proximal sigmoid colon with SUV max 8.3 (604:36), and in the anorectal region with SUV max 12.[address] correlation may be physiologic.\par - PERITONEUM/RETROPERITONEUM: No focus of abnormal FDG avidity. Within normal limits.\par - LYMPH NODES: No focus of abnormal FDG avidity. Within normal limits.\par - VESSELS: No focus of abnormal FDG avidity. Within normal limits.\par - BONES and SOFT TISSUE: Increased FDG uptake surrounding the bilateral humeral heads, right greater than the left, likely inflammatory/degenerative. Area of increased FDG avidity along the paraspinal muscle in the mid thoracic spine level, may be physiologic.\par IMPRESSION:\par 1.) FDG-avid lesion along the left aspect of the anterior nasal septum, with osseous involvement in the floor of the nasal cavity as well as nasal bones.\par 2.) Mildly FDG-avid right level 1 cervical lymph nodes, may be reactive. No evidence of distant metastasis.\par 3.) Mildly FDG avid ground glass opacities and centrilobular ground glass opacities in both lungs, likely inflammatory.\par 4.) FDG-avid left thyroid nodule with calcification. Correlation with neck ultrasound is recommended.\par 5.) FDG-avid focus in the duodenal antrum without CT correlation, nonspecific. Clinical/visual correlation is recommended.\par He doesn’t feel grounded when he walks. \par \par \par PATHOLOGY Left nasal septum biopsy (7/16/2020):\par - Invasive nonkeratinizing squamous cell carcinoma.\par - Ki-67 shows an increased proliferation rate of approximately 50%.\par

## 2022-12-02 NOTE — PHYSICAL EXAM
[Nasal Endoscopy Performed] : nasal endoscopy was performed, see procedure section for findings [Midline] : trachea located in midline position [Removed] : palatine tonsils previously removed [FreeTextEntry1] : No hoarseness. [de-identified] : S/p neck dissection.  No mass noted. [de-identified] : Thyroid gland normal size, no palpable nodules.  [de-identified] : Right cerumen removed with curette.  Left EAC clear [de-identified] : Small eschar on left external nose, just anterior to expected alar crease. Larger opening of left nare but small. Right nare open, normal size. [de-identified] : Upper edentulous.  [de-identified] : Partially resected upper central, reconstructed.  [de-identified] : Reconstructed anterior palate looks well healed with visible skin panel.   [Normal] : no neck adenopathy

## 2022-12-02 NOTE — PROCEDURE
[FreeTextEntry6] : \par Indication: nasal cancer\par - Verbal consent was obtained from patient prior to exam.\par - Pillo-Synephrine and lidocaine 2% spray was applied to nose bilaterally.\par Findings: \par - Inferior turbinates partially resected bilateral. Inferior meatus clear bilateral.\par - Septum resected in prior surgery. \par - No polyps or lesions seen in nasal cavity. Minor bits of crusting on the middle turbinate remnants.\par - Mucus clear bilateral.\par - Superior turbinates normal bilateral.\par - Middle meatus clear bilateral. SER clear bilateral. \par - Nasopharynx without mass or exudate.\par - Adenoids were not visible\par - Eustachian orifices were clear bilateral.\par \par The patient tolerated the procedure well.

## 2022-12-02 NOTE — CONSULT LETTER
[Dear  ___] : Dear  [unfilled], [Courtesy Letter:] : I had the pleasure of seeing your patient, [unfilled], in my office today. [Please see my note below.] : Please see my note below. [Consult Closing:] : Thank you very much for allowing me to participate in the care of this patient.  If you have any questions, please do not hesitate to contact me. [Sincerely,] : Sincerely, [FreeTextEntry2] : Lorie Arnold MD\par [address] [facility]\par [facility] [address] [FreeTextEntry1] : \par \par \par  [FreeTextEntry3] : \par Olivia Gonzalez MD \par Otolaryngology, Head and Neck Surgery \par \par   [DrCalixto  ___] : Dr. AREVALO [DrCalixto ___] : Dr. AREVALO

## 2022-12-02 NOTE — ASSESSMENT
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 \par S/p partial rhinectomy, septectomy and partial maxillectomy with left suprahyoid neck dissection (Dr. Gonzalez), with reconstruction of nose and hard palate with left radial forearm osteocutaneous flap (Dr. Delgadillo) on 10/19/2020. \par Postop RT (11/23/2020-01/18/2021) at [place] (Dr. Baca)\par S/p nasal framework reconstruction and removal of bone graft 3/09/21 (Dr. Delgadillo)\par S/p nasal reconstruction surgery 10/14/2021 (Dr. Larson)\par PET CT last month is negative for new or recurrent malignancy. \par He has another reconstruction nasal surgery scheduled for September 2022 with Dr. Larson. \par \par 2.) Thyroid nodules on left side. FNA 2021 left nodule benign. \par --> Will order US thyroid for 2023\par \par 3.) Dizziness resolved\par \par Return in 4 months. \par

## 2022-12-02 NOTE — HISTORY OF PRESENT ILLNESS
[de-identified] : Mr. Diaz is accompanied by his wife today.\par \par S/p partial rhinectomy, septectomy and partial maxillectomy with left suprahyoid neck dissection (Dr. Gonzalez), with reconstruction of nose and hard palate with left radial forearm osteocutaneous flap (Dr. Delgadillo) on 10/19/2020. \par Postop RT (11/23/2020-01/18/2021) at [place] (Dr. Baca)\par S/p nasal framework reconstruction and removal of bone graft 3/09/21 (Dr. Delgadillo)\par S/p nasal reconstruction surgery 10/14/2021 (Dr. Larson). \par \par Nasal breathing is the same. He irrigates and clears nose daily. He still cannot breathe through left nostril since nare is very contracted. \par About 1 month ago he sneezed, and blood clot came out of his nose. No other bleeding since then. \par No pain or bleeding from nose/mouth. \par He is having another nasal reconstruction surgery in September with Dr. Larson.\par The dizziness/spinning sensation is not bothering him. He did not go for vestibular therapy. \par Hx of bilateral SNHL and tinnitus. No change in hearing or tinnitus; no ear pain or ear discharge. \par Pressure in neck and occipital area (diagnosed as osteosarcosis) was greatly improved with PT.\par CT-guided biopsy of 1.2 cm TYLER nodule (7/06/2021) showed fibrotic lung parenchyma; no neoplasm. On CT chest (01/24/22), this TYLER nodule resolved; new 5 mm RLL nodule. He is followed by Dr. Alves; next CT chest in 6 months. \par His rheumatologist started him on infusion of infliximab q 6 months.\par Hx thyroid nodules in left lobe. FNA benign left lobe nodule in 2021. He did not get a new thyroid US.\par \par VISIT 3/23/2022\par Nasal breathing is the same. He irrigates and clears nose daily. He still cannot breathe through left nostril, which is very contracted. No pain or bleeding from nose/mouth. \par He is to see Dr. Larson on April 12 and discuss another reconstructive procedure to address the left nostril.\par He is feeling more weak and sleepy during the day.\par His dizziness and spinning sensation is less than before but still occurring approximately twice weekly, usually when he gets in and out of bed. He had VNG at [facility], which was normal. He has not gone for vestibular therapy. Hx of bilateral SNHL and tinnitus. No change in hearing or tinnitus; no ear pain or ear discharge. \par Pressure in neck and occipital area (diagnosed as osteosarcosis) was greatly improved with PT.\par CT-guided biopsy of 1.2 cm TYLER nodule (7/06/2021) showed fibrotic lung parenchyma; no neoplasm. On CT chest (01/24/22), this TYLER nodule resolved; new 5 mm RLL nodule. He is followed by Dr. Alves; next CT chest in 6 months. \par His rheumatologist started him on infusion of infliximab q 6 months. Currently tapering down prednisone after infusion.\par Thyroid nodules in left lobe. FNA benign left lobe nodule in 2021\par \par VISIT 12/22/2021\par Currently, nasal breathing has improved but he has some leaking from the nose. He still cannot breathe through left nostril, which is closed. No pain or bleeding from nose/mouth.\par Dr. Larson has one more procedure planned for him.\par The dizziness and spinning sensation improved, almost gone. He had VNG at [facility].\par Hx of bilateral SNHL and tinnitus. No change in hearing;tinnitus; no ear pain or ear discharge. \par Pressure in neck and occipital area (diagnosed as osteosarcosis) greatly improved with PT.\par CT-guided biopsy of 1.2 cm TYLER nodule (7/06/2021) showed fibrotic lung parenchyma; no neoplasm. He is to have a repeat CT chest in 6 months but not ordered yet.\par His rheumatologist is looking into starting him on infusion of infliximab after he heals from nasal reconstruction.\par \par \par PETCT SKUL-THI ONC FDG SUBS (05/24/2022) at [facility] \par - Comparison: CT chest 1/24/2021 and PET/CT 5/11/2021\par * Head and neck: There is been interval reconstructive nasal surgery since prior PET/CT on 10/14/2021 with removal of bone graft and reconstruction using left ear and rib cartilage. There is soft tissue density within the anterior portion of the nasal cavity in the left with focal FDG uptake (SUV 5.0). There is a large septal defect. There is focal FDG uptake within the left ear likely secondary to postsurgical changes. Again demonstrated is linear uptake in the reconstructed palate and maxillectomy bed which has decreased from prior exam (SUV 7.2 versus 18.4) likely secondary to posttreatment/postsurgical changes. Postsurgical changes are noted with surgical clips in the left suprahyoid neck secondary to neck dissection.\par * Lungs and large airways: There is near complete resolution of the left upper lobe groundglass nodule without significant FDG uptake. There is a stable 5 mm right lower lobe pulmonary nodule without significant FDG uptake (series 2 image 59). Again demonstrated is mild FDG uptake within left lung groundglass opacities similar to prior examination. No new FDG avid pulmonary nodules.\par * Pleura:  No pleural effusion.\par * Mediastinum and hilar regions: Stable bilateral hilar and mediastinal mild FDG avid lymph nodes likely inflammatory\par * Heart and pericardium:  Heart size is normal. No pericardial effusion.\par * Vessels:  Normal.\par * Liver, Spleen, Pancreas,Adrenal glands:  Normal.\par * Gallbladder: No radiopaque stones gallbladder.\par * Kidneys: Right renal cysts are noted..\par * Abdominal and pelvic adenopathy:  No lymphadenopathy in abdomen or pelvis.\par * Ascites: None.\par * Gastrointestinal tract: Normal.\par * Pelvic organs: No pathologic FDG uptake. Enlarged prostate gland.\par * Soft tissues: There is FDG uptake adjacent to the left anterior seventh rib likely secondary postsurgical changes..\par * Bones: No pathologic FDG uptake.\par IMPRESSION: \par - Since prior PET/CT 5/11/2021, interval nasal reconstruction with focal FDG uptake in the anterior nasal cavity which may be secondary to postsurgical changes although correlation with physical examination and attention on follow-up imaging is recommended. Focal FDG uptake in the left ear and left anterior chest wall adjacent to the seventh rib likely secondary to postsurgical changes.\par - Interval near complete resolution of left upper lobe groundglass nodule without FDG uptake. Right lower lobe pulmonary nodule is below PET resolution.\par - No additional sites of pathologic FDG uptake to suggest biologic tumor activity.\par \par \par CT CHEST noncontrast (01/24/2022) at [facility]:\par - Comparison: CT chest dated 7/6/2021, PET/CT dated 5/11/2021, and CT chest dated 6/27/2019.\par * Lungs and large airways: Since 6/27/2019, there has been interval development of a 5 mm solid nodule within the posterior segment of the right lower lobe (series 5 image 183); this nodule was not clearly demonstrated on CT chest from 7/6/2021 or PET/CT from 5/11/2021. Compared to imaging from 5/11/2021, there has been near complete interval resolution of left upper lobe groundglass nodule. Compared to imaging from 6/27/2019, there has been no significant interval change in 4 mm solid nodule within the apical segment of the right upper lobe (series 5 image 88). Multiple stable solid subcentimeter and micronodules are noted. Central airways are patent. Continued evidence of linear parenchymal bands of scarring and/or atelectasis with associated traction bronchiectasis involving the inferior aspects of the midlung zones. There are peripheral reticular opacities involving the mid and lower lung zones more prominently with traction bronchiectasis and bronchiolectasis no definite honeycomb lung formation is identified.\par * Pleura: No pleural effusion.\par * Mediastinum and hilar regions: No thoracic lymphadenopathy.\par * Heart and pericardium: Cardiomegaly. No pericardial effusion. Normal aortic and mild mitral annular valve calcification.\par *Vessels: Normal.\par * Chest wall and lower neck: New focal infiltration of the subcutaneous soft tissues overlying the left lower chest; correlate for recent trauma.\par * Upper abdomen: Right renal cysts measuring 6.8 cm and 6.2 cm. Colonic diverticulosis. Nonobstructing punctate parapelvic left renal calculi.\par * Bones: Moderate degenerative changes of the spine. Chronic appearing superior endplate compression deformity of the T11 vertebral body.\par IIMPRESSION:\par 1.) Interval development of a 5 mm solid nodule within the right lower lobe and interval resolution of left upper lobe groundglass nodule. No significant interval change in additional pulmonary nodules. Given the waxing and waning characteristics of these nodules, infectious/inflammatory etiologies are favored over neoplasia. Short interval surveillance in this patient with a provided history of extra pulmonary neoplasm as per protocol is suggested.\par 2.) Stable linear parenchymal scarring and traction bronchiectasis involving both midlung zones. The sequela of a chronic infectious and/or inflammatory process and in view the distribution pulmonary nontuberculous mycobacterial infection cannot be excluded.\par 3.) Peripheral interstitial lung pattern with mild pulmonary fibrosis and no definite honeycomb lung formation involving the mid and lower lung zones more prominently. There is temporal heterogeneity. No significant air-trapping. Abbreviated differential includes a probable UIP pattern.\par \par \par CT-guided biopsies of 1.2 cm TYLER nodule (7/06/2021) at [facility] \par 1.) Core biopsy\par - Fibrotic lung parenchyma with reactive appearing type II pneumocytes, increased macrophages and fresh blood. - No neoplasm visualized \par 2.) FNA\par  - ATYPICAL FINDINGS\par  - Pneumocytes with mild reactive-repairative appearing atypia. Cell block scant, predominantly lysed blood.\par \par \par PETCT SKUL-THI ONC FDG SUBS (05/11/2021) at [facility]:\par - Comparison: PET/CT from 5/11/2020\par - Reference mean SUV, Liver: 2.6 SUV\par * Head and neck: Status post rhinectomy, septectomy and partial maxillectomy with osteocutaneous reconstruction. Status post recent removal of bone graft. There is diffuse linear FDG uptake along the reconstructed palate and maxillectomy bed which may represent posttreatment/postsurgical changes. There is linear FDG uptake along the lateral aspects of the nasal cavity bilaterally which also may be secondary to posttreatment/postsurgical changes. Surgical clips are noted within the left suprahilar neck secondary to neck dissection. There is no evidence of cervical lymphadenopathy. Physiologic FDG avidity in the pharyngeal lymphoid tissue is seen.\par * Lungs and pleura: 1.2 cm round glass opacity within the left upper lobe exhibits mild FDG avidity (SUV 2.4, previously 1.1). This groundglass nodule was in retrospect present on CT chest 10/5/2017 although has a more solid nodular appearance. Again demonstrated is mild FDG uptake within several additional groundglass opacities predominantly within the left lung which are likely inflammatory.\par * Mediastinum and cathy: Symmetric mild FDG uptake within nonenlarged bilateral hilar lymph nodes which by pattern and distribution most likely secondary to inflammatory changes.\par * Heart and pericardium: Physiologic FDG avidity in the myocardium is seen. Within normal limits.\par * Chest wall: No focus of abnormal FDG avidity. Within normal limits.\par * Hepatobiliary, Pancreas, Spleen, Adrenals, Peritoneum/retroperitoneum, Gastrointestinal : No focus of abnormal FDG avidity. Within normal limits. Physiologic FDG uptake in the bowel is seen.\par * Genitourinary: Right renal cysts are noted.. Physiologic FDG avidity along the urinary tract is seen.\par * Lymph nodes: Stable appearance of small right level 1 cervical lymph node with mild FDG avidity (SUV 2.0 compared to SUV 2.6 previously).\par * Vessels: Mild atherosclerosis of aorta.\par * Bones and soft tissue: No focus of abnormal FDG avidity.\par IMPRESSION:\par 1.) Since prior PET/CT 8/5/2020, postsurgical changes within the nasal soft tissues and hard palate with predominantly linear FDG uptake without CT correlate which may represent postsurgical/posttreatment changes. No definite evidence of residual or recurrent malignancy. No evidence of cervical lymphadenopathy.\par 2.) 1.2 cm left upper lobe groundglass opacity with increased mild FDG uptake and has a more solid appearance and therefore malignancy cannot be excluded. Consider tissue sampling for further evaluation.\par \par \par CT MAXILLOFACIAL w/ contrast (03/01/2021) at [facility]:\par - COMPARISON: Postcontrast CT scan of the facial bones dated August 5, 2020.\par - The patient is status post nasomaxillary resection for squamous cell carcinoma with reconstruction. There has been partial resection of the hard palate (midportion) with placement of a bone graft and metallic plate and screws. There has been resection of the left nasal bone with deformity of the overlying adjacent skin, resection of the nasal septum, left inferior turbinate, partial resection of the left middle turbinate, and partial resection of the right inferior turbinate.\par - Surgical clips in the left submandibular space. There has been resection of the left submandibular gland. Soft tissue thickening in the lateral aspect left submandibular space consistent with post therapeutic changes.\par - There is ill-defined abnormal soft tissue anterior and inferior to the nasal cavity (predominantly to the left of midline) extending into the left nasal cavity, medial to the left orbit, and anterior to the left maxillary sinus which likely represents post therapeutic changes. 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 The left frontal sinus outflow tract, right infundibulum, and sphenoethmoidal recesses are patent.\par - Degenerative changes of the visualized cervical spine.\par IMPRESSION:\par In comparison with the prior postcontrast CT scan of the facial bones dated August 5, 2020:\par There has been interval surgical intervention.\par The previously described asymmetric enhancement in the left lateral margin of the anterior nasal septum and the nasal cavity with adjacent bony erosion is no longer demonstrated.\par Abnormal soft tissue anterior and inferior to the nasal cavity (predominantly to the left of midline) extending into the left nasal cavity, medial to the left orbit, and anterior to the left maxillary sinus which likely represents post therapeutic changes. Residual and/or recurrent neoplasm is not excluded. Continued follow-up is advised.\par (Images were reviewed.) \par \par \par XR C-SPINE COMPLETE 4-5 VIEWS (03/02/2021) at [facility]:\par - No acute osseous abnormality.C3-4 demonstrates no spondylolisthesis in neutral with trace retrolisthesis in flexion and reduction in extension. Trace anterolisthesis C5 relative to C4 without movement from flexion to extension. Severe C5-C6 degenerative disc disease. Atlantoaxial joint is stable. No prevertebral soft tissue swelling surgical clips left neck\par IMPRESSION:\par 1.) No acute osseous abnormality\par 2.) C3-4 trace retrolisthesis in flexion with reduction in extension and neutral positions. Findings suggest ligamentous laxity/spinal instability\par \par \par LABS\par (02/09/2021) - WBC 11.64 with 83% PMNs; ESR 90, CRT 1.46\par \par \par USG FNA Left thyroid nodule, 1 cm (02/12/2021), read at [facility]:\par - Benign colloid nodule ([place] 2)\par \par \par PATHOLOGY (10/19/2020)\par - Negative final margins on primary resection, with bony invasion by the squamous cell carcinoma.\par - Left neck lymph nodes 10, all negative for tumor.\par \par US THYROID (10/09/2020) at [facility] in [place], [place]\par - No prior comparison\par - ISTHMUS: 0.4 cm\par - RIGHT LOBE: 3.7 x 1.7 x 1.7 cm, with no nodules\par - LEFT LOBE: 3.6 x 1.7 x 1.7 cm, with 2 nodules\par  --- 1.1 x 0.7 x 1.0 cm predominantly hyperechoic with hypoechoic rim and focus of calcification in lower pole\par  --- 0.7 x 0.4 x 0.5 cm hyperechoic nodule with tiny echogenic foci in lower pole\par \par \par CT MAXILLOFACIAL with contrast (08/05/2020) at [facility]:\par - The current study is interpreted in conjunction with images from concurrent PET/CT dated 8/5/2020.\par - Please refer to report of concurrent MRI for more detailed description of lesion extent. Subtle asymmetric enhancement is noted along the left lateral surface of the anterior cartilaginous septum with more confluent enhancing soft tissue noted along the junction of the lateral margin of the maxillary crest and the floor of the left nasal cavity, a finding best appreciated on coronal images 20 through 23. Here, there is underlying osseous erosion with an approximately 7 mm ill-defined lytic focus lying immediately anterior to the nasopalatine duct; see sagittal bone images 39 through 41, axial images 34 through 36 and coronal images 23 through 25. There is also a small nodular focus of enhancing soft tissue along the superior margin of the anterior left nasal septum, best seen on soft tissue coronal image 12, with irregularity of the adjacent left nasal bone evident on the corresponding bone images. As this lies at a considerable distance from the nasal cavity floor lesion, correlation with direct visual inspection is recommended for confirmation. There does appear to be FDG uptake at this site on the PET/CT study.\par - No additional nasal or nasopharyngeal soft tissue mass. There is prominent right and mild left middle turbinate pneumatization. There is a very large left-sided bony septal spur that contacts the hypoplastic left inferior turbinate and the left lateral nasal wall. Circumferential mucosal thickening lines walls of the right maxillary antrum with the remaining paranasal sinuses predominantly ventilated bilaterally. Anterior and posterior drainage pathways are patent. Orbital contents are normal. There is mild ventricular enlargement out of proportion to the degree of sulcal prominence, central atrophy versus mild communicating hydrocephalus. Mastoid air cells are clear bilaterally.\par IMPRESSION:\par Although it is difficult to define lesional extent on this CT examination, subtle asymmetric enhancement is present along the left lateral margin of the anterior nasal septum, with more confluent soft tissue along the nasal cavity floor that is associated with underlying bone erosion. A smaller nodular focus is present superiorly with suspected erosion of the left nasal bone. Please refer to concurrent MRI and PET/CT for additional assessment of lesional extent.\par \par \par MR ORBIT FACE AND/OR NECK with/without contrast (08/05/2020) at [facility]:\par - The current study is interpreted in conjunction with images from concurrent CT maxillofacial and PET/CT studies.\par - As on the CT study, subtle asymmetric enhancement is present along the mucosal surface of the left anterior nasal septum, with more confluent soft tissue along the nasal cavity floor associated with osseous erosion and extension of tumor into the left ventral maxilla. Additional subcentimeter nodular enhancement along the anterior left nasal roof associated with apparent erosion of the left nasal bone is not as well seen on the MRI study. The best MRI imaging correlate is on reconstructed coronal images of the volumetric T1 series, annotated and saved as key images. No additional sites of suspicious nodular soft tissue or contrast enhancement are identified. There is no pathologic contrast enhancement within skull base foramina and the cavernous sinuses are symmetric in appearance bilaterally. There is again mild ventricular prominence, central atrophy versus mild communicating hydrocephalus. There is no intracranial mass or pathologic contrast enhancement.\par IMPRESSION:\par As on the concurrent CT examination, subtle asymmetric enhancement is noted along the left anterior nasal septum, with more confluent soft tissue density along the nasal cavity floor associated with underlying osseous erosion. A second suspected focus of osseous erosion along the left nasal bone is better seen on the CT examination.\par \par \par PET-CT SKUL-THIGH ONC FDG INIT (08/05/2020) at [facility]: \par - Comparison: CT of the head 8/5/2020, CT chest 6/27/2019\par - Reference mean SUV, Liver: 2.4\par - HEAD and NECK: Focal intense FDG avidity along the left aspect of the anterior nasal septum SUV max 9.9, corresponding to CT findings. The FDG avidity extends to the erosive osseous lesion in the floor of the nasal cavity, as well as the nasal bone (4:33, 45). Subcentimeter Right cervical level 1 lymph nodes are seen (3:64) SUV max up to 2.6.\par Focally FDG avid thyroid nodule in the left lobe with punctate calcification, SUV max 4.2 (4:88).\par - LUNGS and PLEURA: Mildly FDG avid patchy groundglass opacities/centrilobular groundglass nodules pronounced in the mid lung and lower lungs, likely inflammatory.\par - MEDIASTINUM and CATHY: Mildly FDG avid small mediastinal lymph nodes and right hilar node, SUV max up to 4.5 in the periaortic region, may be reactive. Physiologic FDG avidity in the myocardium is seen.\par - HEPATOBILIARY: No focus of abnormal FDG avidity. Within normal limits.\par - PANCREAS: No focus of abnormal FDG avidity. Within normal limits.\par - SPLEEN: No focus of abnormal FDG avidity. Within normal limits.\par - ADRENALS: No focus of abnormal FDG avidity. Within normal limits.\par - GENITOURINARY: Mildly and diffusely increased FDG uptake in bilateral testes with SUV max 4.[address] correlation. Mild diffuse heterogeneous FDG uptake in the enlarged prostate, nonspecific and likely inflammatory. Physiologic FDG avidity along the urinary tract is seen. Photopenic right renal cysts.\par - GASTROINTESTINAL: Focal FDG avidity in the medial aspect of the duodenal antrum with SUV max 9.1, without CT correlation (604:72). Prominent FDG uptake along the proximal gastric wall without CT correlation, with SUV max 6.3 may be inflammatory. FDG avidity in the proximal sigmoid colon with SUV max 8.3 (604:36), and in the anorectal region with SUV max 12.[address] correlation may be physiologic.\par - PERITONEUM/RETROPERITONEUM: No focus of abnormal FDG avidity. Within normal limits.\par - LYMPH NODES: No focus of abnormal FDG avidity. Within normal limits.\par - VESSELS: No focus of abnormal FDG avidity. Within normal limits.\par - BONES and SOFT TISSUE: Increased FDG uptake surrounding the bilateral humeral heads, right greater than the left, likely inflammatory/degenerative. Area of increased FDG avidity along the paraspinal muscle in the mid thoracic spine level, may be physiologic.\par IMPRESSION:\par 1.) FDG-avid lesion along the left aspect of the anterior nasal septum, with osseous involvement in the floor of the nasal cavity as well as nasal bones.\par 2.) Mildly FDG-avid right level 1 cervical lymph nodes, may be reactive. No evidence of distant metastasis.\par 3.) Mildly FDG avid ground glass opacities and centrilobular ground glass opacities in both lungs, likely inflammatory.\par 4.) FDG-avid left thyroid nodule with calcification. Correlation with neck ultrasound is recommended.\par 5.) FDG-avid focus in the duodenal antrum without CT correlation, nonspecific. Clinical/visual correlation is recommended.\par He doesn’t feel grounded when he walks. \par \par \par PATHOLOGY Left nasal septum biopsy (7/16/2020):\par - Invasive nonkeratinizing squamous cell carcinoma.\par - Ki-67 shows an increased proliferation rate of approximately 50%.\par

## 2022-12-02 NOTE — DATA REVIEWED
[de-identified] : \par VNG (8/09/2021) at [place]:\par - Bithermal caloric testing revealed a 14% reduced vestibular response (RVR) in right ear, which is within normal limits.\par - Right beating nystagmus was 3% stronger, also within normal limits.\par - No significant spontaneous nystagmus observed.\par - Saccades, smooth pursuit and optokinetics were within normal limits.\par - [place]-Hallpike maneuver and positional testing was not performed in view of report of back and neck pain.\par IMPRESSION:\par 1.) Normal and symmetrical caloric responses to warm and cool air irrigation\par 2.) Normal oculomotor findings \par \par AUDIOGRAM (03/07/2019)\par RIGHT:  Hearing within normal limits through 2K Hz, sloping to severe SNHL\par LEFT:   Hearing within normal limits through 2K Hz, sloping to severe  SNHL\par Tympanograms  A  bilateral   \par Word recognition 100%  bilateral   \par \par AUDIOGRAM (04/07/2017)\par RIGHT:  Hearing within normal limits except for mild HL at 250 Hz\par LEFT:   Mild conductive hearing loss through 1K Hz, rising to within normal limits with conductive components at 2K and 4K Hz.\par Tympanograms  C  bilateral   \par Word recognition 100%  bilateral   \par DPOAEs:  responses were absent at most tested frequencies bilateral \par

## 2022-12-02 NOTE — END OF VISIT
[FreeTextEntry3] : All medical record entries made by the Scribe were at my, Dr. Olivia Gonzalez, direction and personally dictated by me on 07/27/2022. I have reviewed the chart and agree that the record accurately reflects my personal performance of the history, physical exam, assessment and plan. I have also personally directed, reviewed, and agreed with the chart.

## 2022-12-02 NOTE — ASSESSMENT
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 \par S/p partial rhinectomy, septectomy and partial maxillectomy with left suprahyoid neck dissection (Dr. Gonzalez), with reconstruction of nose and hard palate with left radial forearm osteocutaneous flap (Dr. Delgadillo) on 10/19/2020. \par Postop RT (11/23/2020-01/18/2021) at [place] (Dr. Baca)\par S/p nasal framework reconstruction and removal of bone graft 3/09/21 (Dr. Delgadillo)\par S/p nasal reconstruction surgery 10/14/2021 (Dr. Larson)\par S/p second stage nasal reconstruction surgery with conchal cartilage graft 10/202 (Dr. Larson)\par \par --> PET CT ordered for May/June 2023 by Dr. Alves\par -->Increase frequency of saline spray since has more crusting in nasal cavity.  Blood was probably due to dryness.\par \par 2.) Thyroid nodules on left side. FNA 2021 left nodule benign. \par --> US thyroid for 2023\par \par \par Return in May/June 2023\par

## 2023-04-05 NOTE — PRE-ANESTHESIA EVALUATION ADULT - WEIGHT IN KG
64.4 Carac Counseling:  I discussed with the patient the risks of Carac including but not limited to erythema, scaling, itching, weeping, crusting, and pain.

## 2023-05-04 ENCOUNTER — NON-APPOINTMENT (OUTPATIENT)
Age: 73
End: 2023-05-04

## 2023-05-09 ENCOUNTER — APPOINTMENT (OUTPATIENT)
Dept: PULMONOLOGY | Facility: CLINIC | Age: 73
End: 2023-05-09
Payer: COMMERCIAL

## 2023-05-09 VITALS
OXYGEN SATURATION: 95 % | SYSTOLIC BLOOD PRESSURE: 108 MMHG | HEART RATE: 75 BPM | WEIGHT: 146 LBS | DIASTOLIC BLOOD PRESSURE: 71 MMHG | HEIGHT: 62 IN | TEMPERATURE: 97.52 F | BODY MASS INDEX: 26.87 KG/M2

## 2023-05-09 PROBLEM — I42.1 OBSTRUCTIVE HYPERTROPHIC CARDIOMYOPATHY: Chronic | Status: ACTIVE | Noted: 2022-10-28

## 2023-05-09 PROCEDURE — 99214 OFFICE O/P EST MOD 30 MIN: CPT | Mod: 25

## 2023-05-09 PROCEDURE — 94729 DIFFUSING CAPACITY: CPT

## 2023-05-09 PROCEDURE — 94010 BREATHING CAPACITY TEST: CPT

## 2023-05-09 PROCEDURE — ZZZZZ: CPT

## 2023-05-09 PROCEDURE — 94727 GAS DIL/WSHOT DETER LNG VOL: CPT

## 2023-05-09 RX ORDER — ALBUTEROL SULFATE 90 UG/1
108 (90 BASE) INHALANT RESPIRATORY (INHALATION)
Qty: 1 | Refills: 3 | Status: ACTIVE | COMMUNITY
Start: 2023-05-09 | End: 1900-01-01

## 2023-05-09 NOTE — ASSESSMENT
[FreeTextEntry1] : COPD\par \par I discussed the PFT with the patient and consistent with combined obstructive restrictive lung disease with decrease in the diffusion capacity which is lower values compared to the previous PFT related in 2020 and that could be effect of aging and the radiation to the head and neck.  The patient at this time is totally asymptomatic with no limitation of her exercise capacity.  The baseline oxygen saturation is normal.  I started the patient on albuterol as needed basis and instructed him to use the YouTube videos to learn how to use the inhaler properly.  The patient also instructed to update me on his condition if he has requirement for short acting beta agonist increases.\par \par Pulmonary nodules\par \par I discussed the case with Dr. Gonzalez and we will cancel the CAT scan of the chest and will proceed with a PET scan.  Further recommendation will follow.

## 2023-05-09 NOTE — HISTORY OF PRESENT ILLNESS
[Former] : former [Never] : never [TextBox_4] : Is doing very well.  He walks 8000 steps a day with no shortness of breath.  He is not short of breath no coughing no wheezing he likes to have an albuterol standby.  He has no aspiration when he swallows. [ESS] : 0

## 2023-05-23 ENCOUNTER — APPOINTMENT (OUTPATIENT)
Dept: NUCLEAR MEDICINE | Facility: HOSPITAL | Age: 73
End: 2023-05-23

## 2023-05-23 ENCOUNTER — OUTPATIENT (OUTPATIENT)
Dept: OUTPATIENT SERVICES | Facility: HOSPITAL | Age: 73
LOS: 1 days | End: 2023-05-23
Payer: COMMERCIAL

## 2023-05-23 DIAGNOSIS — Z41.9 ENCOUNTER FOR PROCEDURE FOR PURPOSES OTHER THAN REMEDYING HEALTH STATE, UNSPECIFIED: Chronic | ICD-10-CM

## 2023-05-23 DIAGNOSIS — Z98.890 OTHER SPECIFIED POSTPROCEDURAL STATES: Chronic | ICD-10-CM

## 2023-05-23 LAB — GLUCOSE BLDC GLUCOMTR-MCNC: 78 MG/DL — SIGNIFICANT CHANGE UP (ref 70–99)

## 2023-05-23 PROCEDURE — 78815 PET IMAGE W/CT SKULL-THIGH: CPT

## 2023-05-23 PROCEDURE — 78815 PET IMAGE W/CT SKULL-THIGH: CPT | Mod: 26,PS

## 2023-05-23 PROCEDURE — A9552: CPT

## 2023-05-23 PROCEDURE — 82962 GLUCOSE BLOOD TEST: CPT

## 2023-06-01 ENCOUNTER — NON-APPOINTMENT (OUTPATIENT)
Age: 73
End: 2023-06-01

## 2023-06-02 ENCOUNTER — APPOINTMENT (OUTPATIENT)
Dept: OTOLARYNGOLOGY | Facility: CLINIC | Age: 73
End: 2023-06-02
Payer: COMMERCIAL

## 2023-06-02 VITALS
SYSTOLIC BLOOD PRESSURE: 126 MMHG | WEIGHT: 147.2 LBS | TEMPERATURE: 206.6 F | DIASTOLIC BLOOD PRESSURE: 84 MMHG | HEART RATE: 70 BPM | HEIGHT: 62 IN | BODY MASS INDEX: 27.09 KG/M2

## 2023-06-02 DIAGNOSIS — C30.0 MALIGNANT NEOPLASM OF NASAL CAVITY: ICD-10-CM

## 2023-06-02 DIAGNOSIS — J31.0 CHRONIC RHINITIS: ICD-10-CM

## 2023-06-02 PROCEDURE — 31231 NASAL ENDOSCOPY DX: CPT

## 2023-06-02 PROCEDURE — 99214 OFFICE O/P EST MOD 30 MIN: CPT | Mod: 25

## 2023-06-02 RX ORDER — INFLIXIMAB 100 MG/10ML
100 INJECTION, POWDER, LYOPHILIZED, FOR SOLUTION INTRAVENOUS
Refills: 0 | Status: COMPLETED | COMMUNITY
End: 2023-06-02

## 2023-06-13 ENCOUNTER — OUTPATIENT (OUTPATIENT)
Dept: OUTPATIENT SERVICES | Facility: HOSPITAL | Age: 73
LOS: 1 days | End: 2023-06-13
Payer: COMMERCIAL

## 2023-06-13 ENCOUNTER — APPOINTMENT (OUTPATIENT)
Dept: MRI IMAGING | Facility: HOSPITAL | Age: 73
End: 2023-06-13

## 2023-06-13 DIAGNOSIS — Z98.890 OTHER SPECIFIED POSTPROCEDURAL STATES: Chronic | ICD-10-CM

## 2023-06-13 DIAGNOSIS — Z41.9 ENCOUNTER FOR PROCEDURE FOR PURPOSES OTHER THAN REMEDYING HEALTH STATE, UNSPECIFIED: Chronic | ICD-10-CM

## 2023-06-13 PROCEDURE — 70543 MRI ORBT/FAC/NCK W/O &W/DYE: CPT

## 2023-06-13 PROCEDURE — A9585: CPT

## 2023-06-13 PROCEDURE — 70543 MRI ORBT/FAC/NCK W/O &W/DYE: CPT | Mod: 26

## 2023-06-21 PROBLEM — C30.0 SQUAMOUS CELL CARCINOMA OF NASAL CAVITY: Status: ACTIVE | Noted: 2020-07-23

## 2023-06-21 PROBLEM — J31.0 CHRONIC RHINITIS: Status: ACTIVE | Noted: 2023-06-21

## 2023-06-21 NOTE — DATA REVIEWED
[de-identified] : \par VNG (8/09/2021) at [place]:\par - Bithermal caloric testing revealed a 14% reduced vestibular response (RVR) in right ear, which is within normal limits.\par - Right beating nystagmus was 3% stronger, also within normal limits.\par - No significant spontaneous nystagmus observed.\par - Saccades, smooth pursuit and optokinetics were within normal limits.\par - [place]-Hallpike maneuver and positional testing was not performed in view of report of back and neck pain.\par IMPRESSION:\par 1.) Normal and symmetrical caloric responses to warm and cool air irrigation\par 2.) Normal oculomotor findings \par \par AUDIOGRAM (03/07/2019)\par RIGHT:  Hearing within normal limits through 2K Hz, sloping to severe SNHL\par LEFT:   Hearing within normal limits through 2K Hz, sloping to severe  SNHL\par Tympanograms  A  bilateral   \par Word recognition 100%  bilateral   \par \par AUDIOGRAM (04/07/2017)\par RIGHT:  Hearing within normal limits except for mild HL at 250 Hz\par LEFT:   Mild conductive hearing loss through 1K Hz, rising to within normal limits with conductive components at 2K and 4K Hz.\par Tympanograms  C  bilateral   \par Word recognition 100%  bilateral   \par DPOAEs:  responses were absent at most tested frequencies bilateral \par

## 2023-06-21 NOTE — HISTORY OF PRESENT ILLNESS
[de-identified] : Mr. Diaz is accompanied by his wife today.\par \par S/p partial rhinectomy, septectomy and partial maxillectomy with left suprahyoid neck dissection (Dr. Gonzalez), with reconstruction of nose and hard palate with left radial forearm osteocutaneous flap (Dr. Delgadillo) on 10/19/2020. \par Postop RT (11/23/2020-01/18/2021) at [place] (Dr. Baca)\par S/p nasal framework reconstruction and removal of bone graft 3/09/21 (Dr. Delgadillo)\par S/p nasal reconstruction surgery  (Dr. Larson) 10/2021 and 10/2022 (left nasal sidewall and ala with soft tissue flap and conchal cartilage graft) . \par \par He has no pain in nose/mouth.  No bleeding.  Using saline rinses in nose daily.  Sometimes gets crusts out.  Able to breathe through nose, mainly through right nostril.\par He had PET-CT ordered by Dr. Alves.\par \par VISIT 12/02/2022\par In October 2022, he had another nasal surgery by Dr. Larson -- reconstruction of left nasal sidewall and ala with soft tissue flap and conchal cartilage graft (from right ear) -- which allows a little nasal breathing on the left side.  He still wears a mask in public to avoid stares.\par On 11/26/22, he spit out a blood clot  that he pulled down from the nose.  \par Dr. Alves ordered new PET-CT scan for May/June 2023.\par Overall he feels well.\par \par VISIT 7/27/2022\par Nasal breathing is the same. He irrigates and clears nose daily. He still cannot breathe through left nostril since nare is very contracted. \par About 1 month ago he sneezed, and blood clot came out of his nose. No other bleeding since then. \par No pain or bleeding from nose/mouth. \par He is having another nasal reconstruction surgery in September with Dr. Larson.\par The dizziness/spinning sensation is not bothering him. He did not go for vestibular therapy. \par Hx of bilateral SNHL and tinnitus. No change in hearing or tinnitus; no ear pain or ear discharge. \par Pressure in neck and occipital area (diagnosed as osteosarcosis) was greatly improved with PT.\par CT-guided biopsy of 1.2 cm TYLER nodule (7/06/2021) showed fibrotic lung parenchyma; no neoplasm. On CT chest (01/24/22), this TYLER nodule resolved; new 5 mm RLL nodule. He is followed by Dr. Alves; next CT chest in 6 months. \par His rheumatologist started him on infusion of infliximab q 6 months.\par Hx thyroid nodules in left lobe. FNA benign left lobe nodule in 2021. He did not get a new thyroid US.\par \par VISIT 3/23/2022\par Nasal breathing is the same. He irrigates and clears nose daily. He still cannot breathe through left nostril, which is very contracted. No pain or bleeding from nose/mouth. \par He is to see Dr. Larson on April 12 and discuss another reconstructive procedure to address the left nostril.\par He is feeling more weak and sleepy during the day.\par His dizziness and spinning sensation is less than before but still occurring approximately twice weekly, usually when he gets in and out of bed. He had VNG at [facility], which was normal. He has not gone for vestibular therapy. Hx of bilateral SNHL and tinnitus. No change in hearing or tinnitus; no ear pain or ear discharge. \par Pressure in neck and occipital area (diagnosed as osteosarcosis) was greatly improved with PT.\par CT-guided biopsy of 1.2 cm TYLER nodule (7/06/2021) showed fibrotic lung parenchyma; no neoplasm. On CT chest (01/24/22), this TYLER nodule resolved; new 5 mm RLL nodule. He is followed by Dr. Alves; next CT chest in 6 months. \par His rheumatologist started him on infusion of infliximab q 6 months. Currently tapering down prednisone after infusion.\par Thyroid nodules in left lobe. FNA benign left lobe nodule in 2021\par \par VISIT 12/22/2021\par Currently, nasal breathing has improved but he has some leaking from the nose. He still cannot breathe through left nostril, which is closed. No pain or bleeding from nose/mouth.\par Dr. Larson has one more procedure planned for him.\par The dizziness and spinning sensation improved, almost gone. He had VNG at [facility].\par Hx of bilateral SNHL and tinnitus. No change in hearing;tinnitus; no ear pain or ear discharge. \par Pressure in neck and occipital area (diagnosed as osteosarcosis) greatly improved with PT.\par CT-guided biopsy of 1.2 cm TYLER nodule (7/06/2021) showed fibrotic lung parenchyma; no neoplasm. He is to have a repeat CT chest in 6 months but not ordered yet.\par His rheumatologist is looking into starting him on infusion of infliximab after he heals from nasal reconstruction.\par \par \par PETCT SKUL-THI ONC FDG SUBS (05/23/2023) at [facility] \par - Comparison: Most recent chest CT and PET CT, both from 5/24/2022, and multiple additional prior imaging studies dating back to 10/5/2017.\par * Head and neck: Again post partial rhinectomy, septectomy and partial maxillectomy with interval reconstruction in October 2022. There is now more focal, intense activity on the left side maxillectomy bed, just superior to the reconstruction plate. The SUV at this site is 9.3. There was previously more linear activity within an SUV of 7.2. There is also new focal activity with SUV of 4.5 in the soft tissues along the left nasal side wall anterior to the medial aspect of the left maxillary sinus. Again post left neck dissection, with surgical clips in the left suprahyoid neck. No lymphadenopathy in the neck. Symmetric small foci of activity in the ears bilaterally, likely related to prior surgeries. No hypermetabolic thyroid nodules.\par * Lungs and large airways: No change 4 mm nodule right lower lobe. It is not FDG avid. There are again basilar predominant fibrotic changes bilaterally, worst in the right middle lobe and lingula.\par * Pleura:  No pleural effusion.\par * Mediastinum and hilar regions: No thoracic lymphadenopathy.\par * Heart and pericardium:  Heart size is normal. Calcified mitral valve annulus. No pericardial effusion.\par * Vessels:  Minimal atherosclerotic changes.\par * Liver, Spleen, Pancreas, Adrenal glands:  Normal.\par * Gallbladder: No radiopaque stones gallbladder.\par * Kidneys: A few bilateral renal cysts are again present, the largest cysts in the upper pole of the right kidney.\par * Abdominal and pelvic adenopathy:  No lymphadenopathy in abdomen or pelvis.\par * Ascites: None.\par * Gastrointestinal tract: Small hiatal hernia. Colonic diverticulosis.\par * Pelvic organs: Enlarged, heterogeneous prostate with increased activity centrally at the base. The bladder is only minimally distended.\par * Soft tissues: Postoperative changes anteriorly in left lower chest wall. Small fat-containing umbilical hernia. Small fat-containing inguinal hernias bilaterally.\par * Bones: Degenerative changes of the spine and right shoulder. Metallic anchors right humeral head. No change small sclerotic focus right side of L4 transverse process.\par IMPRESSION:\par 1.) Since 5/24/2022, there is now a more intense focus of increased activity on the left side of the maxillectomy bed and there is additional new increased activity along the soft tissues of the left nasal side wall. This increased activity could be secondary to interval surgery. Recommend correlation with physical exam and consider either CT scan or MRI to rule out recurrence.\par 2.) Increased activity in an enlarged, heterogeneous prostate. Recommend correlation with PSA to rule out prostate cancer.\par (Images were reviewed.)\par \par \par PETCT SKUL-THI ONC FDG SUBS (05/24/2022) at [facility] \par - Comparison: CT chest 1/24/2021 and PET/CT 5/11/2021\par * Head and neck: There is been interval reconstructive nasal surgery since prior PET/CT on 10/14/2021 with removal of bone graft and reconstruction using left ear and rib cartilage. There is soft tissue density within the anterior portion of the nasal cavity in the left with focal FDG uptake (SUV 5.0). There is a large septal defect. There is focal FDG uptake within the left ear likely secondary to postsurgical changes. Again demonstrated is linear uptake in the reconstructed palate and maxillectomy bed which has decreased from prior exam (SUV 7.2 versus 18.4) likely secondary to posttreatment/postsurgical changes. Postsurgical changes are noted with surgical clips in the left suprahyoid neck secondary to neck dissection.\par * Lungs and large airways: There is near complete resolution of the left upper lobe groundglass nodule without significant FDG uptake. There is a stable 5 mm right lower lobe pulmonary nodule without significant FDG uptake (series 2 image 59). Again demonstrated is mild FDG uptake within left lung groundglass opacities similar to prior examination. No new FDG avid pulmonary nodules.\par * Pleura:  No pleural effusion.\par * Mediastinum and hilar regions: Stable bilateral hilar and mediastinal mild FDG avid lymph nodes likely inflammatory\par * Heart and pericardium:  Heart size is normal. No pericardial effusion.\par * Vessels:  Normal.\par * Liver, Spleen, Pancreas,Adrenal glands:  Normal.\par * Gallbladder: No radiopaque stones gallbladder.\par * Kidneys: Right renal cysts are noted..\par * Abdominal and pelvic adenopathy:  No lymphadenopathy in abdomen or pelvis.\par * Ascites: None.\par * Gastrointestinal tract: Normal.\par * Pelvic organs: No pathologic FDG uptake. Enlarged prostate gland.\par * Soft tissues: There is FDG uptake adjacent to the left anterior seventh rib likely secondary postsurgical changes..\par * Bones: No pathologic FDG uptake.\par IMPRESSION: \par - Since prior PET/CT 5/11/2021, interval nasal reconstruction with focal FDG uptake in the anterior nasal cavity which may be secondary to postsurgical changes although correlation with physical examination and attention on follow-up imaging is recommended. Focal FDG uptake in the left ear and left anterior chest wall adjacent to the seventh rib likely secondary to postsurgical changes.\par - Interval near complete resolution of left upper lobe groundglass nodule without FDG uptake. Right lower lobe pulmonary nodule is below PET resolution.\par - No additional sites of pathologic FDG uptake to suggest biologic tumor activity.\par \par \par CT CHEST noncontrast (01/24/2022) at [facility]:\par - Comparison: CT chest dated 7/6/2021, PET/CT dated 5/11/2021, and CT chest dated 6/27/2019.\par * Lungs and large airways: Since 6/27/2019, there has been interval development of a 5 mm solid nodule within the posterior segment of the right lower lobe (series 5 image 183); this nodule was not clearly demonstrated on CT chest from 7/6/2021 or PET/CT from 5/11/2021. Compared to imaging from 5/11/2021, there has been near complete interval resolution of left upper lobe groundglass nodule. Compared to imaging from 6/27/2019, there has been no significant interval change in 4 mm solid nodule within the apical segment of the right upper lobe (series 5 image 88). Multiple stable solid subcentimeter and micronodules are noted. Central airways are patent. Continued evidence of linear parenchymal bands of scarring and/or atelectasis with associated traction bronchiectasis involving the inferior aspects of the midlung zones. There are peripheral reticular opacities involving the mid and lower lung zones more prominently with traction bronchiectasis and bronchiolectasis no definite honeycomb lung formation is identified.\par * Pleura: No pleural effusion.\par * Mediastinum and hilar regions: No thoracic lymphadenopathy.\par * Heart and pericardium: Cardiomegaly. No pericardial effusion. Normal aortic and mild mitral annular valve calcification.\par *Vessels: Normal.\par * Chest wall and lower neck: New focal infiltration of the subcutaneous soft tissues overlying the left lower chest; correlate for recent trauma.\par * Upper abdomen: Right renal cysts measuring 6.8 cm and 6.2 cm. Colonic diverticulosis. Nonobstructing punctate parapelvic left renal calculi.\par * Bones: Moderate degenerative changes of the spine. Chronic appearing superior endplate compression deformity of the T11 vertebral body.\par IIMPRESSION:\par 1.) Interval development of a 5 mm solid nodule within the right lower lobe and interval resolution of left upper lobe groundglass nodule. No significant interval change in additional pulmonary nodules. Given the waxing and waning characteristics of these nodules, infectious/inflammatory etiologies are favored over neoplasia. Short interval surveillance in this patient with a provided history of extra pulmonary neoplasm as per protocol is suggested.\par 2.) Stable linear parenchymal scarring and traction bronchiectasis involving both midlung zones. The sequela of a chronic infectious and/or inflammatory process and in view the distribution pulmonary nontuberculous mycobacterial infection cannot be excluded.\par 3.) Peripheral interstitial lung pattern with mild pulmonary fibrosis and no definite honeycomb lung formation involving the mid and lower lung zones more prominently. There is temporal heterogeneity. No significant air-trapping. Abbreviated differential includes a probable UIP pattern.\par \par \par CT-guided biopsies of 1.2 cm TYLER nodule (7/06/2021) at [facility] \par 1.) Core biopsy\par - Fibrotic lung parenchyma with reactive appearing type II pneumocytes, increased macrophages and fresh blood. - No neoplasm visualized \par 2.) FNA\par  - ATYPICAL FINDINGS\par  - Pneumocytes with mild reactive-repairative appearing atypia. Cell block scant, predominantly lysed blood.\par \par \par PETCT SKUL-THI ONC FDG SUBS (05/11/2021) at [facility]:\par - Comparison: PET/CT from 5/11/2020\par - Reference mean SUV, Liver: 2.6 SUV\par * Head and neck: Status post rhinectomy, septectomy and partial maxillectomy with osteocutaneous reconstruction. Status post recent removal of bone graft. There is diffuse linear FDG uptake along the reconstructed palate and maxillectomy bed which may represent posttreatment/postsurgical changes. There is linear FDG uptake along the lateral aspects of the nasal cavity bilaterally which also may be secondary to posttreatment/postsurgical changes. Surgical clips are noted within the left suprahilar neck secondary to neck dissection. There is no evidence of cervical lymphadenopathy. Physiologic FDG avidity in the pharyngeal lymphoid tissue is seen.\par * Lungs and pleura: 1.2 cm round glass opacity within the left upper lobe exhibits mild FDG avidity (SUV 2.4, previously 1.1). This groundglass nodule was in retrospect present on CT chest 10/5/2017 although has a more solid nodular appearance. Again demonstrated is mild FDG uptake within several additional groundglass opacities predominantly within the left lung which are likely inflammatory.\par * Mediastinum and cathy: Symmetric mild FDG uptake within nonenlarged bilateral hilar lymph nodes which by pattern and distribution most likely secondary to inflammatory changes.\par * Heart and pericardium: Physiologic FDG avidity in the myocardium is seen. Within normal limits.\par * Chest wall: No focus of abnormal FDG avidity. Within normal limits.\par * Hepatobiliary, Pancreas, Spleen, Adrenals, Peritoneum/retroperitoneum, Gastrointestinal : No focus of abnormal FDG avidity. Within normal limits. Physiologic FDG uptake in the bowel is seen.\par * Genitourinary: Right renal cysts are noted.. Physiologic FDG avidity along the urinary tract is seen.\par * Lymph nodes: Stable appearance of small right level 1 cervical lymph node with mild FDG avidity (SUV 2.0 compared to SUV 2.6 previously).\par * Vessels: Mild atherosclerosis of aorta.\par * Bones and soft tissue: No focus of abnormal FDG avidity.\par IMPRESSION:\par 1.) Since prior PET/CT 8/5/2020, postsurgical changes within the nasal soft tissues and hard palate with predominantly linear FDG uptake without CT correlate which may represent postsurgical/posttreatment changes. No definite evidence of residual or recurrent malignancy. No evidence of cervical lymphadenopathy.\par 2.) 1.2 cm left upper lobe groundglass opacity with increased mild FDG uptake and has a more solid appearance and therefore malignancy cannot be excluded. Consider tissue sampling for further evaluation.\par \par \par CT MAXILLOFACIAL w/ contrast (03/01/2021) at [facility]:\par - COMPARISON: Postcontrast CT scan of the facial bones dated August 5, 2020.\par - The patient is status post nasomaxillary resection for squamous cell carcinoma with reconstruction. There has been partial resection of the hard palate (midportion) with placement of a bone graft and metallic plate and screws. There has been resection of the left nasal bone with deformity of the overlying adjacent skin, resection of the nasal septum, left inferior turbinate, partial resection of the left middle turbinate, and partial resection of the right inferior turbinate.\par - Surgical clips in the left submandibular space. There has been resection of the left submandibular gland. Soft tissue thickening in the lateral aspect left submandibular space consistent with post therapeutic changes.\par - There is ill-defined abnormal soft tissue anterior and inferior to the nasal cavity (predominantly to the left of midline) extending into the left nasal cavity, medial to the left orbit, and anterior to the left maxillary sinus which likely represents post therapeutic changes. 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 The left frontal sinus outflow tract, right infundibulum, and sphenoethmoidal recesses are patent.\par - Degenerative changes of the visualized cervical spine.\par IMPRESSION:\par In comparison with the prior postcontrast CT scan of the facial bones dated August 5, 2020:\par There has been interval surgical intervention.\par The previously described asymmetric enhancement in the left lateral margin of the anterior nasal septum and the nasal cavity with adjacent bony erosion is no longer demonstrated.\par Abnormal soft tissue anterior and inferior to the nasal cavity (predominantly to the left of midline) extending into the left nasal cavity, medial to the left orbit, and anterior to the left maxillary sinus which likely represents post therapeutic changes. Residual and/or recurrent neoplasm is not excluded. Continued follow-up is advised.\par (Images were reviewed.) \par \par \par XR C-SPINE COMPLETE 4-5 VIEWS (03/02/2021) at [facility]:\par - No acute osseous abnormality.C3-4 demonstrates no spondylolisthesis in neutral with trace retrolisthesis in flexion and reduction in extension. Trace anterolisthesis C5 relative to C4 without movement from flexion to extension. Severe C5-C6 degenerative disc disease. Atlantoaxial joint is stable. No prevertebral soft tissue swelling surgical clips left neck\par IMPRESSION:\par 1.) No acute osseous abnormality\par 2.) C3-4 trace retrolisthesis in flexion with reduction in extension and neutral positions. Findings suggest ligamentous laxity/spinal instability\par \par \par LABS\par (02/09/2021) - WBC 11.64 with 83% PMNs; ESR 90, CRT 1.46\par \par \par USG FNA Left thyroid nodule, 1 cm (02/12/2021), read at [facility]:\par - Benign colloid nodule ([place] 2)\par \par \par PATHOLOGY (10/19/2020)\par - Negative final margins on primary resection, with bony invasion by the squamous cell carcinoma.\par - Left neck lymph nodes 10, all negative for tumor.\par \par US THYROID (10/09/2020) at [facility] in [place], [place]\par - No prior comparison\par - ISTHMUS: 0.4 cm\par - RIGHT LOBE: 3.7 x 1.7 x 1.7 cm, with no nodules\par - LEFT LOBE: 3.6 x 1.7 x 1.7 cm, with 2 nodules\par  --- 1.1 x 0.7 x 1.0 cm predominantly hyperechoic with hypoechoic rim and focus of calcification in lower pole\par  --- 0.7 x 0.4 x 0.5 cm hyperechoic nodule with tiny echogenic foci in lower pole\par \par \par CT MAXILLOFACIAL with contrast (08/05/2020) at [facility]:\par - The current study is interpreted in conjunction with images from concurrent PET/CT dated 8/5/2020.\par - Please refer to report of concurrent MRI for more detailed description of lesion extent. Subtle asymmetric enhancement is noted along the left lateral surface of the anterior cartilaginous septum with more confluent enhancing soft tissue noted along the junction of the lateral margin of the maxillary crest and the floor of the left nasal cavity, a finding best appreciated on coronal images 20 through 23. Here, there is underlying osseous erosion with an approximately 7 mm ill-defined lytic focus lying immediately anterior to the nasopalatine duct; see sagittal bone images 39 through 41, axial images 34 through 36 and coronal images 23 through 25. There is also a small nodular focus of enhancing soft tissue along the superior margin of the anterior left nasal septum, best seen on soft tissue coronal image 12, with irregularity of the adjacent left nasal bone evident on the corresponding bone images. As this lies at a considerable distance from the nasal cavity floor lesion, correlation with direct visual inspection is recommended for confirmation. There does appear to be FDG uptake at this site on the PET/CT study.\par - No additional nasal or nasopharyngeal soft tissue mass. There is prominent right and mild left middle turbinate pneumatization. There is a very large left-sided bony septal spur that contacts the hypoplastic left inferior turbinate and the left lateral nasal wall. Circumferential mucosal thickening lines walls of the right maxillary antrum with the remaining paranasal sinuses predominantly ventilated bilaterally. Anterior and posterior drainage pathways are patent. Orbital contents are normal. There is mild ventricular enlargement out of proportion to the degree of sulcal prominence, central atrophy versus mild communicating hydrocephalus. Mastoid air cells are clear bilaterally.\par IMPRESSION:\par Although it is difficult to define lesional extent on this CT examination, subtle asymmetric enhancement is present along the left lateral margin of the anterior nasal septum, with more confluent soft tissue along the nasal cavity floor that is associated with underlying bone erosion. A smaller nodular focus is present superiorly with suspected erosion of the left nasal bone. Please refer to concurrent MRI and PET/CT for additional assessment of lesional extent.\par \par \par MR ORBIT FACE AND/OR NECK with/without contrast (08/05/2020) at [facility]:\par - The current study is interpreted in conjunction with images from concurrent CT maxillofacial and PET/CT studies.\par - As on the CT study, subtle asymmetric enhancement is present along the mucosal surface of the left anterior nasal septum, with more confluent soft tissue along the nasal cavity floor associated with osseous erosion and extension of tumor into the left ventral maxilla. Additional subcentimeter nodular enhancement along the anterior left nasal roof associated with apparent erosion of the left nasal bone is not as well seen on the MRI study. The best MRI imaging correlate is on reconstructed coronal images of the volumetric T1 series, annotated and saved as key images. No additional sites of suspicious nodular soft tissue or contrast enhancement are identified. There is no pathologic contrast enhancement within skull base foramina and the cavernous sinuses are symmetric in appearance bilaterally. There is again mild ventricular prominence, central atrophy versus mild communicating hydrocephalus. There is no intracranial mass or pathologic contrast enhancement.\par IMPRESSION:\par As on the concurrent CT examination, subtle asymmetric enhancement is noted along the left anterior nasal septum, with more confluent soft tissue density along the nasal cavity floor associated with underlying osseous erosion. A second suspected focus of osseous erosion along the left nasal bone is better seen on the CT examination.\par \par \par PET-CT SKUL-THIGH ONC FDG INIT (08/05/2020) at [facility]: \par - Comparison: CT of the head 8/5/2020, CT chest 6/27/2019\par - Reference mean SUV, Liver: 2.4\par - HEAD and NECK: Focal intense FDG avidity along the left aspect of the anterior nasal septum SUV max 9.9, corresponding to CT findings. The FDG avidity extends to the erosive osseous lesion in the floor of the nasal cavity, as well as the nasal bone (4:33, 45). Subcentimeter Right cervical level 1 lymph nodes are seen (3:64) SUV max up to 2.6.\par Focally FDG avid thyroid nodule in the left lobe with punctate calcification, SUV max 4.2 (4:88).\par - LUNGS and PLEURA: Mildly FDG avid patchy groundglass opacities/centrilobular groundglass nodules pronounced in the mid lung and lower lungs, likely inflammatory.\par - MEDIASTINUM and CATHY: Mildly FDG avid small mediastinal lymph nodes and right hilar node, SUV max up to 4.5 in the periaortic region, may be reactive. Physiologic FDG avidity in the myocardium is seen.\par - HEPATOBILIARY: No focus of abnormal FDG avidity. Within normal limits.\par - PANCREAS: No focus of abnormal FDG avidity. Within normal limits.\par - SPLEEN: No focus of abnormal FDG avidity. Within normal limits.\par - ADRENALS: No focus of abnormal FDG avidity. Within normal limits.\par - GENITOURINARY: Mildly and diffusely increased FDG uptake in bilateral testes with SUV max 4.9 without CT correlation. Mild diffuse heterogeneous FDG uptake in the enlarged prostate, nonspecific and likely inflammatory. Physiologic FDG avidity along the urinary tract is seen. Photopenic right renal cysts.\par - GASTROINTESTINAL: Focal FDG avidity in the medial aspect of the duodenal antrum with SUV max 9.1, without CT correlation (604:72). Prominent FDG uptake along the proximal gastric wall without CT correlation, with SUV max 6.3 may be inflammatory. FDG avidity in the proximal sigmoid colon with SUV max 8.3 (604:36), and in the anorectal region with SUV max 12.2 without CT correlation may be physiologic.\par - PERITONEUM/RETROPERITONEUM: No focus of abnormal FDG avidity. Within normal limits.\par - LYMPH NODES: No focus of abnormal FDG avidity. Within normal limits.\par - VESSELS: No focus of abnormal FDG avidity. Within normal limits.\par - BONES and SOFT TISSUE: Increased FDG uptake surrounding the bilateral humeral heads, right greater than the left, likely inflammatory/degenerative. Area of increased FDG avidity along the paraspinal muscle in the mid thoracic spine level, may be physiologic.\par IMPRESSION:\par 1.) FDG-avid lesion along the left aspect of the anterior nasal septum, with osseous involvement in the floor of the nasal cavity as well as nasal bones.\par 2.) Mildly FDG-avid right level 1 cervical lymph nodes, may be reactive. No evidence of distant metastasis.\par 3.) Mildly FDG avid ground glass opacities and centrilobular ground glass opacities in both lungs, likely inflammatory.\par 4.) FDG-avid left thyroid nodule with calcification. Correlation with neck ultrasound is recommended.\par 5.) FDG-avid focus in the duodenal antrum without CT correlation, nonspecific. Clinical/visual correlation is recommended.\par He doesn’t feel grounded when he walks. \par \par \par PATHOLOGY Left nasal septum biopsy (7/16/2020):\par - Invasive nonkeratinizing squamous cell carcinoma.\par - Ki-67 shows an increased proliferation rate of approximately 50%.\par

## 2023-06-21 NOTE — CONSULT LETTER
[Dear  ___] : Dear  [unfilled], [Courtesy Letter:] : I had the pleasure of seeing your patient, [unfilled], in my office today. [Please see my note below.] : Please see my note below. [Consult Closing:] : Thank you very much for allowing me to participate in the care of this patient.  If you have any questions, please do not hesitate to contact me. [Sincerely,] : Sincerely, [DrCalixto  ___] : Dr. AREVALO [DrCalixto ___] : Dr. AREVALO [___] : [unfilled] [FreeTextEntry2] : Lorie Arnold MD\par [address] [facility]\par [facility] [address] [FreeTextEntry1] : \par \par \par  [FreeTextEntry3] : \par Olivia Gonzalez MD \par Otolaryngology, Head and Neck Surgery \par \par

## 2023-06-21 NOTE — PROCEDURE
[FreeTextEntry6] : \par Indication: chronic rhinitis\par - Verbal consent was obtained from patient prior to exam.\par Findings: \par - Inferior turbinates almost completely resected bilateral. Inferior meatus clear bilateral.\par - Septum resected almost completely.  Crusting on left nasal sidewall was removed.  \par --Middle turbinates mostly intact.\par - No polyps or lesions seen in nasal cavity. No blood noted.\par - Mucus is dry\par - Superior turbinates normal bilateral.\par - Nasopharynx without mass or exudate.  Adenoids were very small.\par - Eustachian orifices were clear bilateral.\par \par The patient tolerated the procedure well. \par

## 2023-06-21 NOTE — PHYSICAL EXAM
[Nasal Endoscopy Performed] : nasal endoscopy was performed, see procedure section for findings [Midline] : trachea located in midline position [Removed] : palatine tonsils previously removed [Normal] : no neck adenopathy [FreeTextEntry1] : No hoarseness.  [de-identified] : S/p neck dissection.  No mass noted. [de-identified] : Thyroid gland normal size, no palpable nodules.  [de-identified] : Severely contracted left nare.  Right nare open, normal size. Tip supported. [de-identified] : Upper edentulous.  [de-identified] : Partially resected upper central, reconstructed.  [de-identified] : Reconstructed anterior palate intact, lined with skin; no lesion or tenderness.

## 2023-08-16 ENCOUNTER — NON-APPOINTMENT (OUTPATIENT)
Age: 73
End: 2023-08-16

## 2023-08-29 ENCOUNTER — APPOINTMENT (OUTPATIENT)
Dept: MRI IMAGING | Facility: HOSPITAL | Age: 73
End: 2023-08-29

## 2023-08-29 ENCOUNTER — OUTPATIENT (OUTPATIENT)
Dept: OUTPATIENT SERVICES | Facility: HOSPITAL | Age: 73
LOS: 1 days | End: 2023-08-29
Payer: COMMERCIAL

## 2023-08-29 DIAGNOSIS — Z98.890 OTHER SPECIFIED POSTPROCEDURAL STATES: Chronic | ICD-10-CM

## 2023-08-29 DIAGNOSIS — Z41.9 ENCOUNTER FOR PROCEDURE FOR PURPOSES OTHER THAN REMEDYING HEALTH STATE, UNSPECIFIED: Chronic | ICD-10-CM

## 2023-08-29 PROCEDURE — 70543 MRI ORBT/FAC/NCK W/O &W/DYE: CPT

## 2023-08-29 PROCEDURE — A9585: CPT

## 2023-08-29 PROCEDURE — 70543 MRI ORBT/FAC/NCK W/O &W/DYE: CPT | Mod: 26

## 2023-09-07 ENCOUNTER — APPOINTMENT (OUTPATIENT)
Dept: OTOLARYNGOLOGY | Facility: CLINIC | Age: 73
End: 2023-09-07
Payer: COMMERCIAL

## 2023-09-07 VITALS
BODY MASS INDEX: 26.68 KG/M2 | HEART RATE: 82 BPM | SYSTOLIC BLOOD PRESSURE: 135 MMHG | WEIGHT: 145 LBS | DIASTOLIC BLOOD PRESSURE: 90 MMHG | TEMPERATURE: 208.76 F | HEIGHT: 62 IN

## 2023-09-07 DIAGNOSIS — M95.0 ACQUIRED DEFORMITY OF NOSE: ICD-10-CM

## 2023-09-07 DIAGNOSIS — C30.0 MALIGNANT NEOPLASM OF NASAL CAVITY: ICD-10-CM

## 2023-09-07 DIAGNOSIS — R44.8 OTHER SYMPTOMS AND SIGNS INVOLVING GENERAL SENSATIONS AND PERCEPTIONS: ICD-10-CM

## 2023-09-07 PROCEDURE — 31231 NASAL ENDOSCOPY DX: CPT

## 2023-09-07 PROCEDURE — 99214 OFFICE O/P EST MOD 30 MIN: CPT | Mod: 25

## 2023-09-11 PROBLEM — R44.8 FACIAL PRESSURE: Status: ACTIVE | Noted: 2023-09-11

## 2023-09-11 PROBLEM — M95.0 ACQUIRED NASAL DEFORMITY: Status: ACTIVE | Noted: 2021-01-19

## 2023-09-11 PROBLEM — C30.0: Status: ACTIVE | Noted: 2021-07-28

## 2023-09-14 ENCOUNTER — APPOINTMENT (OUTPATIENT)
Dept: OTOLARYNGOLOGY | Facility: CLINIC | Age: 73
End: 2023-09-14

## 2023-11-13 ENCOUNTER — APPOINTMENT (OUTPATIENT)
Dept: PULMONOLOGY | Facility: CLINIC | Age: 73
End: 2023-11-13

## 2023-11-14 ENCOUNTER — APPOINTMENT (OUTPATIENT)
Dept: PULMONOLOGY | Facility: CLINIC | Age: 73
End: 2023-11-14
Payer: COMMERCIAL

## 2023-11-14 VITALS
SYSTOLIC BLOOD PRESSURE: 138 MMHG | HEART RATE: 76 BPM | TEMPERATURE: 97.7 F | RESPIRATION RATE: 15 BRPM | DIASTOLIC BLOOD PRESSURE: 88 MMHG | OXYGEN SATURATION: 96 %

## 2023-11-14 DIAGNOSIS — R05.9 COUGH, UNSPECIFIED: ICD-10-CM

## 2023-11-14 PROCEDURE — 99214 OFFICE O/P EST MOD 30 MIN: CPT

## 2023-11-14 RX ORDER — BENZONATATE 200 MG/1
200 CAPSULE ORAL
Qty: 30 | Refills: 0 | Status: ACTIVE | COMMUNITY
Start: 2023-11-14 | End: 1900-01-01

## 2023-12-07 NOTE — PHYSICAL THERAPY INITIAL EVALUATION ADULT - PHYSICAL ASSIST/NONPHYSICAL ASSIST, REHAB EVAL
Show Text Field For Brand Names Of Contraception?: No Pounds Preamble Statement (Weight Entered In Details Tab): Reported Weight in pounds: Hypertriglyceridemia Monitoring: I explained this is common when taking isotretinoin. We will monitor closely. Nosebleeds Normal Treatment: I explained this is common when taking isotretinoin. I recommended saline mist in each nostril multiple times a day. If this worsens they will contact us. Calculate Months Of Therapy Based On Documented Dosages (Will Hide Months Of Therapy Question)?: Yes Xerosis Aggressive Treatment: I recommended application of Cetaphil or CeraVe numerous times a day and before going to bed to all dry areas. I also prescribed a topical steroid for twice daily use. Cheilitis Aggressive Treatment: I recommended application of Vaseline or Aquaphor numerous times a day (as often as every hour) and before going to bed. I also prescribed a topical steroid for twice daily use. Dosing Month 4 (Required For Cumulative Dosing): 40mg Daily Retinoid Dermatitis Aggressive Treatment: I recommended more frequent application of Cetaphil or CeraVe to the areas of dermatitis. I also prescribed a topical steroid for twice daily use until the dermatitis resolves. Patient Weight (Optional But Required For Cumulative Dose-Numbers And Decimals Only): 190 Months Of Therapy Completed: 1 Retinoid Dermatitis Normal Treatment: I recommended more frequent application of Cetaphil or CeraVe to the areas of dermatitis. Ipledge Number (Optional): [id number] Dosing Month 6 (Required For Cumulative Dosing): 60mg Daily Xerosis Normal Treatment: I recommended application of Cetaphil or CeraVe numerous times a day and before going to bed to all dry areas. Headache Monitoring: I recommended monitoring the headaches for now. There is no evidence of increased intracranial pressure. They were instructed to call if the headaches are worsening. Myalgia Monitoring: I explained this is common when taking isotretinoin. If this worsens they will contact us. Weight Units: pounds Female Pregnancy Counseling Text: Female patients should also be on two forms of birth control while taking this medication and for one month after their last dose. Are Labs Available For Review?: No- Labs Deferred This Month Detail Level: Zone Xerosis Aggressive Treatment: I recommended application of Cetaphil or CeraVe numerous times a day going to bed to all dry areas. I also prescribed a topical steroid for twice daily use. Dosing Month 2 (Required For Cumulative Dosing): 30mg Daily Use Therapeutic Ranged Or Therapeutic Target: Target Dosing Month 1 (Required For Cumulative Dosing): 20mg Daily verbal cues/1 person assist/nonverbal cues (demo/gestures) Myalgia Treatment: I explained this is common when taking isotretinoin. If this worsens they will contact us. They may try OTC ibuprofen. Cheilitis Normal Treatment: I recommended application of Vaseline or Aquaphor numerous times a day (as often as every hour) and before going to bed. Counseling Text: I reviewed the side effect in detail. Patient should get monthly blood tests, not donate blood, not drive at night if vision affected, and not share medication. What Is The Patient's Gender: Female Kilograms Preamble Statement (Weight Entered In Details Tab): Reported Weight in kilograms: Xerosis Normal Treatment: I recommended application of Cetaphil or CeraVe numerous times a day going to bed to all dry areas. Target Cumulative Dosage (In Mg/Kg): 210 Next Month's Dosage: Continue Current Dosage Lower Range (In Mg/Kg): 120 Male Completion Statement: After discussing his treatment course we decided to discontinue isotretinoin therapy at this time. He shouldn't donate blood for one month after the last dose. He should call with any new symptoms of depression. Upper Range (In Mg/Kg): 150 Female Completion Statement: After discussing her treatment course we decided to discontinue isotretinoin therapy at this time. I explained that she would need to continue her birth control methods for at least one month after the last dosage. She should also get a pregnancy test one month after the last dose. She shouldn't donate blood for one month after the last dose. She should call with any new symptoms of depression.

## 2024-02-06 ENCOUNTER — APPOINTMENT (OUTPATIENT)
Dept: PULMONOLOGY | Facility: CLINIC | Age: 74
End: 2024-02-06
Payer: COMMERCIAL

## 2024-02-06 VITALS
OXYGEN SATURATION: 98 % | WEIGHT: 146 LBS | RESPIRATION RATE: 13 BRPM | TEMPERATURE: 97.3 F | BODY MASS INDEX: 26.87 KG/M2 | SYSTOLIC BLOOD PRESSURE: 150 MMHG | HEIGHT: 62 IN | DIASTOLIC BLOOD PRESSURE: 90 MMHG | HEART RATE: 85 BPM

## 2024-02-06 DIAGNOSIS — R91.1 SOLITARY PULMONARY NODULE: ICD-10-CM

## 2024-02-06 DIAGNOSIS — J44.89 OTHER SPECIFIED CHRONIC OBSTRUCTIVE PULMONARY DISEASE: ICD-10-CM

## 2024-02-06 DIAGNOSIS — Z01.811 ENCOUNTER FOR PREPROCEDURAL RESPIRATORY EXAMINATION: ICD-10-CM

## 2024-02-06 PROCEDURE — 99214 OFFICE O/P EST MOD 30 MIN: CPT

## 2024-02-06 NOTE — ASSESSMENT
[FreeTextEntry1] : COPD  The patient is clinically stable.  The patient did not require neither urgent care nor systemic steroids since the last time I saw him.  The baseline oxygen saturation 98% on room air.  The patient rarely uses the short acting beta agonist.  No limitation of his exercise capacity.  The last PFT was in May see attached report and was consistent with combined obstructive restrictive lung disease and decrease in the diffusion capacity.  Pulmonary nodule  Patient had a PET scan in May with no increase activity in the lung area and there were is no change in the 4 mm nodule in the right lower lobe and there are basilar atelectasis and fibrotic changes at the bases.  Pulmonary preoperative evaluation  The pulmonary status is stable and he is optimized from pulmonary point of view for the surgery.  Will follow-up on the chest x-ray.

## 2024-02-06 NOTE — HISTORY OF PRESENT ILLNESS
[Former] : former [Never] : never [TextBox_4] : Is doing very well.  He uses the albuterol about max 3 times a week.  He has not been in emergency room.  Cough dramatically improved after he recovered from the COVID.  He has no limitation exercise capacity can walk 3 miles on daily basis with no problem.  No recent fever chills night sweats. [ESS] : 0

## 2024-02-13 ENCOUNTER — APPOINTMENT (OUTPATIENT)
Dept: OTOLARYNGOLOGY | Facility: CLINIC | Age: 74
End: 2024-02-13

## 2024-02-27 ENCOUNTER — TRANSCRIPTION ENCOUNTER (OUTPATIENT)
Age: 74
End: 2024-02-27

## 2024-02-27 VITALS
TEMPERATURE: 97 F | SYSTOLIC BLOOD PRESSURE: 135 MMHG | OXYGEN SATURATION: 97 % | HEART RATE: 76 BPM | RESPIRATION RATE: 16 BRPM | WEIGHT: 146.39 LBS | HEIGHT: 62 IN | DIASTOLIC BLOOD PRESSURE: 81 MMHG

## 2024-02-27 RX ORDER — LEFLUNOMIDE 10 MG/1
1 TABLET ORAL
Refills: 0 | DISCHARGE

## 2024-02-27 NOTE — ASU PATIENT PROFILE, ADULT - FALL HARM RISK - UNIVERSAL INTERVENTIONS
Bed in lowest position, wheels locked, appropriate side rails in place/Call bell, personal items and telephone in reach/Instruct patient to call for assistance before getting out of bed or chair/Non-slip footwear when patient is out of bed/[place] to call system/Physically safe environment - no spills, clutter or unnecessary equipment/Purposeful Proactive Rounding/Room/bathroom lighting operational, light cord in reach

## 2024-02-28 ENCOUNTER — INPATIENT (INPATIENT)
Facility: HOSPITAL | Age: 74
LOS: 0 days | Discharge: HOME | DRG: 144 | End: 2024-02-29
Attending: DENTIST | Admitting: DENTIST
Payer: COMMERCIAL

## 2024-02-28 DIAGNOSIS — Z41.9 ENCOUNTER FOR PROCEDURE FOR PURPOSES OTHER THAN REMEDYING HEALTH STATE, UNSPECIFIED: Chronic | ICD-10-CM

## 2024-02-28 DIAGNOSIS — Z98.890 OTHER SPECIFIED POSTPROCEDURAL STATES: Chronic | ICD-10-CM

## 2024-02-28 DEVICE — IMPLANTABLE DEVICE: Type: IMPLANTABLE DEVICE | Status: FUNCTIONAL

## 2024-02-28 DEVICE — IMP SCREW RET ABUT ST GH 4.6X2.5MM TAN: Type: IMPLANTABLE DEVICE | Status: FUNCTIONAL

## 2024-02-28 DEVICE — IMP ROXOLID BLX RB SLACTIVE 4X12MM: Type: IMPLANTABLE DEVICE | Status: FUNCTIONAL

## 2024-02-28 DEVICE — MEMBRANE BIOMEND EXND 30X40: Type: IMPLANTABLE DEVICE | Status: FUNCTIONAL

## 2024-02-28 RX ORDER — OXYCODONE HYDROCHLORIDE 5 MG/1
5 TABLET ORAL EVERY 4 HOURS
Refills: 0 | Status: DISCONTINUED | OUTPATIENT
Start: 2024-02-28 | End: 2024-02-29

## 2024-02-28 RX ORDER — DUTASTERIDE 0.5 MG/1
1 CAPSULE, LIQUID FILLED ORAL
Qty: 0 | Refills: 0 | DISCHARGE

## 2024-02-28 RX ORDER — HYDROMORPHONE HYDROCHLORIDE 2 MG/ML
0.25 INJECTION INTRAMUSCULAR; INTRAVENOUS; SUBCUTANEOUS
Refills: 0 | Status: DISCONTINUED | OUTPATIENT
Start: 2024-02-28 | End: 2024-02-28

## 2024-02-28 RX ORDER — PREDNISOLONE 5 MG
1 TABLET ORAL
Qty: 0 | Refills: 0 | DISCHARGE

## 2024-02-28 RX ORDER — FLUTICASONE PROPIONATE AND SALMETEROL 50; 250 UG/1; UG/1
0 POWDER ORAL; RESPIRATORY (INHALATION)
Qty: 0 | Refills: 0 | DISCHARGE

## 2024-02-28 RX ORDER — AMPICILLIN SODIUM AND SULBACTAM SODIUM 250; 125 MG/ML; MG/ML
3 INJECTION, POWDER, FOR SUSPENSION INTRAMUSCULAR; INTRAVENOUS EVERY 6 HOURS
Refills: 0 | Status: DISCONTINUED | OUTPATIENT
Start: 2024-02-28 | End: 2024-02-29

## 2024-02-28 RX ORDER — FINASTERIDE 5 MG/1
5 TABLET, FILM COATED ORAL DAILY
Refills: 0 | Status: DISCONTINUED | OUTPATIENT
Start: 2024-02-28 | End: 2024-02-29

## 2024-02-28 RX ORDER — FLUTICASONE PROPIONATE AND SALMETEROL 50; 250 UG/1; UG/1
1 POWDER ORAL; RESPIRATORY (INHALATION)
Qty: 0 | Refills: 0 | DISCHARGE

## 2024-02-28 RX ORDER — TAMSULOSIN HYDROCHLORIDE 0.4 MG/1
1 CAPSULE ORAL
Qty: 0 | Refills: 0 | DISCHARGE

## 2024-02-28 RX ORDER — SODIUM CHLORIDE 9 MG/ML
1000 INJECTION, SOLUTION INTRAVENOUS
Refills: 0 | Status: DISCONTINUED | OUTPATIENT
Start: 2024-02-28 | End: 2024-02-28

## 2024-02-28 RX ORDER — OXYCODONE HYDROCHLORIDE 5 MG/1
2.5 TABLET ORAL EVERY 4 HOURS
Refills: 0 | Status: DISCONTINUED | OUTPATIENT
Start: 2024-02-28 | End: 2024-02-29

## 2024-02-28 RX ORDER — LEFLUNOMIDE 10 MG/1
1 TABLET ORAL
Qty: 0 | Refills: 0 | DISCHARGE

## 2024-02-28 RX ORDER — ALBUTEROL 90 UG/1
2 AEROSOL, METERED ORAL
Qty: 0 | Refills: 0 | DISCHARGE

## 2024-02-28 RX ORDER — ACETAMINOPHEN 500 MG
1000 TABLET ORAL EVERY 6 HOURS
Refills: 0 | Status: DISCONTINUED | OUTPATIENT
Start: 2024-02-28 | End: 2024-02-28

## 2024-02-28 RX ORDER — CIPROFLOXACIN LACTATE 400MG/40ML
1 VIAL (ML) INTRAVENOUS
Qty: 0 | Refills: 0 | DISCHARGE

## 2024-02-28 RX ORDER — ACETAMINOPHEN 500 MG
975 TABLET ORAL EVERY 6 HOURS
Refills: 0 | Status: DISCONTINUED | OUTPATIENT
Start: 2024-02-28 | End: 2024-02-29

## 2024-02-28 RX ORDER — TAMSULOSIN HYDROCHLORIDE 0.4 MG/1
0.4 CAPSULE ORAL AT BEDTIME
Refills: 0 | Status: DISCONTINUED | OUTPATIENT
Start: 2024-02-28 | End: 2024-02-29

## 2024-02-28 RX ORDER — ENOXAPARIN SODIUM 100 MG/ML
40 INJECTION SUBCUTANEOUS EVERY 24 HOURS
Refills: 0 | Status: DISCONTINUED | OUTPATIENT
Start: 2024-02-29 | End: 2024-02-29

## 2024-02-28 RX ORDER — ONDANSETRON 8 MG/1
4 TABLET, FILM COATED ORAL EVERY 8 HOURS
Refills: 0 | Status: DISCONTINUED | OUTPATIENT
Start: 2024-02-28 | End: 2024-02-29

## 2024-02-28 RX ORDER — ALBUTEROL 90 UG/1
2 AEROSOL, METERED ORAL EVERY 6 HOURS
Refills: 0 | Status: DISCONTINUED | OUTPATIENT
Start: 2024-02-28 | End: 2024-02-29

## 2024-02-28 RX ORDER — LEFLUNOMIDE 10 MG/1
0 TABLET ORAL
Qty: 0 | Refills: 0 | DISCHARGE

## 2024-02-28 RX ORDER — HYDROMORPHONE HYDROCHLORIDE 2 MG/ML
0.5 INJECTION INTRAMUSCULAR; INTRAVENOUS; SUBCUTANEOUS
Refills: 0 | Status: DISCONTINUED | OUTPATIENT
Start: 2024-02-28 | End: 2024-02-28

## 2024-02-28 RX ADMIN — AMPICILLIN SODIUM AND SULBACTAM SODIUM 200 GRAM(S): 250; 125 INJECTION, POWDER, FOR SUSPENSION INTRAMUSCULAR; INTRAVENOUS at 23:55

## 2024-02-28 RX ADMIN — SODIUM CHLORIDE 75 MILLILITER(S): 9 INJECTION, SOLUTION INTRAVENOUS at 12:57

## 2024-02-28 RX ADMIN — AMPICILLIN SODIUM AND SULBACTAM SODIUM 200 GRAM(S): 250; 125 INJECTION, POWDER, FOR SUSPENSION INTRAMUSCULAR; INTRAVENOUS at 17:06

## 2024-02-28 RX ADMIN — HYDROMORPHONE HYDROCHLORIDE 0.25 MILLIGRAM(S): 2 INJECTION INTRAMUSCULAR; INTRAVENOUS; SUBCUTANEOUS at 14:15

## 2024-02-28 RX ADMIN — ENOXAPARIN SODIUM 40 MILLIGRAM(S): 100 INJECTION SUBCUTANEOUS at 23:55

## 2024-02-28 RX ADMIN — HYDROMORPHONE HYDROCHLORIDE 0.25 MILLIGRAM(S): 2 INJECTION INTRAMUSCULAR; INTRAVENOUS; SUBCUTANEOUS at 15:00

## 2024-02-28 NOTE — H&P ADULT - HISTORY OF PRESENT ILLNESS
73M presents for extraction of mandibular teeth and placement of maxillary zygomatic implants and mandibular axial implants with Dr Ross.

## 2024-02-28 NOTE — H&P ADULT - NSHPPHYSICALEXAM_GEN_ALL_CORE
VITALS:   T(C): 36.2 (02-28-24 @ 08:48), Max: 36.2 (02-28-24 @ 08:48)  HR: 76 (02-28-24 @ 08:48) (76 - 76)  BP: 135/81 (02-28-24 @ 08:48) (135/81 - 135/81)  RR: 16 (02-28-24 @ 08:48) (16 - 16)  SpO2: 97% (02-28-24 @ 08:48) (97% - 97%)    GENERAL: NAD, lying in bed comfortably  HEAD:  Atraumatic, Normocephalic  EYES: EOMI, PERRLA, conjunctiva and sclera clear  ENT: Moist mucous membranes  NECK: Supple, No JVD  CHEST/LUNG: Clear to auscultation bilaterally; No rales, rhonchi, wheezing, or rubs. Unlabored respirations  HEART: Regular rate and rhythm; No murmurs, rubs, or gallops  ABDOMEN: BSx4; Soft, nontender, nondistended  EXTREMITIES:  2+ Peripheral Pulses, brisk capillary refill. No clubbing, cyanosis, or edema  NERVOUS SYSTEM:  A&Ox3, no focal deficits   SKIN: No rashes or lesions

## 2024-02-29 ENCOUNTER — TRANSCRIPTION ENCOUNTER (OUTPATIENT)
Age: 74
End: 2024-02-29

## 2024-02-29 VITALS
SYSTOLIC BLOOD PRESSURE: 134 MMHG | DIASTOLIC BLOOD PRESSURE: 74 MMHG | OXYGEN SATURATION: 92 % | HEART RATE: 94 BPM | RESPIRATION RATE: 18 BRPM

## 2024-02-29 PROCEDURE — C1889: CPT

## 2024-02-29 RX ORDER — ACETAMINOPHEN 500 MG
3 TABLET ORAL
Qty: 0 | Refills: 0 | DISCHARGE
Start: 2024-02-29

## 2024-02-29 RX ORDER — OXYCODONE HYDROCHLORIDE 5 MG/1
1 TABLET ORAL
Qty: 8 | Refills: 0
Start: 2024-02-29

## 2024-02-29 RX ADMIN — Medication 975 MILLIGRAM(S): at 11:07

## 2024-02-29 RX ADMIN — AMPICILLIN SODIUM AND SULBACTAM SODIUM 200 GRAM(S): 250; 125 INJECTION, POWDER, FOR SUSPENSION INTRAMUSCULAR; INTRAVENOUS at 11:07

## 2024-02-29 RX ADMIN — FINASTERIDE 5 MILLIGRAM(S): 5 TABLET, FILM COATED ORAL at 11:07

## 2024-02-29 RX ADMIN — AMPICILLIN SODIUM AND SULBACTAM SODIUM 200 GRAM(S): 250; 125 INJECTION, POWDER, FOR SUSPENSION INTRAMUSCULAR; INTRAVENOUS at 06:08

## 2024-02-29 NOTE — DISCHARGE NOTE PROVIDER - NSDCFUADDINST_GEN_ALL_CORE_FT
PAIN: You may continue to take Acetaminophen (Tylenol) and Ibuprofen over the counter for pain. You can alternate the two medications, giving one every 3 hours. We recommend taking the medications around the clock for the first few days at home after surgery. Then you can start taking them only as needed for pain.  WOUND CARE:  Use warm salt water rinses to keep areas clean, there are dissolvable stitches that will fall out on their own.  BATHING: Please do not soak or submerge the wound in water (bath, swimming) for 10 days after your surgery.  ACTIVITY: No heavy lifting, straining, or vigorous activity until your follow-up appointment in 2 weeks.   NOTIFY US IF: There is any bleeding that does not stop, any pus draining from wound(s), any fever (over 100.5 F) or chills, persistent nausea/vomiting, persistent diarrhea, or if pain is not controlled on their discharge pain medications.  FOLLOW-UP: Please call the office and make an appointment to follow up with Dr Ross in 1 week.

## 2024-02-29 NOTE — DISCHARGE NOTE PROVIDER - NSDCMRMEDTOKEN_GEN_ALL_CORE_FT
acetaminophen 325 mg oral tablet: 3 tab(s) orally every 6 hours  Advair Diskus:   amoxicillin-clavulanate 875 mg-125 mg oral tablet: 875 milligram(s) orally 2 times a day  dutasteride 0.5 mg oral capsule: 1 cap(s) orally once a day  Flomax 0.4 mg oral capsule: 1 cap(s) orally once a day  leflunomide 20 mg oral tablet: 1 tab(s) orally once a day  oxyCODONE 5 mg oral tablet: 1 tab(s) orally every 6 hours as needed for  severe pain MDD: 4 tabs  ProAir HFA 90 mcg/inh inhalation aerosol: 2 puff(s) inhaled every 6 hours

## 2024-02-29 NOTE — DISCHARGE NOTE PROVIDER - CARE PROVIDER_API CALL
Regino Ross  Oral/Maxillofacial Surgery  [address] [place] [address], [address]  [place] [address]  Phone: [phone]  Fax: [phone]  Established Patient  Follow Up Time: 1 week

## 2024-02-29 NOTE — DISCHARGE NOTE PROVIDER - HOSPITAL COURSE
DEVORA KATZ is a 73y Male who was admitted to [facility] for extraction of remaining mandibular dentition, placement of 4x maxillary zygoma implants, 4x mandibular axial implants, local advancement flap to R maxilla.  VI post-op.     At time of discharge, pt was tolerating a soft non-chew diet, voiding/stooling independently, ambulating, and pain was well-controlled. Patient and family felt ready for discharge.

## 2024-02-29 NOTE — DISCHARGE NOTE NURSING/CASE MANAGEMENT/SOCIAL WORK - PATIENT PORTAL LINK FT
You can access the FollowMyHealth Patient Portal offered by [facility] by registering at the following website: [email or web address]. By joining [employer]’s FollowMyHealth portal, you will also be able to view your health information using other applications (apps) compatible with our system.

## 2024-02-29 NOTE — DISCHARGE NOTE NURSING/CASE MANAGEMENT/SOCIAL WORK - NSDCPEFALRISK_GEN_ALL_CORE
For information on Fall & Injury Prevention, visit: [email or web address] OR  [email or web address] OR  https://www.cdc.gov/steadi/patient.html

## 2024-03-06 DIAGNOSIS — N40.0 BENIGN PROSTATIC HYPERPLASIA WITHOUT LOWER URINARY TRACT SYMPTOMS: ICD-10-CM

## 2024-03-06 DIAGNOSIS — I42.1 OBSTRUCTIVE HYPERTROPHIC CARDIOMYOPATHY: ICD-10-CM

## 2024-03-06 DIAGNOSIS — K02.9 DENTAL CARIES, UNSPECIFIED: ICD-10-CM

## 2024-03-06 DIAGNOSIS — Z85.22 PERSONAL HISTORY OF MALIGNANT NEOPLASM OF NASAL CAVITIES, MIDDLE EAR, AND ACCESSORY SINUSES: ICD-10-CM

## 2024-03-06 DIAGNOSIS — M06.9 RHEUMATOID ARTHRITIS, UNSPECIFIED: ICD-10-CM

## 2024-03-06 DIAGNOSIS — K08.20 UNSPECIFIED ATROPHY OF EDENTULOUS ALVEOLAR RIDGE: ICD-10-CM

## 2024-03-06 DIAGNOSIS — J44.9 CHRONIC OBSTRUCTIVE PULMONARY DISEASE, UNSPECIFIED: ICD-10-CM

## 2024-05-16 ENCOUNTER — NON-APPOINTMENT (OUTPATIENT)
Age: 74
End: 2024-05-16

## 2024-07-01 ENCOUNTER — APPOINTMENT (OUTPATIENT)
Dept: CT IMAGING | Facility: HOSPITAL | Age: 74
End: 2024-07-01

## 2024-07-01 NOTE — PATIENT PROFILE ADULT - NSPRESCRALCSIXMORE_GEN_A_NUR
[facility]ORDIN[facility]  [address]  [facility] [address]    July 2, 2024    Yun Coyle  [address]  [facility] [address]      Dear Yun,    I am a clinic community health worker who works with Gamaliel Granados DO with the [facility]. I wanted to introduce myself and provide you with my contact information for you to be able to call me with any questions or concerns. Below is a description of clinic care coordination and how I can further assist you.       The clinic care coordination team is made up of a registered nurse, , financial resource worker and community health worker who understand the health care system. The goal of clinic care coordination is to help you manage your health and improve access to the health care system. Our team works alongside your provider to assist you in determining your health and social needs. We can help you obtain health care and community resources, providing you with necessary information and education. We can work with you through any barriers and develop a care plan that helps coordinate and strengthen the communication between you and your care team.  Our services are voluntary and are offered without charge to you personally.    Please feel free to contact me with any questions or concerns regarding care coordination and what we can offer.      We are focused on providing you with the highest-quality healthcare experience possible.      Sincerely,     Catherine Davis  Community Health Worker  [facility]  [phone]          Never

## 2024-07-10 ENCOUNTER — RESULT REVIEW (OUTPATIENT)
Age: 74
End: 2024-07-10

## 2024-07-16 ENCOUNTER — APPOINTMENT (OUTPATIENT)
Dept: PULMONOLOGY | Facility: CLINIC | Age: 74
End: 2024-07-16
Payer: COMMERCIAL

## 2024-07-16 VITALS
RESPIRATION RATE: 16 BRPM | OXYGEN SATURATION: 97 % | WEIGHT: 145 LBS | HEART RATE: 66 BPM | SYSTOLIC BLOOD PRESSURE: 122 MMHG | TEMPERATURE: 97.5 F | HEIGHT: 62 IN | BODY MASS INDEX: 26.68 KG/M2 | DIASTOLIC BLOOD PRESSURE: 79 MMHG

## 2024-07-16 DIAGNOSIS — R91.1 SOLITARY PULMONARY NODULE: ICD-10-CM

## 2024-07-16 DIAGNOSIS — J44.89 OTHER SPECIFIED CHRONIC OBSTRUCTIVE PULMONARY DISEASE: ICD-10-CM

## 2024-07-16 DIAGNOSIS — J31.0 CHRONIC RHINITIS: ICD-10-CM

## 2024-07-16 DIAGNOSIS — R05.9 COUGH, UNSPECIFIED: ICD-10-CM

## 2024-07-16 PROCEDURE — 99214 OFFICE O/P EST MOD 30 MIN: CPT

## 2024-07-16 PROCEDURE — G2211 COMPLEX E/M VISIT ADD ON: CPT

## 2024-07-18 ENCOUNTER — RESULT REVIEW (OUTPATIENT)
Age: 74
End: 2024-07-18

## 2024-07-23 ENCOUNTER — APPOINTMENT (OUTPATIENT)
Dept: CT IMAGING | Facility: HOSPITAL | Age: 74
End: 2024-07-23

## 2024-08-01 ENCOUNTER — APPOINTMENT (OUTPATIENT)
Dept: OTOLARYNGOLOGY | Facility: HOME HEALTH | Age: 74
End: 2024-08-01
Payer: COMMERCIAL

## 2024-08-01 DIAGNOSIS — C30.0 MALIGNANT NEOPLASM OF NASAL CAVITY: ICD-10-CM

## 2024-08-01 PROCEDURE — 99442: CPT

## 2024-08-02 ENCOUNTER — NON-APPOINTMENT (OUTPATIENT)
Age: 74
End: 2024-08-02

## 2024-08-23 NOTE — PHYSICAL THERAPY INITIAL EVALUATION ADULT - GENERAL OBSERVATIONS, REHAB EVAL
Patient ID: Davian Atwood (Davian) is a 72 year old male.  Subjective:  Chief Complaint   Patient presents with    Follow-up     Neck pain- seeing pain management- has procedures scheduled    Diabetes-  On glipiZIDE  Checks sugars: qd  Readings:   Spiked higher after ELSY    Hypertension-  On amLODIPine  carvedilol  lisinopril  spironolactone  Home readings: 110's/60's          Review of Systems   Constitutional:  Negative for chills and fever.   Respiratory:  Negative for shortness of breath.    Cardiovascular:  Negative for chest pain.       Past Medical History:   Diagnosis Date    Aortic stenosis     mild per echo 2022    DM (diabetes mellitus)  (CMD)     HTN (hypertension)     Hyperlipidemia     Post-polio syndrome (CMD)     Status post cardiac catheterization 03/2024    nonobstructive CAD    Status post cholecystectomy 2023     Current Outpatient Medications   Medication Sig Dispense Refill    carvedilol (COREG) 12.5 MG tablet Take 1 tablet by mouth in the morning and 1 tablet in the evening. Take with meals. 180 tablet 3    spironolactone (ALDACTONE) 25 MG tablet Take 1 tablet by mouth daily. 90 tablet 3    blood glucose test strip Test blood sugar 1 time daily. Diagnosis: E11.9. Meter: Digna Contour 100 each 3    Lancets Thin Misc Test blood sugar 1 time daily. Diagnosis: E11.9. Meter: Digna Contour 100 each 3    lisinopril (ZESTRIL) 40 MG tablet Take 1 tablet by mouth daily. 90 tablet 3    glipiZIDE (GLUCOTROL XL) 2.5 MG 24 hr tablet Take 1 tablet by mouth daily. 90 tablet 3    finasteride (PROSCAR) 5 MG tablet Take 1 tablet by mouth daily. 90 tablet 3    tamsulosin (FLOMAX) 0.4 MG Cap Take 1 capsule by mouth daily after a meal. Nightly 90 capsule 3    amLODIPine (NORVASC) 10 MG tablet Take 1 tablet by mouth daily. 90 tablet 3    rosuvastatin (CRESTOR) 5 MG tablet Take 1 tablet by mouth daily. 90 tablet 3    Blood Glucose Monitoring Suppl (Contour Blood Glucose System) w/Device Kit Test blood sugar 1  time daily. Diagnosis: E11.9. Meter: get2play Contour 1 kit 0    timolol (TIMOPTIC) 0.5 % ophthalmic solution Place 1 drop into both eyes in the morning and 1 drop in the evening. 1 each 11    Vitamin E 180 mg (400 units) capsule Take by mouth daily.      aspirin 81 MG EC tablet Take 1 tablet by mouth daily. 30 tablet      No current facility-administered medications for this visit.     ALLERGIES:   Allergen Reactions    Diclofenac SHORTNESS OF BREATH     Tachycardia      Fluconazole SWELLING    Lovastatin MYALGIA    Metformin GI UPSET    Naproxen SWELLING     knee swell up    Niacin Other (See Comments)     flushing    Oxycodone-Acetaminophen NAUSEA and Other (See Comments)     Chills and disoriented.         Objective:  /70   Pulse (!) 52   Temp 97.1 °F (36.2 °C)   Resp 18   Wt 108.9 kg (240 lb)   BMI 33.47 kg/m²   BSA 2.28 m²     Physical Exam  Vitals reviewed.   Constitutional:       General: He is not in acute distress.  HENT:      Head: Normocephalic and atraumatic.      Mouth/Throat:      Pharynx: Oropharynx is clear. No oropharyngeal exudate or posterior oropharyngeal erythema.      Neck: Neck supple.   Eyes:      Extraocular Movements: Extraocular movements intact.      Conjunctiva/sclera: Conjunctivae normal.      Pupils: Pupils are equal, round, and reactive to light.   Neck:      Thyroid: No thyromegaly.   Cardiovascular:      Rate and Rhythm: Regular rhythm. Bradycardia present.      Heart sounds: Murmur heard.   Pulmonary:      Effort: Pulmonary effort is normal.      Breath sounds: Normal breath sounds. No wheezing.   Musculoskeletal:      Right lower leg: No edema.      Left lower leg: No edema.   Lymphadenopathy:      Cervical: No cervical adenopathy.         Component      Latest Ref Rng 5/18/2024 8/17/2024   Fasting Status      0 - 999 Hours  12    Sodium      135 - 145 mmol/L 141  141    Potassium      3.4 - 5.1 mmol/L 4.1  4.0    Chloride      97 - 110 mmol/L 104  103    CO2      21 -  32 mmol/L 27  29    ANION GAP      7 - 19 mmol/L 14  13    Glucose      70 - 99 mg/dL 140 (H)  96    BUN      6 - 20 mg/dL 24 (H)  27 (H)    Creatinine      0.67 - 1.17 mg/dL 0.96  1.02    Glomerular Filtration Rate      >=60  83  78    BUN/CREATININE RATIO      7 - 25  25  26 (H)    CALCIUM      8.4 - 10.2 mg/dL 9.0  8.7    TOTAL BILIRUBIN      0.2 - 1.0 mg/dL 1.5 (H)  1.5 (H)    AST/SGOT      <=37 Units/L 13  19    ALT/SGPT      <64 Units/L 38  50    ALK PHOSPHATASE      45 - 117 Units/L 71  57    Albumin      3.6 - 5.1 g/dL 3.8  3.7    TOTAL PROTEIN      6.4 - 8.2 g/dL 6.8  6.6    GLOBULIN      2.0 - 4.0 g/dL 3.0  2.9    A/G Ratio, Serum      1.0 - 2.4  1.3  1.3    CHOLESTEROL      <=199 mg/dL 128  126    TRIGLYCERIDE      <=149 mg/dL 83  83    HDL      >=40 mg/dL 39 (L)  44    CALCULATED LDL      <=129 mg/dL 72  65    CALCULATED NON HDL      mg/dL 89  82    CHOL/HDL      <=4.4  3.3  2.9    URINE MICRO      mg/dL 4.17     CREATININE, URINE (TOTAL)      mg/dL 89.63     MICROALBUMIN/CREAT      <30.0 mg/g 46.5 (H)     GLYCOHEMOGLOBIN A1C      4.5 - 5.6 % 4.6  5.3       Legend:  (H) High  (L) Low      Davian was seen today for follow-up.    Diagnoses and all orders for this visit:    Controlled type 2 diabetes mellitus with diabetic nephropathy, without long-term current use of insulin  (CMD)  Well controlled. Continue current medications.    -     Glycohemoglobin; Future  -     Comprehensive Metabolic Panel; Future  -     Lipid Panel With Reflex; Future  -     Microalbumin Urine Random; Future    Neck pain  Stable. follow-up with pain management    Essential hypertension  Well controlled. Continue current medications.      Screen for colon cancer  declines  Screening for prostate cancer  -     PSA; Future      Schedule follow up: in 6 months  Electronically signed by: Vamsi Burrell MD  8/23/2024     pt received semi-supine in bed +heplock, +A line, +tele, +nasal packing C/D/I, +B/l SCDs, +LUE ACE bandage C/D/I, +2 FRANTZ drains, c/o fatigue.

## 2024-09-13 ENCOUNTER — APPOINTMENT (OUTPATIENT)
Dept: MRI IMAGING | Facility: HOSPITAL | Age: 74
End: 2024-09-13

## 2024-09-13 ENCOUNTER — OUTPATIENT (OUTPATIENT)
Dept: OUTPATIENT SERVICES | Facility: HOSPITAL | Age: 74
LOS: 1 days | End: 2024-09-13
Payer: COMMERCIAL

## 2024-09-13 ENCOUNTER — APPOINTMENT (OUTPATIENT)
Dept: NUCLEAR MEDICINE | Facility: HOSPITAL | Age: 74
End: 2024-09-13

## 2024-09-13 DIAGNOSIS — Z98.890 OTHER SPECIFIED POSTPROCEDURAL STATES: Chronic | ICD-10-CM

## 2024-09-13 DIAGNOSIS — Z41.9 ENCOUNTER FOR PROCEDURE FOR PURPOSES OTHER THAN REMEDYING HEALTH STATE, UNSPECIFIED: Chronic | ICD-10-CM

## 2024-09-13 LAB — GLUCOSE BLDC GLUCOMTR-MCNC: 104 MG/DL — HIGH (ref 70–99)

## 2024-09-13 PROCEDURE — 82962 GLUCOSE BLOOD TEST: CPT

## 2024-09-13 PROCEDURE — 78815 PET IMAGE W/CT SKULL-THIGH: CPT | Mod: 26,PS

## 2024-09-13 PROCEDURE — 70543 MRI ORBT/FAC/NCK W/O &W/DYE: CPT

## 2024-09-13 PROCEDURE — 78815 PET IMAGE W/CT SKULL-THIGH: CPT

## 2024-09-13 PROCEDURE — 70543 MRI ORBT/FAC/NCK W/O &W/DYE: CPT | Mod: 26

## 2024-09-13 PROCEDURE — A9585: CPT

## 2024-09-13 PROCEDURE — A9552: CPT

## 2024-10-04 NOTE — ASU PREOP CHECKLIST - SITE MARKED BY SURGEON
[No Respiratory Distress] : no respiratory distress  [Coordination Grossly Intact] : coordination grossly intact [No Focal Deficits] : no focal deficits [Normal] : affect was normal and insight and judgment were intact n/a

## 2024-12-05 NOTE — REVIEW OF SYSTEMS
[Patient Intake Form Reviewed] : Patient intake form was reviewed [As Noted in HPI] : as noted in HPI [Cough] : cough [Joint Swelling] : joint swelling [Itching] : itching [Negative] : Heme/Lymph [FreeTextEntry6] : chronic bronchitis [FreeTextEntry8] : prostate problem [FreeTextEntry9] : arthritis [de-identified] : eczema 05-Dec-2024 21:32

## 2024-12-30 ENCOUNTER — NON-APPOINTMENT (OUTPATIENT)
Age: 74
End: 2024-12-30

## 2024-12-31 ENCOUNTER — APPOINTMENT (OUTPATIENT)
Dept: OTOLARYNGOLOGY | Facility: CLINIC | Age: 74
End: 2024-12-31
Payer: COMMERCIAL

## 2024-12-31 PROCEDURE — 31575 DIAGNOSTIC LARYNGOSCOPY: CPT

## 2024-12-31 PROCEDURE — 99214 OFFICE O/P EST MOD 30 MIN: CPT | Mod: 25

## 2025-01-21 ENCOUNTER — APPOINTMENT (OUTPATIENT)
Dept: PULMONOLOGY | Facility: CLINIC | Age: 75
End: 2025-01-21

## 2025-01-22 DIAGNOSIS — Q35.9 CLEFT PALATE, UNSPECIFIED: ICD-10-CM

## 2025-02-04 ENCOUNTER — RESULT REVIEW (OUTPATIENT)
Age: 75
End: 2025-02-04

## 2025-02-10 ENCOUNTER — NON-APPOINTMENT (OUTPATIENT)
Age: 75
End: 2025-02-10

## 2025-02-13 ENCOUNTER — APPOINTMENT (OUTPATIENT)
Dept: PULMONOLOGY | Facility: CLINIC | Age: 75
End: 2025-02-13
Payer: COMMERCIAL

## 2025-02-13 VITALS
HEIGHT: 62 IN | OXYGEN SATURATION: 96 % | RESPIRATION RATE: 20 BRPM | SYSTOLIC BLOOD PRESSURE: 124 MMHG | TEMPERATURE: 97.9 F | HEART RATE: 87 BPM | BODY MASS INDEX: 26.13 KG/M2 | DIASTOLIC BLOOD PRESSURE: 78 MMHG | WEIGHT: 142 LBS

## 2025-02-13 DIAGNOSIS — J44.89 OTHER SPECIFIED CHRONIC OBSTRUCTIVE PULMONARY DISEASE: ICD-10-CM

## 2025-02-13 DIAGNOSIS — R05.9 COUGH, UNSPECIFIED: ICD-10-CM

## 2025-02-13 PROCEDURE — G2211 COMPLEX E/M VISIT ADD ON: CPT

## 2025-02-13 PROCEDURE — 99214 OFFICE O/P EST MOD 30 MIN: CPT

## 2025-02-18 VITALS
DIASTOLIC BLOOD PRESSURE: 85 MMHG | HEIGHT: 62 IN | HEART RATE: 73 BPM | OXYGEN SATURATION: 95 % | WEIGHT: 147.71 LBS | RESPIRATION RATE: 17 BRPM | SYSTOLIC BLOOD PRESSURE: 136 MMHG | TEMPERATURE: 98 F

## 2025-02-18 RX ORDER — INFLUENZA A VIRUS A/IDAHO/07/2018 (H1N1) ANTIGEN (MDCK CELL DERIVED, PROPIOLACTONE INACTIVATED, INFLUENZA A VIRUS A/INDIANA/08/2018 (H3N2) ANTIGEN (MDCK CELL DERIVED, PROPIOLACTONE INACTIVATED), INFLUENZA B VIRUS B/SINGAPORE/INFTT-16-0610/2016 ANTIGEN (MDCK CELL DERIVED, PROPIOLACTONE INACTIVATED), INFLUENZA B VIRUS B/IOWA/06/2017 ANTIGEN (MDCK CELL DERIVED, PROPIOLACTONE INACTIVATED) 15; 15; 15; 15 UG/.5ML; UG/.5ML; UG/.5ML; UG/.5ML
0.5 INJECTION, SUSPENSION INTRAMUSCULAR ONCE
Refills: 0 | Status: DISCONTINUED | OUTPATIENT
Start: 2025-02-19 | End: 2025-02-24

## 2025-02-18 NOTE — PRE-ANESTHESIA EVALUATION ADULT - NSANTHGENDERRD_ENT_A_CORE
----- Message from Yudi Mcmahon MD sent at 2/24/2021  9:08 AM CST -----  Regarding: accutane start  Hi Ruthie,   Can you please mail consent paperwork to family so we can get Mode enrolled in ipledge? I sent the prescription already so that pharmacy can run a test claim, but mom knows he can't get med until we register him.     Anjelica- can you please schedule monthly visits for the next 4-5 months? Please schedule the next visit about 5-6 weeks from now so we have time to get his paperwork and registration completed?    Thanks,   Yudi     Yes

## 2025-02-18 NOTE — PRE-OP CHECKLIST - NOTHING BY MOUTH SINCE
[Former] : former [Never] : never [Obstructive Sleep Apnea] : obstructive sleep apnea [Lab] : lab [APAP:] : APAP [Full Face mask] : full face mask [TextBox_4] : This is a 75-year-old male with significant past medical history of obstructive sleep apnea and obesity who comes in to reestablish care with me.  Patient was initially seen by Dr. Simpson in 1995 and then Dr. Vanegas in 2015.  Patient was initially diagnosed with moderate obstructive sleep apnea and was prescribed PAP therapy during that time.  He was reassessed with a CPAP titration study in 2015 and was again given an APAP machine.  He comes in stating that his machine is starting to wear down.  He is getting failure readings on his machine and he does not know if it is going to last any longer.  Patient indicates that he cannot sleep without his machine.  He currently uses a ResMed S9 device. \par \par He has not had any significant changes to his overall health since the last time he was seen in clinic.  He seen a cardiologist regularly and has not had any heart problems during that time.\par \par \par dreamwear ffm- medium or large\par ______________________________________________________________________________________ \par EPWORTH SLEEPINESS SCALE \par How likely are you to doze off or fall asleep in the situations described below, in contrast to feeling just tired? \par This refers to your usual way of life in recent times. \par Even if you haven't done some of these things recently, try to work out how they would have affected you. \par Use the following scale to choose one most appropriate number for each situation. \par \par 0 = Never would doze \par 1 = Slight chance of dozing \par 2 = Moderate chance of dozing \par 3 = High chance of dozing \par \par  \par 0........................................Sitting and reading \par 1........................................Watching TV \par 0........................................Sitting inactive in a public place (eg a theatre or a meeting) \par 1........................................As a passenger in a car for an hour without a break \par 3........................................Lying down to rest in the afternoon when circumstances permit \par 0........................................Sitting and talking to someone \par 1........................................Sitting quietly after lunch without alcohol \par 0........................................In a car, while stopped for a few minutes in traffic \par 6........................................TOTAL SCORE \par ______________________________________________________________________________________ \par \par  [YearQuit] : 1990 [Awakes Unrefreshed] : does not awaken unrefreshed [Awakes with Dry Mouth] : does not awaken with dry mouth [Awakes with Headache] : does not awaken with headache [Daytime Somnolence] : denies daytime somnolence [Difficulty Initiating Sleep] : does not have difficulty initiating sleep [Difficulty Maintaining Sleep] : does not have difficulty maintaining sleep [Dysesthesias] : denies dysesthesias [Fatigue] : no fatigue [Frequent Nocturnal Awakening] : denies frequent nocturnal awakening [Hypnopompic Hallucinations] : denies hypnopompic hallucinations [Nonrestorative Sleep] : denies nonrestorative sleep [Paresthesias] : denies paresthesias [Recent  Weight Gain] : no recent weight gain [Snoring] : no snoring [Tired while Driving] : not tired while driving [Unintentional Sleep while Active] : no unintentional sleep while active [Unintentional Sleep while Inactive] : no unintentional sleep while inactive [Unusual Movements] : no unusual movements [Vivid dreams] : no vivid dreams [DIS] : does not have difficulty initiating sleep [DMS] : does not have difficulty maintaining sleep [Unusual Sleep Behavior] : no unusual sleep behavior [Hypersomnolence] : no hypersomnolence 18-Feb-2025 22:00 [Cataplexy] : no cataplexy [Sleep Paralysis] : no sleep paralysis [Hypnagogic Hallucinations] : no hypnagogic hallucinations [Hypnopompic Hallucinations] : no hypnopompic hallucinations [Lower Extremity Discomfort] : does not have lower extremity discomfort [Irresistible urge to move legs] : does not have irresistible urge to move legs [Late day/ Evening symptoms] : does not have late day/ evening symptoms [Sleep Disturbances due to LE symptoms] : denies sleep disturbances due to lower extremity symptoms [TextBox_77] : 12 md [TextBox_79] : 7am [TextBox_81] : 5-6 [TextBox_83] : 1 [TextBox_100] : 04/05/1995 [TextBox_108] : 40 [TextBox_112] : 21 [TextBox_116] : 72 [TextBox_104] : 09/2015 [TextBox_125] : 9-20 cmH2O [TextBox_127] : 07/01/22 [TextBox_129] : 09/28/22 [TextBox_133] : 100 [TextBox_137] : 98 [TextBox_141] : 7 [TextBox_143] : 14 [TextBox_147] : 2.2 [TextBox_160] : Brianaar M- Large [ESS] : 6

## 2025-02-18 NOTE — PRE-ANESTHESIA EVALUATION ADULT - NSPROPOSEDPROCEDFT_GEN_ALL_CORE
Free tissue transfer, reconstruction maxilla, neck exploration and dissection, closure of oronasal fistula, anterolateral thigh flap

## 2025-02-19 ENCOUNTER — APPOINTMENT (OUTPATIENT)
Dept: OTOLARYNGOLOGY | Facility: HOSPITAL | Age: 75
End: 2025-02-19

## 2025-02-19 ENCOUNTER — INPATIENT (INPATIENT)
Facility: HOSPITAL | Age: 75
LOS: 4 days | Discharge: HOME | DRG: 144 | End: 2025-02-24
Attending: OTOLARYNGOLOGY | Admitting: OTOLARYNGOLOGY
Payer: COMMERCIAL

## 2025-02-19 ENCOUNTER — TRANSCRIPTION ENCOUNTER (OUTPATIENT)
Age: 75
End: 2025-02-19

## 2025-02-19 DIAGNOSIS — Z98.890 OTHER SPECIFIED POSTPROCEDURAL STATES: Chronic | ICD-10-CM

## 2025-02-19 DIAGNOSIS — Z41.9 ENCOUNTER FOR PROCEDURE FOR PURPOSES OTHER THAN REMEDYING HEALTH STATE, UNSPECIFIED: Chronic | ICD-10-CM

## 2025-02-19 LAB
ANION GAP SERPL CALC-SCNC: 8 MMOL/L — SIGNIFICANT CHANGE UP (ref 5–17)
BUN SERPL-MCNC: 15 MG/DL — SIGNIFICANT CHANGE UP (ref 7–23)
CALCIUM SERPL-MCNC: 9.4 MG/DL — SIGNIFICANT CHANGE UP (ref 8.4–10.5)
CHLORIDE SERPL-SCNC: 104 MMOL/L — SIGNIFICANT CHANGE UP (ref 96–108)
CO2 SERPL-SCNC: 24 MMOL/L — SIGNIFICANT CHANGE UP (ref 22–31)
CREAT SERPL-MCNC: 0.98 MG/DL — SIGNIFICANT CHANGE UP (ref 0.5–1.3)
EGFR: 80 ML/MIN/1.73M2 — SIGNIFICANT CHANGE UP
GLUCOSE SERPL-MCNC: 159 MG/DL — HIGH (ref 70–99)
HCT VFR BLD CALC: 40.3 % — SIGNIFICANT CHANGE UP (ref 39–50)
HGB BLD-MCNC: 12.7 G/DL — LOW (ref 13–17)
MAGNESIUM SERPL-MCNC: 1.6 MG/DL — SIGNIFICANT CHANGE UP (ref 1.6–2.6)
MCHC RBC-ENTMCNC: 26.2 PG — LOW (ref 27–34)
MCHC RBC-ENTMCNC: 31.5 G/DL — LOW (ref 32–36)
MCV RBC AUTO: 83.1 FL — SIGNIFICANT CHANGE UP (ref 80–100)
NRBC BLD AUTO-RTO: 0 /100 WBCS — SIGNIFICANT CHANGE UP (ref 0–0)
PHOSPHATE SERPL-MCNC: 2.9 MG/DL — SIGNIFICANT CHANGE UP (ref 2.5–4.5)
PLATELET # BLD AUTO: 163 K/UL — SIGNIFICANT CHANGE UP (ref 150–400)
POTASSIUM SERPL-MCNC: 4.5 MMOL/L — SIGNIFICANT CHANGE UP (ref 3.5–5.3)
POTASSIUM SERPL-SCNC: 4.5 MMOL/L — SIGNIFICANT CHANGE UP (ref 3.5–5.3)
RBC # BLD: 4.85 M/UL — SIGNIFICANT CHANGE UP (ref 4.2–5.8)
RBC # FLD: 14.2 % — SIGNIFICANT CHANGE UP (ref 10.3–14.5)
SODIUM SERPL-SCNC: 136 MMOL/L — SIGNIFICANT CHANGE UP (ref 135–145)
WBC # BLD: 9.18 K/UL — SIGNIFICANT CHANGE UP (ref 3.8–10.5)
WBC # FLD AUTO: 9.18 K/UL — SIGNIFICANT CHANGE UP (ref 3.8–10.5)

## 2025-02-19 PROCEDURE — 14021 TIS TRNFR S/A/L 10.1-30 SQCM: CPT

## 2025-02-19 PROCEDURE — 15757 FREE SKIN FLAP MICROVASC: CPT

## 2025-02-19 PROCEDURE — 30600 REPAIR MOUTH/NOSE FISTULA: CPT

## 2025-02-19 PROCEDURE — 99222 1ST HOSP IP/OBS MODERATE 55: CPT | Mod: GC

## 2025-02-19 DEVICE — SURGCEL 4 X 8": Type: IMPLANTABLE DEVICE | Site: RIGHT | Status: FUNCTIONAL

## 2025-02-19 DEVICE — DOPPLER PROBE DISPOSABLE: Type: IMPLANTABLE DEVICE | Site: RIGHT | Status: FUNCTIONAL

## 2025-02-19 DEVICE — COUPLER VESSEL ANASTOMOTIC 3MM: Type: IMPLANTABLE DEVICE | Site: RIGHT | Status: FUNCTIONAL

## 2025-02-19 DEVICE — CLIP APPLIER ETHICON LIGACLIP 9 3/8" SMALL: Type: IMPLANTABLE DEVICE | Site: RIGHT | Status: FUNCTIONAL

## 2025-02-19 DEVICE — CLIP APPLIER ETHICON LIGACLIP 11.5" MEDIUM: Type: IMPLANTABLE DEVICE | Site: RIGHT | Status: FUNCTIONAL

## 2025-02-19 DEVICE — SURGIFOAM 2 X 6CM X 7MM (12-7): Type: IMPLANTABLE DEVICE | Site: RIGHT | Status: FUNCTIONAL

## 2025-02-19 RX ORDER — DEXTROSE 50 % IN WATER 50 %
25 SYRINGE (ML) INTRAVENOUS ONCE
Refills: 0 | Status: DISCONTINUED | OUTPATIENT
Start: 2025-02-19 | End: 2025-02-21

## 2025-02-19 RX ORDER — GLUCAGON 3 MG/1
1 POWDER NASAL ONCE
Refills: 0 | Status: DISCONTINUED | OUTPATIENT
Start: 2025-02-19 | End: 2025-02-21

## 2025-02-19 RX ORDER — ONDANSETRON HCL/PF 4 MG/2 ML
4 VIAL (ML) INJECTION EVERY 6 HOURS
Refills: 0 | Status: DISCONTINUED | OUTPATIENT
Start: 2025-02-19 | End: 2025-02-24

## 2025-02-19 RX ORDER — HYDROMORPHONE/SOD CHLOR,ISO/PF 2 MG/10 ML
0.25 SYRINGE (ML) INJECTION
Refills: 0 | Status: DISCONTINUED | OUTPATIENT
Start: 2025-02-19 | End: 2025-02-21

## 2025-02-19 RX ORDER — LABETALOL HYDROCHLORIDE 200 MG/1
5 TABLET, FILM COATED ORAL ONCE
Refills: 0 | Status: COMPLETED | OUTPATIENT
Start: 2025-02-19 | End: 2025-02-19

## 2025-02-19 RX ORDER — DEXTROSE 50 % IN WATER 50 %
15 SYRINGE (ML) INTRAVENOUS ONCE
Refills: 0 | Status: DISCONTINUED | OUTPATIENT
Start: 2025-02-19 | End: 2025-02-21

## 2025-02-19 RX ORDER — INSULIN LISPRO 100 U/ML
INJECTION, SOLUTION INTRAVENOUS; SUBCUTANEOUS EVERY 6 HOURS
Refills: 0 | Status: DISCONTINUED | OUTPATIENT
Start: 2025-02-19 | End: 2025-02-21

## 2025-02-19 RX ORDER — IPRATROPIUM BROMIDE AND ALBUTEROL SULFATE .5; 2.5 MG/3ML; MG/3ML
3 SOLUTION RESPIRATORY (INHALATION) EVERY 6 HOURS
Refills: 0 | Status: DISCONTINUED | OUTPATIENT
Start: 2025-02-19 | End: 2025-02-24

## 2025-02-19 RX ORDER — SODIUM CHLORIDE 9 G/1000ML
1000 INJECTION, SOLUTION INTRAVENOUS
Refills: 0 | Status: DISCONTINUED | OUTPATIENT
Start: 2025-02-19 | End: 2025-02-20

## 2025-02-19 RX ORDER — HYDROMORPHONE/SOD CHLOR,ISO/PF 2 MG/10 ML
0.5 SYRINGE (ML) INJECTION
Refills: 0 | Status: DISCONTINUED | OUTPATIENT
Start: 2025-02-19 | End: 2025-02-21

## 2025-02-19 RX ORDER — ACETAMINOPHEN 500 MG/5ML
1000 LIQUID (ML) ORAL EVERY 6 HOURS
Refills: 0 | Status: DISCONTINUED | OUTPATIENT
Start: 2025-02-19 | End: 2025-02-21

## 2025-02-19 RX ORDER — MAGNESIUM SULFATE 500 MG/ML
1 SYRINGE (ML) INJECTION ONCE
Refills: 0 | Status: COMPLETED | OUTPATIENT
Start: 2025-02-19 | End: 2025-02-19

## 2025-02-19 RX ORDER — SODIUM CHLORIDE 9 G/1000ML
1000 INJECTION, SOLUTION INTRAVENOUS
Refills: 0 | Status: DISCONTINUED | OUTPATIENT
Start: 2025-02-19 | End: 2025-02-21

## 2025-02-19 RX ORDER — AMPICILLIN SODIUM AND SULBACTAM SODIUM 1; .5 G/1; G/1
3 INJECTION, POWDER, FOR SOLUTION INTRAMUSCULAR; INTRAVENOUS EVERY 6 HOURS
Refills: 0 | Status: COMPLETED | OUTPATIENT
Start: 2025-02-19 | End: 2025-02-20

## 2025-02-19 RX ADMIN — AMPICILLIN SODIUM AND SULBACTAM SODIUM 200 GRAM(S): 1; .5 INJECTION, POWDER, FOR SOLUTION INTRAMUSCULAR; INTRAVENOUS at 23:14

## 2025-02-19 RX ADMIN — AMPICILLIN SODIUM AND SULBACTAM SODIUM 200 GRAM(S): 1; .5 INJECTION, POWDER, FOR SOLUTION INTRAMUSCULAR; INTRAVENOUS at 17:22

## 2025-02-19 RX ADMIN — Medication 100 GRAM(S): at 16:05

## 2025-02-19 RX ADMIN — SODIUM CHLORIDE 100 MILLILITER(S): 9 INJECTION, SOLUTION INTRAVENOUS at 16:03

## 2025-02-19 RX ADMIN — INSULIN LISPRO 2: 100 INJECTION, SOLUTION INTRAVENOUS; SUBCUTANEOUS at 17:42

## 2025-02-19 RX ADMIN — Medication 1000 MILLIGRAM(S): at 22:20

## 2025-02-19 RX ADMIN — LABETALOL HYDROCHLORIDE 5 MILLIGRAM(S): 200 TABLET, FILM COATED ORAL at 17:26

## 2025-02-19 RX ADMIN — Medication 40 MILLIGRAM(S): at 16:06

## 2025-02-19 RX ADMIN — IPRATROPIUM BROMIDE AND ALBUTEROL SULFATE 3 MILLILITER(S): .5; 2.5 SOLUTION RESPIRATORY (INHALATION) at 19:11

## 2025-02-19 RX ADMIN — Medication 400 MILLIGRAM(S): at 22:05

## 2025-02-19 RX ADMIN — LABETALOL HYDROCHLORIDE 5 MILLIGRAM(S): 200 TABLET, FILM COATED ORAL at 19:11

## 2025-02-19 NOTE — H&P ADULT - HISTORY OF PRESENT ILLNESS
74M w/ hx of hypertrophic cardiomyopathy, COPD, BPH, RA and nasal SCC s/p total septectomy, partial palatectomy, and RFFF 10/20, now s/p R ALT flap to maxilla for closure of oronasal fistula and R neck dissection for vascular access.

## 2025-02-19 NOTE — CONSULT NOTE ADULT - SUBJECTIVE AND OBJECTIVE BOX
HPI:    75yo Man PMH hypertrophic cardiomyopathy (EF 65-70% 2/11), COPD, BPH, RA, s/p total septectomy, partial palatectomy, for nasal SCC with L. 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 ALT FF to maxilla for closure of oronasal fistula with R. neck dissection for vascular access. Admitted to SICU for post op flap monitoring.    EBL 30cc, IVF 1.5L LR, , Dexamethasone 10mg @8:34AM.    Patient seen and examined in PACU, no intraoperative complications per ENT, no acute distress, no pain, SOB, on nasal cannula, cook doppler with strong signal, alford in place.      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 ALT FF to maxilla for closure of oronasal fistula with R. neck dissection for vascular access. Admitted to SICU for post op flap monitoring.    EBL 30cc, IVF 1.5L LR, , Dexamethasone 10mg @8:34AM.    Patient seen and examined in PACU, no intraoperative complications per ENT, no acute distress, no chest pain, abdominal pain, sob, cook doppler with strong signal, alford in place.      Vitals:  T(C): 36.6 (02-19-25 @ 13:30), Max: 36.6 (02-19-25 @ 13:30)  HR: 62 (02-19-25 @ 13:45) (62 - 65)  BP: 113/64 (02-19-25 @ 13:45) (74/50 - 114/60)  RR: 16 (02-19-25 @ 13:45) (16 - 19)  SpO2: 98% (02-19-25 @ 13:45) (95% - 98%)    Physical Exam:    General: Sleepy, arousable alert  Neuro: A&O x3, symmetric brow raise, lid closure, smile. Sensation intact upper, middle, lower face to light touch.  HEENT: PERRL, EOMI, Mild L. lid swelling, L. paranasal skin paddle sutured in pink, clean, intact. Cook from R. posterior neck, penrose right of midline with gauze and mild serosangenous staining. Neck soft, non tender. Intraoral: No maxillary teeth, dental hardware in place, tongue covered with thin film of dried blood, skin paddle sutured in roof of mouth, MMM.  Cardio: RRR, S1,S2, No MRG  Pulm: B/L CTA anteriorly and posteriorly  Abdomen: Soft NTND  : Alford in place, Clear yellow urine  Extremities: Bilateral  5+ and symmetric, B/L UE WWP cap refill 2 sec, RUE palpable radial pulse, LUE with healed scarring from previous RFFF. B/L LE PT/DP pulses palpable, toes WWP cap refill 2 sec.    Labs:  Pending   HPI:    73yo Man PMH hypertrophic cardiomyopathy (EF 65-70% 2/11), COPD, BPH, RA, s/p total septectomy, partial palatectomy, for nasal SCC with L. 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 ALT FF to maxilla for closure of oronasal fistula with R. neck dissection for vascular access. Admitted to SICU for post op flap monitoring.    EBL 30cc, IVF 1.5L LR, , Dexamethasone 10mg @8:34AM.    Patient seen and examined in PACU, no intraoperative complications per ENT, no acute distress, no chest pain, abdominal pain, sob, cook doppler with strong signal, alford in place.      Vitals:  T(C): 36.6 (02-19-25 @ 13:30), Max: 36.6 (02-19-25 @ 13:30)  HR: 62 (02-19-25 @ 13:45) (62 - 65)  BP: 113/64 (02-19-25 @ 13:45) (74/50 - 114/60)  RR: 16 (02-19-25 @ 13:45) (16 - 19)  SpO2: 98% (02-19-25 @ 13:45) (95% - 98%)    Physical Exam:    General: Sleepy, arousable alert  Neuro: A&O x3, symmetric brow raise, lid closure, smile. Sensation intact upper, middle, lower face to light touch.  HEENT: PERRL, EOMI, Mild L. lid swelling, L. paranasal skin paddle sutured in pink, clean, intact. Cook from R. posterior neck, penrose right of midline with gauze and mild serosangenous staining. Neck soft, non tender. Intraoral: No maxillary teeth, dental hardware in place, tongue covered with thin film of dried blood, skin paddle sutured in roof of mouth, MMM.  Cardio: RRR, S1,S2, No MRG  Pulm: B/L CTA anteriorly and posteriorly  Abdomen: Soft NTND  : Alford in place, Clear yellow urine  Extremities: Bilateral  5+ and symmetric, B/L UE WWP cap refill 2 sec, RUE palpable radial pulse, LUE with healed scarring from previous RFFF. B/L LE PT/DP pulses palpable, toes WWP cap refill 2 sec. LLE with incision covered in steri strips. FRANTZ from top of incision with bloody output.    Labs:  Pending   HPI:    73yo Man PMH hypertrophic cardiomyopathy (EF 65-70% 2/11), COPD, BPH (s/p TURP), RA, s/p total septectomy, partial palatectomy, for nasal SCC with L. 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 ALT FF to maxilla for closure of oronasal fistula with R. neck dissection for vascular access. Admitted to SICU for post op flap monitoring.    EBL 30cc, IVF 1.5L LR, , Dexamethasone 10mg @8:34AM.    Patient seen and examined in PACU, no intraoperative complications per ENT, no acute distress, no chest pain, abdominal pain, sob, or difficulty breathing, cook doppler with strong signal, alford in place, end tidal monitor to nasal cannula. Blood pressures elevated in the 140-150 systolic. Cheyne-Krishna breathing pattern noted.       Vitals:  T(C): 36.6 (02-19-25 @ 13:30), Max: 36.6 (02-19-25 @ 13:30)  HR: 62 (02-19-25 @ 13:45) (62 - 65)  BP: 113/64 (02-19-25 @ 13:45) (74/50 - 114/60)  RR: 16 (02-19-25 @ 13:45) (16 - 19)  SpO2: 98% (02-19-25 @ 13:45) (95% - 98%)    Physical Exam:    General: Sleepy, arousable alert  Neuro: A&O x3, symmetric brow raise, lid closure, smile. Sensation intact upper, middle, lower face to light touch.  HEENT: PERRL, EOMI, Mild L. lid swelling, L. paranasal skin paddle sutured in pink, clean, intact. Cook from R. posterior neck, penrose right of midline with gauze and mild serosangenous staining. Neck soft, non tender. Intraoral: No maxillary teeth, dental hardware in place, tongue covered with thin film of dried blood, skin paddle sutured in roof of mouth, MMM.  Cardio: RRR, S1,S2, No MRG  Pulm: B/L CTA anteriorly and posteriorly  Abdomen: Soft NTND  : Alford in place, Clear yellow urine  Extremities: Bilateral  5+ and symmetric, B/L UE WWP cap refill 2 sec, RUE palpable radial pulse, LUE with healed scarring from previous RFFF. B/L LE PT/DP pulses palpable, toes WWP cap refill 2 sec. LLE with incision covered in steri strips. FRANTZ from top of incision with bloody output.    Labs:  12.7   9.18  )-----------( 163      ( 19 Feb 2025 14:10 )             40.3       02-19    136  |  104  |  15  ----------------------------<  159[H]  4.5   |  24  |  0.98    Ca    9.4      19 Feb 2025 14:10  Phos  2.9     02-19  Mg     1.6     02-19            ABG - ( 19 Feb 2025 12:26 )  pH, Arterial: 7.45  pH, Blood: x     /  pCO2: 34    /  pO2: 191   / HCO3: 24    / Base Excess: 0.0   /  SaO2: x                   Urinalysis Basic - ( 19 Feb 2025 14:10 )    Color: x / Appearance: x / SG: x / pH: x  Gluc: 159 mg/dL / Ketone: x  / Bili: x / Urobili: x   Blood: x / Protein: x / Nitrite: x   Leuk Esterase: x / RBC: x / WBC x   Sq Epi: x / Non Sq Epi: x / Bacteria: x        PT/INR - ( 19 Feb 2025 14:43 )   PT: 12.9 sec;   INR: 1.12          PTT - ( 19 Feb 2025 14:43 )  PTT:35.9 sec          CAPILLARY BLOOD GLUCOSE      POCT Blood Glucose.: 142 mg/dL (19 Feb 2025 14:38)

## 2025-02-19 NOTE — CONSULT NOTE ADULT - ASSESSMENT
73yo Man PMH hypertrophic cardiomyopathy (EF 65-70% 2/11), COPD, BPH, RA, s/p total septectomy, partial palatectomy, for nasal SCC with L. 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 ALT FF to maxilla for closure of oronasal fistula with R. neck dissection for vascular access. Admitted to SICU for post op flap monitoring.    Neuro: Tylenol, Diludid PRN, Zofran PRN  HEENT: Flap Checks q4h  Cards: Map >65. Hx hypertrophic cardiomyopathy (EF 65-70%)  Pulm: Sating well on 2L NC Hx: COPD on home ____  GI/FEN: NPO for 24 hr  : Hx BPH  ID:  Endo:  Heme:  Lines: R. neck ramirez, R. neck penrose, R. 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 ALT FF to maxilla for closure of oronasal fistula with R. neck dissection for vascular access. Admitted to SICU for post op flap monitoring.    Neuro: Tylenol, Diludid PRN, Zofran PRN  HEENT: Flap Checks q4h  Cards: Map >65. Hx hypertrophic cardiomyopathy (EF 65-70%)  Pulm: Sating well on 2L NC Hx: COPD on home Advair and Albuterol PRN  GI/FEN: NPO for 24 hr  : Hx BPH s/p TURP on home tamsulosin and dutasteride.  ID:  Endo:   Heme: Hx Rheumatoid arthritis on home leflunomide 20mg.  Lines: R. neck ramirez, R. neck penrose, R. leg FRANTZ  Wounds: R. neck incision, R. thin incision, L. paranasal skin paddle, Intraoral roof of mouth reconstruction.  Ppx: SCDs  PT/OT: OOBTC POD1  Dispo: SICU 75yo Man PMH hypertrophic cardiomyopathy (EF 65-70% 2/11), COPD, BPH s/p TURP, RA, s/p total septectomy, partial palatectomy, for nasal SCC with L. 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 ALT FF to maxilla for closure of oronasal fistula with R. neck dissection for vascular access. Admitted to SICU for post op flap monitoring.    Neuro: Tylenol, Diludid PRN, Zofran PRN  HEENT: Flap Checks q4h  Cards: Map >65. Hx hypertrophic cardiomyopathy (EF 65-70%, no dynamic gradient at rest, up to 60 with valsalva)  Pulm: Sating well on face tent. IS. Nuonebs Q6 Hx: COPD on home Fluticasone-solmeterol and albuterol PRN  GI/FEN: NPO for 24 hr. LR @100mL/hr  : Carlos, Strict I&O, Hx BPH s/p TURP. On home tamsulosin 0.4mg, Dutasteride 0.5mg  ID: Continue unasyn 24hrs (2/19-2/20)  Endo: ISS   Heme: Hx Rheumatoid arthritis on home leflunomide 20mg.  Lines: PIVs, R. radial Azul. R. neck ramirez, R. neck penrose, R. leg FRANTZ.  Wounds: R. neck incision, R. thin incision, L. paranasal skin paddle, Intraoral roof of mouth reconstruction.  Ppx: SCDs, SQL POD1  PT/OT: OOBTC POD1  Dispo: SICU       75yo Man PMH hypertrophic cardiomyopathy (EF 65-70% 2/11), COPD, BPH s/p TURP, RA, s/p total septectomy, partial palatectomy, for nasal SCC with L. 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 ALT FF to maxilla for closure of oronasal fistula with R. neck dissection for vascular access. Admitted to SICU for post op flap monitoring.    Neuro: Tylenol, Diludid PRN, Zofran PRN  HEENT: Flap Checks q1h. Cook in place. Ok for gentle RRC floor of mouth suctioning  Cards: Map >65. Hx hypertrophic cardiomyopathy (EF 65-70%, no dynamic gradient at rest, up to 60 with valsalva)  Pulm: Sating well on face tent. IS. Nuonebs Q6 Hx: COPD on home Fluticasone-solmeterol and albuterol PRN  GI/FEN: NPO for 24 hr. PPI. LR @100mL/hr  : Carlos, Strict I&O, Hx BPH s/p TURP. Hold home tamsulosin 0.4mg, Dutasteride 0.5mg while NPO.  ID: Continue unasyn 24hrs (2/19-2/20)  Endo: Brigid   Heme: Hx Rheumatoid arthritis on home leflunomide 20mg.  Lines: PIVs, R. radial [place]. R. neck ramirez, R. neck penrose, R. leg FRANTZ.  Wounds: R. neck incision, R. thin incision, L. paranasal skin paddle, Intraoral roof of mouth reconstruction.  Ppx: SCDs, SQL POD1  PT/OT: Not ordered  Dispo: SICU

## 2025-02-19 NOTE — BRIEF OPERATIVE NOTE - OPERATION/FINDINGS
Reconstruction of maxilla for closure of oronasal fistula, ALT flap, Rt neck dissection R ALT flap to maxilla for closure of oronasal fistula, Rt neck dissection for vascular acces

## 2025-02-19 NOTE — CONSULT NOTE ADULT - ATTENDING COMMENTS
Hypertrophic cardiomyopathy, BPH, COPD,  s/p total septectomy, partial palatectomy, for nasal SCC with L. 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 ALT FF to maxilla for closure of oronasal fistula with R. neck dissection   physical as above  some postop Cheyne Krishna respiration  continue ETCO2 monitoring  continue unasyn  bronchodilators

## 2025-02-19 NOTE — H&P ADULT - ASSESSMENT
74M w/ hx of hypertrophic cardiomyopathy, COPD, BPH, RA and nasal SCC s/p total septectomy, partial palatectomy, and RFFF 10/20, now s/p R ALT flap to maxilla for closure of oronasal fistula and R neck dissection for vascular access.    Plan:  - No NG tube  - NPO for 24hr then advance to CLD  - Pain/nausea control 74M w/ hx of hypertrophic cardiomyopathy, COPD, BPH, RA and nasal SCC s/p total septectomy, partial palatectomy, and RFFF 10/20, now s/p R ALT flap to maxilla for closure of oronasal fistula and R neck dissection for vascular access.    Plan:  - No NG tube  - NPO for 24hr then advance to CLD  - Pain/nausea control  - Unasyn 3g q6h x 24hr  - dc alford 2/20  - OOBTC   - please suction secretions with red rubber and try to avoid the hard palate  - Please contact ENT with any questions or concerns

## 2025-02-19 NOTE — H&P ADULT - NSTOBACCOSCREENHP_GEN_A_NCS
I spoke with the patient, advised that skin biopsies may be helpful, but not necessarily helpful to .  I told him that referral to Dr. Romo may possibly lead to a diagnosis of demyelinating disease, including NMOSD, multiple sclerosis type presentation, related to Sjogren's disease.  This could explain his fogginess of thinking.  I will place the referral and he will make an appointment with Dr. Romo.  
Patient declined to answer

## 2025-02-20 LAB
A1C WITH ESTIMATED AVERAGE GLUCOSE RESULT: 5.7 % — HIGH (ref 4–5.6)
ANION GAP SERPL CALC-SCNC: 13 MMOL/L — SIGNIFICANT CHANGE UP (ref 5–17)
BASOPHILS # BLD AUTO: 0.03 K/UL — SIGNIFICANT CHANGE UP (ref 0–0.2)
BASOPHILS NFR BLD AUTO: 0.2 % — SIGNIFICANT CHANGE UP (ref 0–2)
BUN SERPL-MCNC: 13 MG/DL — SIGNIFICANT CHANGE UP (ref 7–23)
CALCIUM SERPL-MCNC: 8.9 MG/DL — SIGNIFICANT CHANGE UP (ref 8.4–10.5)
CHLORIDE SERPL-SCNC: 103 MMOL/L — SIGNIFICANT CHANGE UP (ref 96–108)
CO2 SERPL-SCNC: 21 MMOL/L — LOW (ref 22–31)
CREAT SERPL-MCNC: 0.86 MG/DL — SIGNIFICANT CHANGE UP (ref 0.5–1.3)
EGFR: 90 ML/MIN/1.73M2 — SIGNIFICANT CHANGE UP
EOSINOPHIL # BLD AUTO: 0 K/UL — SIGNIFICANT CHANGE UP (ref 0–0.5)
EOSINOPHIL NFR BLD AUTO: 0 % — SIGNIFICANT CHANGE UP (ref 0–6)
ESTIMATED AVERAGE GLUCOSE: 117 MG/DL — HIGH (ref 68–114)
GLUCOSE SERPL-MCNC: 147 MG/DL — HIGH (ref 70–99)
HCT VFR BLD CALC: 38.7 % — LOW (ref 39–50)
HGB BLD-MCNC: 12.5 G/DL — LOW (ref 13–17)
IMM GRANULOCYTES NFR BLD AUTO: 0.5 % — SIGNIFICANT CHANGE UP (ref 0–0.9)
LYMPHOCYTES # BLD AUTO: 0.87 K/UL — LOW (ref 1–3.3)
LYMPHOCYTES # BLD AUTO: 6.7 % — LOW (ref 13–44)
MAGNESIUM SERPL-MCNC: 1.8 MG/DL — SIGNIFICANT CHANGE UP (ref 1.6–2.6)
MCHC RBC-ENTMCNC: 27.4 PG — SIGNIFICANT CHANGE UP (ref 27–34)
MCHC RBC-ENTMCNC: 32.3 G/DL — SIGNIFICANT CHANGE UP (ref 32–36)
MCV RBC AUTO: 84.9 FL — SIGNIFICANT CHANGE UP (ref 80–100)
MONOCYTES # BLD AUTO: 0.89 K/UL — SIGNIFICANT CHANGE UP (ref 0–0.9)
MONOCYTES NFR BLD AUTO: 6.8 % — SIGNIFICANT CHANGE UP (ref 2–14)
NEUTROPHILS # BLD AUTO: 11.22 K/UL — HIGH (ref 1.8–7.4)
NEUTROPHILS NFR BLD AUTO: 85.8 % — HIGH (ref 43–77)
NRBC BLD AUTO-RTO: 0 /100 WBCS — SIGNIFICANT CHANGE UP (ref 0–0)
PHOSPHATE SERPL-MCNC: 2.8 MG/DL — SIGNIFICANT CHANGE UP (ref 2.5–4.5)
PLATELET # BLD AUTO: 170 K/UL — SIGNIFICANT CHANGE UP (ref 150–400)
POTASSIUM SERPL-MCNC: 4.1 MMOL/L — SIGNIFICANT CHANGE UP (ref 3.5–5.3)
POTASSIUM SERPL-SCNC: 4.1 MMOL/L — SIGNIFICANT CHANGE UP (ref 3.5–5.3)
RBC # BLD: 4.56 M/UL — SIGNIFICANT CHANGE UP (ref 4.2–5.8)
RBC # FLD: 14.3 % — SIGNIFICANT CHANGE UP (ref 10.3–14.5)
SODIUM SERPL-SCNC: 137 MMOL/L — SIGNIFICANT CHANGE UP (ref 135–145)
WBC # BLD: 13.08 K/UL — HIGH (ref 3.8–10.5)
WBC # FLD AUTO: 13.08 K/UL — HIGH (ref 3.8–10.5)

## 2025-02-20 PROCEDURE — 99232 SBSQ HOSP IP/OBS MODERATE 35: CPT | Mod: GC

## 2025-02-20 RX ORDER — TAMSULOSIN HYDROCHLORIDE 0.4 MG/1
0.4 CAPSULE ORAL ONCE
Refills: 0 | Status: COMPLETED | OUTPATIENT
Start: 2025-02-20 | End: 2025-02-20

## 2025-02-20 RX ORDER — FINASTERIDE 1 MG/1
5 TABLET, FILM COATED ORAL DAILY
Refills: 0 | Status: DISCONTINUED | OUTPATIENT
Start: 2025-02-20 | End: 2025-02-24

## 2025-02-20 RX ORDER — TAMSULOSIN HYDROCHLORIDE 0.4 MG/1
0.4 CAPSULE ORAL AT BEDTIME
Refills: 0 | Status: DISCONTINUED | OUTPATIENT
Start: 2025-02-20 | End: 2025-02-24

## 2025-02-20 RX ORDER — ENOXAPARIN SODIUM 100 MG/ML
40 INJECTION SUBCUTANEOUS EVERY 24 HOURS
Refills: 0 | Status: DISCONTINUED | OUTPATIENT
Start: 2025-02-20 | End: 2025-02-24

## 2025-02-20 RX ORDER — DEXTROSE MONOHYDRATE 100 G/1000ML
1000 INJECTION, SOLUTION INTRAVENOUS
Refills: 0 | Status: DISCONTINUED | OUTPATIENT
Start: 2025-02-20 | End: 2025-02-21

## 2025-02-20 RX ORDER — SODIUM PHOSPHATE,DIBASIC DIHYD
15 POWDER (GRAM) MISCELLANEOUS ONCE
Refills: 0 | Status: COMPLETED | OUTPATIENT
Start: 2025-02-20 | End: 2025-02-20

## 2025-02-20 RX ORDER — MAGNESIUM SULFATE 500 MG/ML
1 SYRINGE (ML) INJECTION ONCE
Refills: 0 | Status: COMPLETED | OUTPATIENT
Start: 2025-02-20 | End: 2025-02-20

## 2025-02-20 RX ADMIN — TAMSULOSIN HYDROCHLORIDE 0.4 MILLIGRAM(S): 0.4 CAPSULE ORAL at 18:58

## 2025-02-20 RX ADMIN — IPRATROPIUM BROMIDE AND ALBUTEROL SULFATE 3 MILLILITER(S): .5; 2.5 SOLUTION RESPIRATORY (INHALATION) at 17:42

## 2025-02-20 RX ADMIN — AMPICILLIN SODIUM AND SULBACTAM SODIUM 200 GRAM(S): 1; .5 INJECTION, POWDER, FOR SOLUTION INTRAMUSCULAR; INTRAVENOUS at 10:23

## 2025-02-20 RX ADMIN — Medication 1000 MILLIGRAM(S): at 15:22

## 2025-02-20 RX ADMIN — IPRATROPIUM BROMIDE AND ALBUTEROL SULFATE 3 MILLILITER(S): .5; 2.5 SOLUTION RESPIRATORY (INHALATION) at 23:37

## 2025-02-20 RX ADMIN — INSULIN LISPRO 2: 100 INJECTION, SOLUTION INTRAVENOUS; SUBCUTANEOUS at 18:01

## 2025-02-20 RX ADMIN — IPRATROPIUM BROMIDE AND ALBUTEROL SULFATE 3 MILLILITER(S): .5; 2.5 SOLUTION RESPIRATORY (INHALATION) at 07:39

## 2025-02-20 RX ADMIN — IPRATROPIUM BROMIDE AND ALBUTEROL SULFATE 3 MILLILITER(S): .5; 2.5 SOLUTION RESPIRATORY (INHALATION) at 12:59

## 2025-02-20 RX ADMIN — ENOXAPARIN SODIUM 40 MILLIGRAM(S): 100 INJECTION SUBCUTANEOUS at 10:24

## 2025-02-20 RX ADMIN — Medication 15 MILLILITER(S): at 18:01

## 2025-02-20 RX ADMIN — Medication 0.5 MILLIGRAM(S): at 15:56

## 2025-02-20 RX ADMIN — Medication 0.5 MILLIGRAM(S): at 16:11

## 2025-02-20 RX ADMIN — Medication 1 APPLICATION(S): at 05:52

## 2025-02-20 RX ADMIN — Medication 62.5 MILLIMOLE(S): at 10:22

## 2025-02-20 RX ADMIN — Medication 100 GRAM(S): at 09:27

## 2025-02-20 RX ADMIN — IPRATROPIUM BROMIDE AND ALBUTEROL SULFATE 3 MILLILITER(S): .5; 2.5 SOLUTION RESPIRATORY (INHALATION) at 00:51

## 2025-02-20 RX ADMIN — AMPICILLIN SODIUM AND SULBACTAM SODIUM 200 GRAM(S): 1; .5 INJECTION, POWDER, FOR SOLUTION INTRAMUSCULAR; INTRAVENOUS at 05:51

## 2025-02-20 RX ADMIN — DEXTROSE MONOHYDRATE 30 MILLILITER(S): 100 INJECTION, SOLUTION INTRAVENOUS at 09:49

## 2025-02-20 RX ADMIN — DEXTROSE MONOHYDRATE 30 MILLILITER(S): 100 INJECTION, SOLUTION INTRAVENOUS at 23:38

## 2025-02-20 RX ADMIN — Medication 400 MILLIGRAM(S): at 12:29

## 2025-02-20 NOTE — SWALLOW BEDSIDE ASSESSMENT ADULT - SLP GENERAL OBSERVATIONS
Pt was seen fully awake and alert, HOB fully elevated, on room air. Pt followed simple directives and communicated wants/needs.

## 2025-02-20 NOTE — PROGRESS NOTE ADULT - ASSESSMENT
Assessment and Plan:  DEVORA KATZ is a 74M w/ hx of hypertrophic cardiomyopathy, COPD, BPH, RA and nasal SCC s/p total septectomy, partial palatectomy, and RFFF 10/20, now s/p R ALT flap to maxilla for closure of oronasal fistula and R neck dissection for vascular access.    Plan:  - CLD  - Pain/nausea control  - Unasyn 3g q6h x 24hr  - dc alford 2/20  - OOBTC   - peridex rinses   - please suction secretions with red rubber and try to avoid the hard palate    [facility] at [phone] with any questions/concerns.    Amber Garcia PA-C  02-20-25 @ 09:35

## 2025-02-20 NOTE — PROGRESS NOTE ADULT - SUBJECTIVE AND OBJECTIVE BOX
OTOLARYNGOLOGY (ENT) PROGRESS NOTE    PATIENT: DEVORA KATZ  MRN: [id number]  : [birth year]  [id number]  DATE OF SERVICE:  25  			         ID:DEVORA KATZ is a  74M w/ hx of hypertrophic cardiomyopathy, COPD, BPH, RA and nasal SCC s/p total septectomy, partial palatectomy, and RFFF 10/20, now s/p R ALT flap to maxilla for closure of oronasal fistula and R neck dissection for vascular access.    Subjective/ Interval:   : AFVSS, MAPs 80-100s, had to be given labetalol 2x for sBPs in 170s. Otherwise doing well this morning, awake/alert, pain controlled. Strong doppler. External skin paddle looks healthy with good BRB on pinprick. Intraoral aspect of flap is very dry with heavy crusting. Neck flat,  ALLERGIES:  No Known Drug Allergies  Milk (Stomach Upset; Diarrhea)      MEDICATIONS:  Antiinfectives:   ampicillin/sulbactam  IVPB 3 Gram(s) IV Intermittent every 6 hours    IV fluids:  dextrose 10%. 1000 milliLiter(s) IV Continuous <Continuous>  dextrose 5%. 1000 milliLiter(s) IV Continuous <Continuous>  sodium phosphate 15 milliMole(s)/250 mL IVPB 15 milliMole(s) IV Intermittent once    Hematologic/Anticoagulation:  enoxaparin Injectable 40 milliGRAM(s) SubCutaneous every 24 hours    Pain medications/Neuro:  acetaminophen   IVPB .. 1000 milliGRAM(s) IV Intermittent every 6 hours PRN  HYDROmorphone  Injectable 0.5 milliGRAM(s) IV Push every 3 hours PRN  HYDROmorphone  Injectable 0.25 milliGRAM(s) IV Push every 3 hours PRN  ondansetron Injectable 4 milliGRAM(s) IV Push every 6 hours PRN    Endocrine Medications:   dextrose 50% Injectable 25 Gram(s) IV Push once  dextrose Oral Gel 15 Gram(s) Oral once PRN  glucagon  Injectable 1 milliGRAM(s) IntraMuscular once  insulin lispro (ADMELOG) corrective regimen sliding scale   SubCutaneous every 6 hours    All other standing medications:   albuterol/ipratropium for Nebulization 3 milliLiter(s) Nebulizer every 6 hours  chlorhexidine 2% Cloths 1 Application(s) Topical <User Schedule>  influenza  Vaccine (HIGH DOSE) 0.5 milliLiter(s) IntraMuscular once    All other PRN medications:    Vital Signs Last 24 Hrs  T(C): 36.7 (2025 10:00), Max: 36.7 (2025 10:00)  T(F): 98 (2025 10:00), Max: 98 (2025 10:00)  HR: 65 (2025 09:00) (61 - 86)  BP: 154/85 (2025 17:55) (74/50 - 172/92)  BP(mean): 112 (2025 17:55) (58 - 125)  RR: 20 (:00) (8 - 30)  SpO2: 93% (:) (92% - 98%)    Parameters below as of 2025 09:00  Patient On (Oxygen Delivery Method): room air           @ 07:  -   @ 07:00  --------------------------------------------------------  IN:    IV PiggyBack: 300 mL    Lactated Ringers: 1800 mL  Total IN: 2100 mL    OUT:    Bulb (mL): 47.5 mL    Indwelling Catheter - Urethral (mL): 1755 mL  Total OUT: 1802.5 mL    Total NET: 297.5 mL       @ 07:01  -   @ 09:35  --------------------------------------------------------  IN:    Lactated Ringers: 100 mL  Total IN: 100 mL    OUT:    Bulb (mL): 10 mL    Indwelling Catheter - Urethral (mL): 210 mL  Total OUT: 220 mL    Total NET: -120 mL          25 @ 07:01  -  25 @ 07:00  --------------------------------------------------------  IN:  Total IN: 0 mL    OUT:    Bulb (mL): 47.5 mL  Total OUT: 47.5 mL    Total NET: -47.5 mL      25 @ 07:01  -  25 @ 09:35  --------------------------------------------------------  IN:  Total IN: 0 mL    OUT:    Bulb (mL): 10 mL  Total OUT: 10 mL    Total NET: -10 mL      PHYSICAL EXAM:  GEN: appears stated age, NAD  NEURO: alert & oriented x   HEENT:  skin paddle on left nasolabial fold with nylons , right neck penrose with minimal drainage cook doppler strong, significant amount of oral crusting/dried blood in mouth   CVS: regular rate and rhythm  Pulm: normal respiratory excursions, not tachypneic, no labored breathing  Abd: non-distended  Ext: leg FRANTZ drain right leg        LABS                       12.5   13.08 )-----------( 170      ( 2025 05:36 )             38.7    02-    137  |  103  |  13  ----------------------------<  147[H]  4.1   |  21[L]  |  0.86    Ca    8.9      2025 05:36  Phos  2.8       Mg     1.8     20           Coagulation Studies-   PT/INR - ( 2025 14:43 )   PT: 12.9 sec;   INR: 1.12          PTT - ( 2025 14:43 )  PTT:35.9 sec  Urinalysis Basic - ( 2025 05:36 )    Color: x / Appearance: x / SG: x / pH: x  Gluc: 147 mg/dL / Ketone: x  / Bili: x / Urobili: x   Blood: x / Protein: x / Nitrite: x   Leuk Esterase: x / RBC: x / WBC x   Sq Epi: x / Non Sq Epi: x / Bacteria: x      Endocrine Panel-  Calcium: 8.9 mg/dL ( @ 05:36)  Calcium: 9.4 mg/dL ( @ 14:10)

## 2025-02-20 NOTE — SWALLOW BEDSIDE ASSESSMENT ADULT - SWALLOW EVAL: DIAGNOSIS
Assessment limited to clear liquid items. Pt self administered thin liquids best with use of Nutrisqueeze with a red rubber catheter (R sided placement). Adequate oral containment and oral clearance. No clinical indicators of penetration/aspiration and pharyngeal inefficiency noted. Presentation c/w site of lesion s/p surgical resection and reconstruction. Anticipate improvement with ongoing post-operative recovery.

## 2025-02-20 NOTE — SWALLOW BEDSIDE ASSESSMENT ADULT - SWALLOW EVAL: SECRETION MANAGEMENT
Moderate thick bloody secretions visualized on the palate originating from the anterior palatal flap extending posteriorly

## 2025-02-20 NOTE — PROGRESS NOTE ADULT - SUBJECTIVE AND OBJECTIVE BOX
Interval Events:    Subjective:  Patient seen and examined at bedside. Patient reports pain is well controlled, feels secretions coming down from nasal cavity, coughing brown tinged secretions with deep inspiration. Otherwise no chest pain, abdominal pain, headache, weakness. Cook with strong signal. Breathing comfortably on room air.      Vitals:  T(C): 36.6 (02-19-25 @ 13:30), Max: 36.6 (02-19-25 @ 13:30)  HR: 62 (02-19-25 @ 13:45) (62 - 65)  BP: 113/64 (02-19-25 @ 13:45) (74/50 - 114/60)  RR: 16 (02-19-25 @ 13:45) (16 - 19)  SpO2: 98% (02-19-25 @ 13:45) (95% - 98%)    Physical Exam:    General: Awake, Alert  Neuro: A&O x3, symmetric brow raise, lid closure, smile. Sensation intact upper, middle, lower face to light touch.  HEENT: PERRL, EOMI, Mild L. lid swelling, L. paranasal skin paddle sutured in pink, clean, intact. Cook from R. posterior neck, penrose right of midline with gauze and mild serosangenous staining. Neck soft, non tender. Intraoral: No maxillary teeth, dental hardware in place, tongue with dried blood on center, skin paddle sutured in roof of mouth, MMM.  Cardio: RRR, S1,S2, No MRG  Pulm: B/L CTA anteriorly and posteriorly  Abdomen: Soft NTND  : Carlos in place, Clear yellow urine  Extremities: Bilateral  5+ and symmetric, B/L UE WWP cap refill 2 sec, RUE palpable radial pulse, LUE with healed scarring from previous RFFF. B/L LE PT/DP pulses palpable, toes WWP. LLE with incision covered in steri strips. FRANTZ from top of incision with serosangenous output.    Labs:  12.7   9.18  )-----------( 163      ( 19 Feb 2025 14:10 )             40.3       02-19    136  |  104  |  15  ----------------------------<  159[H]  4.5   |  24  |  0.98    Ca    9.4      19 Feb 2025 14:10  Phos  2.9     02-19  Mg     1.6     02-19            ABG - ( 19 Feb 2025 12:26 )  pH, Arterial: 7.45  pH, Blood: x     /  pCO2: 34    /  pO2: 191   / HCO3: 24    / Base Excess: 0.0   /  SaO2: x                   Urinalysis Basic - ( 19 Feb 2025 14:10 )    Color: x / Appearance: x / SG: x / pH: x  Gluc: 159 mg/dL / Ketone: x  / Bili: x / Urobili: x   Blood: x / Protein: x / Nitrite: x   Leuk Esterase: x / RBC: x / WBC x   Sq Epi: x / Non Sq Epi: x / Bacteria: x        PT/INR - ( 19 Feb 2025 14:43 )   PT: 12.9 sec;   INR: 1.12          PTT - ( 19 Feb 2025 14:43 )  PTT:35.9 sec          CAPILLARY BLOOD GLUCOSE      POCT Blood Glucose.: 142 mg/dL (19 Feb 2025 14:38)     Interval Events:  , given ofirmev for pain, SBP improved to 145. D/cd IVF.    Subjective:  Patient seen and examined at bedside. Patient reports pain is well controlled, feels secretions coming down from nasal cavity, coughing brown tinged secretions with deep inspiration. Otherwise no chest pain, abdominal pain, headache, weakness. Cook with strong signal. Breathing comfortably on room air.      ICU Vital Signs Last 24 Hrs  T(C): 36.8 (20 Feb 2025 13:00), Max: 36.8 (20 Feb 2025 13:00)  T(F): 98.2 (20 Feb 2025 13:00), Max: 98.2 (20 Feb 2025 13:00)  HR: 73 (20 Feb 2025 13:00) (61 - 86)  BP: 131/64 (20 Feb 2025 13:00) (120/69 - 172/92)  BP(mean): 92 (20 Feb 2025 13:00) (89 - 125)  ABP: 127/76 (20 Feb 2025 10:00) (119/59 - 184/94)  ABP(mean): 94 (20 Feb 2025 10:00) (80 - 131)  RR: 24 (20 Feb 2025 13:00) (8 - 32)  SpO2: 93% (20 Feb 2025 13:00) (91% - 98%)    O2 Parameters below as of 20 Feb 2025 13:00  Patient On (Oxygen Delivery Method): room air    I&O's Summary    19 Feb 2025 07:01  -  20 Feb 2025 07:00  --------------------------------------------------------  IN: 2100 mL / OUT: 1802.5 mL / NET: 297.5 mL    20 Feb 2025 07:01  -  20 Feb 2025 13:47  --------------------------------------------------------  IN: 577.5 mL / OUT: 320 mL / NET: 257.5 mL      Physical Exam:    General: Awake, Alert  Neuro: A&O x3, symmetric brow raise, lid closure, smile. Sensation intact upper, middle, lower face to light touch.  HEENT: PERRL, EOMI, Mild L. lid swelling, L. paranasal skin paddle sutured in pink, clean, intact. Cook from R. posterior neck, penrose right of midline with gauze and mild serosangenous staining. Neck soft, non tender. Intraoral: No maxillary teeth, dental hardware in place, tongue with dried blood on center, skin paddle sutured in roof of mouth, MMM.  Cardio: RRR, S1,S2, No MRG  Pulm: B/L CTA anteriorly and posteriorly  Abdomen: Soft NTND  : Carlos in place, Clear yellow urine  Extremities: Bilateral  5+ and symmetric, B/L UE WWP cap refill 2 sec, RUE palpable radial pulse, LUE with healed scarring from previous RFFF. B/L LE PT/DP pulses palpable, toes WWP. LLE with incision covered in steri strips. FRANTZ from top of incision with serosangenous output.    Labs:  12.5   13.08 )-----------( 170      ( 20 Feb 2025 05:36 )             38.7       02-20    137  |  103  |  13  ----------------------------<  147[H]  4.1   |  21[L]  |  0.86    Ca    8.9      20 Feb 2025 05:36  Phos  2.8     02-20  Mg     1.8     02-20            ABG - ( 19 Feb 2025 12:26 )  pH, Arterial: 7.45  pH, Blood: x     /  pCO2: 34    /  pO2: 191   / HCO3: 24    / Base Excess: 0.0   /  SaO2: x                   Urinalysis Basic - ( 20 Feb 2025 05:36 )    Color: x / Appearance: x / SG: x / pH: x  Gluc: 147 mg/dL / Ketone: x  / Bili: x / Urobili: x   Blood: x / Protein: x / Nitrite: x   Leuk Esterase: x / RBC: x / WBC x   Sq Epi: x / Non Sq Epi: x / Bacteria: x        PT/INR - ( 19 Feb 2025 14:43 )   PT: 12.9 sec;   INR: 1.12          PTT - ( 19 Feb 2025 14:43 )  PTT:35.9 sec          CAPILLARY BLOOD GLUCOSE      POCT Blood Glucose.: 142 mg/dL (20 Feb 2025 11:11)              CAPILLARY BLOOD GLUCOSE      POCT Blood Glucose.: 142 mg/dL (19 Feb 2025 14:38)

## 2025-02-20 NOTE — SWALLOW BEDSIDE ASSESSMENT ADULT - ORAL PHASE
Reduced labial seal resulting in anterior loss with thin liquids via cup. Thin liquids via Nutrisqueeze allowed for most optimal oral containment. Slow bolus manipulation and functional oral clearance.

## 2025-02-20 NOTE — SWALLOW BEDSIDE ASSESSMENT ADULT - SWALLOW EVAL: RECOMMENDED FEEDING/EATING TECHNIQUES
Nutrisqueeze with red rubber catheter/maintain upright posture during/after eating for 30 mins/oral hygiene/position upright (90 degrees)

## 2025-02-20 NOTE — PROGRESS NOTE ADULT - ASSESSMENT
75yo Man PMH hypertrophic cardiomyopathy (EF 65-70% 2/11), COPD, BPH s/p TURP, RA, s/p total septectomy, partial palatectomy, for nasal SCC with L. 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 ALT FF to maxilla for closure of oronasal fistula with R. neck dissection for vascular access. Admitted to SICU for post op flap monitoring.    Neuro: Tylenol, Diludid PRN, Zofran PRN  HEENT: Flap Checks q1h. Cook in place. Ok for gentle RRC floor of mouth suctioning  Cards: Map >65. Hx hypertrophic cardiomyopathy (EF 65-70%, no dynamic gradient at rest, up to 60 with valsalva)  Pulm: Sating well on RA. IS. Nuonebs Q6. Hx: COPD. Holding home Fluticasone-solmeterol and albuterol PRN  GI/FEN: CLD w/ nutrisqueeze with RRC tip per SLP. PPI. Stop LR @100mL/hr  : D/c alford, TOV, Strict I&O, Hx BPH s/p TURP. Hold home tamsulosin 0.4mg, Dutasteride 0.5mg while NPO.  ID: Continue unasyn 24hrs (2/19-2/20)  Endo: mISS   Heme: Hx Rheumatoid arthritis on home leflunomide 20mg.  Lines: PIVs, R. radial [place]. R. neck ramirez, R. neck penrose, R. leg FRANTZ.  Wounds: R. neck incision, R. thin incision, L. paranasal skin paddle, Intraoral roof of mouth reconstruction.  Ppx: SCDs, Start SQL  PT/OT: Not ordered  Dispo: SICU     73yo Man PMH hypertrophic cardiomyopathy (EF 65-70% 2/11), COPD, BPH s/p TURP, RA, s/p total septectomy, partial palatectomy, for nasal SCC with L. 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 ALT FF to maxilla for closure of oronasal fistula with R. neck dissection for vascular access. Admitted to SICU for post op flap monitoring.    Neuro: Tylenol, Diludid PRN, Zofran PRN  HEENT: Flap Checks q1h. Cook in place. Ok for gentle RRC floor of mouth suctioning  Cards: Map >65. Hx hypertrophic cardiomyopathy (EF 65-70%, no dynamic gradient at rest, up to 60 with valsalva)  Pulm: Sating well on RA. IS. Nuonebs Q6. Hx: COPD. Holding home Fluticasone-solmeterol and albuterol PRN  GI/FEN: CLD w/ nutrisqueeze with RRC tip per SLP. PPI. Stop LR @100mL/hr.  : D/c alford, TOLANDY, Strict I&O, Hx BPH s/p TURP. Hold home tamsulosin 0.4mg, Dutasteride 0.5mg while NPO.  ID: Continue unasyn 24hrs (2/19-2/20)  Endo: mISS   Heme: Hx Rheumatoid arthritis on home leflunomide 20mg.  Lines: PIVs, D/c R. radial [place]. R. neck ramirez, R. neck penrose, R. leg FRANTZ.  Wounds: R. neck incision, R. thin incision, L. paranasal skin paddle, Intraoral roof of mouth reconstruction.  Ppx: SCDs, Start SQL  PT/OT: OOBTC  Dispo: Step Down

## 2025-02-20 NOTE — SWALLOW BEDSIDE ASSESSMENT ADULT - SLP PERTINENT HISTORY OF CURRENT PROBLEM
PMHx of hypertrophic cardiomyopathy, COPD, BPH, RA and nasal SCC s/p total septectomy, partial palatectomy, and RFFF 10/20, now s/p R ALT flap to maxilla for closure of oronasal fistula and R neck dissection for vascular access on 2/19/25.

## 2025-02-21 LAB
ANION GAP SERPL CALC-SCNC: 10 MMOL/L — SIGNIFICANT CHANGE UP (ref 5–17)
BUN SERPL-MCNC: 13 MG/DL — SIGNIFICANT CHANGE UP (ref 7–23)
CALCIUM SERPL-MCNC: 9.6 MG/DL — SIGNIFICANT CHANGE UP (ref 8.4–10.5)
CHLORIDE SERPL-SCNC: 104 MMOL/L — SIGNIFICANT CHANGE UP (ref 96–108)
CO2 SERPL-SCNC: 25 MMOL/L — SIGNIFICANT CHANGE UP (ref 22–31)
CREAT SERPL-MCNC: 0.88 MG/DL — SIGNIFICANT CHANGE UP (ref 0.5–1.3)
EGFR: 90 ML/MIN/1.73M2 — SIGNIFICANT CHANGE UP
GLUCOSE SERPL-MCNC: 113 MG/DL — HIGH (ref 70–99)
HCT VFR BLD CALC: 39.5 % — SIGNIFICANT CHANGE UP (ref 39–50)
HGB BLD-MCNC: 12.6 G/DL — LOW (ref 13–17)
MAGNESIUM SERPL-MCNC: 2.2 MG/DL — SIGNIFICANT CHANGE UP (ref 1.6–2.6)
MCHC RBC-ENTMCNC: 27 PG — SIGNIFICANT CHANGE UP (ref 27–34)
MCHC RBC-ENTMCNC: 31.9 G/DL — LOW (ref 32–36)
MCV RBC AUTO: 84.8 FL — SIGNIFICANT CHANGE UP (ref 80–100)
NRBC BLD AUTO-RTO: 0 /100 WBCS — SIGNIFICANT CHANGE UP (ref 0–0)
PHOSPHATE SERPL-MCNC: 2.2 MG/DL — LOW (ref 2.5–4.5)
PLATELET # BLD AUTO: 157 K/UL — SIGNIFICANT CHANGE UP (ref 150–400)
POTASSIUM SERPL-MCNC: 3.6 MMOL/L — SIGNIFICANT CHANGE UP (ref 3.5–5.3)
POTASSIUM SERPL-SCNC: 3.6 MMOL/L — SIGNIFICANT CHANGE UP (ref 3.5–5.3)
RBC # BLD: 4.66 M/UL — SIGNIFICANT CHANGE UP (ref 4.2–5.8)
RBC # FLD: 14.5 % — SIGNIFICANT CHANGE UP (ref 10.3–14.5)
SODIUM SERPL-SCNC: 139 MMOL/L — SIGNIFICANT CHANGE UP (ref 135–145)
WBC # BLD: 8.78 K/UL — SIGNIFICANT CHANGE UP (ref 3.8–10.5)
WBC # FLD AUTO: 8.78 K/UL — SIGNIFICANT CHANGE UP (ref 3.8–10.5)

## 2025-02-21 PROCEDURE — 99223 1ST HOSP IP/OBS HIGH 75: CPT

## 2025-02-21 RX ORDER — ACETAMINOPHEN 500 MG/5ML
1000 LIQUID (ML) ORAL EVERY 6 HOURS
Refills: 0 | Status: DISCONTINUED | OUTPATIENT
Start: 2025-02-21 | End: 2025-02-24

## 2025-02-21 RX ORDER — OXYCODONE HYDROCHLORIDE 30 MG/1
5 TABLET ORAL EVERY 6 HOURS
Refills: 0 | Status: DISCONTINUED | OUTPATIENT
Start: 2025-02-21 | End: 2025-02-24

## 2025-02-21 RX ORDER — OXYCODONE HYDROCHLORIDE 30 MG/1
2.5 TABLET ORAL EVERY 6 HOURS
Refills: 0 | Status: DISCONTINUED | OUTPATIENT
Start: 2025-02-21 | End: 2025-02-24

## 2025-02-21 RX ORDER — MELATONIN 5 MG
3 TABLET ORAL AT BEDTIME
Refills: 0 | Status: DISCONTINUED | OUTPATIENT
Start: 2025-02-21 | End: 2025-02-24

## 2025-02-21 RX ORDER — SOD PHOS DI, MONO/K PHOS MONO 250 MG
2 TABLET ORAL ONCE
Refills: 0 | Status: COMPLETED | OUTPATIENT
Start: 2025-02-21 | End: 2025-02-21

## 2025-02-21 RX ADMIN — Medication 2 PACKET(S): at 09:58

## 2025-02-21 RX ADMIN — TAMSULOSIN HYDROCHLORIDE 0.4 MILLIGRAM(S): 0.4 CAPSULE ORAL at 22:28

## 2025-02-21 RX ADMIN — Medication 1 APPLICATION(S): at 05:14

## 2025-02-21 RX ADMIN — ENOXAPARIN SODIUM 40 MILLIGRAM(S): 100 INJECTION SUBCUTANEOUS at 09:57

## 2025-02-21 RX ADMIN — FINASTERIDE 5 MILLIGRAM(S): 1 TABLET, FILM COATED ORAL at 12:24

## 2025-02-21 RX ADMIN — Medication 15 MILLILITER(S): at 05:14

## 2025-02-21 RX ADMIN — IPRATROPIUM BROMIDE AND ALBUTEROL SULFATE 3 MILLILITER(S): .5; 2.5 SOLUTION RESPIRATORY (INHALATION) at 05:14

## 2025-02-21 RX ADMIN — Medication 15 MILLILITER(S): at 19:41

## 2025-02-21 NOTE — CONSULT NOTE ADULT - TIME BILLING
preparation to see patient, history taking, physical exam, discussion with patient,  independent reviewing and interpretation of the data, care coordination. out patient record reviewed.

## 2025-02-21 NOTE — CONSULT NOTE ADULT - ATTENDING COMMENTS
Mr. 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    # R ALT flap to maxilla for closure of oronasal fistula, Rt neck dissection for vascular acces 2/19-   # paroxysmal afib periop -very brief period   # COPD stable  # BPH  # HOCM - seen by cards preop-noted very mild     pt seen and examined with Dr. Mina- awake, alert,   flap with good doppler sound  RRR, good air entry bilaterally  ext- donar site on right thigh clean-=drain in place  no edema noted  aao x 3- non focal.    - with hx of HOCM - would ensure that pt is well hydrated, would keep pt on ivf while npo , clinically no sign of fluid overload, paroxysmal afib periop , on tele monitoring, would follow - not on BB ( determined as not required as per out-patient cardiology during preop assessment )    BPH -cw tamsulosin and finasteride as able  dvt prophylasix    Thank you for allowing medicine to participate in the care, will follow. Mr. 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    # R ALT flap to maxilla for closure of oronasal fistula, Rt neck dissection for vascular acces 2/19-   # paroxysmal afib periop -very brief period   # COPD stable  # BPH  # HOCM - seen by cards preop-noted very mild     pt seen and examined with Dr. Mina- awake, alert,   flap with good doppler sound  RRR, good air entry bilaterally  ext- donar site on right thigh clean-=drain in place  no edema noted  aao x 3- non focal.    - with hx of HOCM - would ensure that pt is well hydrated, would keep pt on ivf while npo , clinically no sign of fluid overload, paroxysmal afib periop while coughing and using urinal so brief period that RN did not get the chance to get EKG- self converted , would continue with tele monitoring, would follow - not on BB ( determined as not required as per out-patient cardiology during preop assessment )- he might benefit from out-pt holter monitor.    BPH -cw tamsulosin and finasteride as able  dvt prophylasix    Thank you for allowing medicine to participate in the care, will follow.

## 2025-02-21 NOTE — CONSULT NOTE ADULT - SUBJECTIVE AND OBJECTIVE BOX
Consultation Requested by:    Patient is a 74y old  Male who presents with a chief complaint of ALT Free Flap for Closure of Oronasal Fistula (20 Feb 2025 13:03)    HPI:  74M w/ hx of hypertrophic cardiomyopathy, COPD, BPH, RA and nasal SCC s/p total septectomy, partial palatectomy, and RFFF 10/20, now s/p R ALT flap to maxilla for closure of oronasal fistula and R neck dissection for vascular access. (19 Feb 2025 13:32)    Mr. 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 At this time the patient reports no issues, he states that he sees a cardiologist routinely but is unaware of any heart conditions that he currently possesses.     Discussed with the patient his overnight tachyarrhythmia for which he is unaware. He denies symptoms or a history of similar. He reports that his cardiologist told him to drink plenty of water and he feels thirsty at this time.       Allergies    No Known Drug Allergies  Milk (Stomach Upset; Diarrhea)    Intolerances      Antimicrobials:      Other Medications:  acetaminophen   IVPB .. 1000 milliGRAM(s) IV Intermittent every 6 hours PRN  albuterol/ipratropium for Nebulization 3 milliLiter(s) Nebulizer every 6 hours  chlorhexidine 0.12% Liquid 15 milliLiter(s) Oral Mucosa two times a day  dextrose 5%. 1000 milliLiter(s) IV Continuous <Continuous>  dextrose 50% Injectable 25 Gram(s) IV Push once  dextrose Oral Gel 15 Gram(s) Oral once PRN  enoxaparin Injectable 40 milliGRAM(s) SubCutaneous every 24 hours  finasteride 5 milliGRAM(s) Oral daily  glucagon  Injectable 1 milliGRAM(s) IntraMuscular once  HYDROmorphone  Injectable 0.5 milliGRAM(s) IV Push every 3 hours PRN  HYDROmorphone  Injectable 0.25 milliGRAM(s) IV Push every 3 hours PRN  influenza  Vaccine (HIGH DOSE) 0.5 milliLiter(s) IntraMuscular once  insulin lispro (ADMELOG) corrective regimen sliding scale   SubCutaneous every 6 hours  ondansetron Injectable 4 milliGRAM(s) IV Push every 6 hours PRN  tamsulosin 0.4 milliGRAM(s) Oral at bedtime      FAMILY HISTORY:    PAST MEDICAL & SURGICAL HISTORY:  Rheumatoid arthritis      Bronchitis  chronic      Malignant neoplasm  nasal cavity      BPH (benign prostatic hyperplasia)      Tendinitis  right rotator cuff      COPD exacerbation      Hypertrophic obstructive cardiomyopathy      S/P rotator cuff repair  Rt. shoulder-2014      Elective surgery  Rt. foot Hallux Valaus repair-!0/2016      History of prostate surgery  TURP      Surgery, elective  left knee      History of surgery  nose surgery x2    VITAL SIGNS:  T(C): 36.2 (02-21-25 @ 09:00), Max: 36.8 (02-21-25 @ 04:44)  T(F): 97.2 (02-21-25 @ 09:00), Max: 98.2 (02-21-25 @ 04:44)  HR: 64 (02-21-25 @ 08:30) (64 - 83)  BP: 161/76 (02-21-25 @ 08:30) (109/65 - 161/76)  BP(mean): 108 (02-21-25 @ 08:30) (81 - 111)  RR: 18 (02-21-25 @ 08:30) (17 - 25)  SpO2: 92% (02-21-25 @ 08:30) (90% - 98%)  Wt(kg): --    PHYSICAL EXAM:  Constitutional: Patient is in no acute distress, resting comfortably in bed.  HEENT: Atraumatic/normocephalic. no oropharyngeal erythema or exudates; mucous membranes moist. Well healing nasal skin graft to left nares.   Neck: supple; no JVD. Healing flap in place on the right jaw.   Respiratory: Clear to auscultation bilaterally; no Wheezing/Crackles/Ronchi.  Cardiac: Regular rate and rhythm, S1/S2; no Murmur/Rub/Gallop;  Gastrointestinal: abdomen soft, non-tender and non-distended;   Extremities: Warm and Well perfused. Right skin graft location, CDI.   Vascular: 2+ radial, Dorsalis pedis and posterior tibial pulses bilaterally.  Neurologic: AAOx3;  Psychiatric: affect and characteristics of appearance, verbalizations, behaviors are appropriate    Lab Results:                        12.6   8.78  )-----------( 157      ( 21 Feb 2025 07:16 )             39.5     02-21    139  |  104  |  13  ----------------------------<  113[H]  3.6   |  25  |  0.88    Ca    9.6      21 Feb 2025 07:16  Phos  2.2     02-21  Mg     2.2     02-21        PT/INR - ( 19 Feb 2025 14:43 )   PT: 12.9 sec;   INR: 1.12          PTT - ( 19 Feb 2025 14:43 )  PTT:35.9 sec  Urinalysis Basic - ( 21 Feb 2025 07:16 )    Color: x / Appearance: x / SG: x / pH: x  Gluc: 113 mg/dL / Ketone: x  / Bili: x / Urobili: x   Blood: x / Protein: x / Nitrite: x   Leuk Esterase: x / RBC: x / WBC x   Sq Epi: x / Non Sq Epi: x / Bacteria: x

## 2025-02-21 NOTE — PROGRESS NOTE ADULT - ASSESSMENT
Assessment and Plan:    DEVORA KATZ is a 74M w/ hx of hypertrophic cardiomyopathy, COPD, BPH, RA and nasal SCC s/p total septectomy, partial palatectomy, and RFFF 10/20, now s/p R ALT flap to maxilla for closure of oronasal fistula and R neck dissection for vascular access.    Plan:  - Advance to FLD   - Pain/nausea control  - Medicine consult for afib and frequent urination   - OOBTC   - peridex rinses   - please suction secretions with red rubber and try to avoid the hard palate      [facility] at [phone] with any questions/concerns.    Amber Garcia PA-C  02-21-25 @ 08:02

## 2025-02-21 NOTE — PROVIDER CONTACT NOTE (OTHER) - ASSESSMENT
Pt alert and oriented x4, was peeing at the time of the event, and then was coughing for a short time. Checked in on the patient and he was fine, in no acute distress.

## 2025-02-21 NOTE — CONSULT NOTE ADULT - ASSESSMENT
Mr. 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    # Atrial fibrillation; CHADVASC of 2 due to age alone, patient with brief incident in the setting of recent surgery, now resolved. Electrolytes stable, asymptomatic presentation.   - Continue to monitor on telemetry.   - Patient may benefit from beta-blocker for this and below condition but important to determine burden to allow accurate risk benefit discussions for AC.   - Hold AC at this time given isolated incident.   - Will need outpatient close cardiology follow up.     # Hypertrophic Cardiomyopathy: Patient with a history of mild HOCM. Reports that he has never needed medications for this condition and was just advised to stay hydrated. Per cardiology note, no plans for beta blockers at last visit as echo findings very mild.     # COPD: Continue with home Advair Diskus and Albuterol inhaler PRN.     # BPH: Monitor for urinary retention. Continue with home Tamsulosin and Dutasteride.     # RA: Continue home Leuflonamide, no signs of active disease at this time.     Case discussed with Dr. Wright.  Mr. 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    # Atrial fibrillation; CHADVASC of 2 due to age alone, patient with brief incident in the setting of recent surgery, now resolved. Electrolytes stable, asymptomatic presentation.   - Continue to monitor on telemetry.   - Patient may benefit from beta-blocker for this and below condition but important to determine burden to allow accurate risk benefit discussions for AC.   - Hold AC at this time given isolated incident.   - Will need outpatient close cardiology follow up.     # Urinary Frequency: Reported new symptom since the patient arrived at the hospital, no reported pain and is able to empty bladder. Patient reports home BPH medications of Tamsulosin and Dutasteride.   - Please obtain a urinalysis.     # Hypertrophic Cardiomyopathy: Patient with a history of mild HOCM. Reports that he has never needed medications for this condition and was just advised to stay hydrated. Per cardiology note, no plans for beta blockers at last visit as echo findings very mild.     # COPD: Continue with home Advair Diskus and Albuterol inhaler PRN.     # BPH: Monitor for urinary retention. Continue with home Tamsulosin and Dutasteride.     # RA: Continue home Leuflonamide, no signs of active disease at this time.     Case discussed with Dr. Wright.  Mr. 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    # Atrial fibrillation; CHADVASC of 2 due to age alone, patient with brief incident in the setting of recent surgery, now resolved. Electrolytes stable, asymptomatic presentation.   - Continue to monitor on telemetry.   - Patient may benefit from beta-blocker for this and below condition but important to determine burden to allow accurate risk benefit discussions for AC.   - Hold AC at this time given isolated incident.   - Will need outpatient close cardiology follow up.     # Urinary Frequency: Reported new symptom since the patient arrived at the hospital, no reported pain and is able to empty bladder. Patient reports home BPH medications of Tamsulosin.  - Please obtain a urinalysis.   - Med rec performed with patients pharmacy, he is not taking Dutasteride at home and so the addition of this medication inpatient could be causing his current symptoms.     # Hypertrophic Cardiomyopathy: Patient with a history of mild HOCM. Reports that he has never needed medications for this condition and was just advised to stay hydrated. Per cardiology note, no plans for beta blockers at last visit as echo findings very mild.     # COPD: Continue with home Advair Diskus and Albuterol inhaler PRN.     # BPH: Monitor for urinary retention. Continue with home Tamsulosin.    # RA: Continue home Leuflonamide, no signs of active disease at this time.     Case discussed with Dr. Wright.

## 2025-02-21 NOTE — PROGRESS NOTE ADULT - SUBJECTIVE AND OBJECTIVE BOX
OTOLARYNGOLOGY (ENT) PROGRESS NOTE    PATIENT: DEVORA KATZ  MRN: [id number]  : [birth year]  [id number]  DATE OF SERVICE:  25  			         		         ID:DEVORA KATZ is a  74M w/ hx of hypertrophic cardiomyopathy, COPD, BPH, RA and nasal SCC s/p total septectomy, partial palatectomy, and RFFF 10/20, now s/p R ALT flap to maxilla for closure of oronasal fistula and R neck dissection for vascular access.    Subjective/ Interval:   : AFVSS, MAPs 80-100s, had to be given labetalol 2x for sBPs in 170s. Otherwise doing well this morning, awake/alert, pain controlled. Strong doppler. External skin paddle looks healthy with good BRB on pinprick. Intraoral aspect of flap is very dry with heavy crusting. Neck flat,  ; Patient seen this morning, noted to have a 2 min of afib with RVR overnight after urinating which self resolved. MAPs 80-100s,  strong doppler. External skin paddle looks healthy. Intraoral aspect of flap much less dry this morning. Neck flat, penrose with minimal serosang discharge. Leg incision c/d/I. FRANTZ was not holding so we put on LIWS. Was OOBTC yesterday. Said he had increased urinary frequency overnight  ALLERGIES:  No Known Drug Allergies  Milk (Stomach Upset; Diarrhea)      MEDICATIONS:  Antiinfectives:     IV fluids:  dextrose 5%. 1000 milliLiter(s) IV Continuous <Continuous>    Hematologic/Anticoagulation:  enoxaparin Injectable 40 milliGRAM(s) SubCutaneous every 24 hours    Pain medications/Neuro:  acetaminophen   IVPB .. 1000 milliGRAM(s) IV Intermittent every 6 hours PRN  HYDROmorphone  Injectable 0.5 milliGRAM(s) IV Push every 3 hours PRN  HYDROmorphone  Injectable 0.25 milliGRAM(s) IV Push every 3 hours PRN  ondansetron Injectable 4 milliGRAM(s) IV Push every 6 hours PRN    Endocrine Medications:   dextrose 50% Injectable 25 Gram(s) IV Push once  dextrose Oral Gel 15 Gram(s) Oral once PRN  finasteride 5 milliGRAM(s) Oral daily  glucagon  Injectable 1 milliGRAM(s) IntraMuscular once  insulin lispro (ADMELOG) corrective regimen sliding scale   SubCutaneous every 6 hours    All other standing medications:   albuterol/ipratropium for Nebulization 3 milliLiter(s) Nebulizer every 6 hours  chlorhexidine 0.12% Liquid 15 milliLiter(s) Oral Mucosa two times a day  influenza  Vaccine (HIGH DOSE) 0.5 milliLiter(s) IntraMuscular once  tamsulosin 0.4 milliGRAM(s) Oral at bedtime    All other PRN medications:    Vital Signs Last 24 Hrs  T(C): 36.8 (2025 04:44), Max: 36.8 (2025 13:00)  T(F): 98.2 (2025 04:44), Max: 98.2 (2025 13:00)  HR: 80 (2025 03:45) (64 - 84)  BP: 151/83 (2025 03:45) (109/65 - 151/83)  BP(mean): 111 (2025 03:45) (81 - 111)  RR: 17 (2025 03:45) (17 - 32)  SpO2: 93% (2025 03:45) (90% - 98%)    Parameters below as of 2025 03:45  Patient On (Oxygen Delivery Method): room air           @ 07:  -   @ 07:00  --------------------------------------------------------  IN:    dextrose 10%: 660 mL    IV PiggyBack: 100 mL    IV PiggyBack: 100 mL    IV PiggyBack: 250 mL    Lactated Ringers: 100 mL    Oral Fluid: 100 mL  Total IN: 1310 mL    OUT:    Bulb (mL): 30 mL    Indwelling Catheter - Urethral (mL): 310 mL    Voided (mL): 1675 mL  Total OUT: 2015 mL    Total NET: -705 mL          25 @ 07:01  -  25 @ 07:00  --------------------------------------------------------  IN:  Total IN: 0 mL    OUT:    Bulb (mL): 30 mL  Total OUT: 30 mL    Total NET: -30 mL        PHYSICAL EXAM:  GEN: appears stated age, NAD  NEURO: alert & oriented x   HEENT:  skin paddle on left nasolabial fold with nylons , right neck penrose with minimal drainage,   cook doppler strong, of oral crusting/dried blood in mouth improving   CVS: regular rate and rhythm  Pulm: normal respiratory excursions, not tachypneic, no labored breathing  Abd: non-distended  Ext: leg FRANTZ drain right leg  intermittently losing suction with tegaderm over suction            LABS                       12.6   8.78  )-----------( 157      ( 2025 07:16 )             39.5    02-20    137  |  103  |  13  ----------------------------<  147[H]  4.1   |  21[L]  |  0.86    Ca    8.9      2025 05:36  Phos  2.8     02-20  Mg     1.8     02-20           Coagulation Studies-   PT/INR - ( 2025 14:43 )   PT: 12.9 sec;   INR: 1.12          PTT - ( 2025 14:43 )  PTT:35.9 sec  Urinalysis Basic - ( 2025 05:36 )    Color: x / Appearance: x / SG: x / pH: x  Gluc: 147 mg/dL / Ketone: x  / Bili: x / Urobili: x   Blood: x / Protein: x / Nitrite: x   Leuk Esterase: x / RBC: x / WBC x   Sq Epi: x / Non Sq Epi: x / Bacteria: x

## 2025-02-21 NOTE — PROVIDER CONTACT NOTE (OTHER) - RECOMMENDATIONS
Continue to monitor. Pt converted back to a NSR on his own once he stopped coughing and calmed down.

## 2025-02-21 NOTE — PROVIDER CONTACT NOTE (OTHER) - BACKGROUND
RECORD     [] Admission Note          [x] Progress Note          [] Discharge Summary     Male Cheko Javier is a well-appearing full term large for gestational age infant born on 2023 at 1:59 AM via , low transverse. His mother is a 39y.o.  year-old  . Prenatal serologies were negative. GBS was negative. ROM occurred 6h 20m  prior to delivery. Pregnancy was uncomplicated. Maternal history of HSV2 without signs or symptoms of outbreak at time of delivery; on valtrex suppression. Delivery was complicated by uterine atony and postpartum hemorrhage. Presentation was Vertex. He weighed 4.065 kg and measured 21.5\" in length. His APGAR scores were 9 and 9 at one and five minutes, respectively. Prenatal History     Mother's Prenatal Labs  Lab Results   Component Value Date/Time    ABO/Rh(D) O NEGATIVE 2022 01:00 AM    HBsAg, External NEGATIVE 2021 12:00 AM    HBsAg, External NEGATIVE 2021 12:00 AM    HIV, External NONREACTIVE 2021 12:00 AM    Rubella, External IMMUNE 2021 12:00 AM    Gonorrhea, External NEGATIVE 2021 12:00 AM    Chlamydia, External NEGATIVE 2021 12:00 AM    GrBStrep, External NEGATIVE 2022 12:00 AM    ABO,Rh O NEGATIVE 2021 12:00 AM        Mother's Medical History  Past Medical History:   Diagnosis Date    Abnormal Papanicolaou smear of cervix     Anemia     Complication of anesthesia     Genital herpes         Delivery Summary  Rupture Date: 2022  Rupture Time: 7:39 PM  Delivery Type: , Low Transverse   Delivery Resuscitation: Suctioning-bulb; Tactile Stimulation;Suctioning-deep    Number of Vessels: 3 Vessels    Cord Events: None  Meconium Stained: None  Amniotic Fluid Description: Clear      Additional Information  Fetal Ultrasound Abnormalities/Concerns?: No  Seen By MFM (Maternal Fetal Medicine)?: Yes  Pediatrician After Birth/ Follow Up Baby Visits: [facility]     Mother's anticipated feeding method is Breast Milk . Refer to maternal Labor & Delivery records for additional details. Early-Onset Sepsis Evaluation     [email or web address]    Incidence of Early-Onset Sepsis: 0.1000 Live Births     Gestational Age: 36w2d      Maternal Temperature: Temp (48hrs), Av.2 °F (36.8 °C), Min:97.9 °F (36.6 °C), Max:98.7 °F (37.1 °C)      ROM Duration: 6h 20m      Maternal GBS Status: Lab Results   Component Value Date/Time    GrBStrep, External NEGATIVE 2022 12:00 AM         Type of Intrapartum Antibiotics:  No antibiotics or any antibiotics < 2 hrs prior to birth     Infant's clinical exam is well-appearing. His risk per 1000/births is 0.06 with a clinical recommendation for no culture and no antibiotics and routine vitals. Hemolytic Disease Evaluation     Maternal Blood Type  Lab Results   Component Value Date/Time    ABO/Rh(D) O NEGATIVE 2022 01:00 AM        Infant's Blood Type & Cord Screen  Lab Results   Component Value Date/Time    ABO/Rh(D) O NEGATIVE 2022 03:34 AM       Lab Results   Component Value Date/Time    CHANTE IgG NEG 2022 03:34 AM        Hospital Course / Problem List       Patient Active Problem List    Diagnosis    Single liveborn, born in hospital, delivered          Intake & Output     Feeding Plan: Breast Milk      Breast Fed: 9 times for 5-30 minutes   LATCH Score: 5   Donor Milk Fed: N/A       Formula Fed: N/A     Urine Occurrence(s) 3   Stool Occurrence(s) 7     Vital Signs     Most Recent 24 Hour Range   Temp: 98 °F (36.7 °C)     Pulse (Heart Rate): 120     Resp Rate: 52  Temp  Min: 98 °F (36.7 °C)  Max: 98.8 °F (37.1 °C)    Pulse  Min: 120  Max: 140    Resp  Min: 55  Max: 52     Physical Exam     Birth Weight Current Weight Change since Birth (%)   4.065 kg 3.691 kg (8 lbs 2.1oz)  -9%       General  Alert, active, nondysmorphic-appearing infant in no acute distress.    Head  Normocephalic, anterior fontenelle soft and flat, atraumatic. Eyes  Pupils equal and reactive, red reflex normal bilaterally. Ears  Normal shape and position with no pits or tags. Nose Nares normal. Septum midline. Mucosa normal.   Throat Lips, mucosa, and tongue normal. Palate intact. Neck Normal structure, no JVD. Back   Symmetric, no evidence of spinal defect. Lungs   Clear to auscultation bilaterally. Chest Wall  Symmetric movement with respiration. No retractions. Heart  Regular rate and rhythm, S1, S2 normal, no murmur. Abdomen   Soft, non-tender. Bowel sounds active. No masses or organomegaly. Umbilical stump is clean, dry, and intact. Genitalia  Normal male. Rectal  Appropriately positioned and patent anal opening. MSK No clavicular crepitus. Negative Anthony and Ortolani. Leg lengths grossly symmetric. Five fingers on each hand and five toes on each foot. Pulses 2+ and symmetric. Skin Skin color, texture, turgor normal. There is moderate erythema toxicum. Neurologic Normal tone. Root, suck, grasp, and [place] reflexes present. Moves all extremities equally.         Examiner: ZULMA Granado  Date/Time: 8/6/22 @ 0630     Medications     Medications Administered       erythromycin (ILOTYCIN) 5 mg/gram (0.5 %) ophthalmic ointment       Admin Date  2022 Action  Given Dose   Route  Both Eyes Administered By  Mukund Skaggs RN              phytonadione (vitamin K1) (AQUA-MEPHYTON) injection 1 mg       Admin Date  2022 Action  Given Dose  1 mg Route  IntraMUSCular Administered By  Mukund Skaggs RN                     Laboratory Studies (24 Hrs)     Recent Results (from the past 24 hour(s))   GLUCOSE, POC    Collection Time: 08/05/22  7:39 AM   Result Value Ref Range    Glucose (POC) 51 50 - 110 mg/dL    Performed by Oliva Palacios    GLUCOSE, POC    Collection Time: 08/05/22  6:48 PM   Result Value Ref Range    Glucose (POC) 45 (LL) 50 - 110 mg/dL    Performed by Nickolas Rey POC    Collection Time: 22  3:40 AM   Result Value Ref Range    Glucose (POC) 55 50 - 110 mg/dL    Performed by [employer] [facility]     Metabolic Screen:      (Device ID:  )     CCHD Screen:   Pre Ductal O2 Sat (%): 100  Post Ductal O2 Sat (%): 100     Hearing Screen:             Car Seat Trial:         Immunization History: There is no immunization history for the selected administration types on file for this patientLarisa Garcia is a well-appearing infant born at a gestational age of 36w2d  and is now 31-hour old old. His physical exam is without concerning findings. His vital signs have been within acceptable ranges. He is now -9% from his birth weight. Mother is breastfeeding and feeding is progressing appropriately. His most recent transcutaneous bilirubin level was 5.1 mg/dL at 25 hours  which is in the low risk zone. .     Plan     - Continue routine  care  - Anticipate follow-up with [facility] . Parental Contact     Infant's mother and father updated and provided the opportunity for questions.      Signed: Henrietta Moreau, ABDIRASHID, APRN, NNP-BC    Date/Time: 2022 at 7:31 AM Pt with hx of SCC, s/p 1/19 ALT FF to maxilla for closure of oronasal fistula with right neck dissection.

## 2025-02-22 LAB
ANION GAP SERPL CALC-SCNC: 12 MMOL/L — SIGNIFICANT CHANGE UP (ref 5–17)
BUN SERPL-MCNC: 12 MG/DL — SIGNIFICANT CHANGE UP (ref 7–23)
CALCIUM SERPL-MCNC: 9.5 MG/DL — SIGNIFICANT CHANGE UP (ref 8.4–10.5)
CHLORIDE SERPL-SCNC: 102 MMOL/L — SIGNIFICANT CHANGE UP (ref 96–108)
CO2 SERPL-SCNC: 23 MMOL/L — SIGNIFICANT CHANGE UP (ref 22–31)
CREAT SERPL-MCNC: 0.86 MG/DL — SIGNIFICANT CHANGE UP (ref 0.5–1.3)
EGFR: 90 ML/MIN/1.73M2 — SIGNIFICANT CHANGE UP
GLUCOSE SERPL-MCNC: 90 MG/DL — SIGNIFICANT CHANGE UP (ref 70–99)
HCT VFR BLD CALC: 41.1 % — SIGNIFICANT CHANGE UP (ref 39–50)
HGB BLD-MCNC: 12.7 G/DL — LOW (ref 13–17)
MAGNESIUM SERPL-MCNC: 2 MG/DL — SIGNIFICANT CHANGE UP (ref 1.6–2.6)
MCHC RBC-ENTMCNC: 26.8 PG — LOW (ref 27–34)
MCHC RBC-ENTMCNC: 30.9 G/DL — LOW (ref 32–36)
MCV RBC AUTO: 86.7 FL — SIGNIFICANT CHANGE UP (ref 80–100)
NRBC BLD AUTO-RTO: 0 /100 WBCS — SIGNIFICANT CHANGE UP (ref 0–0)
PHOSPHATE SERPL-MCNC: 2.6 MG/DL — SIGNIFICANT CHANGE UP (ref 2.5–4.5)
PLATELET # BLD AUTO: 172 K/UL — SIGNIFICANT CHANGE UP (ref 150–400)
POTASSIUM SERPL-MCNC: 4.3 MMOL/L — SIGNIFICANT CHANGE UP (ref 3.5–5.3)
POTASSIUM SERPL-SCNC: 4.3 MMOL/L — SIGNIFICANT CHANGE UP (ref 3.5–5.3)
RBC # BLD: 4.74 M/UL — SIGNIFICANT CHANGE UP (ref 4.2–5.8)
RBC # FLD: 14.2 % — SIGNIFICANT CHANGE UP (ref 10.3–14.5)
SODIUM SERPL-SCNC: 137 MMOL/L — SIGNIFICANT CHANGE UP (ref 135–145)
TSH SERPL-MCNC: 1.15 UIU/ML — SIGNIFICANT CHANGE UP (ref 0.27–4.2)
WBC # BLD: 7.9 K/UL — SIGNIFICANT CHANGE UP (ref 3.8–10.5)
WBC # FLD AUTO: 7.9 K/UL — SIGNIFICANT CHANGE UP (ref 3.8–10.5)

## 2025-02-22 PROCEDURE — 99232 SBSQ HOSP IP/OBS MODERATE 35: CPT

## 2025-02-22 RX ADMIN — FINASTERIDE 5 MILLIGRAM(S): 1 TABLET, FILM COATED ORAL at 12:34

## 2025-02-22 RX ADMIN — Medication 15 MILLILITER(S): at 18:54

## 2025-02-22 RX ADMIN — TAMSULOSIN HYDROCHLORIDE 0.4 MILLIGRAM(S): 0.4 CAPSULE ORAL at 21:36

## 2025-02-22 RX ADMIN — ENOXAPARIN SODIUM 40 MILLIGRAM(S): 100 INJECTION SUBCUTANEOUS at 10:32

## 2025-02-22 RX ADMIN — Medication 15 MILLILITER(S): at 05:43

## 2025-02-22 NOTE — PROGRESS NOTE ADULT - ASSESSMENT
Assessment and Plan:    DEVORA KATZ is a 74M w/ hx of hypertrophic cardiomyopathy, COPD, BPH, RA and nasal SCC s/p total septectomy, partial palatectomy, and RFFF 10/20, now s/p R ALT flap to maxilla for closure of oronasal fistula and R neck dissection for vascular access.    Plan:  - C/w FLD  - Pain/nausea control  - F/u medicine recs for afib  - OOBTC   - peridex rinses   - please suction secretions with red rubber and try to avoid the hard palate      [facility] at [phone] with any questions/concerns.

## 2025-02-22 NOTE — PROGRESS NOTE ADULT - SUBJECTIVE AND OBJECTIVE BOX
OTOLARYNGOLOGY (ENT) PROGRESS NOTE    PATIENT: DEVORA KATZ  MRN: [id number]  : [birth year]  [id number]  DATE OF SERVICE:  25  	  Subjective/ Interval:   : AFVSS, MAPs 80-100s, had to be given labetalol 2x for sBPs in 170s. Otherwise doing well this morning, awake/alert, pain controlled. Strong doppler. External skin paddle looks healthy with good BRB on pinprick. Intraoral aspect of flap is very dry with heavy crusting. Neck flat,  ; Patient seen this morning, noted to have a 2 min of afib with RVR overnight after urinating which self resolved. MAPs 80-100s,  strong doppler. External skin paddle looks healthy. Intraoral aspect of flap much less dry this morning. Neck flat, penrose with minimal serosang discharge. Leg incision c/d/I. FRANTZ was not holding so we put on LIWS. Was OOBTC yesterday. Said he had increased urinary frequency overnight.  : AFVSS. No acute events overnight. Patient seen and examined at bedside. Flap exam stable w/ strong doppler signal. FRANTZ leg on LIWS. No complaints, tolerating full liquids.       PHYSICAL EXAM:  GEN: appears stated age, NAD  NEURO: alert & oriented x   HEENT:  skin paddle on left nasolabial fold with nylons , right neck penrose with minimal drainage,   cook doppler strong, of oral crusting/dried blood in mouth improving   CVS: regular rate and rhythm  Pulm: normal respiratory excursions, not tachypneic, no labored breathing  Abd: non-distended  Ext: leg FRANTZ drain right leg  intermittently losing suction with tegaderm over suction     LABS                       12.7   7.90  )-----------( 172      ( 2025 06:29 )             41.1    -    137  |  102  |  12  ----------------------------<  90  4.3   |  23  |  0.86    Ca    9.5      2025 06:29  Phos  2.6       Mg     2.0                Coagulation Studies-     Urinalysis Basic - ( 2025 06:29 )    Color: x / Appearance: x / SG: x / pH: x  Gluc: 90 mg/dL / Ketone: x  / Bili: x / Urobili: x   Blood: x / Protein: x / Nitrite: x   Leuk Esterase: x / RBC: x / WBC x   Sq Epi: x / Non Sq Epi: x / Bacteria: x      Endocrine Panel-  Calcium: 9.5 mg/dL ( @ 06:29)                MICROBIOLOGY:

## 2025-02-22 NOTE — PROGRESS NOTE ADULT - ATTENDING COMMENTS
Mr. 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    # R ALT flap to maxilla for closure of oronasal fistula, Rt neck dissection for vascular acces 2/19-   # paroxysmal afib periop -very brief period and no recurrance  # COPD stable  # BPH  # HOCM - seen by cards preop-noted very mild     pt seen and examined with Dr. Mina- awake, alert,   flap with good doppler sound  RRR, good air entry bilaterally  ext- donar site on right thigh clean-=drain in place  no edema noted  aao x 3- non focal.    - with hx of HOCM - would ensure that pt is well hydrated, would keep pt on ivf while npo , clinically no sign of fluid overload, paroxysmal afib periop while coughing and using urinal so brief period that RN did not get the chance to get EKG- self converted , would continue with tele monitoring, remain in sinus rate controlled.  would follow - not on BB ( determined as not required as per out-patient cardiology during preop assessment )- he might benefit from out-pt holter monitor.    BPH -cw tamsulosin and finasteride as able  dvt prophylasix    Thank you for allowing medicine to participate in the care, will follow.
Hypertrophic cardiomyopathy, BPH, COPD,  s/p total septectomy, partial palatectomy, for nasal SCC with L. 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 ALT FF to maxilla for closure of oronasal fistula with R. neck dissection   physical as above  breathing is comfortable on RA  starting nutrition as above  continue unasyn  bronchodilators  increase in AG should get better with nutrition, likely degree of ketosis

## 2025-02-22 NOTE — PROGRESS NOTE ADULT - ASSESSMENT
Mr. 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    # Atrial fibrillation; CHADVASC of 2 due to age alone, patient with brief incident in the setting of recent surgery, now resolved. Electrolytes stable, asymptomatic presentation.   - Continue to monitor on telemetry.   - Patient may benefit from beta-blocker for this and below condition but important to determine burden to allow accurate risk benefit discussions for AC.   - Hold AC at this time given isolated incident.   - Will need outpatient close cardiology follow up.     # Urinary Frequency: Reported new symptom since the patient arrived at the hospital, no reported pain and is able to empty bladder. Patient reports home BPH medications of Tamsulosin.  - Please obtain a urinalysis.   - Med rec performed with patients pharmacy, he is not taking Dutasteride at home and so the addition of this medication inpatient could be causing his current symptoms.     # Hypertrophic Cardiomyopathy: Patient with a history of mild HOCM. Reports that he has never needed medications for this condition and was just advised to stay hydrated. Per cardiology note, no plans for beta blockers at last visit as echo findings very mild.     # COPD: Continue with home Advair Diskus and Albuterol inhaler PRN.     # BPH: Monitor for urinary retention. Continue with home Tamsulosin.    # RA: Continue home Leuflonamide, no signs of active disease at this time.     Case discussed with Dr. Wright.

## 2025-02-22 NOTE — PROGRESS NOTE ADULT - SUBJECTIVE AND OBJECTIVE BOX
INTERVAL HPI/OVERNIGHT EVENTS:  Patient was seen and examined at bedside. Case discussed with attending physician during morning rounds.     As per nurse and patient, no o/n events, patient resting comfortably. No complaints at this time.     VITAL SIGNS:  T(F): 97 (02-22-25 @ 09:15)  HR: 78 (02-22-25 @ 08:30)  BP: 131/90 (02-22-25 @ 08:30)  RR: 18 (02-22-25 @ 08:30)  SpO2: 95% (02-22-25 @ 08:30)  Wt(kg): --    PHYSICAL EXAM:  Constitutional: Patient is in no acute distress, resting comfortably in bed.  HEENT: Atraumatic/normocephalic. no oropharyngeal erythema or exudates; mucous membranes moist. Well healing nasal skin graft to left nares.   Neck: supple; no JVD. Healing flap in place on the right jaw.   Respiratory: Clear to auscultation bilaterally; no Wheezing/Crackles/Ronchi.  Cardiac: Regular rate and rhythm, S1/S2; no Murmur/Rub/Gallop;  Gastrointestinal: abdomen soft, non-tender and non-distended;   Extremities: Warm and Well perfused. Right skin graft location, CDI.   Vascular: 2+ radial, Dorsalis pedis and posterior tibial pulses bilaterally.  Neurologic: AAOx3;  Psychiatric: affect and characteristics of appearance, verbalizations, behaviors are appropriate    MEDICATIONS  (STANDING):  acetaminophen   Oral Liquid .. 1000 milliGRAM(s) Oral every 6 hours  albuterol/ipratropium for Nebulization 3 milliLiter(s) Nebulizer every 6 hours  chlorhexidine 0.12% Liquid 15 milliLiter(s) Oral Mucosa two times a day  enoxaparin Injectable 40 milliGRAM(s) SubCutaneous every 24 hours  finasteride 5 milliGRAM(s) Oral daily  influenza  Vaccine (HIGH DOSE) 0.5 milliLiter(s) IntraMuscular once  tamsulosin 0.4 milliGRAM(s) Oral at bedtime    MEDICATIONS  (PRN):  melatonin 3 milliGRAM(s) Oral at bedtime PRN Insomnia  ondansetron Injectable 4 milliGRAM(s) IV Push every 6 hours PRN Nausea  oxyCODONE    Solution 2.5 milliGRAM(s) Oral every 6 hours PRN Moderate Pain (4 - 6)  oxyCODONE    Solution 5 milliGRAM(s) Oral every 6 hours PRN Severe Pain (7 - 10)      Allergies    No Known Drug Allergies  Milk (Stomach Upset; Diarrhea)    Intolerances        LABS:                        12.7   7.90  )-----------( 172      ( 22 Feb 2025 06:29 )             41.1     02-22    137  |  102  |  12  ----------------------------<  90  4.3   |  23  |  0.86    Ca    9.5      22 Feb 2025 06:29  Phos  2.6     02-22  Mg     2.0     02-22        Urinalysis Basic - ( 22 Feb 2025 06:29 )    Color: x / Appearance: x / SG: x / pH: x  Gluc: 90 mg/dL / Ketone: x  / Bili: x / Urobili: x   Blood: x / Protein: x / Nitrite: x   Leuk Esterase: x / RBC: x / WBC x   Sq Epi: x / Non Sq Epi: x / Bacteria: x          RADIOLOGY & ADDITIONAL TESTS:  Reviewed

## 2025-02-23 LAB
ANION GAP SERPL CALC-SCNC: 11 MMOL/L — SIGNIFICANT CHANGE UP (ref 5–17)
APPEARANCE UR: CLEAR — SIGNIFICANT CHANGE UP
BILIRUB UR-MCNC: NEGATIVE — SIGNIFICANT CHANGE UP
BUN SERPL-MCNC: 18 MG/DL — SIGNIFICANT CHANGE UP (ref 7–23)
CALCIUM SERPL-MCNC: 9.4 MG/DL — SIGNIFICANT CHANGE UP (ref 8.4–10.5)
CHLORIDE SERPL-SCNC: 104 MMOL/L — SIGNIFICANT CHANGE UP (ref 96–108)
CO2 SERPL-SCNC: 21 MMOL/L — LOW (ref 22–31)
COLOR SPEC: YELLOW — SIGNIFICANT CHANGE UP
CREAT SERPL-MCNC: 0.86 MG/DL — SIGNIFICANT CHANGE UP (ref 0.5–1.3)
DIFF PNL FLD: NEGATIVE — SIGNIFICANT CHANGE UP
EGFR: 90 ML/MIN/1.73M2 — SIGNIFICANT CHANGE UP
GLUCOSE SERPL-MCNC: 80 MG/DL — SIGNIFICANT CHANGE UP (ref 70–99)
GLUCOSE UR QL: NEGATIVE MG/DL — SIGNIFICANT CHANGE UP
HCT VFR BLD CALC: 40.3 % — SIGNIFICANT CHANGE UP (ref 39–50)
HGB BLD-MCNC: 12.9 G/DL — LOW (ref 13–17)
KETONES UR-MCNC: 80 MG/DL
LEUKOCYTE ESTERASE UR-ACNC: NEGATIVE — SIGNIFICANT CHANGE UP
MAGNESIUM SERPL-MCNC: 1.9 MG/DL — SIGNIFICANT CHANGE UP (ref 1.6–2.6)
MCHC RBC-ENTMCNC: 27.3 PG — SIGNIFICANT CHANGE UP (ref 27–34)
MCHC RBC-ENTMCNC: 32 G/DL — SIGNIFICANT CHANGE UP (ref 32–36)
MCV RBC AUTO: 85.4 FL — SIGNIFICANT CHANGE UP (ref 80–100)
NITRITE UR-MCNC: NEGATIVE — SIGNIFICANT CHANGE UP
NRBC BLD AUTO-RTO: 0 /100 WBCS — SIGNIFICANT CHANGE UP (ref 0–0)
PH UR: 6 — SIGNIFICANT CHANGE UP (ref 5–8)
PHOSPHATE SERPL-MCNC: 2.8 MG/DL — SIGNIFICANT CHANGE UP (ref 2.5–4.5)
PLATELET # BLD AUTO: 158 K/UL — SIGNIFICANT CHANGE UP (ref 150–400)
POTASSIUM SERPL-MCNC: 4 MMOL/L — SIGNIFICANT CHANGE UP (ref 3.5–5.3)
POTASSIUM SERPL-SCNC: 4 MMOL/L — SIGNIFICANT CHANGE UP (ref 3.5–5.3)
PROT UR-MCNC: 30 MG/DL
RBC # BLD: 4.72 M/UL — SIGNIFICANT CHANGE UP (ref 4.2–5.8)
RBC # FLD: 13.9 % — SIGNIFICANT CHANGE UP (ref 10.3–14.5)
SODIUM SERPL-SCNC: 136 MMOL/L — SIGNIFICANT CHANGE UP (ref 135–145)
SP GR SPEC: 1.02 — SIGNIFICANT CHANGE UP (ref 1–1.03)
UROBILINOGEN FLD QL: 1 MG/DL — SIGNIFICANT CHANGE UP (ref 0.2–1)
WBC # BLD: 6.79 K/UL — SIGNIFICANT CHANGE UP (ref 3.8–10.5)
WBC # FLD AUTO: 6.79 K/UL — SIGNIFICANT CHANGE UP (ref 3.8–10.5)

## 2025-02-23 PROCEDURE — 99232 SBSQ HOSP IP/OBS MODERATE 35: CPT

## 2025-02-23 RX ADMIN — ENOXAPARIN SODIUM 40 MILLIGRAM(S): 100 INJECTION SUBCUTANEOUS at 10:39

## 2025-02-23 RX ADMIN — Medication 15 MILLILITER(S): at 05:42

## 2025-02-23 RX ADMIN — Medication 15 MILLILITER(S): at 19:11

## 2025-02-23 RX ADMIN — FINASTERIDE 5 MILLIGRAM(S): 1 TABLET, FILM COATED ORAL at 11:36

## 2025-02-23 RX ADMIN — TAMSULOSIN HYDROCHLORIDE 0.4 MILLIGRAM(S): 0.4 CAPSULE ORAL at 21:16

## 2025-02-23 NOTE — PROGRESS NOTE ADULT - ASSESSMENT
Mr. 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    # R ALT flap to maxilla for closure of oronasal fistula, Rt neck dissection for vascular acces 2/19-   # paroxysmal afib periop -very brief period and no recurrance  # COPD stable  # BPH  # HOCM - seen by cards preop-noted very mild       - with hx of HOCM - would ensure that pt is well hydrated, would keep pt on ivf while npo , clinically no sign of fluid overload, paroxysmal afib periop while coughing and using urinal so brief period that RN did not get the chance to get EKG- self converted and no recurrance-  would continue with tele monitoring, remain in sinus rate controlled. - not on BB ( determined as not required as per out-patient cardiology during preop assessment )- he might benefit from out-pt holter monitor which can be done with private cardiologist upon discharge.     BPH -cw tamsulosin and finasteride as able  - UA noted, not infection, mild ketone in urine could be due to low po intake,   encourage po intake.     dvt prophylasix    Thank you for allowing medicine to participate in the care, will follow, dw primary team.

## 2025-02-23 NOTE — PROGRESS NOTE ADULT - SUBJECTIVE AND OBJECTIVE BOX
DEVORA KATZ , [id number],  [facility] [address] [id number]    Time of encounter : 10 am   resting  no complaint  no more arrhythmia noted on tele.    T(C): 36.4 (25 @ 09:00), Max: 36.7 (25 @ 06:14)  HR: 96 (25 @ 11:42) (68 - 96)  BP: 116/75 (25 @ 11:42) (116/75 - 143/75)  RR: 16 (25 @ 11:42) (16 - 18)  SpO2: 95% (25 @ 11:42) (93% - 100%)    acetaminophen   Oral Liquid .. 1000 milliGRAM(s) Oral every 6 hours  albuterol/ipratropium for Nebulization 3 milliLiter(s) Nebulizer every 6 hours  chlorhexidine 0.12% Liquid 15 milliLiter(s) Oral Mucosa two times a day  enoxaparin Injectable 40 milliGRAM(s) SubCutaneous every 24 hours  finasteride 5 milliGRAM(s) Oral daily  influenza  Vaccine (HIGH DOSE) 0.5 milliLiter(s) IntraMuscular once  melatonin 3 milliGRAM(s) Oral at bedtime PRN  ondansetron Injectable 4 milliGRAM(s) IV Push every 6 hours PRN  oxyCODONE    Solution 2.5 milliGRAM(s) Oral every 6 hours PRN  oxyCODONE    Solution 5 milliGRAM(s) Oral every 6 hours PRN  tamsulosin 0.4 milliGRAM(s) Oral at bedtime    Physical Exam :    General exam :  well built, not in distress, saturating well on room air.  flap on right with good doppler sound  CVS : RRR no murmur, JVP not elevated  Lungs : good air entry bilaterally   Abdomen : BS present, soft, not tender, not distended.  Extremities: no edema, no tenderness.   Neuro : AAO x 3 non focal.  Skin : warm and dry.   :  no alford.      CBC Full  -  ( 2025 05:30 )  WBC Count : 6.79 K/uL  RBC Count : 4.72 M/uL  Hemoglobin : 12.9 g/dL  Hematocrit : 40.3 %  Platelet Count - Automated : 158 K/uL  Mean Cell Volume : 85.4 fl  Mean Cell Hemoglobin : 27.3 pg  Mean Cell Hemoglobin Concentration : 32.0 g/dL  Auto Neutrophil # : x  Auto Lymphocyte # : x  Auto Monocyte # : x  Auto Eosinophil # : x  Auto Basophil # : x  Auto Neutrophil % : x  Auto Lymphocyte % : x  Auto Monocyte % : x  Auto Eosinophil % : x  Auto Basophil % : x            136  |  104  |  18  ----------------------------<  80  4.0   |  21[L]  |  0.86    Ca    9.4      2025 05:30  Phos  2.8       Mg     1.9           Daily     Daily   CAPILLARY BLOOD GLUCOSE      POCT Blood Glucose.: 88 mg/dL (2025 12:04)      Urinalysis Basic - ( 2025 09:50 )    Color: Yellow / Appearance: Clear / S.020 / pH: x  Gluc: x / Ketone: 80 mg/dL  / Bili: Negative / Urobili: 1.0 mg/dL   Blood: x / Protein: 30 mg/dL / Nitrite: Negative   Leuk Esterase: Negative / RBC: None Seen /HPF / WBC None Seen   Sq Epi: x / Non Sq Epi: x / Bacteria: Few /HPF

## 2025-02-23 NOTE — PROGRESS NOTE ADULT - SUBJECTIVE AND OBJECTIVE BOX
OTOLARYNGOLOGY (ENT) PROGRESS NOTE    PATIENT: DEVORA KATZ  MRN: [id number]  : [birth year]  [id number]  DATE OF SERVICE:  25  	  Subjective/ Interval:   : AFVSS, MAPs 80-100s, had to be given labetalol 2x for sBPs in 170s. Otherwise doing well this morning, awake/alert, pain controlled. Strong doppler. External skin paddle looks healthy with good BRB on pinprick. Intraoral aspect of flap is very dry with heavy crusting. Neck flat,  ; Patient seen this morning, noted to have a 2 min of afib with RVR overnight after urinating which self resolved. MAPs 80-100s,  strong doppler. External skin paddle looks healthy. Intraoral aspect of flap much less dry this morning. Neck flat, penrose with minimal serosang discharge. Leg incision c/d/I. FRANTZ was not holding so we put on LIWS. Was OOBTC yesterday. Said he had increased urinary frequency overnight.  : AFVSS. No acute events overnight. Patient seen and examined at bedside. Flap exam stable w/ strong doppler signal. FRANTZ leg on LIWS. No complaints, tolerating full liquids.   : AFVSS. No acute events overnight. Patient seen and examined at bedside. Intraoral flap w/ good color and turgor, strong doppler signal. FRANTZ leg no output. Tolerating fulls. OOBTC yesterday.      PHYSICAL EXAM:  GEN: appears stated age, NAD  NEURO: alert & oriented x 4/  HEENT:  skin paddle on left nasolabial fold with nylons, cook doppler strong, of oral crusting/dried blood in mouth improving   CVS: regular rate and rhythm  Pulm: normal respiratory excursions, not tachypneic, no labored breathing  Abd: non-distended  Ext: leg FRANTZ drain right leg  intermittently losing suction with tegaderm over suction     LABS                       12.9   6.79  )-----------( 158      ( 2025 05:30 )             40.3        136  |  104  |  18  ----------------------------<  80  4.0   |  21[L]  |  0.86    Ca    9.4      2025 05:30  Phos  2.8       Mg     1.9                Coagulation Studies-     Urinalysis Basic - ( 2025 05:30 )    Color: x / Appearance: x / SG: x / pH: x  Gluc: 80 mg/dL / Ketone: x  / Bili: x / Urobili: x   Blood: x / Protein: x / Nitrite: x   Leuk Esterase: x / RBC: x / WBC x   Sq Epi: x / Non Sq Epi: x / Bacteria: x      Endocrine Panel-  Calcium: 9.4 mg/dL ( @ 05:30)                MICROBIOLOGY:

## 2025-02-23 NOTE — PHYSICAL THERAPY INITIAL EVALUATION ADULT - PERTINENT HX OF CURRENT PROBLEM, REHAB EVAL
Pt. is a74 y.o male with h/o COPD, RA, hypertrophic cardiomyopathy and nasal SCC diagnosed in 2020, s/p septectomy, palatectomy and RFFF in 10/20. Pt. is returning for planned repair of L oronasal fistula repair with R ALT flap, L nasolabial skin paddle and  R neck dissection for vascular access.

## 2025-02-23 NOTE — PROGRESS NOTE ADULT - ASSESSMENT
DEVORA KATZ is a 74M w/ hx of hypertrophic cardiomyopathy, COPD, BPH, RA and nasal SCC s/p total septectomy, partial palatectomy, and RFFF 10/20, now s/p R ALT flap to maxilla for closure of oronasal fistula and R neck dissection for vascular access.    Plan:  - C/w FLD  - Pain/nausea control  - F/u medicine recs for afib  - OOBTC   - peridex rinses   - please suction secretions with red rubber and try to avoid the hard palate      [facility] at [phone] with any questions/concerns.

## 2025-02-23 NOTE — PHYSICAL THERAPY INITIAL EVALUATION ADULT - MD ORDER
s/p  repair of L oronasal fistula repair with R ALT flap, L nasolabial skin paddle and  R neck dissection for vascular access. Currently POD#4.

## 2025-02-24 ENCOUNTER — TRANSCRIPTION ENCOUNTER (OUTPATIENT)
Age: 75
End: 2025-02-24

## 2025-02-24 VITALS — SYSTOLIC BLOOD PRESSURE: 131 MMHG | DIASTOLIC BLOOD PRESSURE: 79 MMHG | RESPIRATION RATE: 16 BRPM | HEART RATE: 92 BPM

## 2025-02-24 LAB
ANION GAP SERPL CALC-SCNC: 13 MMOL/L — SIGNIFICANT CHANGE UP (ref 5–17)
BUN SERPL-MCNC: 17 MG/DL — SIGNIFICANT CHANGE UP (ref 7–23)
CALCIUM SERPL-MCNC: 9.4 MG/DL — SIGNIFICANT CHANGE UP (ref 8.4–10.5)
CHLORIDE SERPL-SCNC: 104 MMOL/L — SIGNIFICANT CHANGE UP (ref 96–108)
CO2 SERPL-SCNC: 20 MMOL/L — LOW (ref 22–31)
CREAT SERPL-MCNC: 0.83 MG/DL — SIGNIFICANT CHANGE UP (ref 0.5–1.3)
EGFR: 91 ML/MIN/1.73M2 — SIGNIFICANT CHANGE UP
GLUCOSE SERPL-MCNC: 87 MG/DL — SIGNIFICANT CHANGE UP (ref 70–99)
HCT VFR BLD CALC: 40.5 % — SIGNIFICANT CHANGE UP (ref 39–50)
HGB BLD-MCNC: 12.7 G/DL — LOW (ref 13–17)
MAGNESIUM SERPL-MCNC: 1.9 MG/DL — SIGNIFICANT CHANGE UP (ref 1.6–2.6)
MCHC RBC-ENTMCNC: 26.6 PG — LOW (ref 27–34)
MCHC RBC-ENTMCNC: 31.4 G/DL — LOW (ref 32–36)
MCV RBC AUTO: 84.7 FL — SIGNIFICANT CHANGE UP (ref 80–100)
NRBC BLD AUTO-RTO: 0 /100 WBCS — SIGNIFICANT CHANGE UP (ref 0–0)
PHOSPHATE SERPL-MCNC: 2.5 MG/DL — SIGNIFICANT CHANGE UP (ref 2.5–4.5)
PLATELET # BLD AUTO: 166 K/UL — SIGNIFICANT CHANGE UP (ref 150–400)
POTASSIUM SERPL-MCNC: 3.9 MMOL/L — SIGNIFICANT CHANGE UP (ref 3.5–5.3)
POTASSIUM SERPL-SCNC: 3.9 MMOL/L — SIGNIFICANT CHANGE UP (ref 3.5–5.3)
RBC # BLD: 4.78 M/UL — SIGNIFICANT CHANGE UP (ref 4.2–5.8)
RBC # FLD: 13.7 % — SIGNIFICANT CHANGE UP (ref 10.3–14.5)
SODIUM SERPL-SCNC: 137 MMOL/L — SIGNIFICANT CHANGE UP (ref 135–145)
WBC # BLD: 6.65 K/UL — SIGNIFICANT CHANGE UP (ref 3.8–10.5)
WBC # FLD AUTO: 6.65 K/UL — SIGNIFICANT CHANGE UP (ref 3.8–10.5)

## 2025-02-24 PROCEDURE — 80048 BASIC METABOLIC PNL TOTAL CA: CPT

## 2025-02-24 PROCEDURE — 82947 ASSAY GLUCOSE BLOOD QUANT: CPT

## 2025-02-24 PROCEDURE — 84132 ASSAY OF SERUM POTASSIUM: CPT

## 2025-02-24 PROCEDURE — 82330 ASSAY OF CALCIUM: CPT

## 2025-02-24 PROCEDURE — 94640 AIRWAY INHALATION TREATMENT: CPT

## 2025-02-24 PROCEDURE — 84295 ASSAY OF SERUM SODIUM: CPT

## 2025-02-24 PROCEDURE — 86901 BLOOD TYPING SEROLOGIC RH(D): CPT

## 2025-02-24 PROCEDURE — 82805 BLOOD GASES W/O2 SATURATION: CPT

## 2025-02-24 PROCEDURE — C1889: CPT

## 2025-02-24 PROCEDURE — 83036 HEMOGLOBIN GLYCOSYLATED A1C: CPT

## 2025-02-24 PROCEDURE — 85610 PROTHROMBIN TIME: CPT

## 2025-02-24 PROCEDURE — 85025 COMPLETE CBC W/AUTO DIFF WBC: CPT

## 2025-02-24 PROCEDURE — 85027 COMPLETE CBC AUTOMATED: CPT

## 2025-02-24 PROCEDURE — 84100 ASSAY OF PHOSPHORUS: CPT

## 2025-02-24 PROCEDURE — 84443 ASSAY THYROID STIM HORMONE: CPT

## 2025-02-24 PROCEDURE — 81001 URINALYSIS AUTO W/SCOPE: CPT

## 2025-02-24 PROCEDURE — 97161 PT EVAL LOW COMPLEX 20 MIN: CPT

## 2025-02-24 PROCEDURE — 83735 ASSAY OF MAGNESIUM: CPT

## 2025-02-24 PROCEDURE — 85730 THROMBOPLASTIN TIME PARTIAL: CPT

## 2025-02-24 PROCEDURE — 86900 BLOOD TYPING SEROLOGIC ABO: CPT

## 2025-02-24 PROCEDURE — C9399: CPT

## 2025-02-24 PROCEDURE — 82962 GLUCOSE BLOOD TEST: CPT

## 2025-02-24 PROCEDURE — 36415 COLL VENOUS BLD VENIPUNCTURE: CPT

## 2025-02-24 PROCEDURE — 99232 SBSQ HOSP IP/OBS MODERATE 35: CPT

## 2025-02-24 PROCEDURE — 86850 RBC ANTIBODY SCREEN: CPT

## 2025-02-24 RX ORDER — OXYCODONE HYDROCHLORIDE 30 MG/1
2.5 TABLET ORAL
Qty: 30 | Refills: 0
Start: 2025-02-24 | End: 2025-02-26

## 2025-02-24 RX ADMIN — ENOXAPARIN SODIUM 40 MILLIGRAM(S): 100 INJECTION SUBCUTANEOUS at 10:27

## 2025-02-24 RX ADMIN — Medication 15 MILLILITER(S): at 05:50

## 2025-02-24 RX ADMIN — FINASTERIDE 5 MILLIGRAM(S): 1 TABLET, FILM COATED ORAL at 11:19

## 2025-02-24 NOTE — PROGRESS NOTE ADULT - ASSESSMENT
DEVORA KATZ is a 74M w/ hx of hypertrophic cardiomyopathy, COPD, BPH, RA and nasal SCC s/p total septectomy, partial palatectomy, and RFFF 10/20, now s/p R ALT flap to maxilla for closure of oronasal fistula and R neck dissection for vascular access.    Plan:  - C/w FLD  - Pain/nausea control  - F/u medicine recs for afib and dc planning   - OOBA  - peridex rinses   - Flap checks to end early afternoon, d/c cook after.  - Face nylon removal in OP setting  - please suction secretions with red rubber and try to avoid the hard palate  - dc home today

## 2025-02-24 NOTE — PROGRESS NOTE ADULT - SUBJECTIVE AND OBJECTIVE BOX
DEVORA KATZ , [id number],  [facility] [address] [address]    Time of encounter : 10 am  no event on tele- remain sinus  he is doing well- stated he is going home today.      T(C): 36.3 (02-24-25 @ 09:00), Max: 37.2 (02-23-25 @ 22:24)  HR: 92 (02-24-25 @ 11:17) (76 - 92)  BP: 131/79 (02-24-25 @ 11:17) (131/79 - 160/77)  RR: 16 (02-24-25 @ 11:17) (16 - 18)  SpO2: 94% (02-24-25 @ 05:10) (93% - 94%)    acetaminophen   Oral Liquid .. 1000 milliGRAM(s) Oral every 6 hours  albuterol/ipratropium for Nebulization 3 milliLiter(s) Nebulizer every 6 hours  chlorhexidine 0.12% Liquid 15 milliLiter(s) Oral Mucosa two times a day  enoxaparin Injectable 40 milliGRAM(s) SubCutaneous every 24 hours  finasteride 5 milliGRAM(s) Oral daily  influenza  Vaccine (HIGH DOSE) 0.5 milliLiter(s) IntraMuscular once  melatonin 3 milliGRAM(s) Oral at bedtime PRN  ondansetron Injectable 4 milliGRAM(s) IV Push every 6 hours PRN  oxyCODONE    Solution 2.5 milliGRAM(s) Oral every 6 hours PRN  oxyCODONE    Solution 5 milliGRAM(s) Oral every 6 hours PRN  tamsulosin 0.4 milliGRAM(s) Oral at bedtime      Physical Exam :    General exam :  well built, not in distress, saturating well on room air.  CVS : RRR no murmur,   Lungs : good air entry bilaterally   Abdomen : BS present, soft, not tender, not distended.  Extremities: no edema, no tenderness.  Neuro : AAO x 3 non focal.  Skin : warm and dry.   :  no alford.      CBC Full  -  ( 24 Feb 2025 05:30 )  WBC Count : 6.65 K/uL  RBC Count : 4.78 M/uL  Hemoglobin : 12.7 g/dL  Hematocrit : 40.5 %  Platelet Count - Automated : 166 K/uL  Mean Cell Volume : 84.7 fl  Mean Cell Hemoglobin : 26.6 pg  Mean Cell Hemoglobin Concentration : 31.4 g/dL  Auto Neutrophil # : x  Auto Lymphocyte # : x  Auto Monocyte # : x  Auto Eosinophil # : x  Auto Basophil # : x  Auto Neutrophil % : x  Auto Lymphocyte % : x  Auto Monocyte % : x  Auto Eosinophil % : x  Auto Basophil % : x        02-24    137  |  104  |  17  ----------------------------<  87  3.9   |  20[L]  |  0.83    Ca    9.4      24 Feb 2025 05:30  Phos  2.5     02-24  Mg     1.9     02-24      Daily     Daily   CAPILLARY BLOOD GLUCOSE          Urinalysis Basic - ( 24 Feb 2025 05:30 )    Color: x / Appearance: x / SG: x / pH: x  Gluc: 87 mg/dL / Ketone: x  / Bili: x / Urobili: x   Blood: x / Protein: x / Nitrite: x   Leuk Esterase: x / RBC: x / WBC x   Sq Epi: x / Non Sq Epi: x / Bacteria: x

## 2025-02-24 NOTE — DISCHARGE NOTE NURSING/CASE MANAGEMENT/SOCIAL WORK - PATIENT PORTAL LINK FT
You can access the FollowMyHealth Patient Portal offered by [facility] by registering at the following website: [email or web address]. By joining [employer]’s FollowMyHealth portal, you will also be able to view your health information using other applications (apps) compatible with our system.

## 2025-02-24 NOTE — PROGRESS NOTE ADULT - ASSESSMENT
Mr. 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    # R ALT flap to maxilla for closure of oronasal fistula, Rt neck dissection for vascular acces 2/19-   # paroxysmal afib periop -very brief period and no recurrance  # COPD stable  # BPH  # HOCM - seen by cards preop-noted very mild       - with hx of HOCM - would ensure that pt is well hydrated, would keep pt on ivf while npo , clinically no sign of fluid overload, paroxysmal afib periop while coughing and using urinal so brief period that RN did not get the chance to get EKG- self converted and no recurrance-  would continue with tele monitoring, remain in sinus rate controlled. - not on BB ( determined as not required as per out-patient cardiology during preop assessment )- he might benefit from out-pt holter monitor which can be done with private cardiologist upon discharge.     BPH -cw tamsulosin and finasteride as able  - UA noted, not infection, mild ketone in urine could be due to low po intake,   encourage po intake.     dvt prophylasix    Thank you for allowing medicine to participate in the care,

## 2025-02-24 NOTE — DISCHARGE NOTE NURSING/CASE MANAGEMENT/SOCIAL WORK - FINANCIAL ASSISTANCE
[facility] provides services at a reduced cost to those who are determined to be eligible through [facility]’s financial assistance program. Information regarding [facility]’s financial assistance program can be found by going to [email or web address] or by calling [phone].

## 2025-02-24 NOTE — DISCHARGE NOTE PROVIDER - DISCHARGE SERVICE FOR PATIENT
[___] : [unfilled] on the discharge service for the patient. I have reviewed and made amendments to the documentation where necessary.

## 2025-02-24 NOTE — DISCHARGE NOTE PROVIDER - NSDCCPCAREPLAN_GEN_ALL_CORE_FT
PRINCIPAL DISCHARGE DIAGNOSIS  Diagnosis: Oronasal fistula  Assessment and Plan of Treatment:       SECONDARY DISCHARGE DIAGNOSES  Diagnosis: Squamous cell carcinoma of nasal cavity  Assessment and Plan of Treatment:     Diagnosis: COPD, moderate  Assessment and Plan of Treatment:     Diagnosis: H/O hypertrophic cardiomyopathy  Assessment and Plan of Treatment:     Diagnosis: History of BPH  Assessment and Plan of Treatment:     Diagnosis: Rheumatoid arthritis  Assessment and Plan of Treatment:      PRINCIPAL DISCHARGE DIAGNOSIS  Diagnosis: Oronasal fistula  Assessment and Plan of Treatment:       SECONDARY DISCHARGE DIAGNOSES  Diagnosis: Squamous cell carcinoma of nasal cavity  Assessment and Plan of Treatment:     Diagnosis: COPD, moderate  Assessment and Plan of Treatment:     Diagnosis: H/O hypertrophic cardiomyopathy  Assessment and Plan of Treatment:     Diagnosis: History of BPH  Assessment and Plan of Treatment:     Diagnosis: Rheumatoid arthritis  Assessment and Plan of Treatment:     Diagnosis: Paroxysmal atrial fibrillation  Assessment and Plan of Treatment:

## 2025-02-24 NOTE — DISCHARGE NOTE PROVIDER - NSDCMRMEDTOKEN_GEN_ALL_CORE_FT
acetaminophen 325 mg oral tablet: 3 tab(s) orally every 6 hours  Advair Diskus: 1 inhaler(s) inhaled every 8 hours as needed for  bronchospasm  chlorhexidine 0.12% mucous membrane liquid: 15 milliliter(s) mucous membrane 2 times a day  dutasteride 0.5 mg oral capsule: 1 cap(s) orally once a day  Flomax 0.4 mg oral capsule: 1 cap(s) orally once a day  leflunomide 20 mg oral tablet: 1 tab(s) orally once a day  oxyCODONE 5 mg/5 mL oral solution: 2.5 milliliter(s) orally every 6 hours as needed for Moderate Pain (4 - 6) MDD: 10  ProAir HFA 90 mcg/inh inhalation aerosol: 2 puff(s) inhaled every 6 hours

## 2025-02-24 NOTE — DISCHARGE NOTE PROVIDER - NSDCCPTREATMENT_GEN_ALL_CORE_FT
PRINCIPAL PROCEDURE  Procedure: Reconstructive procedure using flap from anterolateral thigh  Findings and Treatment:

## 2025-02-24 NOTE — DISCHARGE NOTE PROVIDER - CARE PROVIDER_API CALL
Zach Agosto.  Otolaryngology  [address] [place] [address] - Dept [place] [address]  Phone: [phone]  Fax: [phone]  Follow Up Time:

## 2025-02-24 NOTE — DISCHARGE NOTE PROVIDER - HOSPITAL COURSE
OTOLARYNGOLOGY (ENT) DISCHARGE SUMMARY    PATIENT: DEVORA KATZ               : [birth year]  MRN: [id number]  ADMISSION DATE: 25  Discharge Date: 25 @ 07:36  Attending Physician: Zach Agosto    Admission Diagnosis:  C30.0        Status post: Reconstructive procedure using flap from anterolateral thigh         Chronic Conditions:  Rheumatoid arthritis    Bronchitis    Malignant neoplasm    BPH (benign prostatic hyperplasia)    Tendinitis    COPD exacerbation    Hypertrophic obstructive cardiomyopathy        HPI:DEVORA KATZ  is a 74M now S/p R ALT flap to maxilla for closure of oronasal fistula, R neck dissection for vascular access . Anastamosis to facial a and EJV. Skin paddle on left nasolabial fold with nylons.    Disposition: Home with family.    Discharge Condition: Stable  OTOLARYNGOLOGY (ENT) DISCHARGE SUMMARY    PATIENT: DEVORA KATZ               : [birth year]  MRN: [id number]  ADMISSION DATE: 25  Discharge Date: 25 @ 07:36  Attending Physician: Zach Agosto    Admission Diagnosis:  C30.0        Status post: Reconstructive procedure using flap from anterolateral thigh         Chronic Conditions:  Rheumatoid arthritis    Bronchitis    Malignant neoplasm    BPH (benign prostatic hyperplasia)    Tendinitis    COPD exacerbation    Hypertrophic obstructive cardiomyopathy        HPI:DEVORA KATZ  is a 74M now S/p R ALT flap to maxilla for closure of oronasal fistula, R neck dissection for vascular access . Anastamosis to facial a and EJV. Skin paddle on left nasolabial fold with nylons. Post op patient had paroxysmal afib for 2 min - self resolved. Will follow up outpatient with cardiologist.     Disposition: Home with family.    Discharge Condition: Stable

## 2025-02-24 NOTE — DISCHARGE NOTE PROVIDER - NSDCDCMDCOMP_GEN_ALL_CORE
lab work timing   Received: Today   Message Contents   Chandrika Márquez R.N.   Phone Number: [phone]             JOSE/mary     Pt calling to ask when she should get her lipid profile done next.       Please call Nini .      Pt is seen in office making her 6 month FV with MD.  Pt is requesting to speak to this RN.  Pt states that since prescribed Simvastatin, a week later, she experienced muscle pain in her arms and had stopped.  Pt was then prescribed by Dr. Bloch Rosuvastatin but experienced strange pain in arms lasting 5 to 10 minutes and she stopped taking that medication.  Pt had recently started back on Simvastatin, but has only been taking the medication Monday, Tuesday, Thursday, and Friday for 2 weeks and had not experienced any muscle pain.    Pt has an upcoming appointment with Dr. Bloch on 10/25/2018 and states that it was recommended by MD to do her Lipid draw in November.  Pt requesting advise from MD on when to take Lipid test before her appointment with Dr. Bloch or when it was recommended in November.  Reassured pt that concerns will be relayed to MD for advise.      Will relay updates and concerns to MD for advise and recommendation.   This document is complete and the patient is ready for discharge.

## 2025-02-24 NOTE — DISCHARGE NOTE PROVIDER - NSDCFUADDINST_GEN_ALL_CORE_FT
ENT Discharge Instructions    ENT follow up appointment:  - please call the office to confirm appointment:     *Please call your doctor or nurse practitioner if you have increased pain, swelling, redness, or drainage from the incision site.  *You may shower, and wash surgical incisions with a mild soap and warm water. Gently pat the area dry.  *If you have steri-strips, they will fall off on their own. Please remove any remaining strips 7-10 days after surgery.    Activity:  - fatigue is common after surgery, rest if you feel tired   -Please get plenty of rest, continue to ambulate several times per day, and drink adequate amounts of fluids.   -Avoid lifting weights greater than 5-10 lbs until you follow-up with your surgeon, who will instruct you further regarding activity restrictions.  - Walking is recommended, ambulate as tolerated    Medications: Please resume all regular home medications unless specifically advised not to take a particular medication.     Warning Signs:  Please call your doctor or nurse practitioner if you experience the following:  *You experience new chest pain, pressure, squeezing or tightness.  *New or worsening cough, shortness of breath, or wheeze.  *If you are vomiting and cannot keep down fluids or your medications.  *You are getting dehydrated due to continued vomiting, diarrhea, or other reasons. Signs of dehydration include dry mouth, rapid heartbeat, or feeling dizzy or faint when standing.  *Your pain is not improving within 8-12 hours or is not gone within 24 hours.  *You have shaking chills, or fever greater than 101.5 degrees Fahrenheit or 38 degrees Celsius.  *Any change in your symptoms, or any new symptoms that concern you.     PLEASE CALL THE OFFICE WITH ANY QUESTIONS OR CONCERNS:   ENT Discharge Instructions    ENT follow up appointment:  - please call the office to confirm appointment:   Please follow up outpatient with your cardiologist - may need an outpatient halter monitor  *Please call your doctor or nurse practitioner if you have increased pain, swelling, redness, or drainage from the incision site.  *You may shower, and wash surgical incisions with a mild soap and warm water. Gently pat the area dry.  *If you have steri-strips, they will fall off on their own. Please remove any remaining strips 7-10 days after surgery.    Activity:  - fatigue is common after surgery, rest if you feel tired   -Please get plenty of rest, continue to ambulate several times per day, and drink adequate amounts of fluids.   -Avoid lifting weights greater than 5-10 lbs until you follow-up with your surgeon, who will instruct you further regarding activity restrictions.  - Walking is recommended, ambulate as tolerated    Medications: Please resume all regular home medications unless specifically advised not to take a particular medication.     Warning Signs:  Please call your doctor or nurse practitioner if you experience the following:  *You experience new chest pain, pressure, squeezing or tightness.  *New or worsening cough, shortness of breath, or wheeze.  *If you are vomiting and cannot keep down fluids or your medications.  *You are getting dehydrated due to continued vomiting, diarrhea, or other reasons. Signs of dehydration include dry mouth, rapid heartbeat, or feeling dizzy or faint when standing.  *Your pain is not improving within 8-12 hours or is not gone within 24 hours.  *You have shaking chills, or fever greater than 101.5 degrees Fahrenheit or 38 degrees Celsius.  *Any change in your symptoms, or any new symptoms that concern you.     PLEASE CALL THE OFFICE WITH ANY QUESTIONS OR CONCERNS:

## 2025-02-24 NOTE — PROGRESS NOTE ADULT - SUBJECTIVE AND OBJECTIVE BOX
OTOLARYNGOLOGY (ENT) PROGRESS NOTE     Interval:  2/20: AFVSS, MAPs 80-100s, had to be given labetalol 2x for sBPs in 170s. Otherwise doing well this morning, awake/alert, pain controlled. Strong doppler. External skin paddle looks healthy with good BRB on pinprick. Intraoral aspect of flap is very dry with heavy crusting. Neck flat,  2/21; Patient seen this morning, noted to have a 2 min of afib with RVR overnight after urinating which self resolved. MAPs 80-100s,  strong doppler. External skin paddle looks healthy. Intraoral aspect of flap much less dry this morning. Neck flat, penrose with minimal serosang discharge. Leg incision c/d/I. FRANTZ was not holding so we put on LIWS. Was OOBTC yesterday. Said he had increased urinary frequency overnight.  2/22: AFVSS. No acute events overnight. Patient seen and examined at bedside. Flap exam stable w/ strong doppler signal. FRANTZ leg on LIWS. No complaints, tolerating full liquids.   2/23: AFVSS. No acute events overnight. Patient seen and examined at bedside. Intraoral flap w/ good color and turgor, strong doppler signal. FRANTZ leg no output. Tolerating fulls. OOBTC yesterday.  2/24: AFVSS. NAEON. patient seen and examined at bedside. stable flap exam w. strong doppler sounds and goof color and turgor. FRANTZ dc'd yesterday. Tolerating fulls. ambulating appropriately.                     Objective:    Vital Signs:  T(C): 36.2 (02-24-25 @ 04:37), Max: 37.2 (02-23-25 @ 22:24)  HR: 76 (02-24-25 @ 05:10) (68 - 96)  BP: 139/77 (02-24-25 @ 05:10) (116/75 - 160/77)  RR: 17 (02-24-25 @ 05:10) (16 - 18)  SpO2: 94% (02-24-25 @ 05:10) (93% - 97%)    PHYSICAL EXAM:  GEN: WDWN, appears stated age, NAD  NEURO: alert & oriented x 4/4  HEENT: face symmetric, oropharynx moist without exudates, CN VII/XI/XII intact  CVS: regular rate and rhythm, no ectopy on monitor  Pulm: normal respiratory excursions, not tachypneic, no labored breathing  Abd: non-distended  Ext: moving all four extremities, no peripheral edema noted     LINES/DRAINS/AIRWAY:  ***    LABORATORY:                        12.7   6.65  )-----------( 166      ( 24 Feb 2025 05:30 )             40.5    02-24    137  |  104  |  17  ----------------------------<  87  3.9   |  20[L]  |  0.83    Ca    9.4      24 Feb 2025 05:30  Phos  2.5     02-24  Mg     1.9     02-24     [id number]    MICROBIOLOGY:      I&O:    02-23-25 @ 07:01  -  02-24-25 @ 07:00  --------------------------------------------------------  IN: 0 mL / OUT: 1200 mL / NET: -1200 mL         IMAGING:     MEDICATIONS:  acetaminophen   Oral Liquid .. 1000 milliGRAM(s) Oral every 6 hours  albuterol/ipratropium for Nebulization 3 milliLiter(s) Nebulizer every 6 hours  chlorhexidine 0.12% Liquid 15 milliLiter(s) Oral Mucosa two times a day  enoxaparin Injectable 40 milliGRAM(s) SubCutaneous every 24 hours  finasteride 5 milliGRAM(s) Oral daily  influenza  Vaccine (HIGH DOSE) 0.5 milliLiter(s) IntraMuscular once  melatonin 3 milliGRAM(s) Oral at bedtime PRN  ondansetron Injectable 4 milliGRAM(s) IV Push every 6 hours PRN  oxyCODONE    Solution 2.5 milliGRAM(s) Oral every 6 hours PRN  oxyCODONE    Solution 5 milliGRAM(s) Oral every 6 hours PRN  tamsulosin 0.4 milliGRAM(s) Oral at bedtime      Disposition:   [facility] at [phone] with any questions/concerns.         OTOLARYNGOLOGY (ENT) PROGRESS NOTE     Interval:  2/20: AFVSS, MAPs 80-100s, had to be given labetalol 2x for sBPs in 170s. Otherwise doing well this morning, awake/alert, pain controlled. Strong doppler. External skin paddle looks healthy with good BRB on pinprick. Intraoral aspect of flap is very dry with heavy crusting. Neck flat,  2/21; Patient seen this morning, noted to have a 2 min of afib with RVR overnight after urinating which self resolved. MAPs 80-100s,  strong doppler. External skin paddle looks healthy. Intraoral aspect of flap much less dry this morning. Neck flat, penrose with minimal serosang discharge. Leg incision c/d/I. FRANTZ was not holding so we put on LIWS. Was OOBTC yesterday. Said he had increased urinary frequency overnight.  2/22: AFVSS. No acute events overnight. Patient seen and examined at bedside. Flap exam stable w/ strong doppler signal. FRANTZ leg on LIWS. No complaints, tolerating full liquids.   2/23: AFVSS. No acute events overnight. Patient seen and examined at bedside. Intraoral flap w/ good color and turgor, strong doppler signal. FRANTZ leg no output. Tolerating fulls. OOBTC yesterday.  2/24: AFVSS. NAEON. patient seen and examined at bedside. stable flap exam w. strong doppler sounds and goof color and turgor. FRANTZ dc'd yesterday. Tolerating fulls. ambulating appropriately.                     Objective:    Vital Signs:  T(C): 36.2 (02-24-25 @ 04:37), Max: 37.2 (02-23-25 @ 22:24)  HR: 76 (02-24-25 @ 05:10) (68 - 96)  BP: 139/77 (02-24-25 @ 05:10) (116/75 - 160/77)  RR: 17 (02-24-25 @ 05:10) (16 - 18)  SpO2: 94% (02-24-25 @ 05:10) (93% - 97%)    PHYSICAL EXAM:  GEN: appears stated age, NAD  NEURO: alert & oriented x 4/4  HEENT:  skin paddle on left nasolabial fold with nylons, cook doppler strong, of oral crusting/dried blood in mouth improving   CVS: regular rate and rhythm  Pulm: normal respiratory excursions, not tachypneic, no labored breathing  Abd: non-distended  Ext: incision site c/d/i    LABORATORY:                        12.7   6.65  )-----------( 166      ( 24 Feb 2025 05:30 )             40.5    02-24    137  |  104  |  17  ----------------------------<  87  3.9   |  20[L]  |  0.83    Ca    9.4      24 Feb 2025 05:30  Phos  2.5     02-24  Mg     1.9     02-24     [id number]    MICROBIOLOGY:      I&O:    02-23-25 @ 07:01  -  02-24-25 @ 07:00  --------------------------------------------------------  IN: 0 mL / OUT: 1200 mL / NET: -1200 mL         IMAGING:     MEDICATIONS:  acetaminophen   Oral Liquid .. 1000 milliGRAM(s) Oral every 6 hours  albuterol/ipratropium for Nebulization 3 milliLiter(s) Nebulizer every 6 hours  chlorhexidine 0.12% Liquid 15 milliLiter(s) Oral Mucosa two times a day  enoxaparin Injectable 40 milliGRAM(s) SubCutaneous every 24 hours  finasteride 5 milliGRAM(s) Oral daily  influenza  Vaccine (HIGH DOSE) 0.5 milliLiter(s) IntraMuscular once  melatonin 3 milliGRAM(s) Oral at bedtime PRN  ondansetron Injectable 4 milliGRAM(s) IV Push every 6 hours PRN  oxyCODONE    Solution 2.5 milliGRAM(s) Oral every 6 hours PRN  oxyCODONE    Solution 5 milliGRAM(s) Oral every 6 hours PRN  tamsulosin 0.4 milliGRAM(s) Oral at bedtime      Disposition:   [facility] at [phone] with any questions/concerns.

## 2025-02-27 DIAGNOSIS — Z92.3 PERSONAL HISTORY OF IRRADIATION: ICD-10-CM

## 2025-02-27 DIAGNOSIS — I42.1 OBSTRUCTIVE HYPERTROPHIC CARDIOMYOPATHY: ICD-10-CM

## 2025-02-27 DIAGNOSIS — C44.321 SQUAMOUS CELL CARCINOMA OF SKIN OF NOSE: ICD-10-CM

## 2025-02-27 DIAGNOSIS — N40.0 BENIGN PROSTATIC HYPERPLASIA WITHOUT LOWER URINARY TRACT SYMPTOMS: ICD-10-CM

## 2025-02-27 DIAGNOSIS — I48.0 PAROXYSMAL ATRIAL FIBRILLATION: ICD-10-CM

## 2025-02-27 DIAGNOSIS — J44.9 CHRONIC OBSTRUCTIVE PULMONARY DISEASE, UNSPECIFIED: ICD-10-CM

## 2025-02-27 DIAGNOSIS — J34.89 OTHER SPECIFIED DISORDERS OF NOSE AND NASAL SINUSES: ICD-10-CM

## 2025-03-11 ENCOUNTER — APPOINTMENT (OUTPATIENT)
Dept: OTOLARYNGOLOGY | Facility: CLINIC | Age: 75
End: 2025-03-11
Payer: COMMERCIAL

## 2025-03-11 VITALS
OXYGEN SATURATION: 97 % | SYSTOLIC BLOOD PRESSURE: 100 MMHG | DIASTOLIC BLOOD PRESSURE: 70 MMHG | HEIGHT: 62 IN | TEMPERATURE: 96.2 F | BODY MASS INDEX: 23.55 KG/M2 | WEIGHT: 128 LBS | HEART RATE: 97 BPM

## 2025-03-11 PROCEDURE — 99214 OFFICE O/P EST MOD 30 MIN: CPT | Mod: 24

## 2025-04-08 ENCOUNTER — APPOINTMENT (OUTPATIENT)
Dept: OTOLARYNGOLOGY | Facility: CLINIC | Age: 75
End: 2025-04-08

## 2025-04-08 ENCOUNTER — NON-APPOINTMENT (OUTPATIENT)
Age: 75
End: 2025-04-08

## 2025-04-08 VITALS
HEART RATE: 96 BPM | SYSTOLIC BLOOD PRESSURE: 133 MMHG | TEMPERATURE: 98.2 F | OXYGEN SATURATION: 97 % | DIASTOLIC BLOOD PRESSURE: 74 MMHG

## 2025-04-08 VITALS — SYSTOLIC BLOOD PRESSURE: 114 MMHG | DIASTOLIC BLOOD PRESSURE: 72 MMHG

## 2025-04-08 PROCEDURE — 99214 OFFICE O/P EST MOD 30 MIN: CPT | Mod: 25

## 2025-04-08 PROCEDURE — 31231 NASAL ENDOSCOPY DX: CPT | Mod: 78

## 2025-05-06 ENCOUNTER — APPOINTMENT (OUTPATIENT)
Dept: OTOLARYNGOLOGY | Facility: CLINIC | Age: 75
End: 2025-05-06
Payer: COMMERCIAL

## 2025-05-06 VITALS
SYSTOLIC BLOOD PRESSURE: 137 MMHG | HEART RATE: 73 BPM | TEMPERATURE: 98.3 F | DIASTOLIC BLOOD PRESSURE: 87 MMHG | OXYGEN SATURATION: 98 %

## 2025-05-06 PROCEDURE — 99215 OFFICE O/P EST HI 40 MIN: CPT | Mod: 24

## 2025-05-06 RX ORDER — MUPIROCIN 20 MG/G
2 OINTMENT TOPICAL TWICE DAILY
Qty: 1 | Refills: 1 | Status: ACTIVE | COMMUNITY
Start: 2025-05-06 | End: 1900-01-01

## 2025-05-27 ENCOUNTER — APPOINTMENT (OUTPATIENT)
Dept: OTOLARYNGOLOGY | Facility: CLINIC | Age: 75
End: 2025-05-27
Payer: SELF-PAY

## 2025-05-27 VITALS — HEIGHT: 62 IN | WEIGHT: 137 LBS | BODY MASS INDEX: 25.21 KG/M2

## 2025-05-27 DIAGNOSIS — J34.89 OTHER SPECIFIED DISORDERS OF NOSE AND NASAL SINUSES: ICD-10-CM

## 2025-05-27 DIAGNOSIS — M95.0 ACQUIRED DEFORMITY OF NOSE: ICD-10-CM

## 2025-05-27 DIAGNOSIS — J34.829 NASAL VALVE COLLAPSE, UNSPECIFIED: ICD-10-CM

## 2025-05-27 PROCEDURE — 99213 OFFICE O/P EST LOW 20 MIN: CPT

## 2025-06-11 NOTE — ASSESSMENT
Tim Burgos MD   [facility]  [address] [place] [address]     PATIENT NAME: Victoria Delarosa  : 1954  DATE: 25  MRN: [id number]      Billing Provider: Tim Burgos MD  Level of Service: OR OFFICE/OUTPT VISIT, EST, LEVL III, 20-29 MIN  Patient PCP Information       Provider PCP Type    Tim Burgos MD General            Reason for Visit / Chief Complaint: Urinary Tract Infection (Urine odor, left low back pain x 1.5 week. Taking daily Macrobid.)         History of Present Illness / Problem Focused Workflow     Victoria Delarosa presents to the clinic with Urinary Tract Infection (Urine odor, left low back pain x 1.5 week. Taking daily Macrobid.)     HPI    Review of Systems     Review of Systems   Constitutional:  Negative for activity change, appetite change, fatigue and fever.   HENT:  Negative for nasal congestion, ear pain, hearing loss, sinus pressure/congestion and sore throat.    Respiratory:  Negative for cough, chest tightness and shortness of breath.    Cardiovascular:  Negative for chest pain and palpitations.   Gastrointestinal:  Negative for abdominal pain and fecal incontinence.   Genitourinary:  Positive for difficulty urinating, dysuria, flank pain and hematuria. Negative for bladder incontinence.   Musculoskeletal:  Negative for arthralgias.   Integumentary:  Negative for rash.   Neurological:  Negative for dizziness and headaches.        Medical / Social / Family History     Past Medical History:   Diagnosis Date    Allergy to statin medication 2022    Anxiety state     Benign neuroendocrine tumor of stomach 2022    right side    BMI 32.0-32.9,adult     Bronchitis     CHF (congestive heart failure)     Cobalamin deficiency     Constipation 2023    Contusion of left knee 2022    Contusion of right knee 2022    Deep vein thrombosis     Depressive disorder     Diarrhea 2022    Fall 2022    General  [FreeTextEntry1] : Mr. KATZ was evaluated for the following issues today: \par \par \par 1.) Nasal cavity debrided.  Crusting accumulates since has exposed bone.  S/p removal of extruding bone graft and nasal envelope reconstruction over 4 weeks ago.  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  Difficult to examine cavity due to crusting and CT form 3/01/21 with abnormal soft tissue thickening inferior to left nasal area\par \par 4.) Right posterior neck pain - likely musculoskeletal.  Degenerative changes seen on CT scan\par - send C-spine report to Dr. Ovalles\par \par Come in 1-3 days prior to PET so i can debride nose.\par  anesthetics causing adverse effect in therapeutic use     GERD (gastroesophageal reflux disease)     Head injury 08/12/2022    Heart attack     20 yrs ago    Hypertension     Hypothyroidism     Lower extremity edema     Neuropathy     Osteoarthritis of both knees     Pain in left leg     Pain in right leg     Peripheral edema     Pulmonary embolism 2017    Stroke     Varicose veins of bilateral lower extremities with pain     Vitamin D deficiency        Past Surgical History:   Procedure Laterality Date    ABLATION, CHEMICAL SEALANT, VARICOSE VEIN Left 8/30/2024    Procedure: Left GSV VenaSeal Ablation;  Surgeon: Chris Hutchison DO;  Location: [facility];  Service: Peripheral Vascular;  Laterality: Left;    ABLATION, CHEMICAL SEALANT, VARICOSE VEIN Left 9/6/2024    Procedure: Left SSV VenaSeal Ablation;  Surgeon: Chris Hutchison DO;  Location: [facility];  Service: Peripheral Vascular;  Laterality: Left;    BILATERAL TUBAL LIGATION      CARDIAC CATHETERIZATION      COLONOSCOPY      EYE SURGERY      knee scope Left     ROBOT-ASSISTED LAPAROSCOPIC REPAIR OF INGUINAL HERNIA USING DA YENNI XI Bilateral 5/16/2025    Procedure: XI ROBOTIC REPAIR, INGUINAL HERNIA;  Surgeon: Misael Quintero DO;  Location: [facility] OR;  Service: General;  Laterality: Bilateral;    SCLEROTHERAPY WITH ULTRASOUND GUIDANCE Right 08/29/2024    Right Distal GSV Varithena Ablation performed by Dr. Misael Hutchison    THYROIDECTOMY, PARTIAL      TONSILLECTOMY, ADENOIDECTOMY      TOTAL KNEE ARTHROPLASTY Bilateral     TOTAL KNEE ARTHROPLASTY Right     TUBAL LIGATION      VAGINAL DELIVERY      x 3    XI ROBOTIC REPAIR, UMBILICAL HERNIA N/A 5/16/2025    Procedure: XI ROBOTIC REPAIR, UMBILICAL HERNIA;  Surgeon: Misael Quintero DO;  Location: [facility] OR;  Service: General;  Laterality: N/A;       Social History  Victoria Delarosa  reports that she has never smoked. She has been exposed to tobacco smoke. She has never used smokeless  tobacco. She reports that she does not drink alcohol and does not use drugs.    Family History  Victoria Delarosa  family history includes Arthritis in her brother and sister; Drug abuse in her son; Heart disease in her brother, mother, and sister; Hypertension in her mother; Liver disease in her father; No Known Problems in her brother, daughter, and son; Stroke in her brother.    Medications and Allergies     Medications  Outpatient Medications Marked as Taking for the 6/11/25 encounter (Office Visit) with Tim Burgos MD   Medication Sig Dispense Refill    coal tar (NEUTROGENA T-GEL) 0.5 % shampoo Apply topically nightly as needed. 240 mL 12    cyanocobalamin 1,000 mcg/mL injection INJECT 1 ML EVERY MONTH 1 mL 11    ezetimibe (ZETIA) 10 mg tablet Take 1 tablet (10 mg total) by mouth once daily. 90 tablet 1    furosemide (LASIX) 20 MG tablet Take 1 tablet (20 mg total) by mouth once daily. 90 tablet 1    levothyroxine (SYNTHROID) 25 MCG tablet Take 1 tablet (25 mcg total) by mouth once daily. 90 tablet 1    magnesium oxide (MAG-OX) 400 mg (241.3 mg magnesium) tablet Take 1 tablet (400 mg total) by mouth once daily. 90 tablet 1    nitrofurantoin, macrocrystal-monohydrate, (MACROBID) 100 MG capsule Take 1 capsule (100 mg total) by mouth once daily. 30 capsule 5    oxybutynin (DITROPAN) 5 MG Tab Take 1 tablet (5 mg total) by mouth 2 (two) times daily. 180 tablet 1    potassium chloride (KLOR-CON) 10 MEQ TbSR Take 1 tablet (10 mEq total) by mouth once daily. 90 tablet 1    ranolazine (RANEXA) 500 MG Tb12 Take 1 tablet (500 mg total) by mouth 2 (two) times daily. 180 tablet 1    sertraline (ZOLOFT) 100 MG tablet Take 1 tablet (100 mg total) by mouth once daily. 90 tablet 1    spironolactone (ALDACTONE) 25 MG tablet Take 1 tablet (25 mg total) by mouth once daily. 90 tablet 1    traMADoL (ULTRAM) 50 mg tablet Take 1 tablet (50 mg total) by mouth every 6 (six) hours as needed for Pain. 20 tablet 0    [DISCONTINUED]  warfarin (COUMADIN) 5 MG tablet Take 1 tablet (5 mg total) by mouth Daily. 90 tablet 1       Allergies  Review of patient's allergies indicates:   Allergen Reactions    Bactrim [sulfamethoxazole-trimethoprim]      Patient states this medication made her tongue feel like it was on fire and dropped her INR.     Lisinopril      Cough      Statins-hmg-coa reductase inhibitors Hives and Itching       Physical Examination     Vitals:    06/11/25 1045   BP: 132/82   Pulse: 78   Temp: 98 °F (36.7 °C)     Physical Exam  Constitutional:       Appearance: Normal appearance.   HENT:      Head: Normocephalic and atraumatic.      Right Ear: Tympanic membrane normal.      Left Ear: Tympanic membrane normal.      Nose: No congestion or rhinorrhea.      Mouth/Throat:      Pharynx: No posterior oropharyngeal erythema.   Eyes:      Pupils: Pupils are equal, round, and reactive to light.   Cardiovascular:      Rate and Rhythm: Normal rate and regular rhythm.      Pulses: Normal pulses.      Heart sounds: Normal heart sounds.   Pulmonary:      Breath sounds: No wheezing or rhonchi.   Abdominal:      Palpations: Abdomen is soft.   Lymphadenopathy:      Cervical: No cervical adenopathy.   Skin:     General: Skin is warm and dry.   Neurological:      Mental Status: She is alert.          Assessment and Plan (including Health Maintenance)   :    Plan:         Health Maintenance Due   Topic Date Due    TETANUS VACCINE  Never done    RSV Vaccine (Age 60+ and Pregnant patients) (1 - Risk 60-74 years 1-dose series) Never done    Pneumococcal Vaccines (Age 50+) (3 of 3 - PCV20 or PCV21) 06/04/2024    COVID-19 Vaccine (4 - 2024-25 season) 09/01/2024    Shingles Vaccine (2 of 2) 10/09/2024    Mammogram  06/04/2025    DEXA Scan  08/10/2025       Problem List Items Addressed This Visit    None  Visit Diagnoses         Dysuria    -  Primary    Relevant Orders    POCT URINALYSIS W/O SCOPE (Completed)      Acute cystitis with hematuria               Dysuria  -     POCT URINALYSIS W/O SCOPE    Acute cystitis with hematuria    Other orders  -     cefTRIAXone injection 1 g  -     clindamycin (CLEOCIN) 300 MG capsule; Take 1 capsule (300 mg total) by mouth 3 (three) times daily.  Dispense: 21 capsule; Refill: 0       Health Maintenance Topics with due status: Not Due       Topic Last Completion Date    Colorectal Cancer Screening 11/28/2022    Influenza Vaccine 01/10/2024    Lipid Panel 05/02/2025       Procedures     Future Appointments   Date Time Provider Department Center   7/22/2025  8:20 AM [facility]   8/19/2025  8:30 [facility] [place]   9/9/2025 10:15 AM Chris Hutchison DO [facility]        No follow-ups on file.       Signature:  Tim Burgos MD  [facility]  [address] [place] [address]  [phone] Phone  [phone] Fax    Date of encounter: 6/11/25

## 2025-09-09 ENCOUNTER — APPOINTMENT (OUTPATIENT)
Dept: OTOLARYNGOLOGY | Facility: CLINIC | Age: 75
End: 2025-09-09
Payer: COMMERCIAL

## 2025-09-09 VITALS
SYSTOLIC BLOOD PRESSURE: 122 MMHG | OXYGEN SATURATION: 95 % | BODY MASS INDEX: 25.76 KG/M2 | TEMPERATURE: 98 F | WEIGHT: 140 LBS | HEART RATE: 90 BPM | HEIGHT: 62 IN | DIASTOLIC BLOOD PRESSURE: 82 MMHG

## 2025-09-09 PROCEDURE — 99214 OFFICE O/P EST MOD 30 MIN: CPT | Mod: 25

## 2025-09-09 PROCEDURE — 31231 NASAL ENDOSCOPY DX: CPT

## (undated) DEVICE — FOLEY TRAY 16FR 5CC LF UMETER CLOSED

## (undated) DEVICE — SAW BLADE STRYKER PRECISION 9X0.51X31MM

## (undated) DEVICE — STAPLER SKIN PROXIMATE

## (undated) DEVICE — NDL ELECTRODE ULTRACLEAN 6

## (undated) DEVICE — DRAPE TOWEL BLUE 17" X 24"

## (undated) DEVICE — DRAIN PENROSE 5/8" X 18" LATEX

## (undated) DEVICE — POSITIONER FOAM EGG CRATE ULNAR 2PCS (PINK)

## (undated) DEVICE — SPONGE PVP PAINT SPONGE STICKS

## (undated) DEVICE — GLV 6.5 PROTEXIS (WHITE)

## (undated) DEVICE — DRAIN SILICONE ROUND 9FR

## (undated) DEVICE — DRSG MASTISOL

## (undated) DEVICE — SUT MONOCRYL 3-0 27" PS-2 UNDYED

## (undated) DEVICE — GLV 7.5 PROTEXIS (WHITE)

## (undated) DEVICE — BIPOLAR FORCEP SYMMETRY BAYONET STR 8.25" X 1.5MM (BLUE)

## (undated) DEVICE — PETRI DISH MED 3.5"

## (undated) DEVICE — PACK UPPER BODY

## (undated) DEVICE — CATH ANGIO 14G X 3.25"

## (undated) DEVICE — DRAPE INSTRUMENT POUCH 6.75" X 11"

## (undated) DEVICE — SUT VICRYL 4-0 18" P-3 UNDYED

## (undated) DEVICE — APPLICATOR COTTON TIP 6"

## (undated) DEVICE — BUR STRYKER CARBIDE CROSS CUT FISSURE 1.2MM

## (undated) DEVICE — ELCTR BOVIE PENCIL BLADE 10FT

## (undated) DEVICE — ELCTR BOVIE SUCTION 8FR 6"

## (undated) DEVICE — SYR CONTROL LUER LOK 10CC

## (undated) DEVICE — DRAPE MICROSCOPE LEICA 54" X 150"

## (undated) DEVICE — MARKING PEN W RULER

## (undated) DEVICE — DRSG XEROFORM 5 X 9"

## (undated) DEVICE — DRAPE SPLIT SHEET 77" X 108"

## (undated) DEVICE — SUT CHROMIC 4-0 27" SH

## (undated) DEVICE — SPECIMEN CONTAINER 100ML

## (undated) DEVICE — WARMING BLANKET LOWER ADULT

## (undated) DEVICE — SUT SILK 3-0 18" TIES

## (undated) DEVICE — BUR STRYKER EGG 4MM

## (undated) DEVICE — BUR STRYKER CROSS CUT 1.6MM

## (undated) DEVICE — SYR LUER LOK 50CC

## (undated) DEVICE — ELCTR BOVIE TIP BLADE INSULATED 2.75" EDGE

## (undated) DEVICE — SUT PROLENE 2-0 30" CT-2

## (undated) DEVICE — SUT CHROMIC 3-0 27" SH

## (undated) DEVICE — SPONGE PEANUT AUTO COUNT

## (undated) DEVICE — PREP BETADINE KIT

## (undated) DEVICE — VENODYNE/SCD SLEEVE CALF MEDIUM

## (undated) DEVICE — SUT SILK 2-0 12-18"

## (undated) DEVICE — DRSG STERISTRIPS 0.25 X 4"

## (undated) DEVICE — URETERAL CATH RED RUBBER 10FR (BLACK)

## (undated) DEVICE — SUT PROLENE 2-0 30" SH

## (undated) DEVICE — LONE STAR RETRACTOR RING 12MM BLUNT DISP

## (undated) DEVICE — TUBING SUCTION NONCONDUCTIVE 6MM X 12FT

## (undated) DEVICE — MARKING PEN DEVON DUAL TIP W RULER

## (undated) DEVICE — ELCTR GROUNDING PAD ADULT COVIDIEN

## (undated) DEVICE — SUT PROLENE 5-0 18" P-3

## (undated) DEVICE — DRSG STERISTRIPS 0.5 X 4"

## (undated) DEVICE — TUBING SUCTION 20FT

## (undated) DEVICE — BLOCK SILICON

## (undated) DEVICE — WARMING BLANKET UPPER ADULT

## (undated) DEVICE — BIPOLAR FORCEP KIRWAN JEWELERS STR 4" X 0.4MM W 12FT CORD (GREEN)

## (undated) DEVICE — SUT MONOCRYL 4-0 27" PS-2 UNDYED

## (undated) DEVICE — SUT SILK 0 30" TIES

## (undated) DEVICE — ELCTR BIPOLAR CORD 12FT

## (undated) DEVICE — PACK RHINOPLASTY

## (undated) DEVICE — SYR LUER LOK 5CC

## (undated) DEVICE — GOWN LG

## (undated) DEVICE — SOL ANTI FOG

## (undated) DEVICE — LIGASURE EXACT DISSECTOR

## (undated) DEVICE — DRSG TELFA 2 X 3

## (undated) DEVICE — SUT VICRYL 3-0 27" SH

## (undated) DEVICE — Device

## (undated) DEVICE — POLISHER OR CAUTERY TIP

## (undated) DEVICE — SAW BLADE STRYKER RECIPROCATING 22.5MMX0.38MM

## (undated) DEVICE — SUT VICRYL 5-0 18" P-3 UNDYED

## (undated) DEVICE — ELCTR BOVIE PENCIL HANDPIECE ROCKER SWITCH 15FT

## (undated) DEVICE — FOLEY TRAY 16FR 5CC LF LUBRISIL ADVANCE TEMP CLOSED

## (undated) DEVICE — SUT PDS II 4-0 27" RB-1

## (undated) DEVICE — BUR STRYKER CARBIDE ROUND 4MM

## (undated) DEVICE — SHEARS HARMONIC FOCUS CURVED SHEARS 9CM

## (undated) DEVICE — DRSG TEGADERM 2.5 X 3"

## (undated) DEVICE — SYR LUER LOK 3CC

## (undated) DEVICE — ELCTR STRYKER NEPTUNE SMOKE EVACUATION PENCIL (GREEN)

## (undated) DEVICE — SAW BLADE STRYKER RECIPROCATING 27MMX0.38MM

## (undated) DEVICE — NEURO SURGICAL STRIP 1/2 X 6"

## (undated) DEVICE — SAW BLADE STRYKER PRECISION THIN SAGITTAL MEDIUM 9MM X 25MM

## (undated) DEVICE — BLADE SURGICAL #15 CARBON

## (undated) DEVICE — PROBE PRASS SLIM MONOPOLAR STIMULATOR

## (undated) DEVICE — SPEAR SURG EYE WECK-CELL CELOS

## (undated) DEVICE — GLV 7 PROTEXIS (WHITE)

## (undated) DEVICE — DRSG GAUZE VASELINE PETROLEUM 3 X 9"

## (undated) DEVICE — DRAPE MAGNETIC INSTRUMENT MEDIUM

## (undated) DEVICE — SUCTION YANKAUER BULBOUS TIP W VENT

## (undated) DEVICE — S&N ARTHROCARE ENT WAND REFLEX ULTRA 45

## (undated) DEVICE — DRSG GAUZE SPONGE 2X2" STERILE

## (undated) DEVICE — NDL COUNTER FOAM AND MAGNET 20-40

## (undated) DEVICE — DRAPE SURGICAL #1010

## (undated) DEVICE — CANNULA ANT CHMBR 27GX22MM

## (undated) DEVICE — DRAPE MAYO STAND 30"

## (undated) DEVICE — POSITIONER FOAM HEAD DONUT 9" (PINK)

## (undated) DEVICE — VESSEL LOOP MAXI-BLUE 0.120" X 16"

## (undated) DEVICE — ELCTR COLORADO 3CM

## (undated) DEVICE — DRSG TELFA 3 X 8

## (undated) DEVICE — SUT PLAIN GUT 4-0 18" SC-1

## (undated) DEVICE — LUBRICATING JELLY ONESHOT 1.25OZ

## (undated) DEVICE — GEL AQUSNC PACKET 20GR

## (undated) DEVICE — LONE STAR ELASTIC STAY HOOK 12MM BLUNT

## (undated) DEVICE — BLADE SCALPEL SAFETYLOCK #15

## (undated) DEVICE — SUT NYLON 8-0 5" DRM6

## (undated) DEVICE — NDL HYPO REGULAR BEVEL 27G X 1.25" (GRAY)

## (undated) DEVICE — SOCK SPECIMEN 3/8" MALE PORT

## (undated) DEVICE — SUT PLAIN GUT 4-0 18" P-3

## (undated) DEVICE — GLV 6 PROTEXIS (WHITE)

## (undated) DEVICE — SPECIMEN CONTAINER 4OZ

## (undated) DEVICE — WARMING BLANKET FULL UNDERBODY ADULT

## (undated) DEVICE — CATH IV SAFE INSYTE 18G X 1.16" (GREEN)

## (undated) DEVICE — SOL INJ NS 0.9% 100ML

## (undated) DEVICE — SUT SILK 2-0 30" PSL

## (undated) DEVICE — SUT VICRYL 3-0 18" SH (POP-OFF)

## (undated) DEVICE — NDL HYPO SAFE 22G X 1.5" (BLACK)

## (undated) DEVICE — POSITIONER PATIENT SAFETY STRAP 3X60"

## (undated) DEVICE — NDL HYPO SAFE 25G X 1.5" (ORANGE)

## (undated) DEVICE — PREP BETADINE SPONGE STICKS

## (undated) DEVICE — KIT DRAIN 100CC W/10MM PERF

## (undated) DEVICE — DRAIN RESERVOIR FOR JACKSON PRATT 100CC CARDINAL